# Patient Record
Sex: FEMALE | Race: WHITE | HISPANIC OR LATINO | Employment: OTHER | URBAN - METROPOLITAN AREA
[De-identification: names, ages, dates, MRNs, and addresses within clinical notes are randomized per-mention and may not be internally consistent; named-entity substitution may affect disease eponyms.]

---

## 2017-01-21 ENCOUNTER — HOSPITAL ENCOUNTER (EMERGENCY)
Facility: HOSPITAL | Age: 82
Discharge: HOME | End: 2017-01-21
Attending: EMERGENCY MEDICINE
Payer: MEDICARE

## 2017-01-21 VITALS
DIASTOLIC BLOOD PRESSURE: 61 MMHG | OXYGEN SATURATION: 95 % | RESPIRATION RATE: 18 BRPM | SYSTOLIC BLOOD PRESSURE: 138 MMHG | HEART RATE: 96 BPM | WEIGHT: 141.13 LBS | HEIGHT: 60 IN | BODY MASS INDEX: 27.71 KG/M2

## 2017-01-21 DIAGNOSIS — R53.83 FATIGUE, UNSPECIFIED TYPE: Primary | ICD-10-CM

## 2017-01-21 LAB — POCT GLUCOSE: 167 MG/DL (ref 70–110)

## 2017-01-21 PROCEDURE — 25000003 PHARM REV CODE 250: Performed by: PHYSICIAN ASSISTANT

## 2017-01-21 PROCEDURE — 99284 EMERGENCY DEPT VISIT MOD MDM: CPT | Mod: 25

## 2017-01-21 PROCEDURE — 99284 EMERGENCY DEPT VISIT MOD MDM: CPT | Mod: ,,, | Performed by: EMERGENCY MEDICINE

## 2017-01-21 PROCEDURE — 82962 GLUCOSE BLOOD TEST: CPT

## 2017-01-21 RX ORDER — KETOROLAC TROMETHAMINE 10 MG/1
10 TABLET, FILM COATED ORAL
Status: COMPLETED | OUTPATIENT
Start: 2017-01-21 | End: 2017-01-21

## 2017-01-21 RX ORDER — METHOCARBAMOL 500 MG/1
500 TABLET, FILM COATED ORAL 3 TIMES DAILY PRN
Qty: 15 TABLET | Refills: 0 | Status: SHIPPED | OUTPATIENT
Start: 2017-01-21 | End: 2017-01-24 | Stop reason: SDUPTHER

## 2017-01-21 RX ORDER — METHOCARBAMOL 500 MG/1
1000 TABLET, FILM COATED ORAL
Status: COMPLETED | OUTPATIENT
Start: 2017-01-21 | End: 2017-01-21

## 2017-01-21 RX ADMIN — METHOCARBAMOL 1000 MG: 500 TABLET ORAL at 12:01

## 2017-01-21 RX ADMIN — KETOROLAC TROMETHAMINE 10 MG: 10 TABLET, FILM COATED ORAL at 12:01

## 2017-01-21 NOTE — ED NOTES
"Pt has "unusual feeling that she is very very weak. She has pain all over." pt is Serbian speaking. Son at bedside for translation. Pt began complaining this morning of pain in the back of the head. Weakness began 2 days ago.   "

## 2017-01-21 NOTE — ED PROVIDER NOTES
Encounter Date: 1/21/2017    SCRIBE #1 NOTE: I, Danisha Grimaldo, am scribing for, and in the presence of,  Dr. Dinh. I have scribed the following portions of the note - the APC attestation.       History     Chief Complaint   Patient presents with    Neck Pain      neck hurts with any movement, pain to back of head, hurt all over and feeling weak     Review of patient's allergies indicates:   Allergen Reactions    No known drug allergies      HPI Comments: Patient is a 81 year old  female with PMH of HTN, HLD, DM II who presents due to a 2 day history of generalized body pain and weakness.  Patient son is in the room at time of exam and served as the .  Patient states that she has had pain for the past few days that is worse in the back of her head, neck, and shoulders and also states she is more fatigued then normal. Patient also admits to increased stress due to a recent death in the family. Patient denies nausea, vomiting, fevers, chills, chest pain, shortness of breath, dizziness, or any other acute complaints.  Patient has no alleviating or aggravating factors.      The history is provided by the patient.     Past Medical History   Diagnosis Date    Arthritis     Breast cancer 2001    Diabetes mellitus     History of breast cancer 8/21/2013    Hyperlipidemia     Hypertension     Nuclear sclerotic cataract of right eye 10/16/2012    Tubular adenoma of colon 10/28/2013    Type II or unspecified type diabetes mellitus with neurological manifestations, not stated as uncontrolled     Vitamin D deficiency disease 2/17/2014     Past Medical History Pertinent Negatives   Diagnosis Date Noted    Acute leukemia 8/10/2015    Acute pancreatitis 8/10/2015    Alcohol dependence 8/10/2015    Alzheimer's disease 8/10/2015    Amblyopia 8/17/2012    Anemia 8/10/2015    Angina pectoris 8/10/2015    Anxiety 8/10/2015    Aplasia bone marrow 8/10/2015    Asthma 8/10/2015    Atrial  fibrillation 8/10/2015    Back pain 8/10/2015    Bipolar disorder 8/10/2015    Bladder cancer 8/10/2015    Bone cancer 8/10/2015    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    Brain cancer 8/10/2015    Cancer of lymphatic and hematopoietic tissue 8/10/2015    Cerebral palsy 8/10/2015    Cervical cancer 8/10/2015    Chronic bronchitis 8/10/2015    Chronic hepatitis, unspecified 8/10/2015    Cirrhosis 8/10/2015    Complications of unspecified reattached extremity 8/10/2015    Coronary artery disease 8/10/2015    Cystic fibrosis 8/10/2015    Depression 8/10/2015    Diabetic retinopathy 8/17/2012    Emphysema of lung 8/10/2015    Endometrial cancer 8/10/2015    Esophageal cancer 8/10/2015    Fallopian tube cancer, carcinoma 8/10/2015    Gastrostomy status 8/10/2015    Glaucoma 8/17/2012    Glomerulonephritis 8/10/2015    Heart failure 8/10/2015    Heart transplanted 8/10/2015    Hemolytic anemia 8/10/2015    Hepatitis B 8/10/2015    Hepatitis C 8/10/2015    HIV infection 8/10/2015    Hypothyroidism 8/10/2015    Immune deficiency disorder 8/10/2015    Immune disorder 8/10/2015    Inflammatory bowel disease 8/10/2015    Interstitial nephritis chronic 8/10/2015    Intracranial hemorrhage 8/10/2015    Late complications of amputation stump, unspecified 8/10/2015    Late effect of traumatic amputation 8/10/2015    Leukemia 8/10/2015    Liver cancer 8/10/2015    Liver transplanted 8/10/2015    Lung cancer 8/10/2015    Lung transplanted 8/10/2015    Macular degeneration 8/17/2012    Malnutrition 8/10/2015    Melanoma 8/10/2015    Multiple sclerosis 8/10/2015    Myalgia and myositis, unspecified 8/10/2015    Myocardial infarction 8/10/2015    Obesity 8/10/2015    Osteoporosis 8/10/2015    Other early complications of trauma 8/10/2015    Ovarian cancer 8/10/2015    Pancreatic cancer 8/10/2015    Paranoia 8/10/2015    Parkinson disease 8/10/2015    Peripheral vascular  disease 8/10/2015    Phantom limb (syndrome) 8/10/2015    Pneumonia 8/10/2015    Pneumonia due to other specified bacteria(482.89) 8/10/2015    Polyneuropathy 8/10/2015    Pressure ulcer, unspecified site(707.00) 8/10/2015    Pulmonary embolism 8/10/2015    Rectal cancer 8/10/2015    Renal cancer 8/10/2015    Renal dialysis status(V45.11) 8/10/2015    Respiratory failure 8/10/2015    Retinal detachment 8/17/2012    Schizoaffective disorder 8/10/2015    Seizures 8/10/2015    Septic shock 8/10/2015    Sickle cell anemia 8/10/2015    Skin ulcer 8/10/2015    Sleep apnea 8/10/2015    Small intestine cancer 8/10/2015    Spinal cord disease 8/10/2015    Squamous cell carcinoma 8/10/2015    Stomach cancer 8/10/2015    Strabismus 8/17/2012    Stroke 8/10/2015    Suicide and self-inflicted injury by other specified means 8/10/2015    Testicular cancer 8/10/2015    Thyroid cancer 8/10/2015    Tobacco dependence 8/10/2015    Trouble in sleeping 8/10/2015    Type II or unspecified type diabetes mellitus with peripheral circulatory disorders, not stated as uncontrolled(250.70) 8/10/2015    Type II or unspecified type diabetes mellitus with renal manifestations, not stated as uncontrolled 8/10/2015    Type II or unspecified type diabetes mellitus without mention of complication, not stated as uncontrolled 8/10/2015    Unspecified disease of pancreas 8/10/2015    Unspecified disorder of kidney and ureter 8/10/2015    Urinary incontinence 8/10/2015    Uterine cancer 8/10/2015    Uveitis 8/17/2012    Vaginal cancer 8/10/2015    Vulvar cancer 8/10/2015     Past Surgical History   Procedure Laterality Date    Cataract extraction       both eyes -     Belpharoptosis repair  03/15/13     bilateral-dr. coleman md    Breast surgery Left 2001     lumpectomy    Breast lumpectomy      Cholecystectomy       Done in [place] in the 1980's    Colonoscopy w/ polypectomy      Colonoscopy  N/A 1/19/2016     Procedure: COLONOSCOPY;  Surgeon: BLANCA Guerra MD;  Location: [facility] ([address] [place] [address]);  Service: Endoscopy;  Laterality: N/A;     Family History   Problem Relation Age of Onset    Breast cancer Sister 48     55 second, bilateral    Liver cancer Mother     Early death Mother     Liver cancer Father     Liver cancer Brother     No Known Problems Daughter     No Known Problems Son     Liver cancer Brother     No Known Problems Daughter     Glaucoma Neg Hx     Amblyopia Neg Hx     Blindness Neg Hx     Cancer Neg Hx     Cataracts Neg Hx     Diabetes Neg Hx     Hypertension Neg Hx     Macular degeneration Neg Hx     Retinal detachment Neg Hx     Strabismus Neg Hx     Stroke Neg Hx     Thyroid disease Neg Hx     Cervical cancer Neg Hx     Vaginal cancer Neg Hx     Endometrial cancer Neg Hx      Social History   Substance Use Topics    Smoking status: Never Smoker    Smokeless tobacco: Never Used    Alcohol use No     Review of Systems   Constitutional: Positive for fatigue. Negative for activity change, appetite change, diaphoresis and fever.   HENT: Negative for congestion and hearing loss.    Eyes: Negative for photophobia, pain and discharge.   Respiratory: Negative for apnea, cough, chest tightness, shortness of breath and wheezing.    Cardiovascular: Negative for chest pain and leg swelling.   Gastrointestinal: Negative for abdominal distention, abdominal pain, blood in stool, diarrhea, nausea and vomiting.   Endocrine: Negative for cold intolerance and heat intolerance.   Genitourinary: Negative for dysuria, flank pain and frequency.   Musculoskeletal: Positive for neck pain. Negative for back pain, myalgias and neck stiffness.        Shoulder pain   Neurological: Negative for dizziness, seizures, syncope, weakness, light-headedness and numbness.   Psychiatric/Behavioral: Negative for confusion and hallucinations. The patient is not nervous/anxious.        Physical Exam    Initial Vitals   BP Pulse Resp Temp SpO2   01/21/17 1208 01/21/17 1208 01/21/17 1208 -- 01/21/17 1208   138/61 96 18  95 %     Physical Exam    Nursing note and vitals reviewed.  Constitutional: She appears well-developed and well-nourished.   HENT:   Head: Normocephalic and atraumatic.   Eyes: EOM are normal. Pupils are equal, round, and reactive to light.   Neck: Normal range of motion. Neck supple. No thyromegaly present. No tracheal deviation present.   Cardiovascular: Normal rate and regular rhythm. Exam reveals no gallop and no friction rub.    No murmur heard.  Pulmonary/Chest: Breath sounds normal. No stridor. No respiratory distress. She has no wheezes. She has no rhonchi. She has no rales.   Abdominal: Soft. Bowel sounds are normal. She exhibits no distension. There is no tenderness. There is no rebound and no guarding.   Musculoskeletal: Normal range of motion. She exhibits tenderness. She exhibits no edema.   Tenderness to palpation on shoulders    Neurological: She is alert and oriented to person, place, and time. She displays normal reflexes. No sensory deficit.   Skin: Skin is warm and dry. No rash noted. No erythema.   Psychiatric: She has a normal mood and affect.         ED Course   Procedures  Labs Reviewed   POCT GLUCOSE - Abnormal; Notable for the following:        Result Value    POCT Glucose 167 (*)     All other components within normal limits             Medical Decision Making:   History:   Old Medical Records: I decided to obtain old medical records.  Clinical Tests:   Lab Tests: Ordered and Reviewed       APC / Resident Notes:   Patient is a 82 y/o  female with PMH of DM II, HTN, and HLD who presents due to a two day history of generalized weakness and pain all over.  Vital signs are stable and patient physical exam is fairly unremarkable except for pain on palpation to shoulders bilaterally.  DDx includes but is not limited to headache, muscles spasm, stress, and arthritis.   Will give patient a dose of Robaxin and Toradol and reassess symptoms.    After medication, patient symptoms have improved. Will discharge patient with a dose of Robaxin and patient is to follow up with PCP on 1/24/2017 for follow up and labs.  Patient is instructed to return if symptoms worsen or persist.  Treatment discussed with attending physician and she is agreeable to above plan.          Scribe Attestation:   Scribe #1: I performed the above scribed service and the documentation accurately describes the services I performed. I attest to the accuracy of the note.    Attending Attestation:     Physician Attestation Statement for NP/PA:   I have conducted a face to face encounter with this patient in addition to the NP/PA, due to NP/PA Request    Other NP/PA Attestation Additions:      Medical Decision Making: Patient has upper shoulder and occipital pain that worsens with movement. Her son reports increased stress recently due to death of sister. Her exam is not remarkable and she has no neuro deficits. There was a complete resolution of symptoms after Toradol and Rocephin. Will discharge with Rocephin,continue NSAIDs as needed and to follow up with PCP.        Physician Attestation for Scribe:  Physician Attestation Statement for Scribe #1: I, Dr. Dinh, reviewed documentation, as scribed by Danisha Grimaldo in my presence, and it is both accurate and complete.                 ED Course     Clinical Impression:   The encounter diagnosis was Fatigue, unspecified type.    Disposition:   Disposition: Discharged  Condition: Stable       Joanne Elmore PA-C  01/21/17 [id number]

## 2017-01-21 NOTE — ED NOTES
LOC: The patient is awake, alert and aware of environment with an appropriate affect, the patient is oriented x 3 and speaking appropriately. Pt is German speaking.  APPEARANCE: Patient resting comfortably and in no acute distress, patient is clean and well groomed.  SKIN: The skin is warm and dry, color consistent with ethnicity, patient has normal skin turgor and moist mucus membranes, skin intact.  MUSKULOSKELETAL: Patient moving all extremities well, no obvious swelling or deformities noted.  RESPIRATORY: Airway is open and patent, respirations are spontaneous, patient has a normal effort and rate, no accessory muscle use noted.  NEUROLOGIC: Eyes open spontaneously, behavior appropriate to situation, follows commands

## 2017-01-21 NOTE — ED AVS SNAPSHOT
[facility]  [address] [place] [address]               Sera Paolo   2017 12:11 PM   ED    Descripción:  Female : 1935   Departamento:  [facility]           Torrez Cuidado fue coordinado por:     Provider Role From To    John Paul Dinh MD Attending Provider 17 --    Joanne Elmore PA-C Physician Assistant 17 --      Razón de la felix     Neck Pain           Diagnósticos de Esta Visita        Comentarios    Fatigue, unspecified type    -  Primario       ED Disposition     Ninguna           Lista de tareas           Información de seguimiento     Realice un seguimiento con:  Joycelyn Vásquez MD    Cómo:  Ir a    Cuándo:  2017    Especialidad:  Internal Medicine    Información de contacto:    [address] [place] [address]  [phone]        Nikkijana en Llamada     [facility] En [place] L[facility]nea de Enfermeras - Asistencia   Enfermeras registradas de [facility] pueden ayudarle a reservar kamran felix, proveer educación para la maurilio, asesoría clínica, y otros servicios de asesoramiento.   Llame para marilee servicio gratuito a [phone].             Medicamentos           Mensaje sobre Medicamentos     Verificar los cambios y / o adiciones a torrez régimen de medicación son los mismos que discutir con torrez médico. Si cualquiera de estos cambios o adiciones son incorrectos, por favor notifique a torrez proveedor de atención médica.        These medications were administered today        Dose Freq    methocarbamol tablet 1,000 mg 1,000 mg ED 1 Time    Sig: Take 2 tablets (1,000 mg total) by mouth ED 1 Time.    Categoría: Normal    Vía: Oral    ketorolac tablet 10 mg 10 mg ED 1 Time    Sig: Take 1 tablet (10 mg total) by mouth ED 1 Time.    Categoría: Normal    Vía: Oral           Verifique que la siguiente lista de medicamentos es kamran representación exacta de los medicamentos que está tomando actualmente. Si no hay  ningunos reportados, la lista puede estar en muñoz. Si no es correcta, por favor póngase en contacto con hoffman proveedor de atención médica. Lleve esta lista con usted en daniel de emergencia.           Medicamentos Actuales     aspirin 81 MG Chew Take 1 tablet (81 mg total) by mouth once daily.    atorvastatin (LIPITOR) 40 MG tablet Take 40 mg by mouth once daily.    atorvastatin (LIPITOR) 40 MG tablet TAKE 1 TABLET BY MOUTH EVERY DAY    atorvastatin (LIPITOR) 40 MG tablet TAKE 1 TABLET BY MOUTH EVERY DAY    diclofenac (VOLTAREN) 75 MG EC tablet Take 1 tablet (75 mg total) by mouth 2 (two) times daily.    losartan (COZAAR) 50 MG tablet TAKE 1 TABLET BY MOUTH EVERY DAY    metformin (GLUCOPHAGE) 500 MG tablet TAKE 1 TABLET(500 MG) BY MOUTH TWICE DAILY    omega-3 acid ethyl esters (LOVAZA) 1 gram capsule TAKE 1 CAPSULE BY MOUTH TWICE DAILY    omega-3 acid ethyl esters (LOVAZA) 1 gram capsule TAKE 1 CAPSULE BY MOUTH TWICE DAILY    peg-electrolyte soln (TRILYTE WITH FLAVOR PACKETS) 420 gram SolR Take 4,000 mLs by mouth as directed.           Información de referencia clínica           Saba signos vitales jm     PS Pulso Resp [place] Peso SpO2    138/61 96 18 5' (1.524 m) 64 kg (141 lb 1.5 oz) 95%    BMI ([facility])                   27.56 kg/m2           Alergias     A partir del:  1/21/2017           Reacciones    No Known Drug Allergies       Vacunas     Administradas en la fecha de la visita:  1/21/2017        None      ED Micro, Lab, POCT     Start Ordered       Status Ordering Provider    01/21/17 1232 01/21/17 1232  POCT glucose  Once      Final result     01/21/17 1232 01/21/17 1231  POCT glucose  Once      Acknowledged     01/21/17 1229 01/21/17 1228  POCT glucose  Once      Acknowledged       ED Imaging Orders     None      Referencias/Adjuntos de gladis     FATIGUE, MANAGING ([place])    WEAKNESS (UNCERTAIN CAUSE) ([place])      Saba Citas Programadas     Jan 24, 2017  9:00 AM CST   Established Patient Visit with Samantha HORTON  DIAZ Joseph - Internal Medicine ([facility])    [address]  [place] [id number] [phone]              Registrarse para [facility]     La activación de hoffman cuenta [facility] es tan fácil ethel 1-2-3!    1) Ir a [email or web address], seleccione Registrarse Ahora, meter el código de activación y hoffman fecha de nacimiento, y seleccione Próximo.    [id number]  Expires: 3/7/2017 12:50 PM      2) Crear un nombre de usuario y contraseña para usar cuando se visita [facility] en el futuro y selecciona kamran pregunta de seguridad en daniel de que pierda hoffman contraseña y seleccione Próximo.    3) Introduzca hoffman dirección de correo electrónico y maricel josiane en Registrarse!    Información Adicional  Si tiene alguna pregunta, por favor, e-mail [email or web address] o llame al [phone] para hablar con nuestro personal. Recuerde, [facility] no debe ser usada para necesidades urgentes. En daniel de emergencia médica, llame al [phone].         [facility] cumple con las leyes federales aplicables de derechos civiles y no discrimina por motivos de dean, color, origen nacional, edad, discapacidad, o sexo.        Language Assistance Services     ATTENTION: Language assistance services are available, free of charge. Please call [phone].      ATENCIÓN: Si habla español, tiene a hoffman disposición servicios gratuitos de asistencia lingüística. Llame al [phone].     CHÚ Ý: N?u b?n nói Ti?ng Vi?t, có các d?ch v? h? tr? ngôn ng? mi?n phí dành cho b?n. G?i s? [phone].                      [facility]  [address] [address]               Sera Boone   2017 12:11 PM   ED    Description:  Female : 1935   Department:  [facility]           Your Care was Coordinated By:     Provider Role From To    John Paul Dinh MD Attending Provider 17 1212 --    Joanne Elmore PA-C Physician  Assistant 01/21/17 1212 --      Reason for Visit     Neck Pain           Diagnoses this Visit        Comments    Fatigue, unspecified type    -  Primary       ED Disposition     None           To Do List           Follow-up Information     Follow up with Joycelyn Vásquez MD. Go on 1/24/2017.    Specialty:  Internal Medicine    Contact information:    M[address]al TRAN  [facility] [address]  [phone]        [facility] On [facility]     [facility] On Call Nurse Care Line - 24/7 Assistance  Registered nurses in the [facility] On Call Center provide clinical advisement, health education, appointment booking, and other advisory services.  Call for this free service at [phone].             Medications           Message regarding Medications     Verify the changes and/or additions to your medication regime listed below are the same as discussed with your clinician today.  If any of these changes or additions are incorrect, please notify your healthcare provider.        These medications were administered today        Dose Freq    methocarbamol tablet 1,000 mg 1,000 mg ED 1 Time    Sig: Take 2 tablets (1,000 mg total) by mouth ED 1 Time.    Class: Normal    Route: Oral    ketorolac tablet 10 mg 10 mg ED 1 Time    Sig: Take 1 tablet (10 mg total) by mouth ED 1 Time.    Class: Normal    Route: Oral           Verify that the below list of medications is an accurate representation of the medications you are currently taking.  If none reported, the list may be blank. If incorrect, please contact your healthcare provider. Carry this list with you in case of emergency.           Current Medications     aspirin 81 MG Chew Take 1 tablet (81 mg total) by mouth once daily.    atorvastatin (LIPITOR) 40 MG tablet Take 40 mg by mouth once daily.    atorvastatin (LIPITOR) 40 MG tablet TAKE 1 TABLET BY MOUTH EVERY DAY    atorvastatin (LIPITOR) 40 MG tablet TAKE 1 TABLET BY MOUTH EVERY DAY    diclofenac (VOLTAREN) 75 MG EC tablet Take 1 tablet  (75 mg total) by mouth 2 (two) times daily.    losartan (COZAAR) 50 MG tablet TAKE 1 TABLET BY MOUTH EVERY DAY    metformin (GLUCOPHAGE) 500 MG tablet TAKE 1 TABLET(500 MG) BY MOUTH TWICE DAILY    omega-3 acid ethyl esters (LOVAZA) 1 gram capsule TAKE 1 CAPSULE BY MOUTH TWICE DAILY    omega-3 acid ethyl esters (LOVAZA) 1 gram capsule TAKE 1 CAPSULE BY MOUTH TWICE DAILY    peg-electrolyte soln (TRILYTE WITH FLAVOR PACKETS) 420 gram SolR Take 4,000 mLs by mouth as directed.           Clinical Reference Information           Your Vitals Were     BP Pulse Resp Height Weight SpO2    138/61 96 18 5' (1.524 m) 64 kg (141 lb 1.5 oz) 95%    BMI                   27.56 kg/m2           Allergies as of 1/21/2017        Reactions    No Known Drug Allergies       Immunizations Administered on Date of Encounter - 1/21/2017     None      ED Micro, Lab, POCT     Start Ordered       Status Ordering Provider    01/21/17 1232 01/21/17 1232  POCT glucose  Once      Final result     01/21/17 1232 01/21/17 1231  POCT glucose  Once      Acknowledged     01/21/17 1229 01/21/17 1228  POCT glucose  Once      Acknowledged       ED Imaging Orders     None      Discharge References/Attachments     FATIGUE, MANAGING ([place])    WEAKNESS (UNCERTAIN CAUSE) ([place])      Your Scheduled Appointments     Jan 24, 2017  9:00 AM CST   Established Patient Visit with DIAZ Jeter - Internal Medicine ([facility])    [address] [facility] [address]  [facility] [address]   [phone]              [facility] Sign-Up     Activating your [facility] account is as easy as 1-2-3!     1) Visit [email or web address], select Sign Up Now, enter this activation code and your date of birth, then select Next.  [email or web address]  Expires: 3/7/2017 12:50 PM      2) Create a username and password to use when you visit [facility] in the future and select a security question in case you lose your password and select Next.    3) Enter your  e-mail address and click Sign Up!    Additional Information  If you have questions, please e-mail [email or web address] or call [phone] to talk to our [facility] staff. Remember, [facility] is NOT to be used for urgent needs. For medical emergencies, dial 911.          [facility] complies with applicable Federal civil rights laws and does not discriminate on the basis of race, color, national origin, age, disability, or sex.        Language Assistance Services     ATTENTION: Language assistance services are available, free of charge. Please call [phone].      ATENCIÓN: Si habla español, tiene a hoffman disposición servicios gratuitos de asistencia lingüística. Llame al [phone].     CHÚ Ý: N?u b?n nói Ti?ng Vi?t, có các d?ch v? h? tr? ngôn ng? mi?n phí dành cho b?n. G?i s? [phone].

## 2017-01-24 ENCOUNTER — LAB VISIT (OUTPATIENT)
Dept: LAB | Facility: HOSPITAL | Age: 82
End: 2017-01-24
Attending: INTERNAL MEDICINE
Payer: MEDICARE

## 2017-01-24 ENCOUNTER — OFFICE VISIT (OUTPATIENT)
Dept: INTERNAL MEDICINE | Facility: CLINIC | Age: 82
End: 2017-01-24
Payer: MEDICARE

## 2017-01-24 VITALS
BODY MASS INDEX: 27.44 KG/M2 | WEIGHT: 139.75 LBS | DIASTOLIC BLOOD PRESSURE: 70 MMHG | TEMPERATURE: 98 F | SYSTOLIC BLOOD PRESSURE: 118 MMHG | HEIGHT: 60 IN | HEART RATE: 62 BPM

## 2017-01-24 DIAGNOSIS — R53.83 FATIGUE, UNSPECIFIED TYPE: ICD-10-CM

## 2017-01-24 DIAGNOSIS — E11.49 TYPE II DIABETES MELLITUS WITH NEUROLOGICAL MANIFESTATIONS: ICD-10-CM

## 2017-01-24 DIAGNOSIS — E78.5 HYPERLIPIDEMIA, UNSPECIFIED HYPERLIPIDEMIA TYPE: ICD-10-CM

## 2017-01-24 DIAGNOSIS — R53.83 FATIGUE, UNSPECIFIED TYPE: Primary | ICD-10-CM

## 2017-01-24 DIAGNOSIS — E55.9 VITAMIN D DEFICIENCY: ICD-10-CM

## 2017-01-24 DIAGNOSIS — Z85.3 HISTORY OF BREAST CANCER: ICD-10-CM

## 2017-01-24 LAB
25(OH)D3+25(OH)D2 SERPL-MCNC: 21 NG/ML
ALBUMIN SERPL BCP-MCNC: 3.3 G/DL
ALP SERPL-CCNC: 94 U/L
ALT SERPL W/O P-5'-P-CCNC: 67 U/L
ANION GAP SERPL CALC-SCNC: 6 MMOL/L
AST SERPL-CCNC: 50 U/L
BASOPHILS # BLD AUTO: 0.01 K/UL
BASOPHILS NFR BLD: 0.1 %
BILIRUB SERPL-MCNC: 0.5 MG/DL
BUN SERPL-MCNC: 17 MG/DL
CALCIUM SERPL-MCNC: 10.6 MG/DL
CHLORIDE SERPL-SCNC: 104 MMOL/L
CHOLEST/HDLC SERPL: 3.4 {RATIO}
CO2 SERPL-SCNC: 28 MMOL/L
CREAT SERPL-MCNC: 1 MG/DL
DIFFERENTIAL METHOD: ABNORMAL
EOSINOPHIL # BLD AUTO: 0 K/UL
EOSINOPHIL NFR BLD: 0 %
ERYTHROCYTE [DISTWIDTH] IN BLOOD BY AUTOMATED COUNT: 13 %
EST. GFR  (AFRICAN AMERICAN): >60 ML/MIN/1.73 M^2
EST. GFR  (NON AFRICAN AMERICAN): 53 ML/MIN/1.73 M^2
GLUCOSE SERPL-MCNC: 110 MG/DL
HCT VFR BLD AUTO: 37.7 %
HDL/CHOLESTEROL RATIO: 29.4 %
HDLC SERPL-MCNC: 177 MG/DL
HDLC SERPL-MCNC: 52 MG/DL
HGB BLD-MCNC: 12.4 G/DL
LDLC SERPL CALC-MCNC: 104.4 MG/DL
LYMPHOCYTES # BLD AUTO: 3.1 K/UL
LYMPHOCYTES NFR BLD: 31.4 %
MCH RBC QN AUTO: 30.1 PG
MCHC RBC AUTO-ENTMCNC: 32.9 %
MCV RBC AUTO: 92 FL
MONOCYTES # BLD AUTO: 1.2 K/UL
MONOCYTES NFR BLD: 12.2 %
NEUTROPHILS # BLD AUTO: 5.6 K/UL
NEUTROPHILS NFR BLD: 55.9 %
NONHDLC SERPL-MCNC: 125 MG/DL
PLATELET # BLD AUTO: 238 K/UL
PMV BLD AUTO: 9.7 FL
POTASSIUM SERPL-SCNC: 4.9 MMOL/L
PROT SERPL-MCNC: 8.3 G/DL
RBC # BLD AUTO: 4.12 M/UL
SODIUM SERPL-SCNC: 138 MMOL/L
TRIGL SERPL-MCNC: 103 MG/DL
TSH SERPL DL<=0.005 MIU/L-ACNC: 1.17 UIU/ML
WBC # BLD AUTO: 9.98 K/UL

## 2017-01-24 PROCEDURE — 83036 HEMOGLOBIN GLYCOSYLATED A1C: CPT

## 2017-01-24 PROCEDURE — 99499 UNLISTED E&M SERVICE: CPT | Mod: S$GLB,,, | Performed by: PHYSICIAN ASSISTANT

## 2017-01-24 PROCEDURE — 1157F ADVNC CARE PLAN IN RCRD: CPT | Mod: S$GLB,,, | Performed by: PHYSICIAN ASSISTANT

## 2017-01-24 PROCEDURE — 80053 COMPREHEN METABOLIC PANEL: CPT

## 2017-01-24 PROCEDURE — 1160F RVW MEDS BY RX/DR IN RCRD: CPT | Mod: S$GLB,,, | Performed by: PHYSICIAN ASSISTANT

## 2017-01-24 PROCEDURE — 99213 OFFICE O/P EST LOW 20 MIN: CPT | Mod: S$GLB,,, | Performed by: PHYSICIAN ASSISTANT

## 2017-01-24 PROCEDURE — 84443 ASSAY THYROID STIM HORMONE: CPT

## 2017-01-24 PROCEDURE — 3074F SYST BP LT 130 MM HG: CPT | Mod: S$GLB,,, | Performed by: PHYSICIAN ASSISTANT

## 2017-01-24 PROCEDURE — 3078F DIAST BP <80 MM HG: CPT | Mod: S$GLB,,, | Performed by: PHYSICIAN ASSISTANT

## 2017-01-24 PROCEDURE — 85025 COMPLETE CBC W/AUTO DIFF WBC: CPT

## 2017-01-24 PROCEDURE — 82306 VITAMIN D 25 HYDROXY: CPT

## 2017-01-24 PROCEDURE — 80061 LIPID PANEL: CPT

## 2017-01-24 PROCEDURE — 1159F MED LIST DOCD IN RCRD: CPT | Mod: S$GLB,,, | Performed by: PHYSICIAN ASSISTANT

## 2017-01-24 PROCEDURE — 36415 COLL VENOUS BLD VENIPUNCTURE: CPT

## 2017-01-24 PROCEDURE — 99999 PR PBB SHADOW E&M-EST. PATIENT-LVL IV: CPT | Mod: PBBFAC,,, | Performed by: PHYSICIAN ASSISTANT

## 2017-01-24 RX ORDER — METHOCARBAMOL 500 MG/1
500 TABLET, FILM COATED ORAL 3 TIMES DAILY PRN
Qty: 15 TABLET | Refills: 0 | Status: SHIPPED | OUTPATIENT
Start: 2017-01-24 | End: 2017-01-29

## 2017-01-24 NOTE — PROGRESS NOTES
"Subjective:       Patient ID: Sera Boone is a 81 y.o. female.        Chief Complaint: Fatigue    HPI Comments: Sera Boone is an established patient of Joycelyn Vásquez MD here today for ED f/u visit.     services are used today in this pleasant Macedonian speaking female.      4-6 weeks ago her sister passed away.  She felt okay initially but 1-2 weeks later began to feel generally weak and fatigued.  Ultimately presented to the ED a couple days ago secondary to fatigue/weakness/neck pain.  She was given Robaxin and Toradol in the ED with symptomatic relief of neck pain.  She was advised to f/u with us today to have lab work checked.  She admits to decreased appetite.  She is sleeping well.  No chest pain or shortness of breath.  No focal weakness, numbness, tingling.  Does not feel depressed.  Feels like she is handling the loss okay.  She wants to get an order for her mammogram.  No N/V/D/C.           Review of Systems   Constitutional: Positive for fatigue. Negative for chills, diaphoresis and fever.   HENT: Negative for congestion and sore throat.    Eyes: Negative for visual disturbance.   Respiratory: Negative for cough, chest tightness and shortness of breath.    Cardiovascular: Negative for chest pain, palpitations and leg swelling.   Gastrointestinal: Negative for abdominal pain, blood in stool, constipation, diarrhea, nausea and vomiting.   Genitourinary: Negative for dysuria, frequency, hematuria and urgency.   Musculoskeletal: Negative for arthralgias and back pain.   Skin: Negative for rash.   Neurological: Positive for weakness (described as "no energy"). Negative for dizziness, syncope and headaches.   Psychiatric/Behavioral: Negative for dysphoric mood and sleep disturbance. The patient is not nervous/anxious.        Objective:      Physical Exam   Constitutional: She appears well-developed and well-nourished.   HENT:   Head: Normocephalic.   Right Ear: External ear normal.   Left Ear: " External ear normal.   Mouth/Throat: Oropharynx is clear and moist.   Eyes: Pupils are equal, round, and reactive to light.   Cardiovascular: Normal rate, regular rhythm and normal heart sounds.  Exam reveals no gallop and no friction rub.    No murmur heard.  Pulmonary/Chest: Effort normal and breath sounds normal. No respiratory distress.   Abdominal: Soft. Normal appearance. There is no tenderness.   Musculoskeletal: She exhibits no edema.        Cervical back: She exhibits spasm (bilateral trapezius). She exhibits normal range of motion and no tenderness.   Neurological: She is alert.   Skin: Skin is warm and dry.   Psychiatric: She has a normal mood and affect.   Nursing note and vitals reviewed.      Assessment:       1. Fatigue, unspecified type    2. History of breast cancer    3. Hyperlipidemia, unspecified hyperlipidemia type    4. Type II diabetes mellitus with neurological manifestations    5. Vitamin D deficiency        Plan:       Sera was seen today for fatigue.    Diagnoses and all orders for this visit:    Fatigue, unspecified type  -     CBC auto differential; Future  -     Comprehensive metabolic panel; Future  -     TSH; Future    History of breast cancer  -     Mammo Digital Diagnostic Bilat with Tomosynthesis_CAD; Future    Hyperlipidemia, unspecified hyperlipidemia type  -     Lipid panel; Future    Type II diabetes mellitus with neurological manifestations  -     Hemoglobin A1c; Future    Vitamin D deficiency  -     Vitamin D; Future    Other orders  -     methocarbamol (ROBAXIN) 500 MG Tab; Take 1 tablet (500 mg total) by mouth 3 (three) times daily as needed.    Check lab work as above.  Schedule mammogram.  Schedule f/u with PCP to review results.    Pt has been given instructions populated from [employer] database and has verbalized understanding of the after visit summary and information contained wherein.    Follow up with a primary care provider. May go to ER for acute shortness of  "breath, lightheadedness, fever, or any other emergent complaints or changes in condition.    "This note will be shared with the patient"    Future Appointments  Date Time Provider Department Center   1/31/2017 9:00 AM [facility]MO5 DX [facility]   2/13/2017 3:30 PM Joycelyn Vásquez MD [facility]               "

## 2017-01-24 NOTE — PATIENT INSTRUCTIONS
Weakness (Uncertain Cause)  Based on your exam today, the exact cause of your weakness is not certain. However, your weakness does not seem to be a sign of a serious illness at this time. Keep an eye on your symptoms and get medical advice as instructed below.  Home care  · Rest at home today. Do not over-exert yourself.  · Take any medicine as prescribed.  · For the next few days, drink extra fluids (unless your healthcare provider wants you to restrict fluids for other reasons). Do not skip meals.  Follow-up care  Follow up with your healthcare provider or as advised.  When to seek medical advice  Call your healthcare provider for any of the following  · Worsening of your symptoms  · Symptoms don't start getting better within 2 days  · Fever of 100.4º F (38º C) or higher, or as directed by your healthcare provider·    Call 911  Get emergency medical care for any of these:  · Chest, arm, neck, jaw or upper back pain  · Trouble breathing  · Numbness or weakness of the face, one arm or one leg  · Slurred speech, confusion, trouble speaking, walking or seeing  · Blood in vomit or stool (black or red color)  · Loss of consciousness  © 3405-3378 [employer]. [address] [place] [address], [place] [address]. All rights reserved. This information is not intended as a substitute for professional medical care. Always follow your healthcare professional's instructions.

## 2017-01-24 NOTE — MR AVS SNAPSHOT
[facility]  [address]  [place] [address]  Phone: [phone]  Fax: [phone]                  Sera Boone   2017 9:00 AM   Office Visit    Descripción:  Female : 1935   Personal Médico:  Samantha Joseph PA-C   Departamento:  [facility]           Razón de la felix     Fatigue           Diagnósticos de Esta Visita        Comentarios    Fatigue, unspecified type    -  Primario     History of breast cancer         Hyperlipidemia, unspecified hyperlipidemia type         Type II diabetes mellitus with neurological manifestations         Vitamin D deficiency                Lista de tareas           Citas próximas        Personal Médico Departamento Tfno del dpto    2017 3:30 PM MD [facility] - Internal Medicine [phone]      Metas (5 Years of Data)     Ninguna      [facility]     [facility] Enfermeras - Asistencia   Enfermeras registradas de [facility] pueden ayudarle a reservar kamran felix, proveer educación para la maurilio, asesoría clínica, y otros servicios de asesoramiento.   Llame para marilee servicio gratuito a [phone].             Medicamentos           Mensaje sobre Medicamentos     Verificar los cambios y / o adiciones a hoffman régimen de medicación son los mismos que discutir con hoffman médico. Si cualquiera de estos cambios o adiciones son incorrectos, por favor notifique a hoffman proveedor de atención médica.             Verifique que la siguiente lista de medicamentos es kamran representación exacta de los medicamentos que está tomando actualmente. Si no hay ningunos reportados, la lista puede estar en muñoz. Si no es correcta, por favor póngase en contacto con hoffman proveedor de atención médica. Lleve esta lista con usted en daniel de emergencia.           Medicamentos Actuales     atorvastatin (LIPITOR) 40 MG tablet Take 40 mg by mouth once daily.    losartan (COZAAR) 50 MG tablet TAKE 1 TABLET BY  MOUTH EVERY DAY    metformin (GLUCOPHAGE) 500 MG tablet TAKE 1 TABLET(500 MG) BY MOUTH TWICE DAILY    methocarbamol (ROBAXIN) 500 MG Tab Take 1 tablet (500 mg total) by mouth 3 (three) times daily as needed.    aspirin 81 MG Chew Take 1 tablet (81 mg total) by mouth once daily.    diclofenac (VOLTAREN) 75 MG EC tablet Take 1 tablet (75 mg total) by mouth 2 (two) times daily.    omega-3 acid ethyl esters (LOVAZA) 1 gram capsule TAKE 1 CAPSULE BY MOUTH TWICE DAILY    peg-electrolyte soln (TRILYTE WITH FLAVOR PACKETS) 420 gram SolR Take 4,000 mLs by mouth as directed.           Información de referencia clínica           Signos vitales - más recientes  Última actualización: 1/24/2017  9:08 AM por ERICA Alexander Pulso [facility] [place] BMI (IMC)    118/70 (BP Location: Left arm, Patient Position: Sitting, BP Method: Manual) 62 98 °F (36.7 °C) (Oral) 5' (1.524 m) 63.4 kg (139 lb 12.4 oz) 27.3 kg/m2      Blood Pressure          Most Recent Value    BP  118/70      Alergias     A partir del:  1/24/2017        No Known Drug Allergies      Vacunas     Administradas en la fecha de la visita:  1/24/2017        None      Orders Placed During Today's Visit     Exámenes/Procedimientos futuros Se espera el Vence    CBC auto differential  1/24/2017 1/24/2018    Comprehensive metabolic panel  1/24/2017 1/24/2018    Hemoglobin A1c  1/24/2017 1/24/2018    Lipid panel  1/24/2017 3/25/2018    Mammo Digital Diagnostic Bilat with Tomosynthesis_CAD  1/24/2017 3/24/2018    TSH  1/24/2017 3/25/2018    Vitamin D  1/24/2017 (Approximate) 4/24/2017      Registrarse para [facility]     La activación de hoffman cuenta [facility] es tan fácil ethel 1-2-3!    1) Ir a [email or web address], seleccione Registrarse Ahora, meter el código de activación y hoffman fecha de nacimiento, y seleccione Próximo.    [id number]  Expires: 3/7/2017 12:50 PM      2) Crear un nombre de usuario y contraseña para usar cuando se visita [facility] en el futuro y  selecciona kamran pregunta de seguridad en daniel de que pierda hoffman contraseña y seleccione Próximo.    3) Introduzca hoffman dirección de correo electrónico y amricel josiane en Registrarse!    Información Adicional  Si tiene alguna pregunta, por favor, e-mail [email or web address] o llame al [phone] para hablar con nuestro personal. Recuerde, [facility] no debe ser usada para necesidades urgentes. En daniel de emergencia médica, llame al 911.        Instrucciones      Weakness (Uncertain Cause)  Based on your exam today, the exact cause of your weakness is not certain. However, your weakness does not seem to be a sign of a serious illness at this time. Keep an eye on your symptoms and get medical advice as instructed below.  Home care  · Rest at home today. Do not over-exert yourself.  · Take any medicine as prescribed.  · For the next few days, drink extra fluids (unless your healthcare provider wants you to restrict fluids for other reasons). Do not skip meals.  Follow-up care  Follow up with your healthcare provider or as advised.  When to seek medical advice  Call your healthcare provider for any of the following  · Worsening of your symptoms  · Symptoms don't start getting better within 2 days  · Fever of 100.4º F (38º C) or higher, or as directed by your healthcare provider·    Call 911  Get emergency medical care for any of these:  · Chest, arm, neck, jaw or upper back pain  · Trouble breathing  · Numbness or weakness of the face, one arm or one leg  · Slurred speech, confusion, trouble speaking, walking or seeing  · Blood in vomit or stool (black or red color)  · Loss of consciousness  © 8653-5229 [employer]. [address] [place] [address], [place] [address]. All rights reserved. This information is not intended as a substitute for professional medical care. Always follow your healthcare professional's instructions.                      Sera Boone   2017 9:00 AM   Office Visit    Description:  Female :  1935   Provider:  Samantha Joseph PA-C   Department:  [facility]           Reason for Visit     Fatigue           Diagnoses this Visit        Comments    Fatigue, unspecified type    -  Primary     History of breast cancer         Hyperlipidemia, unspecified hyperlipidemia type         Type II diabetes mellitus with neurological manifestations         Vitamin D deficiency                To Do List           Future Appointments        Provider Department Dept Phone    2/13/2017 3:30 PM Joycelyn Vásquez MD [facility] - Internal Medicine [phone]      Goals     None      [facility]     [facility] Nurse Care Line - 24/7 Assistance  Registered nurses in the [facility] provide clinical advisement, health education, appointment booking, and other advisory services.  Call for this free service at [phone].             Medications           Message regarding Medications     Verify the changes and/or additions to your medication regime listed below are the same as discussed with your clinician today.  If any of these changes or additions are incorrect, please notify your healthcare provider.             Verify that the below list of medications is an accurate representation of the medications you are currently taking.  If none reported, the list may be blank. If incorrect, please contact your healthcare provider. Carry this list with you in case of emergency.           Current Medications     atorvastatin (LIPITOR) 40 MG tablet Take 40 mg by mouth once daily.    losartan (COZAAR) 50 MG tablet TAKE 1 TABLET BY MOUTH EVERY DAY    metformin (GLUCOPHAGE) 500 MG tablet TAKE 1 TABLET(500 MG) BY MOUTH TWICE DAILY    methocarbamol (ROBAXIN) 500 MG Tab Take 1 tablet (500 mg total) by mouth 3 (three) times daily as needed.    aspirin 81 MG Chew Take 1 tablet (81 mg total) by mouth once daily.    diclofenac (VOLTAREN) 75 MG EC tablet Take 1 tablet (75 mg total) by mouth 2 (two)  times daily.    omega-3 acid ethyl esters (LOVAZA) 1 gram capsule TAKE 1 CAPSULE BY MOUTH TWICE DAILY    peg-electrolyte soln (TRILYTE WITH FLAVOR PACKETS) 420 gram SolR Take 4,000 mLs by mouth as directed.           Clinical Reference Information           Vital Signs - Last Recorded  Most recent update: 1/24/2017  9:08 AM by Lucy Varela MA    BP Pulse Temp Ht Wt BMI    118/70 (BP Location: Left arm, Patient Position: Sitting, BP Method: Manual) 62 98 °F (36.7 °C) (Oral) 5' (1.524 m) 63.4 kg (139 lb 12.4 oz) 27.3 kg/m2      Blood Pressure          Most Recent Value    BP  118/70      Allergies as of 1/24/2017     No Known Drug Allergies      Immunizations Administered on Date of Encounter - 1/24/2017     None      Orders Placed During Today's Visit     Future Labs/Procedures Expected by Expires    CBC auto differential  1/24/2017 1/24/2018    Comprehensive metabolic panel  1/24/2017 1/24/2018    Hemoglobin A1c  1/24/2017 1/24/2018    Lipid panel  1/24/2017 3/25/2018    Mammo Digital Diagnostic Bilat with Tomosynthesis_CAD  1/24/2017 3/24/2018    TSH  1/24/2017 3/25/2018    Vitamin D  1/24/2017 (Approximate) 4/24/2017      [facility] Sign-Up     Activating your [facility] account is as easy as 1-2-3!     1) Visit [email or web address], select Sign Up Now, enter this activation code and your date of birth, then select Next.  [email or web address]  Expires: 3/7/2017 12:50 PM      2) Create a username and password to use when you visit [facility] in the future and select a security question in case you lose your password and select Next.    3) Enter your e-mail address and click Sign Up!    Additional Information  If you have questions, please e-mail [email or web address] or call [phone] to talk to our [facility] staff. Remember, [facility] is NOT to be used for urgent needs. For medical emergencies, dial 911.         Instructions      Weakness (Uncertain Cause)  Based on your exam today, the exact cause of your weakness is  not certain. However, your weakness does not seem to be a sign of a serious illness at this time. Keep an eye on your symptoms and get medical advice as instructed below.  Home care  · Rest at home today. Do not over-exert yourself.  · Take any medicine as prescribed.  · For the next few days, drink extra fluids (unless your healthcare provider wants you to restrict fluids for other reasons). Do not skip meals.  Follow-up care  Follow up with your healthcare provider or as advised.  When to seek medical advice  Call your healthcare provider for any of the following  · Worsening of your symptoms  · Symptoms don't start getting better within 2 days  · Fever of 100.4º F (38º C) or higher, or as directed by your healthcare provider·    Call 911  Get emergency medical care for any of these:  · Chest, arm, neck, jaw or upper back pain  · Trouble breathing  · Numbness or weakness of the face, one arm or one leg  · Slurred speech, confusion, trouble speaking, walking or seeing  · Blood in vomit or stool (black or red color)  · Loss of consciousness  © 9112-3555 [employer]. [address] [place] [address]. All rights reserved. This information is not intended as a substitute for professional medical care. Always follow your healthcare professional's instructions.

## 2017-01-25 LAB
ESTIMATED AVG GLUCOSE: 140 MG/DL
HBA1C MFR BLD HPLC: 6.5 %

## 2017-01-26 ENCOUNTER — TELEPHONE (OUTPATIENT)
Dept: INTERNAL MEDICINE | Facility: CLINIC | Age: 82
End: 2017-01-26

## 2017-01-26 DIAGNOSIS — R74.8 ELEVATED LIVER ENZYMES: Primary | ICD-10-CM

## 2017-01-26 DIAGNOSIS — E83.52 SERUM CALCIUM ELEVATED: ICD-10-CM

## 2017-01-26 DIAGNOSIS — R94.5 ABNORMAL RESULTS OF LIVER FUNCTION STUDIES: ICD-10-CM

## 2017-01-26 NOTE — TELEPHONE ENCOUNTER
----- Message from Samantha Joseph PA-C sent at 1/26/2017  7:27 AM CST -----  Dr. Vásquez,    Would you please review Ms. Boone's lab work?  She saw me in f/u from the ED secondary to fatigue.  I ordered her lab work.  Liver enzymes are mildly elevated.  Would you like me to order Hepatitis testing and an abdominal US?  She does have a f/u with you next month I believe.      Please review and let me know what you think.    Best,  Samantha Joseph PA-C  [facility]  [address] [place] [address]  P: [phone]  F: [phone]

## 2017-01-26 NOTE — TELEPHONE ENCOUNTER
Yes please. And please, hepatitis profile, PTH, Hepatic profileand abdominal ultrasound.  Thank you so much

## 2017-01-26 NOTE — TELEPHONE ENCOUNTER
Please call patient.  Dr. Vásquez reviewed her labs and would like for her to get some additional testing completed.  Liver enzymes are mildly elevated and calcium level mildly elevated so we need some additional labs and also an abdominal ultrasound.  Please call patient to inform her and schedule.

## 2017-01-26 NOTE — TELEPHONE ENCOUNTER
Please call patient.      Vitamin D level is mildly low-is she taking any Vitamin D?    Diabetes is controlled.    Liver enzymes are mildly elevated.  Make sure she is not taking Tylenol or drinking alcohol.  I am sending her labs to Dr. Vásquez to review to see what recommendations she has for further evaluation.

## 2017-01-26 NOTE — TELEPHONE ENCOUNTER
Informed Bienvenido pt son and he verbalized that he understood. Scheduled Lab and abdominal ultrasound appt with Bienvenido and mailed reminder letters.

## 2017-01-30 ENCOUNTER — TELEPHONE (OUTPATIENT)
Dept: INTERNAL MEDICINE | Facility: CLINIC | Age: 82
End: 2017-01-30

## 2017-01-30 ENCOUNTER — HOSPITAL ENCOUNTER (OUTPATIENT)
Dept: RADIOLOGY | Facility: HOSPITAL | Age: 82
Discharge: HOME | End: 2017-01-30
Attending: INTERNAL MEDICINE
Payer: MEDICARE

## 2017-01-30 DIAGNOSIS — R82.90 ABNORMAL FINDING IN URINE: ICD-10-CM

## 2017-01-30 DIAGNOSIS — R74.8 ELEVATED LIVER ENZYMES: ICD-10-CM

## 2017-01-30 DIAGNOSIS — R93.429 ABNORMAL ULTRASOUND OF KIDNEY: Primary | ICD-10-CM

## 2017-01-30 PROCEDURE — 76700 US EXAM ABDOM COMPLETE: CPT | Mod: 26,,, | Performed by: RADIOLOGY

## 2017-01-30 PROCEDURE — 76700 US EXAM ABDOM COMPLETE: CPT | Mod: TC

## 2017-01-30 NOTE — TELEPHONE ENCOUNTER
Please call patient or her son, Bienvenido.    Lab work is not yet complete.  Dr. Vásquez reviewed her ultrasound and she needs to see urology for further evaluation of the kidneys.  Scheduled the first available-please let patient know of date/time and reschedule if that doesn't work but otherwise the other appointments were much later in the month.    I would also like her to complete urine studies.  Orders placed, please schedule for home collect.

## 2017-01-30 NOTE — TELEPHONE ENCOUNTER
Informed Bienvenido pt son and he verbalized that he understood. Bienvenido will contact the Urology dept and to have the pt placed on the wait list.

## 2017-01-30 NOTE — TELEPHONE ENCOUNTER
----- Message from Joycelyn Vásquez MD sent at 1/30/2017 10:51 AM CST -----  Thanks Samantha    I am not sure what the urolgic findings signify but I would recommend a Urology assessment for those findings    LB  ----- Message -----     From: Samantha Joseph PA-C     Sent: 1/30/2017  10:35 AM       To: MD Dr. Fahad Kauf[facility]-Can you review Ms. Boone's US?  Labs are not yet complete.  Liver enzymes have returned to normal.  Hepatitis panel is pending.  PTH is elevated.    Vadim,  Samantha

## 2017-01-31 ENCOUNTER — HOSPITAL ENCOUNTER (OUTPATIENT)
Dept: RADIOLOGY | Facility: HOSPITAL | Age: 82
Discharge: HOME | End: 2017-01-31
Attending: INTERNAL MEDICINE
Payer: MEDICARE

## 2017-01-31 DIAGNOSIS — Z85.3 HISTORY OF BREAST CANCER: ICD-10-CM

## 2017-01-31 PROCEDURE — 77062 BREAST TOMOSYNTHESIS BI: CPT | Mod: 26,,, | Performed by: RADIOLOGY

## 2017-01-31 PROCEDURE — 77066 DX MAMMO INCL CAD BI: CPT | Mod: 26,,, | Performed by: RADIOLOGY

## 2017-01-31 PROCEDURE — 77066 DX MAMMO INCL CAD BI: CPT | Mod: TC

## 2017-02-02 ENCOUNTER — OFFICE VISIT (OUTPATIENT)
Dept: UROLOGY | Facility: CLINIC | Age: 82
End: 2017-02-02
Payer: MEDICARE

## 2017-02-02 ENCOUNTER — TELEPHONE (OUTPATIENT)
Dept: INTERNAL MEDICINE | Facility: CLINIC | Age: 82
End: 2017-02-02

## 2017-02-02 VITALS
BODY MASS INDEX: 27.09 KG/M2 | WEIGHT: 138 LBS | SYSTOLIC BLOOD PRESSURE: 130 MMHG | HEIGHT: 60 IN | DIASTOLIC BLOOD PRESSURE: 74 MMHG | HEART RATE: 67 BPM

## 2017-02-02 DIAGNOSIS — N30.00 ACUTE CYSTITIS WITHOUT HEMATURIA: Primary | ICD-10-CM

## 2017-02-02 DIAGNOSIS — N30.00 ACUTE CYSTITIS WITHOUT HEMATURIA: ICD-10-CM

## 2017-02-02 DIAGNOSIS — N13.30 HYDRONEPHROSIS, UNSPECIFIED HYDRONEPHROSIS TYPE: Primary | ICD-10-CM

## 2017-02-02 PROCEDURE — 99499 UNLISTED E&M SERVICE: CPT | Mod: S$GLB,,, | Performed by: UROLOGY

## 2017-02-02 PROCEDURE — 3075F SYST BP GE 130 - 139MM HG: CPT | Mod: S$GLB,,, | Performed by: UROLOGY

## 2017-02-02 PROCEDURE — 99999 PR PBB SHADOW E&M-EST. PATIENT-LVL III: CPT | Mod: PBBFAC,,, | Performed by: UROLOGY

## 2017-02-02 PROCEDURE — 1160F RVW MEDS BY RX/DR IN RCRD: CPT | Mod: S$GLB,,, | Performed by: UROLOGY

## 2017-02-02 PROCEDURE — 99203 OFFICE O/P NEW LOW 30 MIN: CPT | Mod: S$GLB,,, | Performed by: UROLOGY

## 2017-02-02 PROCEDURE — 1157F ADVNC CARE PLAN IN RCRD: CPT | Mod: S$GLB,,, | Performed by: UROLOGY

## 2017-02-02 PROCEDURE — 1159F MED LIST DOCD IN RCRD: CPT | Mod: S$GLB,,, | Performed by: UROLOGY

## 2017-02-02 PROCEDURE — 3078F DIAST BP <80 MM HG: CPT | Mod: S$GLB,,, | Performed by: UROLOGY

## 2017-02-02 RX ORDER — DOXYCYCLINE 100 MG/1
100 CAPSULE ORAL EVERY 12 HOURS
Qty: 14 CAPSULE | Refills: 0 | Status: SHIPPED | OUTPATIENT
Start: 2017-02-02 | End: 2017-03-16 | Stop reason: ALTCHOICE

## 2017-02-02 NOTE — PROGRESS NOTES
Subjective:       Patient ID: Sera Boone is a 81 y.o. female.    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  She has a urine culture which is pending but his preliminary Juan gram-negative rods.  She is asymptomatic.  Patient denies fever chills nausea vomiting flank pain dysuria etc. she is diabetic and her PVR is 176    Past Medical History   Diagnosis Date    Arthritis     Breast cancer 2001    Diabetes mellitus     History of breast cancer 8/21/2013    Hyperlipidemia     Hypertension     Nuclear sclerotic cataract of right eye 10/16/2012    Tubular adenoma of colon 10/28/2013    Type II or unspecified type diabetes mellitus with neurological manifestations, not stated as uncontrolled     Vitamin D deficiency disease 2/17/2014       Past Surgical History   Procedure Laterality Date    Cataract extraction       both eyes -     Belpharoptosis repair  03/15/13     bilateral-dr. coleman md    Breast surgery Left 2001     lumpectomy    Breast lumpectomy      Cholecystectomy       Done in [place] in the 1980's    Colonoscopy w/ polypectomy      Colonoscopy N/A 1/19/2016     Procedure: COLONOSCOPY;  Surgeon: BLANCA Guerra MD;  Location: [address]);  Service: Endoscopy;  Laterality: N/A;       Family History   Problem Relation Age of Onset    Breast cancer Sister 48     55 second, bilateral    Liver cancer Mother     Early death Mother     Liver cancer Father     Liver cancer Brother     No Known Problems Daughter     No Known Problems Son     Liver cancer Brother     No Known Problems Daughter     Glaucoma Neg Hx     Amblyopia Neg Hx     Blindness Neg Hx     Cancer Neg Hx     Cataracts Neg Hx     Diabetes Neg Hx     Hypertension Neg Hx     Macular degeneration Neg Hx     Retinal detachment Neg Hx     Strabismus Neg Hx     Stroke Neg Hx     Thyroid disease Neg Hx      Cervical cancer Neg Hx     Vaginal cancer Neg Hx     Endometrial cancer Neg Hx        Social History     Social History    Marital status:      Spouse name: N/A    Number of children: N/A    Years of education: N/A     Occupational History    Not on file.     Social History Main Topics    Smoking status: Never Smoker    Smokeless tobacco: Never Used    Alcohol use No    Drug use: No    Sexual activity: No     Other Topics Concern    Not on file     Social History Narrative       Allergies:  No known drug allergies    Medications:    Current Outpatient Prescriptions:     aspirin 81 MG Chew, Take 1 tablet (81 mg total) by mouth once daily., Disp: , Rfl:     atorvastatin (LIPITOR) 40 MG tablet, Take 40 mg by mouth once daily., Disp: , Rfl:     diclofenac (VOLTAREN) 75 MG EC tablet, Take 1 tablet (75 mg total) by mouth 2 (two) times daily., Disp: 60 tablet, Rfl: 12    losartan (COZAAR) 50 MG tablet, TAKE 1 TABLET BY MOUTH EVERY DAY, Disp: 90 tablet, Rfl: 0    metformin (GLUCOPHAGE) 500 MG tablet, TAKE 1 TABLET(500 MG) BY MOUTH TWICE DAILY, Disp: 180 tablet, Rfl: 0    omega-3 acid ethyl esters (LOVAZA) 1 gram capsule, TAKE 1 CAPSULE BY MOUTH TWICE DAILY, Disp: 180 capsule, Rfl: 0    peg-electrolyte soln (TRILYTE WITH FLAVOR PACKETS) 420 gram SolR, Take 4,000 mLs by mouth as directed., Disp: 1 Bottle, Rfl: 0    Review of Systems   Constitutional: Negative.    HENT: Negative.    Eyes: Negative.    Respiratory: Negative.    Cardiovascular: Negative.    Gastrointestinal: Negative.    Genitourinary: Negative.    Musculoskeletal: Negative.    Neurological: Negative.    Hematological: Negative.    Psychiatric/Behavioral: Negative.        Objective:      Physical Exam   Constitutional: She appears well-developed.   HENT:   Head: Normocephalic.   Cardiovascular: Normal rate.    Pulmonary/Chest: Effort normal.   Abdominal: Soft.   Musculoskeletal: Normal range of motion.   Neurological: She is alert.    Skin: Skin is warm.         Assessment:       1. Hydronephrosis, unspecified hydronephrosis type    2. Acute cystitis without hematuria        Plan:       Sera was seen today for abnormal ultrasound.    Diagnoses and all orders for this visit:    Hydronephrosis, unspecified hydronephrosis type    Acute cystitis without hematuria        give Lasix renal scan and internal medicine will receive the results of the urine culture and treat.  I will see her back with a renal scan results and repeat her PVR

## 2017-02-02 NOTE — MR AVS SNAPSHOT
[facility] Urolog 4th Floor  [address] [facility] [place] [address]  Phone: [phone]                  Sera Boone   2017 9:30 AM   Office Visit    Descripción:  Female : 1935   Personal Médico:  Joe Nayak Jr., MD   Departamento:  [facility] - Urolog 4th Floor           Razón de la felix     abnormal ultrasound           Diagnósticos de Esta Visita        Comentarios    Hydronephrosis, unspecified hydronephrosis type    -  Primario     Acute cystitis without hematuria                Lista de tareas           Citas próximas        Personal Médico Departamento Tfno del dpto    2017 12:15 PM [place] [address] MG1 400LB LIMIT [facility] [phone]    2017 10:30 AM Joe Nayak Jr., MD [facility] Urology 4th [facility] [phone]    2017 3:30 PM Joycelyn Vásquez MD [facility] - Internal Medicine [phone]      Metas (5 Years of Data)     Ninguna      [facility]     [facility] L[facility]nea de Enfermeras - Asistencia   Enfermeras registradas de [facility] pueden ayudarle a reservar kamran felix, proveer educación para la maurilio, asesoría clínica, y otros servicios de asesoramiento.   Llame para marilee servicio gratuito a [phone].             Medicamentos           Mensaje sobre Medicamentos     Verificar los cambios y / o adiciones a hoffman régimen de medicación son los mismos que discutir con hoffman médico. Si cualquiera de estos cambios o adiciones son incorrectos, por favor notifique a hoffman proveedor de atención médica.             Verifique que la siguiente lista de medicamentos es kamran representación exacta de los medicamentos que está tomando actualmente. Si no hay ningunos reportados, la lista puede estar en muñoz. Si no es correcta, por favor póngase en contacto con hoffman proveedor de atención médica. Lleve esta lista con usted en daniel de emergencia.           Medicamentos Actuales     aspirin 81 MG Chew Take 1 tablet (81 mg total) by mouth once daily.     atorvastatin (LIPITOR) 40 MG tablet Take 40 mg by mouth once daily.    diclofenac (VOLTAREN) 75 MG EC tablet Take 1 tablet (75 mg total) by mouth 2 (two) times daily.    losartan (COZAAR) 50 MG tablet TAKE 1 TABLET BY MOUTH EVERY DAY    metformin (GLUCOPHAGE) 500 MG tablet TAKE 1 TABLET(500 MG) BY MOUTH TWICE DAILY    omega-3 acid ethyl esters (LOVAZA) 1 gram capsule TAKE 1 CAPSULE BY MOUTH TWICE DAILY    peg-electrolyte soln (TRILYTE WITH FLAVOR PACKETS) 420 gram SolR Take 4,000 mLs by mouth as directed.           Información de referencia clínica           Saba signos vitales jm     PS [place] BMI (IMC)       130/74 67 5' (1.524 m) 62.6 kg (138 lb 0.1 oz) 26.95 kg/m2       Blood Pressure          Most Recent Value    BP  130/74      Alergias     A partir del:  2/2/2017        No Known Drug Allergies      Vacunas     Administradas en la fecha de la visita:  2/2/2017        None      Orders Placed During Today's Visit     Exámenes/Procedimientos futuros Se espera el Vence    NM Kidney W Flow Funct Pharmacol  2/2/2017 2/2/2018      Registrarse para [facility]     La activación de hoffman cuenta [facility] es tan fácil ethel 1-2-3!    1) Ir a [email or web address], seleccione Registrarse Ahora, meter el código de activación y hoffman fecha de nacimiento, y seleccione Próximo.    [id number]  Expires: 3/7/2017 12:50 PM      2) Crear un nombre de usuario y contraseña para usar cuando se visita [facility] en el futuro y selecciona kamran pregunta de seguridad en daniel de que pierda hoffman contraseña y seleccione Próximo.    3) Introduzca hoffman dirección de correo electrónico y maricel clic en Registrarse!    Información Adicional  Si tiene alguna pregunta, por favor, e-mail [email or web address] o llame al [phone] para hablar con nuestro personal. Recuerde, [facility] no debe ser usada para necesidades urgentes. En daniel de emergencia médica, llame al [phone].        Language Assistance Services     ATTENTION: Language assistance services  are available, free of charge. Please call [phone].      ATENCIÓN: Si habla español, tiene a hoffman disposición servicios gratuitos de asistencia lingüística. Llame al [phone].     CHÚ Ý: N?u b?n nói Ti?ng Vi?t, có các d?ch v? h? tr? ngôn ng? mi?n phí dành cho b?n. G?i s? [phone].         [facility] Urolog 4th Floor cumple con las leyes federales aplicables de derechos civiles y no discrimina por motivos de dean, color, origen nacional, edad, discapacidad, o sexo.                 Sera Boone   2017 9:30 AM   Office Visit    Description:  Female : 1935   Provider:  Joe Nayak Jr., MD   Department:  [facility] Urolog 4th Floor           Reason for Visit     abnormal ultrasound           Diagnoses this Visit        Comments    Hydronephrosis, unspecified hydronephrosis type    -  Primary     Acute cystitis without hematuria                To Do List           Future Appointments        Provider Department Dept Phone    2017 12:15 PM [facility] NM3 MG1 400LB LIMIT [facility] [phone]    2017 10:30 AM Joe Nayak Jr., MD [facility] Urolog 4th Floor [phone]    2017 3:30 PM Joycelyn Vásquez MD [facility] - Internal Medicine [phone]      Goals     None      [facility]     [facility] Nurse Care Line -  Assistance  Registered nurses in the [facility] provide clinical advisement, health education, appointment booking, and other advisory services.  Call for this free service at [phone].             Medications           Message regarding Medications     Verify the changes and/or additions to your medication regime listed below are the same as discussed with your clinician today.  If any of these changes or additions are incorrect, please notify your healthcare provider.             Verify that the below list of medications is an accurate representation of the medications you are currently taking.  If none  reported, the list may be blank. If incorrect, please contact your healthcare provider. Carry this list with you in case of emergency.           Current Medications     aspirin 81 MG Chew Take 1 tablet (81 mg total) by mouth once daily.    atorvastatin (LIPITOR) 40 MG tablet Take 40 mg by mouth once daily.    diclofenac (VOLTAREN) 75 MG EC tablet Take 1 tablet (75 mg total) by mouth 2 (two) times daily.    losartan (COZAAR) 50 MG tablet TAKE 1 TABLET BY MOUTH EVERY DAY    metformin (GLUCOPHAGE) 500 MG tablet TAKE 1 TABLET(500 MG) BY MOUTH TWICE DAILY    omega-3 acid ethyl esters (LOVAZA) 1 gram capsule TAKE 1 CAPSULE BY MOUTH TWICE DAILY    peg-electrolyte soln (TRILYTE WITH FLAVOR PACKETS) 420 gram SolR Take 4,000 mLs by mouth as directed.           Clinical Reference Information           Your Vitals Were     BP Pulse Height Weight BMI       130/74 67 5' (1.524 m) 62.6 kg (138 lb 0.1 oz) 26.95 kg/m2       Blood Pressure          Most Recent Value    BP  130/74      Allergies as of 2/2/2017     No Known Drug Allergies      Immunizations Administered on Date of Encounter - 2/2/2017     None      Orders Placed During Today's Visit     Future Labs/Procedures Expected by Expires    NM Kidney W Flow Funct Pharmacol  2/2/2017 2/2/2018      [facility] Sign-Up     Activating your [facility] account is as easy as 1-2-3!     1) Visit [email or web address], select Sign Up Now, enter this activation code and your date of birth, then select Next.  [email or web address]  Expires: 3/7/2017 12:50 PM      2) Create a username and password to use when you visit [facility] in the future and select a security question in case you lose your password and select Next.    3) Enter your e-mail address and click Sign Up!    Additional Information  If you have questions, please e-mail [email or web address] or call [phone] to talk to our [facility] staff. Remember, [facility] is NOT to be used for urgent needs. For medical emergencies, dial 911.          Language Assistance Services     ATTENTION: Language assistance services are available, free of charge. Please call [phone].      ATENCIÓN: Si habla odette, tiene a hoffman disposición servicios gratuitos de asistencia lingüística. Llame al [phone].     CHÚ Ý: N?u b?n nói Ti?ng Vi?t, có các d?ch v? h? tr? ngôn ng? mi?n phí dành cho b?n. G?i s? [phone].         [facility] - Urology 4th Floor complies with applicable Federal civil rights laws and does not discriminate on the basis of race, color, national origin, age, disability, or sex.

## 2017-02-02 NOTE — TELEPHONE ENCOUNTER
Please call.  Urine culture shows bacteria although the urinalysis was normal.  I see that she is seeing urology today so she can discuss this further with him to see if treatment is needed.

## 2017-02-02 NOTE — LETTER
February 4, 2017      Bib Navarro MD  [address]  [place] [address]           [facility] - Urology 4th Floor  [address]  [place] [address]  Phone: [phone]          Patient: Sera Boone   MR Number: [id number]   YOB: 1935   Date of Visit: 2/2/2017       Dear Dr. Bib Navarro:    Thank you for referring Sera Boone to me for evaluation. Attached you will find relevant portions of my assessment and plan of care.    If you have questions, please do not hesitate to call me. I look forward to following Sera Boone along with you.    Sincerely,    Joe Nayak Jr., MD    Enclosure  CC:  No Recipients    If you would like to receive this communication electronically, please contact [email or web address] or [phone] to request more information on [email or web address] Link access.    For providers and/or their staff who would like to refer a patient to [facility], please contact us through our one-stop-shop provider referral line, [facility] , at [phone].    If you feel you have received this communication in error or would no longer like to receive these types of communications, please e-mail [email or web address]

## 2017-02-06 ENCOUNTER — HOSPITAL ENCOUNTER (OUTPATIENT)
Dept: RADIOLOGY | Facility: HOSPITAL | Age: 82
Discharge: HOME | End: 2017-02-06
Attending: UROLOGY
Payer: MEDICARE

## 2017-02-06 DIAGNOSIS — N13.30 HYDRONEPHROSIS, UNSPECIFIED HYDRONEPHROSIS TYPE: ICD-10-CM

## 2017-02-06 PROCEDURE — 78708 K FLOW/FUNCT IMAGE W/DRUG: CPT | Mod: TC,50

## 2017-02-06 PROCEDURE — 78708 K FLOW/FUNCT IMAGE W/DRUG: CPT | Mod: 26,,, | Performed by: RADIOLOGY

## 2017-02-07 ENCOUNTER — TELEPHONE (OUTPATIENT)
Dept: UROLOGY | Facility: CLINIC | Age: 82
End: 2017-02-07

## 2017-02-07 NOTE — TELEPHONE ENCOUNTER
----- Message from Joe Nayak Jr., MD sent at 2/6/2017  2:58 PM CST -----  Normal renal scan  No further rx

## 2017-02-13 ENCOUNTER — OFFICE VISIT (OUTPATIENT)
Dept: INTERNAL MEDICINE | Facility: CLINIC | Age: 82
End: 2017-02-13
Payer: MEDICARE

## 2017-02-13 ENCOUNTER — LAB VISIT (OUTPATIENT)
Dept: LAB | Facility: HOSPITAL | Age: 82
End: 2017-02-13
Attending: INTERNAL MEDICINE
Payer: MEDICARE

## 2017-02-13 ENCOUNTER — OFFICE VISIT (OUTPATIENT)
Dept: UROLOGY | Facility: CLINIC | Age: 82
End: 2017-02-13
Payer: MEDICARE

## 2017-02-13 VITALS — WEIGHT: 138 LBS | HEIGHT: 60 IN | RESPIRATION RATE: 14 BRPM | BODY MASS INDEX: 27.09 KG/M2

## 2017-02-13 VITALS
DIASTOLIC BLOOD PRESSURE: 80 MMHG | WEIGHT: 141.13 LBS | SYSTOLIC BLOOD PRESSURE: 125 MMHG | HEIGHT: 60 IN | BODY MASS INDEX: 27.71 KG/M2

## 2017-02-13 DIAGNOSIS — M81.0 OSTEOPOROSIS, UNSPECIFIED: ICD-10-CM

## 2017-02-13 DIAGNOSIS — N18.3 CHRONIC RENAL DISEASE, STAGE 3 (MODERATE): ICD-10-CM

## 2017-02-13 DIAGNOSIS — M25.511 ACUTE PAIN OF BOTH SHOULDERS: ICD-10-CM

## 2017-02-13 DIAGNOSIS — N13.30 HYDRONEPHROSIS, UNSPECIFIED HYDRONEPHROSIS TYPE: Primary | ICD-10-CM

## 2017-02-13 DIAGNOSIS — E53.8 DEFICIENCY OF OTHER SPECIFIED B GROUP VITAMINS: ICD-10-CM

## 2017-02-13 DIAGNOSIS — R74.8 ELEVATED LIVER ENZYMES: Primary | ICD-10-CM

## 2017-02-13 DIAGNOSIS — E55.9 VITAMIN D DEFICIENCY DISEASE: ICD-10-CM

## 2017-02-13 DIAGNOSIS — E11.42 DIABETIC POLYNEUROPATHY ASSOCIATED WITH TYPE 2 DIABETES MELLITUS: ICD-10-CM

## 2017-02-13 DIAGNOSIS — K76.0 FATTY LIVER: ICD-10-CM

## 2017-02-13 DIAGNOSIS — I10 ESSENTIAL HYPERTENSION: ICD-10-CM

## 2017-02-13 DIAGNOSIS — M25.512 ACUTE PAIN OF BOTH SHOULDERS: ICD-10-CM

## 2017-02-13 DIAGNOSIS — F41.9 ANXIETY: ICD-10-CM

## 2017-02-13 DIAGNOSIS — E11.49 TYPE II DIABETES MELLITUS WITH NEUROLOGICAL MANIFESTATIONS: ICD-10-CM

## 2017-02-13 DIAGNOSIS — Z86.010 HISTORY OF ADENOMATOUS POLYP OF COLON: ICD-10-CM

## 2017-02-13 DIAGNOSIS — Z85.3 HISTORY OF BREAST CANCER: ICD-10-CM

## 2017-02-13 DIAGNOSIS — E78.2 MIXED HYPERLIPIDEMIA: ICD-10-CM

## 2017-02-13 LAB — ERYTHROCYTE [SEDIMENTATION RATE] IN BLOOD BY WESTERGREN METHOD: 42 MM/HR

## 2017-02-13 PROCEDURE — 1126F AMNT PAIN NOTED NONE PRSNT: CPT | Mod: S$GLB,,, | Performed by: UROLOGY

## 2017-02-13 PROCEDURE — 82607 VITAMIN B-12: CPT

## 2017-02-13 PROCEDURE — 86200 CCP ANTIBODY: CPT

## 2017-02-13 PROCEDURE — 99999 PR PBB SHADOW E&M-EST. PATIENT-LVL III: CPT | Mod: PBBFAC,,, | Performed by: UROLOGY

## 2017-02-13 PROCEDURE — 1125F AMNT PAIN NOTED PAIN PRSNT: CPT | Mod: S$GLB,,, | Performed by: INTERNAL MEDICINE

## 2017-02-13 PROCEDURE — 1159F MED LIST DOCD IN RCRD: CPT | Mod: S$GLB,,, | Performed by: UROLOGY

## 2017-02-13 PROCEDURE — 1160F RVW MEDS BY RX/DR IN RCRD: CPT | Mod: S$GLB,,, | Performed by: UROLOGY

## 2017-02-13 PROCEDURE — 3074F SYST BP LT 130 MM HG: CPT | Mod: S$GLB,,, | Performed by: INTERNAL MEDICINE

## 2017-02-13 PROCEDURE — 86140 C-REACTIVE PROTEIN: CPT

## 2017-02-13 PROCEDURE — 1159F MED LIST DOCD IN RCRD: CPT | Mod: S$GLB,,, | Performed by: INTERNAL MEDICINE

## 2017-02-13 PROCEDURE — 1160F RVW MEDS BY RX/DR IN RCRD: CPT | Mod: S$GLB,,, | Performed by: INTERNAL MEDICINE

## 2017-02-13 PROCEDURE — 36415 COLL VENOUS BLD VENIPUNCTURE: CPT

## 2017-02-13 PROCEDURE — 83735 ASSAY OF MAGNESIUM: CPT

## 2017-02-13 PROCEDURE — 1157F ADVNC CARE PLAN IN RCRD: CPT | Mod: S$GLB,,, | Performed by: UROLOGY

## 2017-02-13 PROCEDURE — 86038 ANTINUCLEAR ANTIBODIES: CPT

## 2017-02-13 PROCEDURE — 86431 RHEUMATOID FACTOR QUANT: CPT

## 2017-02-13 PROCEDURE — 99499 UNLISTED E&M SERVICE: CPT | Mod: S$GLB,,, | Performed by: INTERNAL MEDICINE

## 2017-02-13 PROCEDURE — 99999 PR PBB SHADOW E&M-EST. PATIENT-LVL III: CPT | Mod: PBBFAC,,, | Performed by: INTERNAL MEDICINE

## 2017-02-13 PROCEDURE — 99213 OFFICE O/P EST LOW 20 MIN: CPT | Mod: S$GLB,,, | Performed by: UROLOGY

## 2017-02-13 PROCEDURE — 3079F DIAST BP 80-89 MM HG: CPT | Mod: S$GLB,,, | Performed by: INTERNAL MEDICINE

## 2017-02-13 PROCEDURE — 99215 OFFICE O/P EST HI 40 MIN: CPT | Mod: S$GLB,,, | Performed by: INTERNAL MEDICINE

## 2017-02-13 PROCEDURE — 1157F ADVNC CARE PLAN IN RCRD: CPT | Mod: S$GLB,,, | Performed by: INTERNAL MEDICINE

## 2017-02-13 PROCEDURE — 85651 RBC SED RATE NONAUTOMATED: CPT

## 2017-02-13 PROCEDURE — 80069 RENAL FUNCTION PANEL: CPT

## 2017-02-13 RX ORDER — VIT C/E/ZN/COPPR/LUTEIN/ZEAXAN 250MG-90MG
2000 CAPSULE ORAL DAILY
Qty: 60 CAPSULE | Refills: 12 | Status: SHIPPED | OUTPATIENT
Start: 2017-02-13 | End: 2020-10-19 | Stop reason: SDUPTHER

## 2017-02-13 RX ORDER — ESCITALOPRAM OXALATE 10 MG/1
10 TABLET ORAL DAILY
Qty: 30 TABLET | Refills: 11 | Status: SHIPPED | OUTPATIENT
Start: 2017-02-13 | End: 2017-05-30 | Stop reason: SDUPTHER

## 2017-02-13 RX ORDER — LOSARTAN POTASSIUM 50 MG/1
50 TABLET ORAL DAILY
Qty: 90 TABLET | Refills: 3 | Status: SHIPPED | OUTPATIENT
Start: 2017-02-13 | End: 2017-05-30 | Stop reason: SDUPTHER

## 2017-02-13 NOTE — MR AVS SNAPSHOT
[facility]  [address]  [facility] [address]  Phone: [phone]  Fax: [phone]                  Sera Boone   2017 3:30 PM   Office Visit    Descripción:  Female : 1935   Personal Médico:  Joycelyn Vásquez MD   Departamento:  [facility]           Razón de la felix     Follow-up     Shoulder Pain           Diagnósticos de Esta Visita        Comentarios    Elevated liver enzymes    -  Primario     Diabetic polyneuropathy associated with type 2 diabetes mellitus         Essential hypertension         History of adenomatous polyp of colon         History of breast cancer         Mixed hyperlipidemia         Type II diabetes mellitus with neurological manifestations         Osteoporosis, unspecified         Fatty liver         Chronic renal disease, stage 3 (moderate)         Acute pain of both shoulders         Anxiety         Deficiency of other specified B group vitamins         Vitamin D deficiency disease                Lista de tareas           Metas (5 Years of Data)     Ninguna      Follow-Up and Disposition     Return in about 6 weeks (around 3/27/2017).      Recetas para recoger        Disp Refills Start End    losartan (COZAAR) 50 MG tablet 90 tablet 3 2017     Take 1 tablet (50 mg total) by mouth once daily. - Oral    [facility]: [facility] [address] [place] [address] AT [facility] No. de tlfo: #: [phone]       escitalopram oxalate (LEXAPRO) 10 MG tablet 30 tablet 11 2017    Take 1 tablet (10 mg total) by mouth once daily. - Oral    [facility]: [facility] [address] [place] [address] AT [facility] No. de tlfo: #: [phone]       cholecalciferol, vitamin D3, 1,000 unit capsule 60 capsule 12 2017     Take 2 capsules (2,000 Units total) by mouth once daily. - Oral    [facility]: [facility] [address] [place], [place] [address] [facility] AT [facility] No. de tlfo: #: [phone]         [facility]     [facility] Línea de Enfermeras - Asistencia 24/7  Enfermeras registradas de [facility] pueden ayudarle a reservar kamran felix, proveer educación para la maurilio, asesoría clínica, y otros servicios de asesoramiento.   Llame para marilee servicio gratuito a [phone].             Medicamentos           Mensaje sobre Medicamentos     Verificar los cambios y / o adiciones a hoffman régimen de medicación son los mismos que discutir con hoffman médico. Si cualquiera de estos cambios o adiciones son incorrectos, por favor notifique a hoffman proveedor de atención médica.        EMPEZAR a lora estos medicamentos NUEVOS        Refills    escitalopram oxalate (LEXAPRO) 10 MG tablet 11    Sig: Take 1 tablet (10 mg total) by mouth once daily.    Categoría: Normal    Vía: Oral    cholecalciferol, vitamin D3, 1,000 unit capsule 12    Sig: Take 2 capsules (2,000 Units total) by mouth once daily.    Categoría: Print    Vía: Oral      CAMBIAR la forma en que está tomando estos medicamentos     Start Taking Instead of    losartan (COZAAR) 50 MG tablet losartan (COZAAR) 50 MG tablet    Dosage:  Take 1 tablet (50 mg total) by mouth once daily. Dosage:  TAKE 1 TABLET BY MOUTH EVERY DAY    Reason for Change:  Reorder       DEJAR de lora estos medicamentos     peg-electrolyte soln (TRILYTE WITH FLAVOR PACKETS) 420 gram SolR Take 4,000 mLs by mouth as directed.    diclofenac (VOLTAREN) 75 MG EC tablet Take 1 tablet (75 mg total) by mouth 2 (two) times daily.           Verifique que la siguiente lista de medicamentos es kamran representación exacta de los medicamentos que está tomando actualmente. Si no hay ningunos reportados, la lista puede estar en muñoz. Si no es correcta, por favor póngase en contacto con hoffman proveedor de atención médica. Lleve esta lista con usted en daniel de emergencia.           Medicamentos Actuales     aspirin 81 MG Chew Take 1 tablet (81 mg  total) by mouth once daily.    atorvastatin (LIPITOR) 40 MG tablet Take 40 mg by mouth once daily.    losartan (COZAAR) 50 MG tablet Take 1 tablet (50 mg total) by mouth once daily.    metformin (GLUCOPHAGE) 500 MG tablet TAKE 1 TABLET(500 MG) BY MOUTH TWICE DAILY    omega-3 acid ethyl esters (LOVAZA) 1 gram capsule TAKE 1 CAPSULE BY MOUTH TWICE DAILY    cholecalciferol, vitamin D3, 1,000 unit capsule Take 2 capsules (2,000 Units total) by mouth once daily.    doxycycline (VIBRAMYCIN) 100 MG Cap Take 1 capsule (100 mg total) by mouth every 12 (twelve) hours.    escitalopram oxalate (LEXAPRO) 10 MG tablet Take 1 tablet (10 mg total) by mouth once daily.           Información de referencia clínica           Saba signos vitales jm     PS Kimberly Peso BMI (IMC)          125/80 5' (1.524 m) 64 kg (141 lb 1.5 oz) 27.56 kg/m2        Blood Pressure          Most Recent Value    BP  125/80      Alergias     A partir del:  2/13/2017        No Known Drug Allergies      Vacunas     Administradas en la fecha de la visita:  2/13/2017        None      Orders Placed During Today's Visit      Órdenes normales de esta visita    Ambulatory consult to Optometry     Exámenes/Procedimientos futuros Se espera el Vence    ASHLIE  2/13/2017 4/14/2018    C-reactive protein  2/13/2017 4/14/2018    Cyclic citrul peptide antibody, IgG  2/13/2017 4/14/2018    Magnesium  2/13/2017 2/13/2018    Renal function panel  2/13/2017 4/14/2018    Rheumatoid factor  2/13/2017 4/14/2018    Sedimentation rate, manual  2/13/2017 4/14/2018    Vitamin B12  2/13/2017 2/13/2018      Registrarse para [facility]     La activación de hoffman cuenta [facility] es tan fácil ethel 1-2-3!    1) Ir a [email or web address], seleccione Registrarse Ahora, meter el código de activación y hoffman fecha de nacimiento, y seleccione Próximo.    [id number]  Expires: 3/7/2017 12:50 PM      2) Crear un nombre de usuario y contraseña para usar cuando se visita [facility] en el futuro y selecciona  kamran pregunta de seguridad en daniel de que pierda hoffman contraseña y seleccione Próximo.    3) Introduzca hoffman dirección de correo electrónico y maricel josiane en Registrarse!    Información Adicional  Si tiene alguna pregunta, por favor, e-mail [email or web address] o llame al [phone] para hablar con nuestro personal. Recuerde, [facility] no debe ser usada para necesidades urgentes. En adniel de emergencia médica, llame al [phone].        Instrucciones      Escitalopram tablets  ¿Qué es marilee medicamento?  El ESCITALOPRAM se utiliza para el tratamiento de la depresión y ciertos tipos de ansiedad.  ¿Cómo meg utilizar marilee medicamento?  [place] marilee medicamento por vía oral con un vaso de agua. Siga las instrucciones de la etiqueta del medicamento. Marilee medicamento se puede lora con o sin alimentos. Si le produce malestar estomacal, tómelo con alimentos. [place] fabiola dosis a intervalos regulares. No tome hoffman medicamento con kamran frecuencia mayor a la indicada. No deje de lora marilee medicamento repentinamente a menos que así indique hoffman médico. El detener marilee medicamento demasiado rápido puede causar efectos secundarios graves o puede empeorar hoffman condición.   Hoffman farmacéutico le dará kamran guía del medicamento especial con cada receta y relleno. Asegúrese de leer esta información cada vez cuidadosamente.  Hable con hoffman pediatra para informarse acerca del uso de marilee medicamento en niños. Puede requerir atención especial.  ¿Qué efectos secundarios puedo tener al utilizar marilee medicamento?  Efectos secundarios que debe informar a hoffman médico o a hoffman profesional de la maurilio tan pronto ethel sea posible:  · reacciones alérgicas ehtel erupción cutánea, picazón o urticarias, hinchazón de la curry, labios o lengua  · confusión  · sensación de desmayos o mareos, caídas  · habla rápidamente y presenta acciones o sentimientos agitados y fuera de control  · alucinaciones, pérdida del contacto con la realidad  · convulsiones  · ideas suicidas u otros cambios de  humor  · sangrado, magulladuras inusuales  Efectos secundarios que, por lo general, no requieren atención médica (debe informarlos a hoffman médico o a hoffman profesional de la maurilio si persisten o si son molestos):  · visión borrosa  · cambios del apetito  · cambios en el deseo sexual o capacidad  · dolor de chris  · aumento de la sudoración  · náuseas  ¿Qué puede interactuar con marilee medicamento?  No tome esta medicina con ninguno de los siguientes medicamentos:  · ciertos medicamentos para infecciones micóticas ethel fluconazol, itraconazol, quetoconazol, posaconazol, voriconazol  · cisapride  · citalopram  · dofetilida  · dronedarona  · linezolid  · IMAOs, tales ethel Carbex, Eldepryl, Marplan, Nardil y Parnate  · bessy de metileno (vía intravenosa)  · pimozida  · tioridazina  · ziprasidona  Esta medicina también puede interactuar con los siguientes medicamentos:  · alcohol  · aspirina o medicamentos tipo aspirina  · carbamazepina  · ciertos medicamentos para la depresión, ansiedad o trastornos psicóticos  · ciertos medicamentos para migrañas, tales ethel almotriptán, eletriptán, frovatriptán, naratriptán, rizatriptán, sumatriptán, zolmitriptán  · ciertos medicamentos para dormir  · ciertos medicamentos que tratan o previenen coágulos sanguíneos, ethel warfarina, enoxaparina, dalteparina  · cimetidina  · diuréticos  · fentanilo  · furazolidona  · isoniazida  · litio  · metoprolol  · los NAHID, medicamentos para el dolor o inflamación, tales ethel ibuprofeno o naproxeno  · otros medicamentos que prolongan el intervalo QT (causa un ritmo cardiaco anormal)  · procarbazina  · rasagilina  · suplementos ethel hierba de [place], kava kava, valeriana  · tramadol  · triptófano  ¿Qué sucede si me olvido de kamran dosis?  Si olvida kamran dosis, tómela lo antes posible. Si es telma la hora de la próxima dosis, tome sólo ximena dosis. No tome dosis adicionales o dobles.  ¿Dónde meg guardar mi medicina?  [place] fuera del alcance de los  niños.  Guárdela a temperatura ambiente, entre 15 y 30 grados C (59 y 86 grados F). Deseche todo el medicamento que no haya utilizado, después de la fecha de vencimiento.  ¿Qué le meg informar a mi profesional de la maurilio antes de lora marilee medicamento?  Necesita saber si usted presenta alguno de los siguientes problemas o situaciones:  · trastorno bipolar o antecedentes familiares del trastorno bipolar  · diabetes  · glaucoma  · enfermedad cardiaca  · enfermedad renal o hepática  · recibe tratamiento electroconvulsivo  · convulsiones  · ideas suicidas, planes o si usted o alguien de hoffman stewart ha intentado un suicidio previo  · kamran reacción alérgica o inusual al escitalopram, al medicamento relacionado citalopram, a otros medicamentos, alimentos, colorantes o conservadores  · si está embarazada o buscando quedar embarazada  · si está amamantando a un bebé  ¿A qué meg estar atento al usar marilee medicamento?  Informe a hoffman médico si fabiola síntomas no mejoran o si empeoran. Visite a hoffman médico o a hoffman profesional de la maurilio para chequear hoffman evolución periódicamente. Debido que puede ser necesario lora marilee medicamento renee varias semanas para que sea posible observar fabiola efectos en forma completa, es importante que sigue hoffman tratamiento ethel recetado por hoffman médico.   Los pacientes y fabiola familias deben estar atentos si empeora la depresión o ideas suicidas. También esté atento a cambios repentinos o severos de emoción, tales ethel el sentirse ansioso, agitado, lleno de pánico, irritable, hostil, agresivo, impulsivo, inquietud severa, demasiado excitado y hiperactivo o dificultad para conciliar el sueño. Si esto ocurre, especialmente al comenzar con el tratamiento o al cambiar de dosis, comuníquese con hoffman profesional de la maurilio.  Puede experimentar somnolencia o mareos. No conduzca ni utilice maquinaria, ni maricel nada que le exija permanecer en estado de alerta hasta que sepa cómo le afecta marilee medicamento. No se  siente ni se ponga de pie con rapidez, especialmente si es un paciente de edad avanzada. Osco reduce el riesgo de mareos o desmayos. El alcohol puede interferir con el efecto de marilee medicamento. Evite consumir bebidas alcohólicas.  Se le podrá secar la boca. Masticar chicle sin azúcar, chupar caramelos duros y beber agua en abundancia le ayudará a mantener la boca húmeda. Si el problema no desaparece o es severo, consulte a hoffman médico.  Date Last Reviewed:   © 5378-0360 [employer]. [address] [place] [address], [place] [address]. Todos los derechos reservados. Esta información no pretende sustituir la atención médica profesional. Sólo hoffman médico puede diagnosticar y tratar un problema de maurilio.             Language Assistance Services     ATTENTION: Language assistance services are available, free of charge. Please call [phone].      ATENCIÓN: Si habla español, tiene a hoffman disposición servicios gratuitos de asistencia lingüística. Llame al [phone].     CHÚ Ý: N?u b?n nói Ti?ng Vi?t, có các d?ch v? h? tr? ngôn ng? mi?n phí dành cho b?n. G?i s? [phone].         [facility] cumple con las leyes federales aplicables de derechos civiles y no discrimina por motivos de dean, color, origen nacional, edad, discapacidad, o sexo.                 Sera Boone   2017 3:30 PM   Office Visit    Description:  Female : 1935   Provider:  Joycelyn Vásquez MD   Department:  [facility]           Reason for Visit     Follow-up     Shoulder Pain           Diagnoses this Visit        Comments    Elevated liver enzymes    -  Primary     Diabetic polyneuropathy associated with type 2 diabetes mellitus         Essential hypertension         History of adenomatous polyp of colon         History of breast cancer         Mixed hyperlipidemia         Type II diabetes mellitus with neurological manifestations         Osteoporosis, unspecified         Fatty liver          Chronic renal disease, stage 3 (moderate)         Acute pain of both shoulders         Anxiety         Deficiency of other specified B group vitamins         Vitamin D deficiency disease                To Do List           Goals     None      Follow-Up and Disposition     Return in about 6 weeks (around 3/27/2017).       These Medications        Disp Refills Start End    losartan (COZAAR) 50 MG tablet 90 tablet 3 2/13/2017     Take 1 tablet (50 mg total) by mouth once daily. - Oral    Pharmacy: [facility] [address] Ph #: [phone]       escitalopram oxalate (LEXAPRO) 10 MG tablet 30 tablet 11 2/13/2017 2/13/2018    Take 1 tablet (10 mg total) by mouth once daily. - Oral    Pharmacy: [address] Ph #: [phone]       cholecalciferol, vitamin D3, 1,000 unit capsule 60 capsule 12 2/13/2017     Take 2 capsules (2,000 Units total) by mouth once daily. - Oral    Pharmacy: [address] Ph #: [phone]         [facility]     [facility] - 24/7 Assistance  Registered nurses in the [facility] provide clinical advisement, health education, appointment booking, and other advisory services.  Call for this free service at [phone].             Medications           Message regarding Medications     Verify the changes and/or additions to your medication regime listed below are the same as discussed with your clinician today.  If any of these changes or additions are incorrect, please notify your healthcare provider.        START taking these NEW medications        Refills    escitalopram oxalate (LEXAPRO) 10 MG tablet 11    Sig: Take 1 tablet (10 mg total) by mouth once daily.    Class: Normal    Route: Oral    cholecalciferol, vitamin D3, 1,000 unit capsule  12    Sig: Take 2 capsules (2,000 Units total) by mouth once daily.    Class: Print    Route: Oral      CHANGE how you are taking these medications     Start Taking Instead of    losartan (COZAAR) 50 MG tablet losartan (COZAAR) 50 MG tablet    Dosage:  Take 1 tablet (50 mg total) by mouth once daily. Dosage:  TAKE 1 TABLET BY MOUTH EVERY DAY    Reason for Change:  Reorder       STOP taking these medications     peg-electrolyte soln (TRILYTE WITH FLAVOR PACKETS) 420 gram SolR Take 4,000 mLs by mouth as directed.    diclofenac (VOLTAREN) 75 MG EC tablet Take 1 tablet (75 mg total) by mouth 2 (two) times daily.           Verify that the below list of medications is an accurate representation of the medications you are currently taking.  If none reported, the list may be blank. If incorrect, please contact your healthcare provider. Carry this list with you in case of emergency.           Current Medications     aspirin 81 MG Chew Take 1 tablet (81 mg total) by mouth once daily.    atorvastatin (LIPITOR) 40 MG tablet Take 40 mg by mouth once daily.    losartan (COZAAR) 50 MG tablet Take 1 tablet (50 mg total) by mouth once daily.    metformin (GLUCOPHAGE) 500 MG tablet TAKE 1 TABLET(500 MG) BY MOUTH TWICE DAILY    omega-3 acid ethyl esters (LOVAZA) 1 gram capsule TAKE 1 CAPSULE BY MOUTH TWICE DAILY    cholecalciferol, vitamin D3, 1,000 unit capsule Take 2 capsules (2,000 Units total) by mouth once daily.    doxycycline (VIBRAMYCIN) 100 MG Cap Take 1 capsule (100 mg total) by mouth every 12 (twelve) hours.    escitalopram oxalate (LEXAPRO) 10 MG tablet Take 1 tablet (10 mg total) by mouth once daily.           Clinical Reference Information           Your Vitals Were     BP Height Weight BMI          125/80 5' (1.524 m) 64 kg (141 lb 1.5 oz) 27.56 kg/m2        Blood Pressure          Most Recent Value    BP  125/80      Allergies as of 2/13/2017     No Known Drug Allergies      Immunizations Administered on Date of  Encounter - 2/13/2017     None      Orders Placed During Today's Visit      Normal Orders This Visit    Ambulatory consult to Optometry     Future Labs/Procedures Expected by Expires    ASHLIE  2/13/2017 4/14/2018    C-reactive protein  2/13/2017 4/14/2018    Cyclic citrul peptide antibody, IgG  2/13/2017 4/14/2018    Magnesium  2/13/2017 2/13/2018    Renal function panel  2/13/2017 4/14/2018    Rheumatoid factor  2/13/2017 4/14/2018    Sedimentation rate, manual  2/13/2017 4/14/2018    Vitamin B12  2/13/2017 2/13/2018      [facility] Sign-Up     Activating your [facility] account is as easy as 1-2-3!     1) Visit [email or web address], select Sign Up Now, enter this activation code and your date of birth, then select Next.  [email or web address]  Expires: 3/7/2017 12:50 PM      2) Create a username and password to use when you visit [facility] in the future and select a security question in case you lose your password and select Next.    3) Enter your e-mail address and click Sign Up!    Additional Information  If you have questions, please e-mail [email or web address] or call [phone] to talk to our [facility] staff. Remember, [facility] is NOT to be used for urgent needs. For medical emergencies, dial 911.         Instructions      Escitalopram tablets  ¿Qué es kaur medicamento?  El ESCITALOPRAM se utiliza para el tratamiento de la depresión y ciertos tipos de ansiedad.  ¿Cómo meg utilizar kaur medicamento?  [place] kaur medicamento por vía oral con un vaso de agua. Siga las instrucciones de la etiqueta del medicamento. Kaur medicamento se puede lora con o sin alimentos. Si le produce malestar estomacal, tómelo con alimentos. [place] fabiola dosis a intervalos regulares. No tome hoffman medicamento con kamran frecuencia mayor a la indicada. No deje de lora kaur medicamento repentinamente a menos que así indique hoffman médico. El detener kaur medicamento demasiado rápido puede causar efectos secundarios graves o puede empeorar hoffman condición.   Hoffman  farmacéutico le dará kamran guía del medicamento especial con cada receta y relleno. Asegúrese de leer esta información cada vez cuidadosamente.  Hable con hoffman pediatra para informarse acerca del uso de marilee medicamento en niños. Puede requerir atención especial.  ¿Qué efectos secundarios puedo tener al utilizar marilee medicamento?  Efectos secundarios que debe informar a hoffman médico o a hoffman profesional de la maurilio tan pronto ethel sea posible:  · reacciones alérgicas ethel erupción cutánea, picazón o urticarias, hinchazón de la curry, labios o lengua  · confusión  · sensación de desmayos o mareos, caídas  · habla rápidamente y presenta acciones o sentimientos agitados y fuera de control  · alucinaciones, pérdida del contacto con la realidad  · convulsiones  · ideas suicidas u otros cambios de humor  · sangrado, magulladuras inusuales  Efectos secundarios que, por lo general, no requieren atención médica (debe informarlos a hoffman médico o a hoffman profesional de la maurilio si persisten o si son molestos):  · visión borrosa  · cambios del apetito  · cambios en el deseo sexual o capacidad  · dolor de chris  · aumento de la sudoración  · náuseas  ¿Qué puede interactuar con marilee medicamento?  No tome esta medicina con ninguno de los siguientes medicamentos:  · ciertos medicamentos para infecciones micóticas ethel fluconazol, itraconazol, quetoconazol, posaconazol, voriconazol  · cisapride  · citalopram  · dofetilida  · dronedarona  · linezolid  · IMAOs, tales ethel Carbex, Eldepryl, Marplan, Nardil y Parnate  · bessy de metileno (vía intravenosa)  · pimozida  · tioridazina  · ziprasidona  Esta medicina también puede interactuar con los siguientes medicamentos:  · alcohol  · aspirina o medicamentos tipo aspirina  · carbamazepina  · ciertos medicamentos para la depresión, ansiedad o trastornos psicóticos  · ciertos medicamentos para migrañas, tales ethel almotriptán, eletriptán, frovatriptán, naratriptán, rizatriptán, sumatriptán,  zolmitriptán  · ciertos medicamentos para dormir  · ciertos medicamentos que tratan o previenen coágulos sanguíneos, ethel warfarina, enoxaparina, dalteparina  · cimetidina  · diuréticos  · fentanilo  · furazolidona  · isoniazida  · litio  · metoprolol  · los NAHID, medicamentos para el dolor o inflamación, tales ethel ibuprofeno o naproxeno  · otros medicamentos que prolongan el intervalo QT (causa un ritmo cardiaco anormal)  · procarbazina  · rasagilina  · suplementos ethel hierba de [place], kava kava, valeriana  · tramadol  · triptófano  ¿Qué sucede si me olvido de kamran dosis?  Si olvida kamran dosis, tómela lo antes posible. Si es telma la hora de la próxima dosis, tome sólo ximena dosis. No tome dosis adicionales o dobles.  ¿Dónde meg guardar mi medicina?  [place] fuera del alcance de los niños.  Guárdela a temperatura ambiente, entre 15 y 30 grados C (59 y 86 grados F). Deseche todo el medicamento que no haya utilizado, después de la fecha de vencimiento.  ¿Qué le meg informar a mi profesional de la maurilio antes de lora marilee medicamento?  Necesita saber si usted presenta alguno de los siguientes problemas o situaciones:  · trastorno bipolar o antecedentes familiares del trastorno bipolar  · diabetes  · glaucoma  · enfermedad cardiaca  · enfermedad renal o hepática  · recibe tratamiento electroconvulsivo  · convulsiones  · ideas suicidas, planes o si usted o alguien de hoffman stewart ha intentado un suicidio previo  · kamran reacción alérgica o inusual al escitalopram, al medicamento relacionado citalopram, a otros medicamentos, alimentos, colorantes o conservadores  · si está embarazada o buscando quedar embarazada  · si está amamantando a un bebé  ¿A qué meg estar atento al usar marilee medicamento?  Informe a hoffman médico si fabiola síntomas no mejoran o si empeoran. Visite a hoffman médico o a hoffman profesional de la maurilio para chequear hoffman evolución periódicamente. Debido que puede ser necesario lora marilee medicamento renee varias  semanas para que sea posible observar fabiola efectos en forma completa, es importante que sigue hoffman tratamiento ethel recetado por hoffman médico.   Los pacientes y fabiola familias deben estar atentos si empeora la depresión o ideas suicidas. También esté atento a cambios repentinos o severos de emoción, tales ethel el sentirse ansioso, agitado, lleno de pánico, irritable, hostil, agresivo, impulsivo, inquietud severa, demasiado excitado y hiperactivo o dificultad para conciliar el sueño. Si esto ocurre, especialmente al comenzar con el tratamiento o al cambiar de dosis, comuníquese con hoffman profesional de la maurilio.  Puede experimentar somnolencia o mareos. No conduzca ni utilice maquinaria, ni maricel nada que le exija permanecer en estado de alerta hasta que sepa cómo le afecta marilee medicamento. No se siente ni se ponga de pie con rapidez, especialmente si es un paciente de edad avanzada. [place] reduce el riesgo de mareos o desmayos. El alcohol puede interferir con el efecto de marilee medicamento. Evite consumir bebidas alcohólicas.  Se le podrá secar la boca. Masticar chicle sin azúcar, chupar caramelos duros y beber agua en abundancia le ayudará a mantener la boca húmeda. Si el problema no desaparece o es severo, consulte a hoffman médico.  Date Last Reviewed:   © 8861-8036 [employer]. [address] [place] [address], [place] [address]. Todos los derechos reservados. Esta información no pretende sustituir la atención médica profesional. Sólo hoffman médico puede diagnosticar y tratar un problema de maurilio.             Language Assistance Services     ATTENTION: Language assistance services are available, free of charge. Please call [phone].      ATENCIÓN: Si habla español, tiene a hoffman disposición servicios gratuitos de asistencia lingüística. Llame al [phone].     ADAMS Ý: N?u b?n nói Ti?ng Vi?t, có các d?ch v? h? tr? ngôn ng? mi?n phí dành cho b?n. G?i s? [phone].         [facility] complies with  applicable Federal civil rights laws and does not discriminate on the basis of race, color, national origin, age, disability, or sex.

## 2017-02-13 NOTE — MR AVS SNAPSHOT
[facility]  [address]  [place] [address]  Phone: [phone]                  Sera Boone   2017 10:30 AM   Office Visit    Descripción:  Female : 1935   Personal Médico:  Joe Nayak Jr., MD   Departamento:  [facility]           Razón de la felix     Follow-up           Diagnósticos de Esta Visita        Comentarios    Hydronephrosis, unspecified hydronephrosis type    -  Primario            Lista de tareas           Citas próximas        Personal Médico Departamento Tfno del dpto    2017 3:30 PM MD [facility] - [facility] Medicine [phone]      Metas (5 Years of Data)     Ninguna      [facility]     [facility] Enfermeras - Asistencia   Enfermeras registradas de [facility] pueden ayudarle a reservar kamran felix, proveer educación para la maurilio, asesoría clínica, y otros servicios de asesoramiento.   Llame para marilee servicio gratuito a [phone].             Medicamentos           Mensaje sobre Medicamentos     Verificar los cambios y / o adiciones a hoffman régimen de medicación son los mismos que discutir con hoffman médico. Si cualquiera de estos cambios o adiciones son incorrectos, por favor notifique a hoffman proveedor de atención médica.             Verifique que la siguiente lista de medicamentos es kamran representación exacta de los medicamentos que está tomando actualmente. Si no hay ningunos reportados, la lista puede estar en muñoz. Si no es correcta, por favor póngase en contacto con hoffman proveedor de atención médica. Lleve esta lista con usted en daniel de emergencia.           Medicamentos Actuales     aspirin 81 MG Chew Take 1 tablet (81 mg total) by mouth once daily.    atorvastatin (LIPITOR) 40 MG tablet Take 40 mg by mouth once daily.    diclofenac (VOLTAREN) 75 MG EC tablet Take 1 tablet (75 mg total) by mouth 2 (two) times daily.    doxycycline (VIBRAMYCIN) 100 MG Cap Take 1 capsule (100 mg  total) by mouth every 12 (twelve) hours.    losartan (COZAAR) 50 MG tablet TAKE 1 TABLET BY MOUTH EVERY DAY    metformin (GLUCOPHAGE) 500 MG tablet TAKE 1 TABLET(500 MG) BY MOUTH TWICE DAILY    omega-3 acid ethyl esters (LOVAZA) 1 gram capsule TAKE 1 CAPSULE BY MOUTH TWICE DAILY    peg-electrolyte soln (TRILYTE WITH FLAVOR PACKETS) 420 gram SolR Take 4,000 mLs by mouth as directed.           Información de referencia clínica           Saba signos vitales jm     Resp [place] BMI (IMC)          14 5' (1.524 m) 62.6 kg (138 lb 0.1 oz) 26.95 kg/m2        Alergias     A partir del:  2/13/2017        No Known Drug Allergies      Vacunas     Administradas en la fecha de la visita:  2/13/2017        None      Registrarse para [facility]     La activación de hoffman cuenta [facility] es tan fácil ethel 1-2-3!    1) Ir a [email or web address], seleccione Registrarse Ahora, meter el código de activación y hoffman fecha de nacimiento, y seleccione Próximo.    [id number]  Expires: 3/7/2017 12:50 PM      2) Crear un nombre de usuario y contraseña para usar cuando se visita [facility] en el futuro y selecciona kamran pregunta de seguridad en daniel de que pierda hoffman contraseña y seleccione Próximo.    3) Introduzca hoffman dirección de correo electrónico y maricel [facility] en Registrarse!    Información Adicional  Si tiene alguna pregunta, por favor, e-mail [email or web address] o llame al [phone] para hablar con nuestro personal. Recuerde, [facility] no debe ser usada para necesidades urgentes. En daniel de emergencia médica, llame al 911.        Language Assistance Services     ATTENTION: Language assistance services are available, free of charge. Please call [phone].      ATENCIÓN: Si habla español, tiene a hoffman disposición servicios gratuitos de asistencia lingüística. Yana al [phone].     ADAMS Ý: N?u b?n nói Ti?ng Vi?t, có các d?ch v? h? tr? ngôn ng? mi?n phí dành cho b?n. G?i s? [phone].         [facility] cumple  con las leyes federales aplicables de derechos civiles y no discrimina por motivos de dean, color, origen nacional, edad, discapacidad, o sexo.                 Sera Boone   2017 10:30 AM   Office Visit    Description:  Female : 1935   Provider:  Joe Nayak Jr., MD   Department:  [facility]           Reason for Visit     Follow-up           Diagnoses this Visit        Comments    Hydronephrosis, unspecified hydronephrosis type    -  Primary            To Do List           Future Appointments        Provider Department Dept Phone    2017 3:30 PM Joycelyn Vásquez MD [facility] - Internal Medicine [phone]      Goals     None      [facility]     [facility] Nurse Care Line -  Assistance  Registered nurses in the [facility] provide clinical advisement, health education, appointment booking, and other advisory services.  Call for this free service at [phone].             Medications           Message regarding Medications     Verify the changes and/or additions to your medication regime listed below are the same as discussed with your clinician today.  If any of these changes or additions are incorrect, please notify your healthcare provider.             Verify that the below list of medications is an accurate representation of the medications you are currently taking.  If none reported, the list may be blank. If incorrect, please contact your healthcare provider. Carry this list with you in case of emergency.           Current Medications     aspirin 81 MG Chew Take 1 tablet (81 mg total) by mouth once daily.    atorvastatin (LIPITOR) 40 MG tablet Take 40 mg by mouth once daily.    diclofenac (VOLTAREN) 75 MG EC tablet Take 1 tablet (75 mg total) by mouth 2 (two) times daily.    doxycycline (VIBRAMYCIN) 100 MG Cap Take 1 capsule (100 mg total) by mouth every 12 (twelve) hours.    losartan (COZAAR) 50 MG tablet TAKE 1 TABLET BY MOUTH EVERY DAY     metformin (GLUCOPHAGE) 500 MG tablet TAKE 1 TABLET(500 MG) BY MOUTH TWICE DAILY    omega-3 acid ethyl esters (LOVAZA) 1 gram capsule TAKE 1 CAPSULE BY MOUTH TWICE DAILY    peg-electrolyte soln (TRILYTE WITH FLAVOR PACKETS) 420 gram SolR Take 4,000 mLs by mouth as directed.           Clinical Reference Information           Your Vitals Were     Resp Height Weight BMI          14 5' (1.524 m) 62.6 kg (138 lb 0.1 oz) 26.95 kg/m2        Allergies as of 2/13/2017     No Known Drug Allergies      Immunizations Administered on Date of Encounter - 2/13/2017     None      [facility] Sign-Up     Activating your [facility] account is as easy as 1-2-3!     1) Visit [email or web address], select Sign Up Now, enter this activation code and your date of birth, then select Next.  [email or web address]  Expires: 3/7/2017 12:50 PM      2) Create a username and password to use when you visit [facility] in the future and select a security question in case you lose your password and select Next.    3) Enter your e-mail address and click Sign Up!    Additional Information  If you have questions, please e-mail [email or web address] or call [phone] to talk to our [facility] staff. Remember, [facility] is NOT to be used for urgent needs. For medical emergencies, dial 911.         Language Assistance Services     ATTENTION: Language assistance services are available, free of charge. Please call [phone].      ATENCIÓN: Si habla español, tiene a hoffman disposición servicios gratuitos de asistencia lingüística. Llame al [phone].     [facility] Ý: N?u b?n nói Ti?ng Vi?t, có các d?ch v? h? tr? ngôn ng? mi?n phí dành cho b?n. G?i s? [phone].         [facility] 4th Floor complies with applicable Federal civil rights laws and does not discriminate on the basis of race, color, national origin, age, disability, or sex.

## 2017-02-13 NOTE — PATIENT INSTRUCTIONS
Escitalopram tablets  ¿Qué es kaur medicamento?  El ESCITALOPRAM se utiliza para el tratamiento de la depresión y ciertos tipos de ansiedad.  ¿Cómo meg utilizar kaur medicamento?  [place] kaur medicamento por vía oral con un vaso de agua. Siga las instrucciones de la etiqueta del medicamento. Kaur medicamento se puede lora con o sin alimentos. Si le produce malestar estomacal, tómelo con alimentos. [place] fabiola dosis a intervalos regulares. No tome hoffman medicamento con kamran frecuencia mayor a la indicada. No deje de lora kaur medicamento repentinamente a menos que así indique hoffman médico. El detener kaur medicamento demasiado rápido puede causar efectos secundarios graves o puede empeorar hoffman condición.   Hoffman farmacéutico le dará kamran guía del medicamento especial con cada receta y relleno. Asegúrese de leer esta información cada vez cuidadosamente.  Hable con hoffman pediatra para informarse acerca del uso de kaur medicamento en niños. Puede requerir atención especial.  ¿Qué efectos secundarios puedo tener al utilizar kaur medicamento?  Efectos secundarios que debe informar a hoffman médico o a hoffman profesional de la maurilio tan pronto ethel sea posible:  · reacciones alérgicas ethel erupción cutánea, picazón o urticarias, hinchazón de la curry, labios o lengua  · confusión  · sensación de desmayos o mareos, caídas  · habla rápidamente y presenta acciones o sentimientos agitados y fuera de control  · alucinaciones, pérdida del contacto con la realidad  · convulsiones  · ideas suicidas u otros cambios de humor  · sangrado, magulladuras inusuales  Efectos secundarios que, por lo general, no requieren atención médica (debe informarlos a hoffman médico o a hoffman profesional de la maurilio si persisten o si son molestos):  · visión borrosa  · cambios del apetito  · cambios en el deseo sexual o capacidad  · dolor de chris  · aumento de la sudoración  · náuseas  ¿Qué puede interactuar con kaur medicamento?  No tome esta medicina con ninguno de los siguientes  medicamentos:  · ciertos medicamentos para infecciones micóticas ethel fluconazol, itraconazol, quetoconazol, posaconazol, voriconazol  · cisapride  · citalopram  · dofetilida  · dronedarona  · linezolid  · IMAOs, tales ethel Carbex, Eldepryl, Marplan, Nardil y Parnate  · bessy de metileno (vía intravenosa)  · pimozida  · tioridazina  · ziprasidona  Esta medicina también puede interactuar con los siguientes medicamentos:  · alcohol  · aspirina o medicamentos tipo aspirina  · carbamazepina  · ciertos medicamentos para la depresión, ansiedad o trastornos psicóticos  · ciertos medicamentos para migrañas, tales ethel almotriptán, eletriptán, frovatriptán, naratriptán, rizatriptán, sumatriptán, zolmitriptán  · ciertos medicamentos para dormir  · ciertos medicamentos que tratan o previenen coágulos sanguíneos, ethel warfarina, enoxaparina, dalteparina  · cimetidina  · diuréticos  · fentanilo  · furazolidona  · isoniazida  · litio  · metoprolol  · los NAHID, medicamentos para el dolor o inflamación, tales ethel ibuprofeno o naproxeno  · otros medicamentos que prolongan el intervalo QT (causa un ritmo cardiaco anormal)  · procarbazina  · rasagilina  · suplementos ethel hierba de [place], kava kava, valeriana  · tramadol  · triptófano  ¿Qué sucede si me olvido de kamran dosis?  Si olvida kamran dosis, tómela lo antes posible. Si es telma la hora de la próxima dosis, tome sólo ximena dosis. No tome dosis adicionales o dobles.  ¿Dónde meg guardar mi medicina?  [place] fuera del alcance de los niños.  Guárdela a temperatura ambiente, entre 15 y 30 grados C (59 y 86 grados F). Deseche todo el medicamento que no haya utilizado, después de la fecha de vencimiento.  ¿Qué le meg informar a mi profesional de la maurilio antes de lora marilee medicamento?  Necesita saber si usted presenta alguno de los siguientes problemas o situaciones:  · trastorno bipolar o antecedentes familiares del trastorno bipolar  · diabetes  · glaucoma  · enfermedad  cardiaca  · enfermedad renal o hepática  · recibe tratamiento electroconvulsivo  · convulsiones  · ideas suicidas, planes o si usted o alguien de hoffman stewart ha intentado un suicidio previo  · kamran reacción alérgica o inusual al escitalopram, al medicamento relacionado citalopram, a otros medicamentos, alimentos, colorantes o conservadores  · si está embarazada o buscando quedar embarazada  · si está amamantando a un bebé  ¿A qué meg estar atento al usar marilee medicamento?  Informe a hoffman médico si fabiola síntomas no mejoran o si empeoran. Visite a hoffman médico o a hoffman profesional de la maurilio para chequear hoffman evolución periódicamente. Debido que puede ser necesario lora marilee medicamento renee varias semanas para que sea posible observar fabiola efectos en forma completa, es importante que sigue hoffman tratamiento ethel recetado por hoffman médico.   Los pacientes y fabiola familias deben estar atentos si empeora la depresión o ideas suicidas. También esté atento a cambios repentinos o severos de emoción, tales ethel el sentirse ansioso, agitado, lleno de pánico, irritable, hostil, agresivo, impulsivo, inquietud severa, demasiado excitado y hiperactivo o dificultad para conciliar el sueño. Si esto ocurre, especialmente al comenzar con el tratamiento o al cambiar de dosis, comuníquese con hoffman profesional de la maurilio.  Puede experimentar somnolencia o mareos. No conduzca ni utilice maquinaria, ni maricel nada que le exija permanecer en estado de alerta hasta que sepa cómo le afecta marilee medicamento. No se siente ni se ponga de pie con rapidez, especialmente si es un paciente de edad avanzada. [place] reduce el riesgo de mareos o desmayos. El alcohol puede interferir con el efecto de marilee medicamento. Evite consumir bebidas alcohólicas.  Se le podrá secar la boca. Masticar chicle sin azúcar, chupar caramelos duros y beber agua en abundancia le ayudará a mantener la boca húmeda. Si el problema no desaparece o es severo, consulte a hoffman médico.  Date Last  Reviewed:   © 3022-0895 [employer]. [address] [place] [address], [place] [address]. Todos los derechos reservados. Esta información no pretende sustituir la atención médica profesional. Sólo hoffman médico puede diagnosticar y tratar un problema de maurilio.

## 2017-02-13 NOTE — PROGRESS NOTES
Subjective:       Patient ID: Sera Boone is a 81 y.o. female.    Chief Complaint: Follow-up (pvr/lasix scan)    HPI patient had mild hydronephrosis based on ultrasound however she underwent a Lasix renal scan showing no evidence of hydro-.  Her ultrasonic postvoid residual is 36 cc.  A urine is negative and she is feeling well    Past Medical History   Diagnosis Date    Arthritis     Breast cancer 2001    Diabetes mellitus     History of breast cancer 8/21/2013    Hyperlipidemia     Hypertension     Nuclear sclerotic cataract of right eye 10/16/2012    Tubular adenoma of colon 10/28/2013    Type II or unspecified type diabetes mellitus with neurological manifestations, not stated as uncontrolled     Vitamin D deficiency disease 2/17/2014       Past Surgical History   Procedure Laterality Date    Cataract extraction       both eyes -     Belpharoptosis repair  03/15/13     bilateral-dr. coleman md    Breast surgery Left 2001     lumpectomy    Breast lumpectomy      Cholecystectomy       Done in [place] in the 1980's    Colonoscopy w/ polypectomy      Colonoscopy N/A 1/19/2016     Procedure: COLONOSCOPY;  Surgeon: BLANCA Guerra MD;  Location: [facility] ([address] [place] [address]);  Service: Endoscopy;  Laterality: N/A;       Family History   Problem Relation Age of Onset    Breast cancer Sister 48     55 second, bilateral    Liver cancer Mother     Early death Mother     Liver cancer Father     Liver cancer Brother     No Known Problems Daughter     No Known Problems Son     Liver cancer Brother     No Known Problems Daughter     Glaucoma Neg Hx     Amblyopia Neg Hx     Blindness Neg Hx     Cancer Neg Hx     Cataracts Neg Hx     Diabetes Neg Hx     Hypertension Neg Hx     Macular degeneration Neg Hx     Retinal detachment Neg Hx     Strabismus Neg Hx     Stroke Neg Hx     Thyroid disease Neg Hx     Cervical cancer Neg Hx     Vaginal cancer Neg Hx     Endometrial cancer Neg Hx         Social History     Social History    Marital status:      Spouse name: N/A    Number of children: N/A    Years of education: N/A     Occupational History    Not on file.     Social History Main Topics    Smoking status: Never Smoker    Smokeless tobacco: Never Used    Alcohol use No    Drug use: No    Sexual activity: No     Other Topics Concern    Not on file     Social History Narrative       Allergies:  No known drug allergies    Medications:    Current Outpatient Prescriptions:     aspirin 81 MG Chew, Take 1 tablet (81 mg total) by mouth once daily., Disp: , Rfl:     atorvastatin (LIPITOR) 40 MG tablet, Take 40 mg by mouth once daily., Disp: , Rfl:     diclofenac (VOLTAREN) 75 MG EC tablet, Take 1 tablet (75 mg total) by mouth 2 (two) times daily., Disp: 60 tablet, Rfl: 12    doxycycline (VIBRAMYCIN) 100 MG Cap, Take 1 capsule (100 mg total) by mouth every 12 (twelve) hours., Disp: 14 capsule, Rfl: 0    losartan (COZAAR) 50 MG tablet, TAKE 1 TABLET BY MOUTH EVERY DAY, Disp: 90 tablet, Rfl: 0    metformin (GLUCOPHAGE) 500 MG tablet, TAKE 1 TABLET(500 MG) BY MOUTH TWICE DAILY, Disp: 180 tablet, Rfl: 0    omega-3 acid ethyl esters (LOVAZA) 1 gram capsule, TAKE 1 CAPSULE BY MOUTH TWICE DAILY, Disp: 180 capsule, Rfl: 0    peg-electrolyte soln (TRILYTE WITH FLAVOR PACKETS) 420 gram SolR, Take 4,000 mLs by mouth as directed., Disp: 1 Bottle, Rfl: 0    Review of Systems   Constitutional: Negative.    HENT: Negative.    Respiratory: Negative.    Cardiovascular: Negative.    Gastrointestinal: Negative.    Endocrine: Negative.    Genitourinary: Negative.    Musculoskeletal: Negative.    Allergic/Immunologic: Negative.    Neurological: Negative.    Hematological: Negative.        Objective:      Physical Exam   Constitutional: She appears well-developed.   HENT:   Head: Normocephalic.   Cardiovascular: Normal rate.    Pulmonary/Chest: Effort normal.   Abdominal: Soft.   Musculoskeletal: Normal  range of motion.   Neurological: She is alert.   Skin: Skin is warm.         Assessment:       1. Hydronephrosis, unspecified hydronephrosis type        Plan:       Sera was seen today for follow-up.    Diagnoses and all orders for this visit:    Hydronephrosis, unspecified hydronephrosis type        no evidence of obstruction patient will return when necessary

## 2017-02-13 NOTE — PROGRESS NOTES
Subjective:       Patient ID: Sera Boone is a 81 y.o. female.    Chief Complaint: Follow-up and Shoulder Pain    HPI Comments: Follow up multiple issues    Seen for fatigue and arthralgias a few weeks ago.   Went to ER and given muscle relexant, no help.  She feels her neck is tight.  Many issues    She points to shoulders.  However full ROM of shoulders.  No fever, chills or sweats.    No vision change.   No headache or temple pain.  Denies morning stiffness.      Extensive work up.  UTI and elevated LFTS and then CKD; repeat liver labs OK.   Possible hydronephrosis- then she saw Urology and got a nuclear scan today wnl.    She took alprazolam from her son.  This helped all her sx.  Stressed.  Sad- sister  last year.    DM and BP doing well she thinks.  Declines flu and pneumonia vaccines today.  Patient Active Problem List:     Mixed hyperlipidemia     Type II diabetes mellitus with neurological manifestations     Ptosis of eyebrow     History of breast cancer     Vitamin D deficiency disease     Essential hypertension     History of adenomatous polyp of colon  no need for further follow up per Dr Guerra     Diabetic polyneuropathy associated with type 2 diabetes mellitus     Diabetic polyneuropathy     Osteoporosis, unspecified: see DEXA 8/15-     Fatty liver: see ultrasound      Elevated liver enzymes              Shoulder Pain    Pertinent negatives include no fever or numbness.     Review of Systems   Constitutional: Negative for fatigue and fever.   Eyes: Negative for visual disturbance.   Respiratory: Negative for cough and shortness of breath.    Cardiovascular: Negative for chest pain.   Gastrointestinal: Negative for abdominal pain.   Endocrine: Negative for polydipsia, polyphagia and polyuria.   Genitourinary: Negative for difficulty urinating and hematuria.   Musculoskeletal: Positive for arthralgias and back pain.   Skin: Negative for rash.   Neurological: Negative for weakness and  numbness.   Psychiatric/Behavioral: Negative for dysphoric mood and sleep disturbance. The patient is nervous/anxious.        Objective:      Physical Exam   Constitutional: She is oriented to person, place, and time. She appears well-developed and well-nourished.   HENT:   Head: Normocephalic and atraumatic.   Right Ear: External ear normal.   Left Ear: External ear normal.   Nose: Nose normal.   Mouth/Throat: Oropharynx is clear and moist. No oropharyngeal exudate.   Eyes: Conjunctivae and EOM are normal. No scleral icterus.   Neck: Normal range of motion. Neck supple. No JVD present. No thyromegaly present.   Cardiovascular: Normal rate, regular rhythm, normal heart sounds and intact distal pulses.  Exam reveals no gallop.    No murmur heard.  Pulses:       Dorsalis pedis pulses are 2+ on the right side, and 2+ on the left side.        Posterior tibial pulses are 2+ on the right side, and 2+ on the left side.   Pulmonary/Chest: Effort normal and breath sounds normal. No respiratory distress. She has no wheezes. She exhibits no tenderness.   Abdominal: Soft. Bowel sounds are normal. She exhibits no distension and no mass. There is no tenderness. There is no rebound and no guarding.   Musculoskeletal: Normal range of motion. She exhibits no edema or tenderness.        Right foot: There is normal range of motion and no deformity.        Left foot: There is normal range of motion and no deformity.   Feet:   Right Foot:   Protective Sensation: 5 sites tested. 5 sites sensed.   Skin Integrity: Negative for ulcer, blister, skin breakdown or erythema.   Left Foot:   Protective Sensation: 5 sites tested. 5 sites sensed.   Skin Integrity: Negative for ulcer, blister, skin breakdown or erythema.   Lymphadenopathy:     She has no cervical adenopathy.   Neurological: She is alert and oriented to person, place, and time. She displays normal reflexes. No cranial nerve deficit. Coordination normal.   Skin: Skin is warm. No rash  noted. No erythema.   Psychiatric: She has a normal mood and affect. Her behavior is normal. Judgment and thought content normal.   Nursing note and vitals reviewed.      Assessment:       1. Elevated liver enzymes    2. Diabetic polyneuropathy associated with type 2 diabetes mellitus    3. Essential hypertension    4. History of adenomatous polyp of colon 1/16 no need for further follow up per Dr Guerra    5. History of breast cancer    6. Mixed hyperlipidemia    7. Type II diabetes mellitus with neurological manifestations    8. Osteoporosis, unspecified: see DEXA 8/15-    9. Fatty liver: see ultrasound 1/17    10. Chronic renal disease, stage 3 (moderate)    11. Acute pain of both shoulders    12. Anxiety    13. Deficiency of other specified B group vitamins     14. Vitamin D deficiency disease        Plan:         Elevated liver enzymes: resolved- fatty liver discussed    Diabetic polyneuropathy associated with type 2 diabetes mellitus  -     Ambulatory consult to Optometry  -     Vitamin B12; Future; Expected date: 2/13/17  -     Magnesium; Future; Expected date: 2/13/17    Essential hypertension  -     losartan (COZAAR) 50 MG tablet; Take 1 tablet (50 mg total) by mouth once daily.  Dispense: 90 tablet; Refill: 3    History of adenomatous polyp of colon 1/16 no need for further follow up per Dr Guerra    History of breast cancer: mammogram UTD    Mixed hyperlipidemia: labs and review    Type II diabetes mellitus with neurological manifestations: stable    Osteoporosis, unspecified: see DEXA 8/15- to be repeated this year; has declined tx    Fatty liver: see ultrasound 1/17: Exercise, dietary modification, weight loss    Chronic renal disease, stage 3 (moderate): Hydration, avoid nephrotoxins.  -     Renal function panel; Future; Expected date: 2/13/17    Acute pain of both shoulders  -     ASHLIE; Future; Expected date: 2/13/17  -     Cyclic citrul peptide antibody, IgG; Future; Expected date: 2/13/17  -      C-reactive protein; Future; Expected date: 2/13/17  -     Rheumatoid factor; Future; Expected date: 2/13/17  -     Sedimentation rate, manual; Future; Expected date: 2/13/17    Anxiety  -     escitalopram oxalate (LEXAPRO) 10 MG tablet; Take 1 tablet (10 mg total) by mouth once daily.  Dispense: 30 tablet; Refill: 11    Deficiency of other specified B group vitamins   -     Vitamin B12; Future; Expected date: 2/13/17    Vitamin D deficiency disease  -     cholecalciferol, vitamin D3, 1,000 unit capsule; Take 2 capsules (2,000 Units total) by mouth once daily.  Dispense: 60 capsule; Refill: 12    Avoid taking anxiolytics  Gentle exercise  Sleep hygiene  Stress reduction    Return to clinic in 4-6 weeks to review with me, sooner with problems    Immunizations recommended, she declines    I will review all studies and determine further tx depending on findings    Patient evaluated for over 40 minutes with this appoinment, including diagnostic testing and treatment.  All questions answered,  chart reviewed and care coordinated.

## 2017-02-14 ENCOUNTER — TELEPHONE (OUTPATIENT)
Dept: INTERNAL MEDICINE | Facility: CLINIC | Age: 82
End: 2017-02-14

## 2017-02-14 DIAGNOSIS — M25.511 PAIN OF BOTH SHOULDER JOINTS: ICD-10-CM

## 2017-02-14 DIAGNOSIS — M25.512 PAIN OF BOTH SHOULDER JOINTS: ICD-10-CM

## 2017-02-14 DIAGNOSIS — R76.8 RHEUMATOID FACTOR POSITIVE: ICD-10-CM

## 2017-02-14 LAB
ALBUMIN SERPL BCP-MCNC: 3.8 G/DL
ANA SER QL IF: NORMAL
ANION GAP SERPL CALC-SCNC: 12 MMOL/L
BUN SERPL-MCNC: 16 MG/DL
CALCIUM SERPL-MCNC: 9.7 MG/DL
CCP AB SER IA-ACNC: 1 U/ML
CHLORIDE SERPL-SCNC: 104 MMOL/L
CO2 SERPL-SCNC: 24 MMOL/L
CREAT SERPL-MCNC: 1 MG/DL
CRP SERPL-MCNC: 1.5 MG/L
EST. GFR  (AFRICAN AMERICAN): >60 ML/MIN/1.73 M^2
EST. GFR  (NON AFRICAN AMERICAN): 53 ML/MIN/1.73 M^2
GLUCOSE SERPL-MCNC: 129 MG/DL
MAGNESIUM SERPL-MCNC: 2 MG/DL
PHOSPHATE SERPL-MCNC: 2.7 MG/DL
POTASSIUM SERPL-SCNC: 3.8 MMOL/L
RHEUMATOID FACT SERPL-ACNC: 16 IU/ML
SODIUM SERPL-SCNC: 140 MMOL/L
VIT B12 SERPL-MCNC: 745 PG/ML

## 2017-02-14 NOTE — TELEPHONE ENCOUNTER
Please let her know that labs are all okay other than one of the tests for rheumatoid arthritis is positive.  I would like for her to be seen in rheumatology.    Referral is in    Continue with current medical regimen and drink plenty of water.  EP with me in 2 months.  Thank you

## 2017-02-15 NOTE — TELEPHONE ENCOUNTER
----- Message from Sarath Garcia sent at 2/15/2017  9:49 AM CST -----  Contact: self [phone]  Patient is returning a call from the office , Thanks !

## 2017-03-16 ENCOUNTER — OFFICE VISIT (OUTPATIENT)
Dept: INTERNAL MEDICINE | Facility: CLINIC | Age: 82
End: 2017-03-16
Payer: MEDICARE

## 2017-03-16 VITALS
SYSTOLIC BLOOD PRESSURE: 124 MMHG | HEIGHT: 60 IN | HEART RATE: 64 BPM | WEIGHT: 141.13 LBS | DIASTOLIC BLOOD PRESSURE: 64 MMHG | BODY MASS INDEX: 27.71 KG/M2

## 2017-03-16 DIAGNOSIS — N18.30 STAGE 3 CHRONIC KIDNEY DISEASE: ICD-10-CM

## 2017-03-16 DIAGNOSIS — Z23 NEED FOR PNEUMOCOCCAL VACCINATION: ICD-10-CM

## 2017-03-16 DIAGNOSIS — Z00.00 ENCOUNTER FOR PREVENTIVE HEALTH EXAMINATION: Primary | ICD-10-CM

## 2017-03-16 DIAGNOSIS — M81.0 OSTEOPOROSIS, UNSPECIFIED: ICD-10-CM

## 2017-03-16 DIAGNOSIS — E55.9 VITAMIN D DEFICIENCY DISEASE: ICD-10-CM

## 2017-03-16 DIAGNOSIS — N13.30 HYDRONEPHROSIS, UNSPECIFIED HYDRONEPHROSIS TYPE: ICD-10-CM

## 2017-03-16 DIAGNOSIS — E11.49 TYPE II DIABETES MELLITUS WITH NEUROLOGICAL MANIFESTATIONS: ICD-10-CM

## 2017-03-16 DIAGNOSIS — E78.2 MIXED HYPERLIPIDEMIA: ICD-10-CM

## 2017-03-16 DIAGNOSIS — R74.8 ELEVATED LIVER ENZYMES: ICD-10-CM

## 2017-03-16 DIAGNOSIS — R76.8 RHEUMATOID FACTOR POSITIVE: ICD-10-CM

## 2017-03-16 DIAGNOSIS — E11.42 DIABETIC POLYNEUROPATHY ASSOCIATED WITH TYPE 2 DIABETES MELLITUS: ICD-10-CM

## 2017-03-16 DIAGNOSIS — I10 ESSENTIAL HYPERTENSION: ICD-10-CM

## 2017-03-16 DIAGNOSIS — K76.0 FATTY LIVER: ICD-10-CM

## 2017-03-16 DIAGNOSIS — Z85.3 HISTORY OF BREAST CANCER: ICD-10-CM

## 2017-03-16 PROBLEM — M25.511 PAIN OF BOTH SHOULDER JOINTS: Status: RESOLVED | Noted: 2017-02-14 | Resolved: 2017-03-16

## 2017-03-16 PROBLEM — M25.512 PAIN OF BOTH SHOULDER JOINTS: Status: RESOLVED | Noted: 2017-02-14 | Resolved: 2017-03-16

## 2017-03-16 PROCEDURE — 99999 PR PBB SHADOW E&M-EST. PATIENT-LVL III: CPT | Mod: PBBFAC,,, | Performed by: NURSE PRACTITIONER

## 2017-03-16 PROCEDURE — 3074F SYST BP LT 130 MM HG: CPT | Mod: S$GLB,,, | Performed by: NURSE PRACTITIONER

## 2017-03-16 PROCEDURE — G0009 ADMIN PNEUMOCOCCAL VACCINE: HCPCS | Mod: S$GLB,,, | Performed by: NURSE PRACTITIONER

## 2017-03-16 PROCEDURE — 90732 PPSV23 VACC 2 YRS+ SUBQ/IM: CPT | Mod: S$GLB,,, | Performed by: NURSE PRACTITIONER

## 2017-03-16 PROCEDURE — 99499 UNLISTED E&M SERVICE: CPT | Mod: S$GLB,,, | Performed by: NURSE PRACTITIONER

## 2017-03-16 PROCEDURE — G0439 PPPS, SUBSEQ VISIT: HCPCS | Mod: S$GLB,,, | Performed by: NURSE PRACTITIONER

## 2017-03-16 PROCEDURE — 3078F DIAST BP <80 MM HG: CPT | Mod: S$GLB,,, | Performed by: NURSE PRACTITIONER

## 2017-03-16 NOTE — PROGRESS NOTES
Sera Boone presented for a  Medicare AWV and comprehensive Health Risk Assessment today. The following components were reviewed and updated:    · Medical history  · Family History  · Social history  · Allergies and Current Medications  · Health Risk Assessment  · Health Maintenance  · Care Team     ** See Completed Assessments for Annual Wellness Visit within the encounter summary.**       The following assessments were completed:  · Living Situation  · Depression Screening  · Timed Get Up and Go  · Whisper Test  · Cognitive Function Screening  · Nutrition Screening  · ADL Screening  · PAQ Screening            Vitals:    03/16/17 1037   BP: 124/64   Pulse: 64   Weight: 64 kg (141 lb 1.5 oz)   Height: 5' (1.524 m)     Body mass index is 27.56 kg/(m^2).     Physical Exam   Constitutional: She is oriented to person, place, and time. She appears well-developed and well-nourished. No distress.   HENT:   Head: Normocephalic and atraumatic.   Cardiovascular: Normal rate, regular rhythm, normal heart sounds and intact distal pulses.    No murmur heard.  Trace bilateral pedal edema   Pulmonary/Chest: Effort normal and breath sounds normal. No respiratory distress. She has no wheezes. She has no rales.   Musculoskeletal: She exhibits no edema, tenderness or deformity.   Neurological: She is alert and oriented to person, place, and time. No cranial nerve deficit.   Skin: Skin is warm and dry. She is not diaphoretic.   Psychiatric: She has a normal mood and affect. Her behavior is normal. She expresses no homicidal and no suicidal ideation. She expresses no suicidal plans and no homicidal plans.   Vitals reviewed.       present for entire visit.     Diagnoses and health risks identified today and associated recommendations/orders:    1. Encounter for preventive health examination  Eye exam scheduled.  DXA scan due 8/2018.    2. Mixed hyperlipidemia  Stable.   Continue current medication.  Followed by PCP.      3. Type II diabetes mellitus with neurological manifestations  Stable.   ADA diet.   Continue current medication.  Eye exam scheduled.  Followed by PCP.     4. Essential hypertension  Stable.   Low sodium diet.   Continue current medication.  Followed by PCP.     5. Vitamin D deficiency disease  Stable.   Continue Vitamin D.  Followed by PCP.     6. History of breast cancer  Stable.   [facility].     7. Diabetic polyneuropathy associated with type 2 diabetes mellitus  Stable.   Followed by PCP.     8. Osteoporosis, unspecified: see DEXA 8/15-  Stable.   Vitamin D and Calcium.  Followed by PCP.     9. Fatty liver: see ultrasound 1/17  Stable.   Followed by PCP.     10. Hydronephrosis, unspecified hydronephrosis type  Stable.   Followed by Urology.     11. Stage 3 chronic kidney disease  Stable.   Avoid NSAIDs.  Increase water intake.   Followed by PCP.     12. Need for pneumococcal vaccination  - Pneumococcal Polysaccharide Vaccine (23 Valent) (SQ/IM)    13. Elevated liver enzymes  Stable.   Followed by PCP.     14. Rheumatoid factor positive  Stable.   Scheduled to see Rheumatology.  Followed by PCP.       Provided Sera with a 5-10 year written screening schedule and personal prevention plan. Recommendations were developed using the USPSTF age appropriate recommendations. Education, counseling, and referrals were provided as needed. After Visit Summary printed and given to patient which includes a list of additional screenings\tests needed.    Follow up with PCP in 2 weeks. Patient will schedule appt.    Corrie Alvarez NP

## 2017-03-16 NOTE — MR AVS SNAPSHOT
[facility]  [address]  [place] [address]  Phone: [phone]  Fax: [phone]                  Sera Boone   3/16/2017 10:00 AM   Office Visit    Descripción:  Female : 1935   Personal Médico:  MARC, NOM 5   Departamento:  [facility]           Razón de la felix     Health Risk Assessment           Diagnósticos de Esta Visita        Comentarios    Mixed hyperlipidemia    -  Primario     Type II diabetes mellitus with neurological manifestations         Essential hypertension         Vitamin D deficiency disease         History of breast cancer         Diabetic polyneuropathy associated with type 2 diabetes mellitus         Osteoporosis, unspecified         Fatty liver         Hydronephrosis, unspecified hydronephrosis type         Stage 3 chronic kidney disease         Encounter for preventive health examination         Need for pneumococcal vaccination                Lista de tareas           Citas próximas        Personal Médico Departamento Tfno del dpto    3/30/2017 9:00 AM MD [facility] - Rheumatology [phone]    3/30/2017 1:00 PM GABRIELA Lopes jace - Optometry [phone]      Metas (5 Years of Data)     Ninguna      [facility]     [facility] - Asistencia   Enfermeras registradas de [facility] pueden ayudarle a reservar kamran felix, proveer educación para la maurilio, asesoría clínica, y otros servicios de asesoramiento.   Llame para marilee servicio gratuito a [phone].             Medicamentos           Mensaje sobre Medicamentos     Verificar los cambios y / o adiciones a hoffman régimen de medicación son los mismos que discutir con hoffman médico. Si cualquiera de estos cambios o adiciones son incorrectos, por favor notifique a hoffman proveedor de atención médica.        DEJAR de lora estos medicamentos     doxycycline (VIBRAMYCIN) 100 MG Cap Take 1 capsule (100 mg total) by mouth every 12  (twelve) hours.           Verifique que la siguiente lista de medicamentos es kamran representación exacta de los medicamentos que está tomando actualmente. Si no hay ningunos reportados, la lista puede estar en muñoz. Si no es correcta, por favor póngase en contacto con hoffman proveedor de atención médica. Lleve esta lista con usted en daniel de emergencia.           Medicamentos Actuales     aspirin 81 MG Chew Take 1 tablet (81 mg total) by mouth once daily.    atorvastatin (LIPITOR) 40 MG tablet Take 40 mg by mouth once daily.    cholecalciferol, vitamin D3, 1,000 unit capsule Take 2 capsules (2,000 Units total) by mouth once daily.    escitalopram oxalate (LEXAPRO) 10 MG tablet Take 1 tablet (10 mg total) by mouth once daily.    losartan (COZAAR) 50 MG tablet Take 1 tablet (50 mg total) by mouth once daily.    metformin (GLUCOPHAGE) 500 MG tablet TAKE 1 TABLET(500 MG) BY MOUTH TWICE DAILY    omega-3 acid ethyl esters (LOVAZA) 1 gram capsule TAKE 1 CAPSULE BY MOUTH TWICE DAILY           Información de referencia clínica           Saba signos vitales jm     PS [place] BMI (IMC)       124/64 64 5' (1.524 m) 64 kg (141 lb 1.5 oz) 27.56 kg/m2       Blood Pressure          Most Recent Value    BP  124/64      Alergias     A partir del:  3/16/2017        No Known Drug Allergies      Vacunas     Administradas en la fecha de la visita:  3/16/2017        Nombre Fecha Dosis Fecha del VIS Vía    Pneumococcal Polysaccharide - 23 Valent  Incomplete 0.5 mL 4/24/2015 Intramuscular      Orders Placed During Today's Visit      Órdenes normales de esta visita    Pneumococcal Polysaccharide Vaccine (23 Valent) (SQ/IM)       Registrarse para [facility]     La activación de hoffman cuenta [facility] es tan fácil ethel 1-2-3!    1) Ir a [email or web address], seleccione Registrarse Ahora, meter el código de activación y hoffman fecha de nacimiento, y seleccione Próximo.    [id number]  Expires: 4/30/2017 11:48 AM      2) Crear un nombre de  usuario y contraseña para usar cuando se visita [facility] en el futuro y selecciona kamran pregunta de seguridad en daniel de que pierda hoffman contraseña y seleccione Próximo.    3) Introduzca hoffman dirección de correo electrónico y maricel josiane en Registrarse!    Información Adicional  Si tiene alguna pregunta, por favor, e-mail [email or web address] o llame al [phone] para hablar con nuestro personal. Recuerde, [facility] no debe ser usada para necesidades urgentes. En daniel de emergencia médica, llame al [phone].        Instrucciones      Counseling and Referral of Other Preventative  (Italic type indicates deductible and co-insurance are waived)    Patient Name: Sera Boone  Today's Date: 3/16/2017      SERVICE LIMITATIONS RECOMMENDATION    Vaccines    · Pneumococcal (once after 65)    · Influenza (annually)    · Hepatitis B (if medium/high risk)    · Prevnar 13      Hepatitis B medium/high risk factors:       - End-stage renal disease       - Hemophiliacs who received Factor VII or         IX concentrates       - Clients of institutions for the mentally             retarded       - Persons who live in the same house as          a HepB carrier       - Homosexual men       - Illicit injectable drug abusers     Pneumococcal: Scheduled - see appointments     Influenza: Done, repeat in one year     Hepatitis B: N/A     Prevnar 13: Done, no repeat necessary    Mammogram (biennial age 50-74)  Annually (age 40 or over)  Last done 1/31/2017, recommend to repeat every    year    Pap (up to age 70 and after 70 if unknown history or abnormal study last 10 years)    N/A     The USPSTF recommends against screening for cervical cancer in women older than age 65 years who have had adequate prior screening and are not otherwise at high risk for cervical cancer.      Colorectal cancer screening (to age 75)    · Fecal occult blood test (annual)  · Flexible sigmoidoscopy (5y)  · Screening colonoscopy (10y)  · Barium enema   Last done 1/2016,  recommend to repeat every 5  years    Diabetes self-management training (no USPSTF recommendations)  Requires referral by treating physician for patient with diabetes or renal disease. 10 hours of initial DSMT sessions of no less than 30 minutes each in a continuous 12-month period. 2 hours of follow-up DSMT in subsequent years.  Done     Bone mass measurements (age 65 & older, biennial)  Requires diagnosis related to osteoporosis or estrogen deficiency. Biennial benefit unless patient has history of long-term glucocorticoid  Last done 8/10/2015, recommend to repeat every 3  years    Glaucoma screening (no USPSTF recommendation)  Diabetes mellitus, family history   , age 50 or over    American, age 65 or over  Scheduled, see appointments    Medical nutrition therapy for diabetes or renal disease (no recommended schedule)  Requires referral by treating physician for patient with diabetes or renal disease or kidney transplant within the past 3 years.  Can be provided in same year as diabetes self-management training (DSMT), and CMS recommends medical nutrition therapy take place after DSMT. Up to 3 hours for initial year and 2 hours in subsequent years.  N/A    Cardiovascular screening blood tests (every 5 years)  · Fasting lipid panel  Order as a panel if possible  Done this year, repeat every year    Diabetes screening tests (at least every 3 years, Medicare covers annually or at 6-month intervals for prediabetic patients)  · Fasting blood sugar (FBS) or glucose tolerance test (GTT)  Patient must be diagnosed with one of the following:       - Hypertension       - Dyslipidemia       - Obesity (BMI 30kg/m2)       - Previous elevated impaired FBS or GTT       ... or any two of the following:       - Overweight (BMI 25 but <30)       - Family history of diabetes       - Age 65 or older       - History of gestational diabetes or birth of baby weighing more than 9 pounds  Done this year, repeat  every year    Abdominal aortic aneurysm screening (once)  · Sonogram   Limited to patients who meet one of the following criteria:       - Men who are 65-75 years old and have smoked more than 100 cigarette in their lifetime       - Anyone with a family history of abdominal aortic aneurysm       - Anyone recommended for screening by the USPSTF  N/A    HIV screening (annually for increased risk patients)  · HIV-1 and HIV-2 by EIA, or MAGALI, rapid antibody test or oral mucosa transudate  Patients must be at increased risk for HIV infection per USPSTF guidelines or pregnant. Tests covered annually for patient at increased risk or as requested by the patient. Pregnant patients may receive up to 3 tests during pregnancy.  Risks discussed, screening is not recommended    Smoking cessation counseling (up to 8 sessions per year)  Patients must be asymptomatic of tobacco-related conditions to receive as a preventative service.  N/A    Subsequent annual wellness visit  At least 12 months since last AWV  Return in one year     The following information is provided to all patients.  This information is to help you find resources for any of the problems found today that may be affecting your health:                Living healthy guide: www.[facility].[place].[facility]      Understanding Diabetes: www.diabetes.org      Eating healthy: www.cdc.gov/healthyweight      [facility] home safety checklist: www.cdc.gov/steadi/patient.html      Agency on Aging: www.goea.[place].gov      Alcoholics anonymous (AA): www.aa.org      Physical Activity: www.chioma.nih.gov/jh6xzii      Tobacco use: www.quitwithusla.org     Exercises to Prevent Falls  Certain types of exercises may help make you less likely to fall. Try the ones below. Or do other exercises that your health care provider suggests. Depending on your health, you may need to start slowly. Don't let that stop you. Even small amounts of exercise can help you. Be sure to talk to your health care provider  "before starting any exercise program.    Improve balance  Many types of exercise can help improve balance. Vivek chi and yoga are good examples. Here's another one to try. You can do it anytime and almost anywhere.  · Stand next to a counter or solid support.  · Push yourself up onto your tiptoes.  · Hold for 5 seconds. If you start to lose your balance, hold on to the counter.  · Rest and repeat 5 times. Work up to holding for 20 to 30 seconds, if you can.    Increase flexibility  Being more flexible makes it easier for you to move around safely. Try exercises like the seated hamstring stretch.  · Sit in a chair and put one foot on a stool.  · Straighten your leg and reach with both hands down either side of your leg. Reach as far down your leg as you can.  · Hold for about 20 seconds.  · Go back to the starting position. Then repeat 5 times. Switch legs.    Build strength  "Resistance" exercises help build strength. You can do them without equipment. Or you can use weights, elastic bands, or special machines. One such exercise is called the biceps curl. You can hold a 1-pound weight or even a can of soup. Do this exercise at least 3 times a week. Strive for every day.  · Sit up straight in a chair.  · Keep your elbow close to your body and your wrist straight.  · Bend your arm, moving your hand up to your shoulder. Then slowly lower your arm.  · Repeat 5 times. Switch to the other arm.    Build your staying power  Aerobic exercises make your heart and lungs stronger so you can keep moving longer. Walking and swimming are two of the best types of exercises you can do. Using a stationary bike is great, too. Find an aerobic exercise that you enjoy. Start slowly and build up. Even 5 minutes is helpful. Aim for a goal of 30 minutes, at least 3 times a week. You don't have to do 30 minutes in 1 session. Break it up and walk a little throughout the day.     More helpful tips  · Start easy. Slowly work up to doing " more.  · Talk with your health care provider about the best exercises for you.  · Call senior centers or health clubs about exercise programs.  · If needed, have a family member watch you walk every so often to check your stability.  · Exercise with a friend. Choose an activity you both enjoy.  · Consider kuldeep chi or yoga to strengthen your balance.  · Try exercises that you can do anytime, anywhere. Here are 2 examples. Have someone with you when you first try these:  ¨ Practice walking by placing 1 foot right in front of the other.  ¨ Stand up and sit down 10 times. Repeat this throughout the day.   Date Last Reviewed: 2015-2016 [employer]. [address] [place] [address], [place] [address]. All rights reserved. This information is not intended as a substitute for professional medical care. Always follow your healthcare professional's instructions.             Language Assistance Services     ATTENTION: Language assistance services are available, free of charge. Please call [phone].      ATENCIÓN: Si habla español, tiene a hoffman disposición servicios gratuitos de asistencia lingüística. Llame al [phone].     CHÚ Ý: N?u b?n nói Ti?ng Vi?t, có các d?ch v? h? tr? ngôn ng? mi?n phí dành cho b?n. G?i s? [phone].         [facility] cumple con las leyes federales aplicables de derechos civiles y no discrimina por motivos de dean, color, origen nacional, edad, discapacidad, o sexo.                 Sera Boone   3/16/2017 10:00 AM   Office Visit    Description:  Female : 1935   Provider:  RYDER TRAN 5   Department:  [facility]           Reason for Visit     Health Risk Assessment           Diagnoses this Visit        Comments    Mixed hyperlipidemia    -  Primary     Type II diabetes mellitus with neurological manifestations         Essential hypertension         Vitamin D deficiency disease         History of breast cancer         Diabetic  polyneuropathy associated with type 2 diabetes mellitus         Osteoporosis, unspecified         Fatty liver         Hydronephrosis, unspecified hydronephrosis type         Stage 3 chronic kidney disease         Encounter for preventive health examination         Need for pneumococcal vaccination                To Do List           Future Appointments        Provider Department Dept Phone    3/30/2017 9:00 AM MD [facility] Jacob jace - Rheumatology [phone]    3/30/2017 1:00 PM GABRIELA Lopes - Optometry [phone]      Goals     None      [facility]     [facility] - 24/7 Assistance  Registered nurses in the [facility] Center provide clinical advisement, health education, appointment booking, and other advisory services.  Call for this free service at [phone].             Medications           Message regarding Medications     Verify the changes and/or additions to your medication regime listed below are the same as discussed with your clinician today.  If any of these changes or additions are incorrect, please notify your healthcare provider.        STOP taking these medications     doxycycline (VIBRAMYCIN) 100 MG Cap Take 1 capsule (100 mg total) by mouth every 12 (twelve) hours.           Verify that the below list of medications is an accurate representation of the medications you are currently taking.  If none reported, the list may be blank. If incorrect, please contact your healthcare provider. Carry this list with you in case of emergency.           Current Medications     aspirin 81 MG Chew Take 1 tablet (81 mg total) by mouth once daily.    atorvastatin (LIPITOR) 40 MG tablet Take 40 mg by mouth once daily.    cholecalciferol, vitamin D3, 1,000 unit capsule Take 2 capsules (2,000 Units total) by mouth once daily.    escitalopram oxalate (LEXAPRO) 10 MG tablet Take 1 tablet (10 mg total) by mouth once daily.    losartan (COZAAR) 50 MG tablet Take 1  tablet (50 mg total) by mouth once daily.    metformin (GLUCOPHAGE) 500 MG tablet TAKE 1 TABLET(500 MG) BY MOUTH TWICE DAILY    omega-3 acid ethyl esters (LOVAZA) 1 gram capsule TAKE 1 CAPSULE BY MOUTH TWICE DAILY           Clinical Reference Information           Your Vitals Were     BP Pulse Height Weight BMI       124/64 64 5' (1.524 m) 64 kg (141 lb 1.5 oz) 27.56 kg/m2       Blood Pressure          Most Recent Value    BP  124/64      Allergies as of 3/16/2017     No Known Drug Allergies      Immunizations Administered on Date of Encounter - 3/16/2017     Name Date Dose VIS Date Route    Pneumococcal Polysaccharide - 23 Valent  Incomplete 0.5 mL 4/24/2015 Intramuscular      Orders Placed During Today's Visit      Normal Orders This Visit    Pneumococcal Polysaccharide Vaccine (23 Valent) (SQ/IM)       [facility] Sign-Up     Activating your [facility] account is as easy as 1-2-3!     1) Visit [email or web address], select Sign Up Now, enter this activation code and your date of birth, then select Next.  [email or web address]  Expires: 4/30/2017 11:48 AM      2) Create a username and password to use when you visit [facility] in the future and select a security question in case you lose your password and select Next.    3) Enter your e-mail address and click Sign Up!    Additional Information  If you have questions, please e-mail [email or web address] or call [phone] to talk to our [facility] staff. Remember, [facility] is NOT to be used for urgent needs. For medical emergencies, dial 911.         Instructions      Counseling and Referral of Other Preventative  (Italic type indicates deductible and co-insurance are waived)    Patient Name: Sera Boone  Today's Date: 3/16/2017      SERVICE LIMITATIONS RECOMMENDATION    Vaccines    · Pneumococcal (once after 65)    · Influenza (annually)    · Hepatitis B (if medium/high risk)    · Prevnar 13      Hepatitis B medium/high risk factors:       - End-stage renal disease       -  Hemophiliacs who received Factor VII or         IX concentrates       - Clients of institutions for the mentally             retarded       - Persons who live in the same house as          a HepB carrier       - Homosexual men       - Illicit injectable drug abusers     Pneumococcal: Scheduled - see appointments     Influenza: Done, repeat in one year     Hepatitis B: N/A     Prevnar 13: Done, no repeat necessary    Mammogram (biennial age 50-74)  Annually (age 40 or over)  Last done 1/31/2017, recommend to repeat every    year    Pap (up to age 70 and after 70 if unknown history or abnormal study last 10 years)    N/A     The USPSTF recommends against screening for cervical cancer in women older than age 65 years who have had adequate prior screening and are not otherwise at high risk for cervical cancer.      Colorectal cancer screening (to age 75)    · Fecal occult blood test (annual)  · Flexible sigmoidoscopy (5y)  · Screening colonoscopy (10y)  · Barium enema   Last done 1/2016, recommend to repeat every 5  years    Diabetes self-management training (no USPSTF recommendations)  Requires referral by treating physician for patient with diabetes or renal disease. 10 hours of initial DSMT sessions of no less than 30 minutes each in a continuous 12-month period. 2 hours of follow-up DSMT in subsequent years.  Done     Bone mass measurements (age 65 & older, biennial)  Requires diagnosis related to osteoporosis or estrogen deficiency. Biennial benefit unless patient has history of long-term glucocorticoid  Last done 8/10/2015, recommend to repeat every 3  years    Glaucoma screening (no USPSTF recommendation)  Diabetes mellitus, family history   , age 50 or over    American, age 65 or over  Scheduled, see appointments    Medical nutrition therapy for diabetes or renal disease (no recommended schedule)  Requires referral by treating physician for patient with diabetes or renal disease or kidney  transplant within the past 3 years.  Can be provided in same year as diabetes self-management training (DSMT), and CMS recommends medical nutrition therapy take place after DSMT. Up to 3 hours for initial year and 2 hours in subsequent years.  N/A    Cardiovascular screening blood tests (every 5 years)  · Fasting lipid panel  Order as a panel if possible  Done this year, repeat every year    Diabetes screening tests (at least every 3 years, Medicare covers annually or at 6-month intervals for prediabetic patients)  · Fasting blood sugar (FBS) or glucose tolerance test (GTT)  Patient must be diagnosed with one of the following:       - Hypertension       - Dyslipidemia       - Obesity (BMI 30kg/m2)       - Previous elevated impaired FBS or GTT       ... or any two of the following:       - Overweight (BMI 25 but <30)       - Family history of diabetes       - Age 65 or older       - History of gestational diabetes or birth of baby weighing more than 9 pounds  Done this year, repeat every year    Abdominal aortic aneurysm screening (once)  · Sonogram   Limited to patients who meet one of the following criteria:       - Men who are 65-75 years old and have smoked more than 100 cigarette in their lifetime       - Anyone with a family history of abdominal aortic aneurysm       - Anyone recommended for screening by the USPSTF  N/A    HIV screening (annually for increased risk patients)  · HIV-1 and HIV-2 by EIA, or MAGALI, rapid antibody test or oral mucosa transudate  Patients must be at increased risk for HIV infection per USPSTF guidelines or pregnant. Tests covered annually for patient at increased risk or as requested by the patient. Pregnant patients may receive up to 3 tests during pregnancy.  Risks discussed, screening is not recommended    Smoking cessation counseling (up to 8 sessions per year)  Patients must be asymptomatic of tobacco-related conditions to receive as a preventative service.  N/A    Subsequent  "annual wellness visit  At least 12 months since last AWV  Return in one year     The following information is provided to all patients.  This information is to help you find resources for any of the problems found today that may be affecting your health:                Living healthy guide: www.[facility].[place].[facility]      Understanding Diabetes: www.diabetes.org      Eating healthy: www.cdc.gov/healthyweight      CDC home safety checklist: www.cdc.gov/steadi/patient.html      Agency on Aging: www.goea.[place].[facility]      Alcoholics anonymous (AA): www.aa.org      Physical Activity: www.chioma.nih.gov/tq5zlmw      Tobacco use: www.quitwithusla.org     Exercises to Prevent Falls  Certain types of exercises may help make you less likely to fall. Try the ones below. Or do other exercises that your health care provider suggests. Depending on your health, you may need to start slowly. Don't let that stop you. Even small amounts of exercise can help you. Be sure to talk to your health care provider before starting any exercise program.    Improve balance  Many types of exercise can help improve balance. Vivek chi and yoga are good examples. Here's another one to try. You can do it anytime and almost anywhere.  · Stand next to a counter or solid support.  · Push yourself up onto your tiptoes.  · Hold for 5 seconds. If you start to lose your balance, hold on to the counter.  · Rest and repeat 5 times. Work up to holding for 20 to 30 seconds, if you can.    Increase flexibility  Being more flexible makes it easier for you to move around safely. Try exercises like the seated hamstring stretch.  · Sit in a chair and put one foot on a stool.  · Straighten your leg and reach with both hands down either side of your leg. Reach as far down your leg as you can.  · Hold for about 20 seconds.  · Go back to the starting position. Then repeat 5 times. Switch legs.    Build strength  "Resistance" exercises help build strength. You can do them " without equipment. Or you can use weights, elastic bands, or special machines. One such exercise is called the biceps curl. You can hold a 1-pound weight or even a can of soup. Do this exercise at least 3 times a week. Strive for every day.  · Sit up straight in a chair.  · Keep your elbow close to your body and your wrist straight.  · Bend your arm, moving your hand up to your shoulder. Then slowly lower your arm.  · Repeat 5 times. Switch to the other arm.    Build your staying power  Aerobic exercises make your heart and lungs stronger so you can keep moving longer. Walking and swimming are two of the best types of exercises you can do. Using a stationary bike is great, too. Find an aerobic exercise that you enjoy. Start slowly and build up. Even 5 minutes is helpful. Aim for a goal of 30 minutes, at least 3 times a week. You don't have to do 30 minutes in 1 session. Break it up and walk a little throughout the day.     More helpful tips  · Start easy. Slowly work up to doing more.  · Talk with your health care provider about the best exercises for you.  · Call senior centers or health clubs about exercise programs.  · If needed, have a family member watch you walk every so often to check your stability.  · Exercise with a friend. Choose an activity you both enjoy.  · Consider kuldeep chi or yoga to strengthen your balance.  · Try exercises that you can do anytime, anywhere. Here are 2 examples. Have someone with you when you first try these:  ¨ Practice walking by placing 1 foot right in front of the other.  ¨ Stand up and sit down 10 times. Repeat this throughout the day.   Date Last Reviewed: 6/13/2015 © 2000-2016 [employer]. [address] [place] [address], [place] [address]. All rights reserved. This information is not intended as a substitute for professional medical care. Always follow your healthcare professional's instructions.             Language Assistance Services     ATTENTION: Language  assistance services are available, free of charge. Please call [phone].      ATENCIÓN: Si habla odette, tiene a hoffman disposición servicios gratuitos de asistencia lingüística. Llame al [phone].     CHÚ Ý: N?u b?n nói Ti?ng Vi?t, có các d?ch v? h? tr? ngôn ng? mi?n phí dành cho b?n. G?i s? [phone].         [facility] complies with applicable Federal civil rights laws and does not discriminate on the basis of race, color, national origin, age, disability, or sex.

## 2017-03-16 NOTE — PATIENT INSTRUCTIONS
Counseling and Referral of Other Preventative  (Italic type indicates deductible and co-insurance are waived)    Patient Name: Sera Boone  Today's Date: 3/16/2017      SERVICE LIMITATIONS RECOMMENDATION    Vaccines    · Pneumococcal (once after 65)    · Influenza (annually)    · Hepatitis B (if medium/high risk)    · Prevnar 13      Hepatitis B medium/high risk factors:       - End-stage renal disease       - Hemophiliacs who received Factor VII or         IX concentrates       - Clients of institutions for the mentally             retarded       - Persons who live in the same house as          a HepB carrier       - Homosexual men       - Illicit injectable drug abusers     Pneumococcal: Scheduled - see appointments     Influenza: Done, repeat in one year     Hepatitis B: N/A     Prevnar 13: Done, no repeat necessary    Mammogram (biennial age 50-74)  Annually (age 40 or over)  Last done 1/31/2017, recommend to repeat every    year    Pap (up to age 70 and after 70 if unknown history or abnormal study last 10 years)    N/A     The USPSTF recommends against screening for cervical cancer in women older than age 65 years who have had adequate prior screening and are not otherwise at high risk for cervical cancer.      Colorectal cancer screening (to age 75)    · Fecal occult blood test (annual)  · Flexible sigmoidoscopy (5y)  · Screening colonoscopy (10y)  · Barium enema   Last done 1/2016, recommend to repeat every 5  years    Diabetes self-management training (no USPSTF recommendations)  Requires referral by treating physician for patient with diabetes or renal disease. 10 hours of initial DSMT sessions of no less than 30 minutes each in a continuous 12-month period. 2 hours of follow-up DSMT in subsequent years.  Done     Bone mass measurements (age 65 & older, biennial)  Requires diagnosis related to osteoporosis or estrogen deficiency. Biennial benefit unless patient has history of long-term glucocorticoid   Last done 8/10/2015, recommend to repeat every 3  years    Glaucoma screening (no USPSTF recommendation)  Diabetes mellitus, family history   , age 50 or over    American, age 65 or over  Scheduled, see appointments    Medical nutrition therapy for diabetes or renal disease (no recommended schedule)  Requires referral by treating physician for patient with diabetes or renal disease or kidney transplant within the past 3 years.  Can be provided in same year as diabetes self-management training (DSMT), and CMS recommends medical nutrition therapy take place after DSMT. Up to 3 hours for initial year and 2 hours in subsequent years.  N/A    Cardiovascular screening blood tests (every 5 years)  · Fasting lipid panel  Order as a panel if possible  Done this year, repeat every year    Diabetes screening tests (at least every 3 years, Medicare covers annually or at 6-month intervals for prediabetic patients)  · Fasting blood sugar (FBS) or glucose tolerance test (GTT)  Patient must be diagnosed with one of the following:       - Hypertension       - Dyslipidemia       - Obesity (BMI 30kg/m2)       - Previous elevated impaired FBS or GTT       ... or any two of the following:       - Overweight (BMI 25 but <30)       - Family history of diabetes       - Age 65 or older       - History of gestational diabetes or birth of baby weighing more than 9 pounds  Done this year, repeat every year    Abdominal aortic aneurysm screening (once)  · Sonogram   Limited to patients who meet one of the following criteria:       - Men who are 65-75 years old and have smoked more than 100 cigarette in their lifetime       - Anyone with a family history of abdominal aortic aneurysm       - Anyone recommended for screening by the USPSTF  N/A    HIV screening (annually for increased risk patients)  · HIV-1 and HIV-2 by EIA, or MAGALI, rapid antibody test or oral mucosa transudate  Patients must be at increased risk for HIV  infection per USPSTF guidelines or pregnant. Tests covered annually for patient at increased risk or as requested by the patient. Pregnant patients may receive up to 3 tests during pregnancy.  Risks discussed, screening is not recommended    Smoking cessation counseling (up to 8 sessions per year)  Patients must be asymptomatic of tobacco-related conditions to receive as a preventative service.  N/A    Subsequent annual wellness visit  At least 12 months since last AWV  Return in one year     The following information is provided to all patients.  This information is to help you find resources for any of the problems found today that may be affecting your health:                Living healthy guide: www.[facility].[place].[facility]      Understanding Diabetes: www.diabetes.org      Eating healthy: www.cdc.gov/healthyweight      CDC home safety checklist: www.cdc.gov/steadi/patient.html      Agency on Aging: www.goea.[place].[facility]      Alcoholics anonymous (AA): www.aa.org      Physical Activity: www.chioma.nih.gov/ih5bozc      Tobacco use: www.quitwithusla.org     Exercises to Prevent Falls  Certain types of exercises may help make you less likely to fall. Try the ones below. Or do other exercises that your health care provider suggests. Depending on your health, you may need to start slowly. Don't let that stop you. Even small amounts of exercise can help you. Be sure to talk to your health care provider before starting any exercise program.    Improve balance  Many types of exercise can help improve balance. Vivek chi and yoga are good examples. Here's another one to try. You can do it anytime and almost anywhere.  · Stand next to a counter or solid support.  · Push yourself up onto your tiptoes.  · Hold for 5 seconds. If you start to lose your balance, hold on to the counter.  · Rest and repeat 5 times. Work up to holding for 20 to 30 seconds, if you can.    Increase flexibility  Being more flexible makes it easier for you to  "move around safely. Try exercises like the seated hamstring stretch.  · Sit in a chair and put one foot on a stool.  · Straighten your leg and reach with both hands down either side of your leg. Reach as far down your leg as you can.  · Hold for about 20 seconds.  · Go back to the starting position. Then repeat 5 times. Switch legs.    Build strength  "Resistance" exercises help build strength. You can do them without equipment. Or you can use weights, elastic bands, or special machines. One such exercise is called the biceps curl. You can hold a 1-pound weight or even a can of soup. Do this exercise at least 3 times a week. Strive for every day.  · Sit up straight in a chair.  · Keep your elbow close to your body and your wrist straight.  · Bend your arm, moving your hand up to your shoulder. Then slowly lower your arm.  · Repeat 5 times. Switch to the other arm.    Build your staying power  Aerobic exercises make your heart and lungs stronger so you can keep moving longer. Walking and swimming are two of the best types of exercises you can do. Using a stationary bike is great, too. Find an aerobic exercise that you enjoy. Start slowly and build up. Even 5 minutes is helpful. Aim for a goal of 30 minutes, at least 3 times a week. You don't have to do 30 minutes in 1 session. Break it up and walk a little throughout the day.     More helpful tips  · Start easy. Slowly work up to doing more.  · Talk with your health care provider about the best exercises for you.  · Call senior centers or health clubs about exercise programs.  · If needed, have a family member watch you walk every so often to check your stability.  · Exercise with a friend. Choose an activity you both enjoy.  · Consider kuldeep chi or yoga to strengthen your balance.  · Try exercises that you can do anytime, anywhere. Here are 2 examples. Have someone with you when you first try these:  ¨ Practice walking by placing 1 foot right in front of the " other.  ¨ Stand up and sit down 10 times. Repeat this throughout the day.   Date Last Reviewed: 6/13/2015  © 9769-9671 [employer]. [address] [place] [address], [place] [address]. All rights reserved. This information is not intended as a substitute for professional medical care. Always follow your healthcare professional's instructions.

## 2017-03-30 ENCOUNTER — HOSPITAL ENCOUNTER (OUTPATIENT)
Dept: RADIOLOGY | Facility: HOSPITAL | Age: 82
Discharge: HOME | End: 2017-03-30
Attending: INTERNAL MEDICINE
Payer: MEDICARE

## 2017-03-30 ENCOUNTER — INITIAL CONSULT (OUTPATIENT)
Dept: RHEUMATOLOGY | Facility: CLINIC | Age: 82
End: 2017-03-30
Payer: MEDICARE

## 2017-03-30 ENCOUNTER — OFFICE VISIT (OUTPATIENT)
Dept: OPTOMETRY | Facility: CLINIC | Age: 82
End: 2017-03-30
Payer: MEDICARE

## 2017-03-30 VITALS
BODY MASS INDEX: 26.84 KG/M2 | HEART RATE: 60 BPM | HEIGHT: 60 IN | WEIGHT: 136.69 LBS | SYSTOLIC BLOOD PRESSURE: 111 MMHG | DIASTOLIC BLOOD PRESSURE: 64 MMHG

## 2017-03-30 DIAGNOSIS — M47.812 SPONDYLOSIS OF CERVICAL REGION WITHOUT MYELOPATHY OR RADICULOPATHY: Primary | ICD-10-CM

## 2017-03-30 DIAGNOSIS — M15.9 PRIMARY OSTEOARTHRITIS INVOLVING MULTIPLE JOINTS: ICD-10-CM

## 2017-03-30 DIAGNOSIS — E11.9 DIABETES MELLITUS TYPE 2 WITHOUT RETINOPATHY: Primary | ICD-10-CM

## 2017-03-30 DIAGNOSIS — H52.203 MYOPIA WITH ASTIGMATISM, BILATERAL: ICD-10-CM

## 2017-03-30 DIAGNOSIS — H35.363 MACULAR DRUSEN, BILATERAL: ICD-10-CM

## 2017-03-30 DIAGNOSIS — R76.8 RHEUMATOID FACTOR POSITIVE: ICD-10-CM

## 2017-03-30 DIAGNOSIS — H52.13 MYOPIA WITH ASTIGMATISM, BILATERAL: ICD-10-CM

## 2017-03-30 DIAGNOSIS — Z13.5 SCREENING FOR GLAUCOMA: ICD-10-CM

## 2017-03-30 DIAGNOSIS — H02.889 MGD (MEIBOMIAN GLAND DISEASE), UNSPECIFIED LATERALITY: ICD-10-CM

## 2017-03-30 DIAGNOSIS — M47.812 SPONDYLOSIS OF CERVICAL REGION WITHOUT MYELOPATHY OR RADICULOPATHY: ICD-10-CM

## 2017-03-30 DIAGNOSIS — Z96.1 PSEUDOPHAKIA: ICD-10-CM

## 2017-03-30 PROCEDURE — 3078F DIAST BP <80 MM HG: CPT | Mod: S$GLB,,, | Performed by: OPTOMETRIST

## 2017-03-30 PROCEDURE — 3074F SYST BP LT 130 MM HG: CPT | Mod: S$GLB,,, | Performed by: INTERNAL MEDICINE

## 2017-03-30 PROCEDURE — 99999 PR PBB SHADOW E&M-EST. PATIENT-LVL II: CPT | Mod: PBBFAC,,, | Performed by: OPTOMETRIST

## 2017-03-30 PROCEDURE — 99999 PR PBB SHADOW E&M-EST. PATIENT-LVL III: CPT | Mod: PBBFAC,,, | Performed by: INTERNAL MEDICINE

## 2017-03-30 PROCEDURE — 72040 X-RAY EXAM NECK SPINE 2-3 VW: CPT | Mod: 26,,, | Performed by: RADIOLOGY

## 2017-03-30 PROCEDURE — 3078F DIAST BP <80 MM HG: CPT | Mod: S$GLB,,, | Performed by: INTERNAL MEDICINE

## 2017-03-30 PROCEDURE — 92015 DETERMINE REFRACTIVE STATE: CPT | Mod: S$GLB,,, | Performed by: OPTOMETRIST

## 2017-03-30 PROCEDURE — 3074F SYST BP LT 130 MM HG: CPT | Mod: S$GLB,,, | Performed by: OPTOMETRIST

## 2017-03-30 PROCEDURE — 1159F MED LIST DOCD IN RCRD: CPT | Mod: S$GLB,,, | Performed by: INTERNAL MEDICINE

## 2017-03-30 PROCEDURE — 1157F ADVNC CARE PLAN IN RCRD: CPT | Mod: S$GLB,,, | Performed by: INTERNAL MEDICINE

## 2017-03-30 PROCEDURE — 1160F RVW MEDS BY RX/DR IN RCRD: CPT | Mod: S$GLB,,, | Performed by: INTERNAL MEDICINE

## 2017-03-30 PROCEDURE — 92014 COMPRE OPH EXAM EST PT 1/>: CPT | Mod: S$GLB,,, | Performed by: OPTOMETRIST

## 2017-03-30 PROCEDURE — 99204 OFFICE O/P NEW MOD 45 MIN: CPT | Mod: S$GLB,,, | Performed by: INTERNAL MEDICINE

## 2017-03-30 PROCEDURE — 1125F AMNT PAIN NOTED PAIN PRSNT: CPT | Mod: S$GLB,,, | Performed by: INTERNAL MEDICINE

## 2017-03-30 NOTE — PROGRESS NOTES
HPI     Pt is here with son who is interpreting for patient. Pt states OU are dry    and blurry using  OTC +2.50.    Gtts: OTC 2-3x/day Genteal.  Systane caused redness    Hemoglobin A1C       Date                     Value               Ref Range             Status                01/24/2017               6.5 (H)             4.5 - 6.2 %           Final                  12/04/2015               6.6 (H)             4.5 - 6.2 %           Final                 05/08/2015               6.8 (H)             4.5 - 6.2 %           Final            ----------         Last edited by Landon Myrick, OD on 3/30/2017 11:41 AM.         Assessment /Plan     For exam results, see Encounter Report.    Diabetes mellitus type 2 without retinopathy  -No retinopathy noted today.  Continued control with primary care physician and annual comprehensive eye exam.    Macular drusen, bilateral  -Ocular vitamins    MGD (meibomian gland disease), unspecified laterality  -Genteal BID+    Screening for glaucoma  -Monitor with annual eye exam and IOP check    Pseudophakia  -Clear, centerd    Eyeglass Final Rx     Eyeglass Final Rx      Sphere Cylinder Axis Add   Right -0.25 +0.75 180 +2.50   Left -0.25 +0.75 180 +2.50       Type:  Bifocal    Expiration Date:  3/31/2018                  RTC 1 yr

## 2017-03-30 NOTE — MR AVS SNAPSHOT
[facility]  [address] [place] [address]  Phone: [phone]  Fax: [phone]                  Sera Boone   3/30/2017 9:00 AM   Initial consult    Descripción:  Female : 1935   Personal Médico:  Dayday Cheema MD   Departamento:  [facility] - Rheumatology           Razón de la felix     Consult           Diagnósticos de Esta Visita        Comentarios    Spondylosis of cervical region without myelopathy or radiculopathy    -  Primario     Primary osteoarthritis involving multiple joints                Lista de tareas           Citas próximas        Personal Médico Departamento Tfno del dpto    3/30/2017 10:00 AM LAB, SAME DAY [facility] [phone]    3/30/2017 11:15 AM [facility] [id number] [address] LB LIMIT [facility] [phone]    3/30/2017 1:00 PM Landon Myrick OD [facility] - Optometry [phone]    2017 10:00 AM Dayday Cheema MD [facility] [phone]      Metas (5 Years of Data)     Ninguna      Follow-Up and Disposition     Return in about 3 months (around 2017).      [facility]     [facility] de Enfermeras - Asistencia   Enfermeras registradas de [facility] pueden ayudarle a reservar kamran felix, proveer educación para la maurilio, asesoría clínica, y otros servicios de asesoramiento.   Llame para marilee servicio gratuito a [phone].             Medicamentos           Mensaje sobre Medicamentos     Verificar los cambios y / o adiciones a hoffman régimen de medicación son los mismos que discutir con hoffman médico. Si cualquiera de estos cambios o adiciones son incorrectos, por favor notifique a hoffman proveedor de atención médica.             Verifique que la siguiente lista de medicamentos es kamran representación exacta de los medicamentos que está tomando actualmente. Si no hay ningunos reportados, la lista puede estar en muñoz. Si no es correcta, por favor póngase en contacto con hoffman proveedor de  atención médica. Lleve esta lista con usted en daniel de emergencia.           Medicamentos Actuales     aspirin 81 MG Chew Take 1 tablet (81 mg total) by mouth once daily.    atorvastatin (LIPITOR) 40 MG tablet Take 40 mg by mouth once daily.    cholecalciferol, vitamin D3, 1,000 unit capsule Take 2 capsules (2,000 Units total) by mouth once daily.    escitalopram oxalate (LEXAPRO) 10 MG tablet Take 1 tablet (10 mg total) by mouth once daily.    FLUAD [id number], 65 YR UP,,PF, 45 mcg (15 mcg x 3)/0.5 mL Syrg ADM 0.5ML IM UTD    losartan (COZAAR) 50 MG tablet Take 1 tablet (50 mg total) by mouth once daily.    metformin (GLUCOPHAGE) 500 MG tablet TAKE 1 TABLET(500 MG) BY MOUTH TWICE DAILY    omega-3 acid ethyl esters (LOVAZA) 1 gram capsule TAKE 1 CAPSULE BY MOUTH TWICE DAILY           Información de referencia clínica           Saba signos vitales jm     PS [place]so [place] BMI ([facility])       111/64 (BP Location: Right arm, Patient Position: Sitting, BP Method: Automatic) 60 5' (1.524 m) 62 kg (136 lb 11.2 oz) 26.7 kg/m2       Blood Pressure          Most Recent Value    BP  111/64      Alergias     A partir del:  3/30/2017        No Known Drug Allergies      Vacunas     Administradas en la fecha de la visita:  3/30/2017        None      Orders Placed During Today's Visit      Órdenes normales de esta visita    Ambulatory Referral to Physical/Occupational Therapy     Exámenes/Procedimientos futuros Se espera el Vence    Aldolase  3/30/2017 5/29/2018    CK  3/30/2017 5/29/2018    X-Ray Cervical Spine AP And Lateral  3/30/2017 3/30/2018      Registrarse para [facility]     La activación de hoffman cuenta [facility] es tan fácil ethel 1-2-3!    1) Ir a [email or web address], seleccione Registrarse Ahora, meter el código de activación y hoffman fecha de nacimiento, y seleccione Próximo.    [id number]  Expires: 4/30/2017 11:48 AM      2) Crear un nombre de usuario y contraseña para usar cuando se visita [facility] en el futuro y  selecciona kamran pregunta de seguridad en daniel de que pierda hoffman contraseña y seleccione Próximo.    3) Introduzca hoffman dirección de correo electrónico y maricel josiane en Registrarse!    Información Adicional  Si tiene alguna pregunta, por favor, e-mail [email or web address] o llame al [phone] para hablar con nuestro personal. Recuerde, [facility] no debe ser usada para necesidades urgentes. En daniel de emergencia médica, llame al [phone].        Language Assistance Services     ATTENTION: Language assistance services are available, free of charge. Please call [phone].      ATENCIÓN: Si habla español, tiene a hoffman disposición servicios gratuitos de asistencia lingüística. Llame al [phone].     CHÚ Ý: N?u b?n nói Ti?ng Vi?t, có các d?ch v? h? tr? ngôn ng? mi?n phí dành cho b?n. G?i s? [phone].         [facility] cumple con las leyes federales aplicables de derechos civiles y no discrimina por motivos de dean, color, origen nacional, edad, discapacidad, o sexo.                 Sera Paolo   3/30/2017 9:00 AM   Initial consult    Description:  Female : 1935   Provider:  Dayday Cheema MD   Department:  [facility]           Reason for Visit     Consult           Diagnoses this Visit        Comments    Spondylosis of cervical region without myelopathy or radiculopathy    -  Primary     Primary osteoarthritis involving multiple joints                To Do List           Future Appointments        Provider Department Dept Phone    3/30/2017 10:00 AM LAB, SAME DAY [facility] [phone]    3/30/2017 11:15 AM [facility] [id number] [address] LB LIMIT [facility] [phone]    3/30/2017 1:00 PM [facility] [phone]    2017 10:00 AM Dayday Cheema MD [facility] - Rheumatology [phone]      Goals     None      Follow-Up and Disposition     Return in about 3 months (around 2017).      [facility]     [facility] Nurse Care Line - 24/7 Assistance  Unless otherwise directed by your provider, please contact [facility], our nurse care line that is available for 24/7 assistance.     Registered nurses in the [facility] provide: appointment scheduling, clinical advisement, health education, and other advisory services.  Call: [phone] (toll free)               Medications           Message regarding Medications     Verify the changes and/or additions to your medication regime listed below are the same as discussed with your clinician today.  If any of these changes or additions are incorrect, please notify your healthcare provider.             Verify that the below list of medications is an accurate representation of the medications you are currently taking.  If none reported, the list may be blank. If incorrect, please contact your healthcare provider. Carry this list with you in case of emergency.           Current Medications     aspirin 81 MG Chew Take 1 tablet (81 mg total) by mouth once daily.    atorvastatin (LIPITOR) 40 MG tablet Take 40 mg by mouth once daily.    cholecalciferol, vitamin D3, 1,000 unit capsule Take 2 capsules (2,000 Units total) by mouth once daily.    escitalopram oxalate (LEXAPRO) 10 MG tablet Take 1 tablet (10 mg total) by mouth once daily.    FLUAD [id number], 65 YR UP,,PF, 45 mcg (15 mcg x 3)/0.5 mL Syrg ADM 0.5ML IM UTD    losartan (COZAAR) 50 MG tablet Take 1 tablet (50 mg total) by mouth once daily.    metformin (GLUCOPHAGE) 500 MG tablet TAKE 1 TABLET(500 MG) BY MOUTH TWICE DAILY    omega-3 acid ethyl esters (LOVAZA) 1 gram capsule TAKE 1 CAPSULE BY MOUTH TWICE DAILY           Clinical Reference Information           Your Vitals Were     BP Pulse Height Weight BMI       111/64 (BP Location: Right arm, Patient Position: Sitting, BP Method: Automatic) 60 5' (1.524 m) 62 kg (136 lb 11.2 oz) 26.7 kg/m2       Blood Pressure          Most Recent Value    BP  111/64       Allergies as of 3/30/2017     No Known Drug Allergies      Immunizations Administered on Date of Encounter - 3/30/2017     None      Orders Placed During Today's Visit      Normal Orders This Visit    Ambulatory Referral to Physical/Occupational Therapy     Future Labs/Procedures Expected by Expires    Aldolase  3/30/2017 5/29/2018    CK  3/30/2017 5/29/2018    X-Ray Cervical Spine AP And Lateral  3/30/2017 3/30/2018      [facility] Sign-Up     Activating your [facility] account is as easy as 1-2-3!     1) Visit [email or web address], select Sign Up Now, enter this activation code and your date of birth, then select Next.  [email or web address]  Expires: 4/30/2017 11:48 AM      2) Create a username and password to use when you visit [facility] in the future and select a security question in case you lose your password and select Next.    3) Enter your e-mail address and click Sign Up!    Additional Information  If you have questions, please e-mail [email or web address] or call [phone] to talk to our [facility] staff. Remember, [facility] is NOT to be used for urgent needs. For medical emergencies, dial 911.         Language Assistance Services     ATTENTION: Language assistance services are available, free of charge. Please call [phone].      ATENCIÓN: Si habla español, tiene a hoffman disposición servicios gratuitos de asistencia lingüística. Llame al [phone].     CHÚ Ý: N?u b?n nói Ti?ng Vi?t, có các d?ch v? h? tr? ngôn ng? mi?n phí dành cho b?n. G?i s? [phone].         [facility] complies with applicable Federal civil rights laws and does not discriminate on the basis of race, color, national origin, age, disability, or sex.

## 2017-03-30 NOTE — MR AVS SNAPSHOT
[facility]  [address]  [place] [address]  Phone: [phone]  Fax: [phone]                  Sera Boone   3/30/2017 1:00 PM   Office Visit    Descripción:  Female : 1935   Personal Médico:  Landon Myrick, GABRIELA   Departamento:  [facility]           Razón de la felix     Diabetic Eye Exam           Diagnósticos de Esta Visita        Comentarios    Diabetes mellitus type 2 without retinopathy    -  Primario     Macular drusen, bilateral         MGD (meibomian gland disease), unspecified laterality         Screening for glaucoma         Pseudophakia                Lista de tareas           Citas próximas        Personal Médico Departamento Tfno del dpto    3/30/2017 1:00 PM Landon Myrick[phone]    2017 10:00 AM Dayday Cheema MD [facility] [phone]      Metas (5 Years of Data)     Ninguna      Follow-Up and Disposition     Return in about 1 year (around 3/30/2018).      [facility]     [facility] - Asistencia   Enfermeras registradas de [facility] pueden ayudarle a reservar kamran felix, proveer educación para la maurilio, asesoría clínica, y otros servicios de asesoramiento.   Llame para marilee servicio gratuito a [phone].             Medicamentos           Mensaje sobre Medicamentos     Verificar los cambios y / o adiciones a hoffman régimen de medicación son los mismos que discutir con hoffman médico. Si cualquiera de estos cambios o adiciones son incorrectos, por favor notifique a hoffman proveedor de atención médica.             Verifique que la siguiente lista de medicamentos es kamran representación exacta de los medicamentos que está tomando actualmente. Si no hay ningunos reportados, la lista puede estar en muñoz. Si no es correcta, por favor póngase en contacto con hoffman proveedor de atención médica. Lleve esta lista con usted en daniel de emergencia.           Medicamentos Actuales     aspirin  81 MG Chew Take 1 tablet (81 mg total) by mouth once daily.    atorvastatin (LIPITOR) 40 MG tablet Take 40 mg by mouth once daily.    cholecalciferol, vitamin D3, 1,000 unit capsule Take 2 capsules (2,000 Units total) by mouth once daily.    escitalopram oxalate (LEXAPRO) 10 MG tablet Take 1 tablet (10 mg total) by mouth once daily.    FLUAD [id number], 65 YR UP,,PF, 45 mcg (15 mcg x 3)/0.5 mL Syrg ADM 0.5ML IM UTD    losartan (COZAAR) 50 MG tablet Take 1 tablet (50 mg total) by mouth once daily.    metformin (GLUCOPHAGE) 500 MG tablet TAKE 1 TABLET(500 MG) BY MOUTH TWICE DAILY    omega-3 acid ethyl esters (LOVAZA) 1 gram capsule TAKE 1 CAPSULE BY MOUTH TWICE DAILY           Información de referencia clínica           Alergias     A partir del:  3/30/2017        No Known Drug Allergies      Vacunas     Administradas en la fecha de la visita:  3/30/2017        None      Registrarse para [facility]     La activación de hoffman cuenta [facility] es tan fácil ethel 1-2-3!    1) Ir a [email or web address], seleccione Registrarse Ahora, meter el código de activación y hoffman fecha de nacimiento, y seleccione Próximo.    [id number]  Expires: 4/30/2017 11:48 AM      2) Crear un nombre de usuario y contraseña para usar cuando se visita [facility] en el futuro y selecciona kamran pregunta de seguridad en daniel de que pierda hoffman contraseña y seleccione Próximo.    3) Introduzca hoffman dirección de correo electrónico y maricel [facility] en Registrarse!    Información Adicional  Si tiene alguna pregunta, por favor, e-mail [email or web address] o llame al [phone] para hablar con nuestro personal. Recuerde, [facility] no debe ser usada para necesidades urgentes. En daniel de emergencia médica, llcarl al [phone].        Language Assistance Services     ATTENTION: Language assistance services are available, free of charge. Please call [phone].      ATENCIÓN: Si habla español, tiene a hoffman disposición servicios gratuitos de asistencia lingüística. Llame al  [phone].     [facility] Ý: N?u b?n nói Ti?ng Vi?t, có các d?ch v? h? tr? ngôn ng? mi?n phí dành cho b?n. G?i s? [phone].         [facility] cumple con las leyes federales aplicables de derechos civiles y no discrimina por motivos de dean, color, origen nacional, edad, discapacidad, o sexo.                 Sera Boone   3/30/2017 1:00 PM   Office Visit    Description:  Female : 1935   Provider:  Landon Myrick, OD   Department:  [facility]           Reason for Visit     Diabetic Eye Exam           Diagnoses this Visit        Comments    Diabetes mellitus type 2 without retinopathy    -  Primary     Macular drusen, bilateral         MGD (meibomian gland disease), unspecified laterality         Screening for glaucoma         Pseudophakia                To Do List           Future Appointments        Provider Department Dept Phone    3/30/2017 1:00 PM Landon Myrick, GABRIELA [facility] [phone]    2017 10:00 AM Dayday Cheema MD [facility] - Rheumatology [phone]      Goals     None      Follow-Up and Disposition     Return in about 1 year (around 3/30/2018).      [facility]     [facility] - 24/ Assistance  Unless otherwise directed by your provider, please contact [facility], our nurse care line that is available for / assistance.     Registered nurses in the [facility] Center provide: appointment scheduling, clinical advisement, health education, and other advisory services.  Call: [phone] (toll free)               Medications           Message regarding Medications     Verify the changes and/or additions to your medication regime listed below are the same as discussed with your clinician today.  If any of these changes or additions are incorrect, please notify your healthcare provider.             Verify that the below list of medications is an accurate representation of the medications you are currently taking.  If none  reported, the list may be blank. If incorrect, please contact your healthcare provider. Carry this list with you in case of emergency.           Current Medications     aspirin 81 MG Chew Take 1 tablet (81 mg total) by mouth once daily.    atorvastatin (LIPITOR) 40 MG tablet Take 40 mg by mouth once daily.    cholecalciferol, vitamin D3, 1,000 unit capsule Take 2 capsules (2,000 Units total) by mouth once daily.    escitalopram oxalate (LEXAPRO) 10 MG tablet Take 1 tablet (10 mg total) by mouth once daily.    FLUAD [id number], 65 YR UP,,PF, 45 mcg (15 mcg x 3)/0.5 mL Syrg ADM 0.5ML IM UTD    losartan (COZAAR) 50 MG tablet Take 1 tablet (50 mg total) by mouth once daily.    metformin (GLUCOPHAGE) 500 MG tablet TAKE 1 TABLET(500 MG) BY MOUTH TWICE DAILY    omega-3 acid ethyl esters (LOVAZA) 1 gram capsule TAKE 1 CAPSULE BY MOUTH TWICE DAILY           Clinical Reference Information           Allergies as of 3/30/2017     No Known Drug Allergies      Immunizations Administered on Date of Encounter - 3/30/2017     None      [facility] Sign-Up     Activating your [facility] account is as easy as 1-2-3!     1) Visit [email or web address], select Sign Up Now, enter this activation code and your date of birth, then select Next.  [email or web address]  Expires: 4/30/2017 11:48 AM      2) Create a username and password to use when you visit [facility] in the future and select a security question in case you lose your password and select Next.    3) Enter your e-mail address and click Sign Up!    Additional Information  If you have questions, please e-mail [email or web address] or call [phone] to talk to our [facility] staff. Remember, [facility] is NOT to be used for urgent needs. For medical emergencies, dial 911.         Language Assistance Services     ATTENTION: Language assistance services are available, free of charge. Please call [phone].      ATENCIÓN: Si habla español, tiene a hoffman disposición servicios gratuitos de  asistencia lingüística. Yana al [phone].     [facility] Ý: N?u b?n nói Ti?ng Vi?t, có các d?ch v? h? tr? ngôn ng? mi?n phí dành cho b?n. G?i s? [phone].         [facility] complies with applicable Federal civil rights laws and does not discriminate on the basis of race, color, national origin, age, disability, or sex.

## 2017-03-30 NOTE — PROGRESS NOTES
History of present illness: 81-year-old Belarusian-speaking female, history is obtained through an .  She comes in because of neck and shoulder pain which she states started in December.  She had similar problems in 2012.  She was seen by Dr. Sun at that time.  X-ray revealed degenerative changes of both the neck and shoulder.  Her left shoulder was treated with a cortisone injection with some response.  Her pain is gradually been getting worse since then but it is been especially bad since December.  She has also had some pain in her right flank.  In 2014 she was treated with physical therapy for one month.  This didn't give her some relief.    She has no history of antecedent trauma or URI.  Her pain is constant.  It does not wake her up at night.  She denies any increase morning pain.  She has no morning stiffness.  She has noticed no swelling in the shoulders.  The pain does not radiate down the arm.  She has no numbness or weakness in her arms.  She has had no problems with the wrist or hands.  She has had no pain in the lower extremities.  The pain does not interfere with activity.    She stands under a hot shower and this does help.  She also has tried and ice pack.  She had side effects from topical medications.  She is not taking any medications but has tolerated Advil or Aleve in the past.    No fever, headache, rash, conjunctivitis, oral ulcers, dry eyes or mouth, Raynaud's phenomenon, pleurisy, chronic or bloody diarrhea, vaginal or urethral discharge or ulcer, numbness or tingling, blood clots or phlebitis.  She has had no amaurosis or jaw claudication.    Systems review:  Gen.: Weight has been stable  GI: No abdominal pain or peptic ulcer disease.  She apparently carries a diagnosis of fatty liver disease  : Had been evaluated recently for hydronephrosis.    Physical examination:  ENT: Temporal artery pulsations are normal.  Adequate tears and saliva.  Musculoskeletal: She has decreased  range of motion of the cervical spine.  She has paracervical tenderness.  She has decreased range of motion of the shoulders bilaterally.  She is tender primarily over the acromioclavicular joint and also the supraspinatus area.  She has no swelling in the shoulder.  Elbows and wrist are unremarkable.  She has bony hypertrophy of the PIPs.  She has no synovitis in the hands.  She has decreased range of motion of the lumbar spine.  Straight leg raising is negative bilaterally.  Hips have good range of motion.  She has bony hypertrophy of the right knee.  She has no pain on range of motion of the knees.  Ankles and small joints of feet are unremarkable.  Laboratory: Rheumatoid factor is 16 with upper limits of normal being 15.  CCP antibody and ASHLIE are negative.  CRP is normal.  Sedimentation rate is slightly elevated at 42.  She has elevated transaminases.    Assessment:  1.  Clinically she only has evidence of osteoarthritis  2.  She has no evidence to suggest an inflammatory arthritis.  Her rheumatoid factor is essentially normal.    Plans:  1.  Further laboratory studies are obtained to rule out underlying myopathy  2.  I have ordered x-ray of the cervical spine to compared to the x-ray done 4 years ago  3.  I have referred her to physical therapy.  She is concerned that she may have transportation problems  4.  She may take over-the-counter medications as needed  5.  Return to see me in 3 months

## 2017-04-03 ENCOUNTER — TELEPHONE (OUTPATIENT)
Dept: RHEUMATOLOGY | Facility: CLINIC | Age: 82
End: 2017-04-03

## 2017-04-20 ENCOUNTER — CLINICAL SUPPORT (OUTPATIENT)
Dept: REHABILITATION | Facility: HOSPITAL | Age: 82
End: 2017-04-20
Attending: INTERNAL MEDICINE
Payer: MEDICARE

## 2017-04-20 DIAGNOSIS — M47.812 SPONDYLOSIS OF CERVICAL REGION WITHOUT MYELOPATHY OR RADICULOPATHY: ICD-10-CM

## 2017-04-20 DIAGNOSIS — M15.9 PRIMARY OSTEOARTHRITIS INVOLVING MULTIPLE JOINTS: ICD-10-CM

## 2017-04-20 PROCEDURE — 97161 PT EVAL LOW COMPLEX 20 MIN: CPT | Mod: PN

## 2017-04-20 PROCEDURE — G8979 MOBILITY GOAL STATUS: HCPCS | Mod: CI,PN

## 2017-04-20 PROCEDURE — G8978 MOBILITY CURRENT STATUS: HCPCS | Mod: CJ,PN

## 2017-04-20 NOTE — PLAN OF CARE
TIME RECORD    04/20/2017    Start Time:  1110  Stop Time:  1159    PROCEDURES:    TIMED  Procedure Time Min.    Start:  Stop:     Start:  Stop:     Start:  Stop:     Start:  Stop:          UNTIMED  Procedure Time Min.   Evaluation Start:1110  Stop:1159 49    Start:  Stop:      Total Timed Minutes:  0  Total Timed Units:  0  Total Untimed Units:  1  Charges Billed/# of units:  1    OUTPATIENT PHYSICAL THERAPY   PATIENT EVALUATION  Onset Date: December 2016  Problem List Items Addressed This Visit     Spondylosis of cervical region without myelopathy or radiculopathy    Primary osteoarthritis involving multiple joints        Treatment Diagnosis: Impaired mobility with neck pain    Past Medical History:   Diagnosis Date    Arthritis     Breast cancer 2001    Diabetes mellitus     History of breast cancer 8/21/2013    Hyperlipidemia     Hypertension     Nuclear sclerotic cataract of right eye 10/16/2012    Osteoporosis, unspecified: see DEXA 8/15- 2/13/2017    Osteoporosis, unspecified: see DEXA 8/15- 2/13/2017    Pain of both shoulder joints 2/14/2017    Rheumatoid factor positive 2/14/2017    Tubular adenoma of colon 10/28/2013    Type II or unspecified type diabetes mellitus with neurological manifestations, not stated as uncontrolled     Vitamin D deficiency disease 2/17/2014       Past Surgical History:   Procedure Laterality Date    BELPHAROPTOSIS REPAIR  03/15/13    bilateral-dr. coleman md    BREAST LUMPECTOMY      BREAST SURGERY Left 2001    lumpectomy    CATARACT EXTRACTION      both eyes -     CHOLECYSTECTOMY      Done in [place] in the 1980's    COLONOSCOPY N/A 1/19/2016    Procedure: COLONOSCOPY;  Surgeon: BLANCA Guerra MD;  Location: [address];  Service: Endoscopy;  Laterality: N/A;    COLONOSCOPY W/ POLYPECTOMY         has a current medication list which includes the following prescription(s): aspirin, atorvastatin, cholecalciferol (vitamin d3),  escitalopram oxalate, fluad [id number] (65 yr up)(pf), losartan, metformin, and omega-3 acid ethyl esters.    Precautions: HTN, hx breast CA  Surgical history:   Prior Therapy: Yes,   History of Present Illness: Patient is a 80 yo right-handed F who presents to physical therapy with c/o neck pain which started insidiously in December of 2016. Pt speaks Telugu and is accompanied by her son, who interpreted for his mother. States that she had left shoulder pain a few years ago which was treated with an injection with resolution of symptoms. Pt's current pain is located at her posterior and lateral neck bilaterally and extends to her shoulders and posterior skull. She also c/o radiating pain down bilateral UE's and ends in the thumbs. Denies trauma/injury to the area. Denies weakness or dropping objects. Son also adds that his mother has a hx of breast CA with surgical treatment, occasionally feels pain along pectoral region on left side when she is having pain. States she was able to move her arm fully up until this winter.  Prior Level of Function: Independent  Social History:    Lives with son   Transportation: son   Leisure activities/hobbies: son states patient is very active around the house, cooking and cleaning    Current level of function: Independent  Current equipment:   Equipment owned (not used):   Functional Deficits Leading to Referral/Nature of Injury: decreased tolerance to activity, impaired I/ADL's  Patient Therapy Goals: Improve pain level      Subjective     Sera Boone states her pain goes all the way up to her skull and down both arms.    Pain:  Location: posterior neck and skull, lateral neck bilateral, shoulder bilateral, radiation down bilateral arms to the thumb  Description: soreness, pain  Activities Which Increase Pain: morning  Activities Which Decrease Pain: tylenol (mild relief), heat, activity  Pain Scale: 2/10 at best 4/10 now  10/10 at worst    Numbness/Paraesthesias:  "denies    Objective     Posture: Fwd head position, rounded shoulders  Palpation: TTP generalized posterior cervical paraspinals, bilateral scalenes, generalized shoulders and upper arms  Sensation: Intact to LT  DTRs:   Range of Motion/Strength:      Cervical AROM: Pain/Dysfunction with Movement:   Flexion 50*    Extension 42* Increased pain   Right side bending 28* Describes feeling "like a pinched nerve" in right sided neck   Left side bending 25* Describes feeling "like a pinched nerve" in left sided neck   Right rotation 50* Increased pain   Left rotation 38* Increased pain     Upper Extremity Range of Motion:   Right Upper Extremity: WFL shoulder, elbow, wrist   Left Upper Extremity: Decreased shoulder flex to 120* 2/2 pain, muscle guarding during PROM; elbow/wrist WFL    Upper Extremity Strength:   Right Upper Extremity: shoulder flex 4/5, ABD 4/5, elbow 4+/5, wrist 4+/5   Left Upper Extremity: shoulder flex 4-/5, ABD 4-/5, elbow flex 4-/5, ext 4/5, wrist 4/5      Flexibility: Tightness notable bilateral UT's  Gait: unremarkable  Bed Mobility: Supine<>sit Independent  Transfers: Sit<>stand Independent  Special Tests:   Cervical compression + for localized pain in neck, - for radiating symptoms  Cervical distraction -   ULTT + ulnar nerves bilateral, median nerve L  Functional Outcome Measure: Neck Disability Index (NDI): 13/50  G code tool used: NDI  Current modifier: CJ  Goal modifier: CI  Treatment: Educated on role/goal PT, POC.  Pt verbalizing understanding and agreement.     Assessment       Initial Assessment (Pertinent finding, problem list and factors affecting outcome): Patient is an 80 yo F presenting to PT with c/o insidious onset of neck and shoulder pain last December with no trauma/injury notable. Notable impairments include decreased functional ROM, UE weakness/ROM restrictions, postural abnormality and impaired functional mobility. Patient would benefit from skilled PT to address notable " impairments and improve functional mobility to PLOF.    Rehab Potiential: good    Short Term Goals (2 Weeks): 1) Pt will initiate HEP 2) Patient will improve C/S ROM by 10* rotation bilaterally  3) Pt will demonstrate 130+ deg of left shoulder flexion for reaching purposes 4) Pt will report decreased pain in UE's  Long Term Goals (4 Weeks): 1) Pt will be I with HEP 2) Pt will return to I/ADL's with ROM WFL and strength 4+/5 3) Pt will improve NDI to <20% impairment    Plan     Certification Period: 04/20/17 to 06/09/17  Recommended Treatment Plan: 2 times per week for 4 weeks: Cervical/Lumbar Traction, Electrical Stimulation PRN, Group Therapy, Manual Therapy, Moist Heat/ Ice, Patient Education, Therapeutic Activites, Therapeutic Exercise and Other dry needling PRN  Other Recommendations:     Thank you for referral.    Therapist: Cyndi Elmore, PT    I CERTIFY THE NEED FOR THESE SERVICES FURNISHED UNDER THIS PLAN OF TREATMENT AND WHILE UNDER MY CARE    Physician's comments: ________________________________________________________________________________________________________________________________________________      Physician's Name: ___________________________________

## 2017-04-22 PROBLEM — M47.812 SPONDYLOSIS OF CERVICAL REGION WITHOUT MYELOPATHY OR RADICULOPATHY: Status: ACTIVE | Noted: 2017-04-22

## 2017-04-22 PROBLEM — M15.9 PRIMARY OSTEOARTHRITIS INVOLVING MULTIPLE JOINTS: Status: ACTIVE | Noted: 2017-04-22

## 2017-04-22 PROBLEM — M15.0 PRIMARY OSTEOARTHRITIS INVOLVING MULTIPLE JOINTS: Status: ACTIVE | Noted: 2017-04-22

## 2017-04-25 ENCOUNTER — CLINICAL SUPPORT (OUTPATIENT)
Dept: REHABILITATION | Facility: HOSPITAL | Age: 82
End: 2017-04-25
Attending: INTERNAL MEDICINE
Payer: MEDICARE

## 2017-04-25 DIAGNOSIS — M15.9 PRIMARY OSTEOARTHRITIS INVOLVING MULTIPLE JOINTS: ICD-10-CM

## 2017-04-25 DIAGNOSIS — M47.812 SPONDYLOSIS OF CERVICAL REGION WITHOUT MYELOPATHY OR RADICULOPATHY: ICD-10-CM

## 2017-04-25 PROCEDURE — 97110 THERAPEUTIC EXERCISES: CPT | Mod: PN

## 2017-04-25 PROCEDURE — 97010 HOT OR COLD PACKS THERAPY: CPT | Mod: PN

## 2017-04-25 PROCEDURE — 97140 MANUAL THERAPY 1/> REGIONS: CPT | Mod: PN

## 2017-04-26 ENCOUNTER — CLINICAL SUPPORT (OUTPATIENT)
Dept: REHABILITATION | Facility: HOSPITAL | Age: 82
End: 2017-04-26
Attending: INTERNAL MEDICINE
Payer: MEDICARE

## 2017-04-26 DIAGNOSIS — M47.812 SPONDYLOSIS OF CERVICAL REGION WITHOUT MYELOPATHY OR RADICULOPATHY: ICD-10-CM

## 2017-04-26 DIAGNOSIS — M15.9 PRIMARY OSTEOARTHRITIS INVOLVING MULTIPLE JOINTS: ICD-10-CM

## 2017-04-26 PROCEDURE — 97140 MANUAL THERAPY 1/> REGIONS: CPT | Mod: PN

## 2017-04-26 PROCEDURE — 97110 THERAPEUTIC EXERCISES: CPT | Mod: PN

## 2017-04-26 PROCEDURE — 97010 HOT OR COLD PACKS THERAPY: CPT | Mod: PN

## 2017-04-28 NOTE — PROGRESS NOTES
Name: Sera [place]  Clinic Number: [id number]  Date of Treatment: 04/25/2017   Diagnosis:   Encounter Diagnoses   Name Primary?    Spondylosis of cervical region without myelopathy or radiculopathy     Primary osteoarthritis involving multiple joints        Time in: 1300  Time Out: 1400  Total Treatment Time: 60      Subjective:    Sera reports through her son, who translated for patient, that her neck was stiff and painful.  Patient reports their pain to be 5/10 on a 0-10 scale with 0 being no pain and 10 being the worst pain imaginable.    Objective    Sera received the following supervised modalities after being cleared for contradictions: MHP x 10' in sitting prior to MT and TE  Sera received the following manual therapy techniques: Myofacial release and Soft tissue Mobilization were applied to the: UT/LS/Csparaspinals for 15 minutes with patient in supine.    Sera received therapeutic exercises to develop strength, endurance, ROM, flexibility and posture for 35 minutes including:  OH pulleys 5'  C/s AROM flexion/ext/Rot B/ SB B x 10 each with demonstration for correct technique, and instructions to stop if she were to become dizzy  Chin tucks 10x with 3 sec hold  Scapular retraction x10  Post shld rolls x 10  UT stretch 3/30s B with tactile and demonstration to achieve correct technique  LS stretch 1/30s B with tactile and demonstration to achieve correct technique      Written Home Exercises Provided: Will distribute next visit after assessing patients tolerance of todays visit  Pt demo good understanding of the education provided. Sera demonstrated good return demonstration of activities.     Assessment:   Patient with good tolerance, after MHP and MT, tolerated therex well.    Pt will continue to benefit from skilled PT intervention. Medical Necessity is demonstrated by:  Continued inability to participate in vocational pursuits, Pain limits function of effected part for some activities, Requires skilled  supervision to complete and progress HEP and Weakness.    Patient is making good progress towards established goals.    Plan:  Continue with established Plan of Care towards PT goals.

## 2017-04-28 NOTE — PROGRESS NOTES
Name: Sera [place]  Clinic Number: [id number]  Date of Treatment: 04/26/2017   Diagnosis:   Encounter Diagnoses   Name Primary?    Spondylosis of cervical region without myelopathy or radiculopathy     Primary osteoarthritis involving multiple joints        Time in: 1400  Time Out: 1500  Total Treatment Time: 60      Subjective:    Sera reports, through her daughter who is translating for her that her pain is decreased today and she's feeling much better since before yesterdays visit.  Patient reports their pain to be 2/10 on a 0-10 scale with 0 being no pain and 10 being the worst pain imaginable.    Objective  Sera received the following supervised modalities after being cleared for contradictions: MHP x 10' in sitting prior to MT and TE  Sera received the following manual therapy techniques: Myofacial release and Soft tissue Mobilization were applied to the: UT/LS/Csparaspinals for 15 minutes with patient in supine.    Sera received therapeutic exercises to develop strength, endurance, ROM, flexibility and posture for 35 minutes including:  OH pulleys 4'  C/s AROM flexion/ext/Rot B/ SB B 2 x 10 each with demonstration for correct technique, and instructions to stop if she were to become dizzy  Chin tucks 10x2 with 3 sec hold  Scapular retraction 2x10  Post shld rolls 2 x 10  UT stretch 3/30s B with tactile and demonstration to achieve correct technique  LS stretch 3/30s B with tactile and demonstration to achieve correct technique      Written Home Exercises Provided: Patient provided HEP in Ugandan, stated she understood the written and pictoral instructions, as follows:   Cervical rotation, cervical sidebending, cervical flexion, cervical extension, chin tucks, scap retration, post shld roll, upper trap stretch, levator scapulae stretch   Pt demo good understanding of the education provided. Sera demonstrated good return demonstration of activities.     Assessment:   Patient with improvement in tolerance of  therex today, decreased tension in B UT. Patient pleased with progress.    Pt will continue to benefit from skilled PT intervention. Medical Necessity is demonstrated by:  Pain limits function of effected part for some activities, Requires skilled supervision to complete and progress HEP and Weakness.    Patient is making good progress towards established goals.    Plan:  Continue with established Plan of Care towards PT goals.

## 2017-05-03 ENCOUNTER — CLINICAL SUPPORT (OUTPATIENT)
Dept: REHABILITATION | Facility: HOSPITAL | Age: 82
End: 2017-05-03
Attending: INTERNAL MEDICINE
Payer: MEDICARE

## 2017-05-03 DIAGNOSIS — M15.9 PRIMARY OSTEOARTHRITIS INVOLVING MULTIPLE JOINTS: ICD-10-CM

## 2017-05-03 DIAGNOSIS — M47.812 SPONDYLOSIS OF CERVICAL REGION WITHOUT MYELOPATHY OR RADICULOPATHY: ICD-10-CM

## 2017-05-03 PROCEDURE — 97110 THERAPEUTIC EXERCISES: CPT | Mod: PN

## 2017-05-03 PROCEDURE — 97010 HOT OR COLD PACKS THERAPY: CPT | Mod: PN

## 2017-05-03 NOTE — PROGRESS NOTES
"Name: Sera Boone  Clinic Number: [id number]  Date of Treatment: 05/03/2017   Diagnosis:   Encounter Diagnoses   Name Primary?    Spondylosis of cervical region without myelopathy or radiculopathy     Primary osteoarthritis involving multiple joints        Time in: 0850  Time Out: 0948  Total Treatment Time: 58  Group Time: 0      Subjective:    Sera reports improvement of symptoms, doing HEP daily, and had warm shower this a.m. which brings relief.  Patient reports their pain to be 0/10 on a 0-10 scale with 0 being no pain and 10 being the worst pain imaginable.    Objective    Sera received therapeutic exercises to develop strength, endurance, flexibility and posture for 48 minutes including:     OHP 5'  Cervical ROM x20: chin tucks,side bends,rotation,flexion,extension  Scapular squeezes x20  Posterior shoulder rolls x20  Pec stretch in corner 10x10"  UT stretch 3x30"  LS stretch 3x30"  Wall ladder L x10 >23  Wall wipes B x10: flexion,cw,ccw    Hot pack to neck 10' to decrease soreness    Pt demo good understanding of the education provided. Sera demonstrated good return demonstration of activities with cueing for direction and proper form.     Assessment:     Pt will continue to benefit from skilled PT intervention. Medical Necessity is demonstrated by:  Pain limits function of effected part for some activities, Unable to participate fully in daily activities, Requires skilled supervision to complete and progress HEP and Weakness.    Patient is making good progress towards established goals.    Plan:    Continue with established Plan of Care towards PT goals.   "

## 2017-05-05 ENCOUNTER — CLINICAL SUPPORT (OUTPATIENT)
Dept: REHABILITATION | Facility: HOSPITAL | Age: 82
End: 2017-05-05
Attending: INTERNAL MEDICINE
Payer: MEDICARE

## 2017-05-05 DIAGNOSIS — M15.9 PRIMARY OSTEOARTHRITIS INVOLVING MULTIPLE JOINTS: ICD-10-CM

## 2017-05-05 DIAGNOSIS — M47.812 SPONDYLOSIS OF CERVICAL REGION WITHOUT MYELOPATHY OR RADICULOPATHY: ICD-10-CM

## 2017-05-05 PROCEDURE — 97010 HOT OR COLD PACKS THERAPY: CPT | Mod: PN

## 2017-05-05 PROCEDURE — 97110 THERAPEUTIC EXERCISES: CPT | Mod: PN

## 2017-05-05 NOTE — PROGRESS NOTES
Name: Sera Boone  Clinic Number: [id number]  Date of Treatment: 05/05/2017   Diagnosis:   Encounter Diagnoses   Name Primary?   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  Patient reports their pain to be 00/10 on a 0-10 scale with 0 being no pain and 10 being the worst pain imaginable.    Objective  Sera received therapeutic exercises to develop strength, endurance, ROM, flexibility and posture for 50 minutes including:    OH pulleys 5'   C/s AROM flexion/ext/Rot B/ SB B 3 x 10 each with demonstration for correct technique, and instructions to stop if she were to become dizzy   Chin tucks 10x3 with 3 sec hold   Scapular retraction 3x10 with RTB   Post shld rolls 3x10   Shld ext RTB 3/10   Shld flex, standing against wall, 1# 2/10   Bicep curl 1# 3/10   UT stretch 3/30s B with tactile and demonstration to achieve correct technique   LS stretch 3/30s B with tactile and demonstration to achieve correct technique     MHP x 10' to cspine after therex to decrease muscle tightness and promote increased blood flow    Written Home Exercises Provided: Cont with HEP  Pt demo good understanding of the education provided. Sera demonstrated good return demonstration of activities.     Assessment:   Good tolerance of added therex without c/o increase in s/s.    Pt will continue to benefit from skilled PT intervention. Medical Necessity is demonstrated by:  Continued inability to participate in vocational pursuits, Requires skilled supervision to complete and progress HEP and Weakness.    Patient is making good progress towards established goals.      Plan:  Continue with established Plan of Care towards PT goals.

## 2017-05-10 ENCOUNTER — CLINICAL SUPPORT (OUTPATIENT)
Dept: REHABILITATION | Facility: HOSPITAL | Age: 82
End: 2017-05-10
Attending: INTERNAL MEDICINE
Payer: MEDICARE

## 2017-05-10 DIAGNOSIS — M15.9 PRIMARY OSTEOARTHRITIS INVOLVING MULTIPLE JOINTS: ICD-10-CM

## 2017-05-10 DIAGNOSIS — M47.812 SPONDYLOSIS OF CERVICAL REGION WITHOUT MYELOPATHY OR RADICULOPATHY: ICD-10-CM

## 2017-05-10 PROCEDURE — 97110 THERAPEUTIC EXERCISES: CPT | Mod: PN

## 2017-05-10 PROCEDURE — 97010 HOT OR COLD PACKS THERAPY: CPT | Mod: PN

## 2017-05-10 NOTE — PROGRESS NOTES
Name: Sera [place]  Clinic Number: [id number]  Date of Treatment: 05/10/2017   Diagnosis:   Encounter Diagnoses   Name Primary?    Spondylosis of cervical region without myelopathy or radiculopathy     Primary osteoarthritis involving multiple joints        Time in: 0804  Time Out: 0855  Total Treatment Time: 51      Subjective:    Sera reports improvement of symptoms and decreased pain.  Patient reports their pain to be 0/10 on a 0-10 scale with 0 being no pain and 10 being the worst pain imaginable.    Objective  Sera received therapeutic exercises to develop strength, endurance, ROM, flexibility and posture for 40 minutes including:    UBE 3'fwd/3'bwd   C/s AROM flexion/ext/Rot B/ SB B 3 x 10 each with demonstration for correct technique with SB   Chin tucks 10x3 with 3 sec hold   Scapular retraction 3x10 with RTB   Post shld rolls 3x10   Shld ext RTB 3/10   Shld flex, standing against wall, 1# 2/10   Bicep curl 1# 3/10   B shld wall wipe in flexion 3/10    Written Home Exercises Provided: Cont with HEP daily  Pt demo good understanding of the education provided. Sera demonstrated good return demonstration of activities.     Assessment:   Patient progressing well with therex without c/o increase in s/s.    Pt will continue to benefit from skilled PT intervention. Medical Necessity is demonstrated by:  Requires skilled supervision to complete and progress HEP and Weakness.    Patient is making good progress towards established goals.    Plan:  Continue with established Plan of Care towards PT goals.

## 2017-05-12 ENCOUNTER — CLINICAL SUPPORT (OUTPATIENT)
Dept: REHABILITATION | Facility: HOSPITAL | Age: 82
End: 2017-05-12
Attending: INTERNAL MEDICINE
Payer: MEDICARE

## 2017-05-12 DIAGNOSIS — M47.812 SPONDYLOSIS OF CERVICAL REGION WITHOUT MYELOPATHY OR RADICULOPATHY: ICD-10-CM

## 2017-05-12 DIAGNOSIS — M15.9 PRIMARY OSTEOARTHRITIS INVOLVING MULTIPLE JOINTS: ICD-10-CM

## 2017-05-12 PROCEDURE — G8979 MOBILITY GOAL STATUS: HCPCS | Mod: CI,PN

## 2017-05-12 PROCEDURE — G8980 MOBILITY D/C STATUS: HCPCS | Mod: CH,PN

## 2017-05-12 NOTE — PLAN OF CARE
Name: Sera Boone  Date:   05/12/2017  Primary Diagnosis: .  Problem List Items Addressed This Visit     Spondylosis of cervical region without myelopathy or radiculopathy    Primary osteoarthritis involving multiple joints          Time in: 0905  Time Out: 0930  Total Treatment Time: 25  Group Time: 0      Subjective:    Patient reports improvement of symptoms. Accompanied by her daughter, who acted as an  for the patient. States she is no longer having the pain like she did before therapy, does not feel she needs more therapy. Daughter also agreeing that patient is improved, however she is concerned that she does so much around the house.  Patient reports their pain to be 0/10 in the neck/UE's on a 0-10 scale with 0 being no pain and 10 being the worst pain imaginable.    Objective    Reassessment performed for POC update purposes:        Assessment:     Patient tolerating treatment .    Pt .    Patient is making  progress towards established goals.    Plan:  Continue with established Plan of Care towards PT goals.

## 2017-05-30 ENCOUNTER — OFFICE VISIT (OUTPATIENT)
Dept: INTERNAL MEDICINE | Facility: CLINIC | Age: 82
End: 2017-05-30
Payer: MEDICARE

## 2017-05-30 DIAGNOSIS — Z85.3 HISTORY OF BREAST CANCER: ICD-10-CM

## 2017-05-30 DIAGNOSIS — N18.30 STAGE 3 CHRONIC KIDNEY DISEASE: ICD-10-CM

## 2017-05-30 DIAGNOSIS — M47.812 CERVICAL SPINE ARTHRITIS: Primary | ICD-10-CM

## 2017-05-30 DIAGNOSIS — E78.2 MIXED HYPERLIPIDEMIA: ICD-10-CM

## 2017-05-30 DIAGNOSIS — E11.49 TYPE II DIABETES MELLITUS WITH NEUROLOGICAL MANIFESTATIONS: ICD-10-CM

## 2017-05-30 DIAGNOSIS — R76.8 RHEUMATOID FACTOR POSITIVE: ICD-10-CM

## 2017-05-30 DIAGNOSIS — Z86.010 HISTORY OF ADENOMATOUS POLYP OF COLON: ICD-10-CM

## 2017-05-30 DIAGNOSIS — F41.9 ANXIETY: ICD-10-CM

## 2017-05-30 DIAGNOSIS — I10 ESSENTIAL HYPERTENSION: ICD-10-CM

## 2017-05-30 DIAGNOSIS — K76.0 FATTY LIVER: ICD-10-CM

## 2017-05-30 DIAGNOSIS — E11.42 DIABETIC POLYNEUROPATHY ASSOCIATED WITH TYPE 2 DIABETES MELLITUS: ICD-10-CM

## 2017-05-30 DIAGNOSIS — R74.8 ELEVATED LIVER ENZYMES: ICD-10-CM

## 2017-05-30 DIAGNOSIS — M81.0 OSTEOPOROSIS WITHOUT CURRENT PATHOLOGICAL FRACTURE, UNSPECIFIED OSTEOPOROSIS TYPE: ICD-10-CM

## 2017-05-30 PROCEDURE — 1125F AMNT PAIN NOTED PAIN PRSNT: CPT | Mod: S$GLB,,, | Performed by: INTERNAL MEDICINE

## 2017-05-30 PROCEDURE — 99214 OFFICE O/P EST MOD 30 MIN: CPT | Mod: S$GLB,,, | Performed by: INTERNAL MEDICINE

## 2017-05-30 PROCEDURE — 99999 PR PBB SHADOW E&M-EST. PATIENT-LVL III: CPT | Mod: PBBFAC,,, | Performed by: INTERNAL MEDICINE

## 2017-05-30 PROCEDURE — 99499 UNLISTED E&M SERVICE: CPT | Mod: S$GLB,,, | Performed by: INTERNAL MEDICINE

## 2017-05-30 PROCEDURE — 1159F MED LIST DOCD IN RCRD: CPT | Mod: S$GLB,,, | Performed by: INTERNAL MEDICINE

## 2017-05-30 RX ORDER — ESCITALOPRAM OXALATE 10 MG/1
10 TABLET ORAL DAILY
Qty: 90 TABLET | Refills: 11 | Status: ON HOLD | OUTPATIENT
Start: 2017-05-30 | End: 2017-07-24

## 2017-05-30 RX ORDER — OMEGA-3-ACID ETHYL ESTERS 1 G/1
1 CAPSULE, LIQUID FILLED ORAL 2 TIMES DAILY
Qty: 180 CAPSULE | Refills: 1 | Status: SHIPPED | OUTPATIENT
Start: 2017-05-30 | End: 2018-03-06 | Stop reason: SDUPTHER

## 2017-05-30 RX ORDER — ATORVASTATIN CALCIUM 40 MG/1
40 TABLET, FILM COATED ORAL DAILY
Qty: 90 TABLET | Refills: 3 | Status: ON HOLD | OUTPATIENT
Start: 2017-05-30 | End: 2017-07-24

## 2017-05-30 RX ORDER — LOSARTAN POTASSIUM 50 MG/1
50 TABLET ORAL DAILY
Qty: 90 TABLET | Refills: 3 | Status: SHIPPED | OUTPATIENT
Start: 2017-05-30 | End: 2018-03-06 | Stop reason: SDUPTHER

## 2017-05-30 RX ORDER — METFORMIN HYDROCHLORIDE 500 MG/1
TABLET ORAL
Qty: 180 TABLET | Refills: 2 | Status: SHIPPED | OUTPATIENT
Start: 2017-05-30 | End: 2018-06-04 | Stop reason: SDUPTHER

## 2017-05-30 NOTE — PROGRESS NOTES
Subjective:       Patient ID: Sera Boone is a 81 y.o. female.    Chief Complaint: Follow-up    Follow up multiple issues    Says she is out of all her meds? Says pharmacy has called- we have not gotten request.    Seen in Rheumatology this year and work up done she is not sure what he recommended.    She comes in because of neck and shoulder pain which she states started in December.  She had similar problems in 2012.  She was seen by Dr. Sun at that time.  X-ray revealed degenerative changes of both the neck and shoulder.  Her left shoulder was treated with a cortisone injection with some response.   PT has been done and no help.   She says lexapro has helped.    Labs OK other than CKD, and positive rheumatoid factor.     No headache or temple pain.  No hand pain or morning stiffness.    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   HENT: Negative for congestion, sinus pressure and sore throat.    Eyes: Negative for visual disturbance.   Respiratory: Negative for cough, chest tightness and shortness of breath.    Cardiovascular: Negative for chest pain, palpitations and leg swelling.   Gastrointestinal: Negative for abdominal pain, constipation and diarrhea.   Genitourinary:  Negative for difficulty urinating and hematuria.   Musculoskeletal: Positive for arthralgias and neck pain. Negative for back pain.   Skin: Negative for rash.   Neurological: Negative for weakness, numbness and headaches.   Psychiatric/Behavioral: Negative for decreased concentration and dysphoric mood. The patient is not nervous/anxious.        Objective:      Physical Exam   Constitutional: She is oriented to person, place, and time. She appears well-developed and well-nourished.   HENT:   Head: Normocephalic and atraumatic.   Eyes: EOM are normal.   Neck: Normal range of motion. Neck supple.   Cardiovascular: Normal rate and regular rhythm.    No murmur heard.  Pulmonary/Chest: Effort normal and breath sounds normal. No respiratory distress. She has no wheezes.   Abdominal: Soft. She exhibits no distension.   Musculoskeletal:   No CVA pain.    Strength UE and LE wnl.  No pain over spine    No trapezius spasm.  Points to cervical spine  Able to raise both arms/shoulders without limitation   Neurological: She is oriented to person, place, and time. She displays normal reflexes. No cranial nerve deficit.   No TA pain   Skin: Skin is warm and dry.   Psychiatric: She has a normal mood and affect. Her behavior is normal.       Assessment:       1. Cervical spine arthritis    2. Essential hypertension    3. Anxiety    4. Diabetic polyneuropathy associated with type 2 diabetes mellitus    5. Type II diabetes mellitus with neurological manifestations    6. Stage 3 chronic kidney disease    7. Fatty liver: see ultrasound 1/17    8. Mixed hyperlipidemia    9. Elevated liver enzymes    10. History of adenomatous polyp of colon 1/16 no need for further follow up per Dr Guerra    11. History of breast cancer    12. Osteoporosis without current pathological fracture, unspecified osteoporosis type    13. Rheumatoid factor positive        Plan:         Cervical spine arthritis: has not done well with PT thus far; medication  options limited secondary to CKD but she says lexapro has helped- will resume. CT to assess for possible pain management  -     Cyclic citrul peptide antibody, IgG; Future; Expected date: 05/30/2017  -     C-reactive protein; Future; Expected date: 05/30/2017  -     Rheumatoid factor; Future; Expected date: 05/30/2017  -     Sedimentation rate, manual; Future; Expected date: 05/30/2017  -     CT Cervical Spine Without Contrast; Future; Expected date: 05/30/2017    Essential hypertension: restart meds.  Low salt diet, exercise, 5-10-# weight loss.  Call if BP > 135/85 on a regular basis.  RTC 1 month for BP check.  Compliance with meds urged  .-     losartan (COZAAR) 50 MG tablet; Take 1 tablet (50 mg total) by mouth once daily.  Dispense: 90 tablet; Refill: 3    Anxiety  -     escitalopram oxalate (LEXAPRO) 10 MG tablet; Take 1 tablet (10 mg total) by mouth once daily.  Dispense: 90 tablet; Refill: 11    Diabetic polyneuropathy associated with type 2 diabetes mellitus: no major sx currently- consider gabapentin, she declines currently and prefers to try Lexapro    Type II diabetes mellitus with neurological manifestations: resume meds.  Diet discussed  -     Comprehensive metabolic panel; Future; Expected date: 05/30/2017  -     Hemoglobin A1c; Future; Expected date: 05/30/2017    Stage 3 chronic kidney disease: labs and review; avoid nephrotoxins    Fatty liver: see ultrasound 1/17: reviewed    Mixed hyperlipidemia: labs and review    Elevated liver enzymes: labs and review    History of adenomatous polyp of colon 1/16 no need for further follow up per Dr Guerra    History of breast cancer: UTD with screenings    Osteoporosis without current pathological fracture, unspecified osteoporosis type: previously has declined treatment  -     DXA Bone Density Spine And Hip; Future; Expected date: 05/30/2017    Rheumatoid factor positive: will revisit  -     Cyclic citrul peptide antibody, IgG; Future; Expected date:  05/30/2017  -     C-reactive protein; Future; Expected date: 05/30/2017  -     Rheumatoid factor; Future; Expected date: 05/30/2017  -     Sedimentation rate, manual; Future; Expected date: 05/30/2017    Other orders  -     metformin (GLUCOPHAGE) 500 MG tablet; TAKE 1 TABLET(500 MG) BY MOUTH TWICE DAILY  Dispense: 180 tablet; Refill: 2  -     atorvastatin (LIPITOR) 40 MG tablet; Take 1 tablet (40 mg total) by mouth once daily.  Dispense: 90 tablet; Refill: 3  -     omega-3 acid ethyl esters (LOVAZA) 1 gram capsule; Take 1 capsule (1 g total) by mouth 2 (two) times daily.  Dispense: 180 capsule; Refill: 1    I will review all studies and determine further tx depending on findings

## 2017-05-31 VITALS
DIASTOLIC BLOOD PRESSURE: 80 MMHG | WEIGHT: 138.88 LBS | SYSTOLIC BLOOD PRESSURE: 145 MMHG | BODY MASS INDEX: 27.13 KG/M2

## 2017-06-01 ENCOUNTER — TELEPHONE (OUTPATIENT)
Dept: INTERNAL MEDICINE | Facility: CLINIC | Age: 82
End: 2017-06-01

## 2017-06-01 NOTE — TELEPHONE ENCOUNTER
Dayna, please call    Labs all stable other than very high cholesterol    Would not start medication at her age and with all her pain problems    Keep appt for CT scan and for Rheumatology    EP with me in 3 months, sooner with problems, thanks

## 2017-06-02 NOTE — TELEPHONE ENCOUNTER
Spoke to pt son and notified of results he voiced understanding and will notified pt and they will keep all the apt's

## 2017-06-06 ENCOUNTER — HOSPITAL ENCOUNTER (OUTPATIENT)
Dept: RADIOLOGY | Facility: HOSPITAL | Age: 82
Discharge: HOME | End: 2017-06-06
Attending: INTERNAL MEDICINE
Payer: MEDICARE

## 2017-06-06 ENCOUNTER — TELEPHONE (OUTPATIENT)
Dept: INTERNAL MEDICINE | Facility: CLINIC | Age: 82
End: 2017-06-06

## 2017-06-06 DIAGNOSIS — M47.812 CERVICAL SPINE ARTHRITIS: ICD-10-CM

## 2017-06-06 PROCEDURE — 72125 CT NECK SPINE W/O DYE: CPT | Mod: TC

## 2017-06-06 PROCEDURE — 72125 CT NECK SPINE W/O DYE: CPT | Mod: 26,,, | Performed by: RADIOLOGY

## 2017-06-06 NOTE — TELEPHONE ENCOUNTER
Please let her know that she definitely has narrowing in the neck which could be contributing to the symptoms that she is experiencing in her upper back and arms.    We can offer her an appointment in neurosurgery or the pain clinic to see whether they can give her an injection or do a procedure that might help her.  If she would like, I will be happy to order that for her.  Thank you

## 2017-06-07 NOTE — TELEPHONE ENCOUNTER
Spoke to pt son and notified of results he voiced understanding and will notified pt and discuss they will call at a later time to notified what pt decide to do

## 2017-06-09 ENCOUNTER — TELEPHONE (OUTPATIENT)
Dept: INTERNAL MEDICINE | Facility: CLINIC | Age: 82
End: 2017-06-09

## 2017-06-09 NOTE — TELEPHONE ENCOUNTER
----- Message from Sarath Garcia sent at 6/9/2017 11:42 AM CDT -----  Contact: self [phone]  Patient is returning a call from the office . Please advise, Thanks !

## 2017-06-12 ENCOUNTER — TELEPHONE (OUTPATIENT)
Dept: INTERNAL MEDICINE | Facility: CLINIC | Age: 82
End: 2017-06-12

## 2017-06-12 NOTE — TELEPHONE ENCOUNTER
----- Message from Xochitl Lopez sent at 6/12/2017 12:16 PM CDT -----  Contact: son/shamir/[phone]  Pt son called in regards to a missed call from the office.      Please advise

## 2017-06-13 ENCOUNTER — TELEPHONE (OUTPATIENT)
Dept: INTERNAL MEDICINE | Facility: CLINIC | Age: 82
End: 2017-06-13

## 2017-06-13 DIAGNOSIS — M47.812 SPONDYLOSIS OF CERVICAL REGION WITHOUT MYELOPATHY OR RADICULOPATHY: Primary | ICD-10-CM

## 2017-06-13 NOTE — TELEPHONE ENCOUNTER
----- Message from Soni Gusman MA sent at 6/13/2017 11:20 AM CDT -----  Contact: son / Bienvenido Hinojosa [phone]   Requesting to speak with someone regarding the patient's test results. Stated he left several messages. Please call. Thanks!

## 2017-06-13 NOTE — TELEPHONE ENCOUNTER
Spoke to Bienvenido---they have decided to go to pain management for possible injection. Please place referral

## 2017-06-29 ENCOUNTER — OFFICE VISIT (OUTPATIENT)
Dept: PAIN MEDICINE | Facility: CLINIC | Age: 82
End: 2017-06-29
Payer: MEDICARE

## 2017-06-29 VITALS
BODY MASS INDEX: 26.06 KG/M2 | HEIGHT: 61 IN | DIASTOLIC BLOOD PRESSURE: 61 MMHG | SYSTOLIC BLOOD PRESSURE: 121 MMHG | HEART RATE: 56 BPM | WEIGHT: 138 LBS

## 2017-06-29 DIAGNOSIS — M54.12 CERVICAL RADICULITIS: ICD-10-CM

## 2017-06-29 DIAGNOSIS — M47.812 SPONDYLOSIS OF CERVICAL REGION WITHOUT MYELOPATHY OR RADICULOPATHY: ICD-10-CM

## 2017-06-29 DIAGNOSIS — M50.30 DDD (DEGENERATIVE DISC DISEASE), CERVICAL: Primary | ICD-10-CM

## 2017-06-29 PROCEDURE — 99999 PR PBB SHADOW E&M-EST. PATIENT-LVL III: CPT | Mod: PBBFAC,,, | Performed by: ANESTHESIOLOGY

## 2017-06-29 PROCEDURE — 1125F AMNT PAIN NOTED PAIN PRSNT: CPT | Mod: S$GLB,,, | Performed by: ANESTHESIOLOGY

## 2017-06-29 PROCEDURE — 1159F MED LIST DOCD IN RCRD: CPT | Mod: S$GLB,,, | Performed by: ANESTHESIOLOGY

## 2017-06-29 PROCEDURE — 99499 UNLISTED E&M SERVICE: CPT | Mod: S$GLB,,, | Performed by: ANESTHESIOLOGY

## 2017-06-29 PROCEDURE — 99204 OFFICE O/P NEW MOD 45 MIN: CPT | Mod: S$GLB,,, | Performed by: ANESTHESIOLOGY

## 2017-06-29 NOTE — LETTER
June 29, 2017      Joycelyn Vásquez MD  [address]  [facility] [address]           [facility]  [address] [place] [address]  [address]  [facility] [place] [address]  Phone: [phone]          Patient: Sera Boone   MR Number: [id number]   YOB: 1935   Date of Visit: 6/29/2017       Dear Dr. Joycelyn Vásquez:    Thank you for referring Sera Boone to me for evaluation. Attached you will find relevant portions of my assessment and plan of care.    If you have questions, please do not hesitate to call me. I look forward to following Sera Boone along with you.    Sincerely,    Francisco Rosenthal MD    Enclosure  CC:  No Recipients    If you would like to receive this communication electronically, please contact [email or web address] or [phone] to request more information on [facility] access.    For providers and/or their staff who would like to refer a patient to [facility], please contact us through our one-stop-shop provider referral line, [facility], at [phone].    If you feel you have received this communication in error or would no longer like to receive these types of communications, please e-mail [email or web address]

## 2017-06-29 NOTE — PROGRESS NOTES
This note was completed with dictation software and grammatical errors may exist.    Referring Physician: Joycelyn Vásquez MD    PCP: Joycelyn Vásquez MD      CC: neck pain    HPI:   Sera Boone is a 81 y.o. female is referred for neck and bilateral shoulder pain she is Divehi-speaking only, but he is accompanied by her son today.  Pain has gradually worsened over past 8 months.  No recent traumatic incident.  She has constant sharp, throbbing pain in her posterior neck.  Pain radiates to her bilateral shoulders.  She feels pain radiating down her left arm to her forearm.  Pain worsens with lateral rotation and extension of the neck.  Pain improves with rest.  She recently had a CT scan of her cervical spine.  She has tried physical therapy with minimal benefit.  She denies any worsening weakness.  No bowel bladder changes.  She rates her pain 6/10 today, 10/10 at worse.    ROS:  CONSTITUTIONAL: No fevers, chills, night sweats, wt. loss, appetite changes  SKIN: no rashes or itching  ENT: No headaches, head trauma, vision changes, or eye pain  LYMPH NODES: None noticed   CV: No chest pain, palpitations.   RESP: No shortness of breath, dyspnea on exertion, cough, wheezing, or hemoptysis  GI: No nausea, emesis, diarrhea, constipation, melena, hematochezia, pain.    : No dysuria, hematuria, urgency, or frequency   HEME: No easy bruising, bleeding problems  PSYCHIATRIC: No depression, anxiety, psychosis, hallucinations.  NEURO: No seizures, memory loss, dizziness or difficulty sleeping  MSK: + History of present illness      Past Medical History:   Diagnosis Date    Arthritis     Breast cancer 2001    Diabetes mellitus     History of breast cancer 8/21/2013    Hyperlipidemia     Hypertension     Nuclear sclerotic cataract of right eye 10/16/2012    Osteoporosis, unspecified: see DEXA 8/15- 2/13/2017    Osteoporosis, unspecified: see DEXA 8/15- 2/13/2017    Pain of both shoulder joints 2/14/2017     Rheumatoid factor positive 2/14/2017    Tubular adenoma of colon 10/28/2013    Type II or unspecified type diabetes mellitus with neurological manifestations, not stated as uncontrolled     Vitamin D deficiency disease 2/17/2014     Past Surgical History:   Procedure Laterality Date    BELPHAROPTOSIS REPAIR  03/15/13    bilateral-dr. coleman md    BREAST LUMPECTOMY      BREAST SURGERY Left 2001    lumpectomy    CATARACT EXTRACTION      both eyes -     CHOLECYSTECTOMY      Done in [place] in the 1980's    COLONOSCOPY N/A 1/19/2016    Procedure: COLONOSCOPY;  Surgeon: BLANCA Guerra MD;  Location: [address]);  Service: Endoscopy;  Laterality: N/A;    COLONOSCOPY W/ POLYPECTOMY       Family History   Problem Relation Age of Onset    Breast cancer Sister 48     55 second, bilateral    Liver cancer Mother     Early death Mother     Liver cancer Father     Liver cancer Brother     No Known Problems Daughter     No Known Problems Son     Liver cancer Brother     No Known Problems Daughter     No Known Problems Maternal Aunt     No Known Problems Maternal Uncle     No Known Problems Paternal Aunt     No Known Problems Paternal Uncle     No Known Problems Maternal Grandmother     No Known Problems Maternal Grandfather     No Known Problems Paternal Grandmother     No Known Problems Paternal Grandfather     Glaucoma Neg Hx     Amblyopia Neg Hx     Blindness Neg Hx     Cancer Neg Hx     Cataracts Neg Hx     Diabetes Neg Hx     Hypertension Neg Hx     Macular degeneration Neg Hx     Retinal detachment Neg Hx     Strabismus Neg Hx     Stroke Neg Hx     Thyroid disease Neg Hx     Cervical cancer Neg Hx     Vaginal cancer Neg Hx     Endometrial cancer Neg Hx      Social History     Social History    Marital status:      Spouse name: N/A    Number of children: N/A    Years of education: N/A     Social History Main Topics    Smoking status: Never Smoker  "   Smokeless tobacco: Never Used    Alcohol use No    Drug use: No    Sexual activity: No     Other Topics Concern    None     Social History Narrative    None         Medications/Allergies: See med card    Vitals:    06/29/17 0805   BP: 121/61   Pulse: (!) 56   Weight: 62.6 kg (138 lb)   Height: 5' 1" (1.549 m)   PainSc:   4   PainLoc: Neck         Physical exam:    GENERAL: A and O x3, the patient appears well groomed and is in no acute distress.  Skin: No rashes or obvious lesions  HEENT: normocephalic, atraumatic  CARDIOVASCULAR:  Palpable peripheral pulses  LUNGS: easy work of breathing  ABDOMEN: soft, nontender   UPPER EXTREMITIES: Normal alignment, normal range of motion, no atrophy, no skin changes,  hair growth and nail growth normal and equal bilaterally. No swelling, no tenderness.    LOWER EXTREMITIES:  Normal alignment, normal range of motion, no atrophy, no skin changes,  hair growth and nail growth normal and equal bilaterally. No swelling, no tenderness.  CERVICAL SPINE:  Cervical spine: ROM is full in flexion, extension and lateral rotation with moderate increased pain.  Spurling's maneuver causes neck pain to left side.  Myofascial exam: No Tenderness to palpation across cervical paraspinous region bilaterally.    MENTAL STATUS: normal orientation, speech, language, and fund of knowledge for social situation.  Emotional state appropriate.    CRANIAL NERVES:  II:  PERRL bilaterally,   III,IV,VI: EOMI.    V:  Facial sensation equal bilaterally  VII:  Facial motor function normal.  VIII:  Hearing equal to finger rub bilaterally  IX/X: Gag normal, palate symmetric  XI:  Shoulder shrug equal, head turn equal  XII:  Tongue midline without fasciculations      MOTOR: Tone and bulk: normal bilateral upper and lower Strength: normal   Delt Bi Tri WE WF     R 5 5 5 5 5 5   L 5 5 5 5 5 5     IP ADD ABD Quad TA Gas HAM  R 5 5 5 5 5 5 5  L 5 5 5 5 5 5 5    SENSATION: Light touch and pinprick intact " bilaterally  REFLEXES: normal, symmetric, nonbrisk.  Toes down, no clonus. No hoffmans.  GAIT: normal rise, base, steps, and arm swing.        Imaging:  CT Cervical Spine w/o contrast 6/6/17  Moderate loss of disc height is present at C4-5 and C5-6, with mild loss of disc height at C3-4 and C6-7. Mild to moderate facet arthropathy is present throughout the cervical spine.  No prevertebral soft tissue swelling.    C4-5: There is small broad-based posterior disc osteophyte complex formation resulting in mild spinal canal narrowing and moderate left neuroforaminal narrowing.    C5-6: Small broad-based posterior discussed by complex formation contributes to mild bilateral neuroforaminal narrowing.    Assessment:  Patient referred for neck and bilateral shoulder pain  1. DDD (degenerative disc disease), cervical    2. Spondylosis of cervical region without myelopathy or radiculopathy    3. Cervical radiculitis          Plan:  - I have stressed the importance of physical activity and exercise to improve overall health  - I think that the patient's neck pain and radicular arm symptoms are due to degenerative disc disease and have recommended a cervical epidural steroid injection.   - May consider cervical medial branch blocks if above is not helpful  - Follow up after procedure      Thank you for referring this interesting patient, and I look forward to continuing to collaborate in her care.

## 2017-07-17 DIAGNOSIS — M54.12 CERVICAL RADICULOPATHY: Primary | ICD-10-CM

## 2017-07-24 ENCOUNTER — SURGERY (OUTPATIENT)
Age: 82
End: 2017-07-24

## 2017-07-24 ENCOUNTER — HOSPITAL ENCOUNTER (OUTPATIENT)
Facility: AMBULARY SURGERY CENTER | Age: 82
Discharge: HOME | End: 2017-07-24
Attending: ANESTHESIOLOGY | Admitting: ANESTHESIOLOGY
Payer: MEDICARE

## 2017-07-24 DIAGNOSIS — M50.30 DDD (DEGENERATIVE DISC DISEASE), CERVICAL: Primary | ICD-10-CM

## 2017-07-24 PROCEDURE — 62321 NJX INTERLAMINAR CRV/THRC: CPT | Mod: ,,, | Performed by: ANESTHESIOLOGY

## 2017-07-24 PROCEDURE — 99152 MOD SED SAME PHYS/QHP 5/>YRS: CPT | Mod: ,,, | Performed by: ANESTHESIOLOGY

## 2017-07-24 PROCEDURE — 62321 NJX INTERLAMINAR CRV/THRC: CPT | Performed by: ANESTHESIOLOGY

## 2017-07-24 RX ORDER — DEXAMETHASONE SODIUM PHOSPHATE 10 MG/ML
INJECTION INTRAMUSCULAR; INTRAVENOUS
Status: DISPENSED
Start: 2017-07-24 | End: 2017-07-25

## 2017-07-24 RX ORDER — SODIUM CHLORIDE, SODIUM LACTATE, POTASSIUM CHLORIDE, CALCIUM CHLORIDE 600; 310; 30; 20 MG/100ML; MG/100ML; MG/100ML; MG/100ML
INJECTION, SOLUTION INTRAVENOUS CONTINUOUS
Status: DISCONTINUED | OUTPATIENT
Start: 2017-07-24 | End: 2017-07-24 | Stop reason: HOSPADM

## 2017-07-24 RX ORDER — LIDOCAINE HYDROCHLORIDE 10 MG/ML
INJECTION, SOLUTION EPIDURAL; INFILTRATION; INTRACAUDAL; PERINEURAL
Status: DISPENSED
Start: 2017-07-24 | End: 2017-07-25

## 2017-07-24 RX ORDER — FENTANYL CITRATE 50 UG/ML
INJECTION, SOLUTION INTRAMUSCULAR; INTRAVENOUS
Status: DISCONTINUED
Start: 2017-07-24 | End: 2017-07-24 | Stop reason: HOSPADM

## 2017-07-24 RX ORDER — MIDAZOLAM HYDROCHLORIDE 2 MG/2ML
INJECTION, SOLUTION INTRAMUSCULAR; INTRAVENOUS
Status: DISCONTINUED | OUTPATIENT
Start: 2017-07-24 | End: 2017-07-24 | Stop reason: HOSPADM

## 2017-07-24 RX ORDER — ALPRAZOLAM 1 MG/1
1 TABLET, ORALLY DISINTEGRATING ORAL ONCE AS NEEDED
Status: DISCONTINUED | OUTPATIENT
Start: 2017-07-24 | End: 2017-07-24 | Stop reason: HOSPADM

## 2017-07-24 RX ORDER — MIDAZOLAM HYDROCHLORIDE 1 MG/ML
INJECTION INTRAMUSCULAR; INTRAVENOUS
Status: DISCONTINUED
Start: 2017-07-24 | End: 2017-07-24 | Stop reason: HOSPADM

## 2017-07-24 RX ORDER — FENTANYL CITRATE 50 UG/ML
INJECTION, SOLUTION INTRAMUSCULAR; INTRAVENOUS
Status: DISCONTINUED | OUTPATIENT
Start: 2017-07-24 | End: 2017-07-24 | Stop reason: HOSPADM

## 2017-07-24 RX ORDER — LIDOCAINE HYDROCHLORIDE 10 MG/ML
INJECTION, SOLUTION EPIDURAL; INFILTRATION; INTRACAUDAL; PERINEURAL
Status: DISCONTINUED | OUTPATIENT
Start: 2017-07-24 | End: 2017-07-24 | Stop reason: HOSPADM

## 2017-07-24 RX ORDER — SODIUM CHLORIDE 9 MG/ML
INJECTION, SOLUTION INTRAMUSCULAR; INTRAVENOUS; SUBCUTANEOUS
Status: DISCONTINUED | OUTPATIENT
Start: 2017-07-24 | End: 2017-07-24 | Stop reason: HOSPADM

## 2017-07-24 RX ORDER — DEXAMETHASONE SODIUM PHOSPHATE 10 MG/ML
INJECTION INTRAMUSCULAR; INTRAVENOUS
Status: DISCONTINUED | OUTPATIENT
Start: 2017-07-24 | End: 2017-07-24 | Stop reason: HOSPADM

## 2017-07-24 RX ADMIN — DEXAMETHASONE SODIUM PHOSPHATE 10 MG: 10 INJECTION INTRAMUSCULAR; INTRAVENOUS at 12:07

## 2017-07-24 RX ADMIN — SODIUM CHLORIDE 4 ML: 9 INJECTION, SOLUTION INTRAMUSCULAR; INTRAVENOUS; SUBCUTANEOUS at 12:07

## 2017-07-24 RX ADMIN — LIDOCAINE HYDROCHLORIDE 10 ML: 10 INJECTION, SOLUTION EPIDURAL; INFILTRATION; INTRACAUDAL; PERINEURAL at 12:07

## 2017-07-24 RX ADMIN — FENTANYL CITRATE 50 MCG: 50 INJECTION, SOLUTION INTRAMUSCULAR; INTRAVENOUS at 12:07

## 2017-07-24 RX ADMIN — SODIUM CHLORIDE, SODIUM LACTATE, POTASSIUM CHLORIDE, CALCIUM CHLORIDE: 600; 310; 30; 20 INJECTION, SOLUTION INTRAVENOUS at 12:07

## 2017-07-24 RX ADMIN — MIDAZOLAM HYDROCHLORIDE 2 MG: 2 INJECTION, SOLUTION INTRAMUSCULAR; INTRAVENOUS at 12:07

## 2017-07-24 NOTE — OP NOTE
PROCEDURE DATE: 7/24/2017    Procedure: C6-C7 cervical interlaminar epidural steroid injection under utilizing fluoroscopy.    Diagnosis: Cervical DISC DEGENERATION WITHOUT MYELOPATHY  POSTOP DIAGNOSIS: SAME    Physician: Francisco Rosenthal MD    Medications injected:  Dexamethasone 10mg followed by a slow injection of 4 mL sterile, preservative-free normal saline.    Local anesthetic used: Lidocaine 1%, 2 ml.    Sedation Medications: RN IV sedation    Complications:  None    Estimated blood loss: None    Technique:  A time-out was taken to identify patient and procedure prior to starting the procedure.  With the patient laying in a prone position with the neck in a mid-flexed forward position, the area was prepped and draped in the usual sterile fashion using ChloraPrep and a fenestrated drape.  The area was determined under AP fluoroscopic guidance.  Local anesthetic was given using a 25-gauge 1.5 inch needle by raising a wheal and then infiltrating ventrally.  A 3.5 inch 20-gauge Touhy needle was introduced under fluoroscopic guidance to meet the lamina of C7.  The needle was then hinged under the lamina then advanced using loss of resistance technique.  Once the tip of the needle was in the desired position, the 1ml contrast dye Omnipaque was injected to determine placement and no uptake.  The steroid was then injected slowly followed by a slow injection of 4 mL of the sterile preservative-free normal saline.  The patient tolerated the procedure well.    The patient was monitored after the procedure and was given post-procedure and discharge instructions to follow at home. The patient was discharged in a stable condition.

## 2017-07-24 NOTE — H&P (VIEW-ONLY)
This note was completed with dictation software and grammatical errors may exist.    Referring Physician: Joycelyn Vásquez MD    PCP: Joycelyn Vásquez MD      CC: neck pain    HPI:   Sera Boone is a 81 y.o. female is referred for neck and bilateral shoulder pain she is Swedish-speaking only, but he is accompanied by her son today.  Pain has gradually worsened over past 8 months.  No recent traumatic incident.  She has constant sharp, throbbing pain in her posterior neck.  Pain radiates to her bilateral shoulders.  She feels pain radiating down her left arm to her forearm.  Pain worsens with lateral rotation and extension of the neck.  Pain improves with rest.  She recently had a CT scan of her cervical spine.  She has tried physical therapy with minimal benefit.  She denies any worsening weakness.  No bowel bladder changes.  She rates her pain 6/10 today, 10/10 at worse.    ROS:  CONSTITUTIONAL: No fevers, chills, night sweats, wt. loss, appetite changes  SKIN: no rashes or itching  ENT: No headaches, head trauma, vision changes, or eye pain  LYMPH NODES: None noticed   CV: No chest pain, palpitations.   RESP: No shortness of breath, dyspnea on exertion, cough, wheezing, or hemoptysis  GI: No nausea, emesis, diarrhea, constipation, melena, hematochezia, pain.    : No dysuria, hematuria, urgency, or frequency   HEME: No easy bruising, bleeding problems  PSYCHIATRIC: No depression, anxiety, psychosis, hallucinations.  NEURO: No seizures, memory loss, dizziness or difficulty sleeping  MSK: + History of present illness      Past Medical History:   Diagnosis Date    Arthritis     Breast cancer 2001    Diabetes mellitus     History of breast cancer 8/21/2013    Hyperlipidemia     Hypertension     Nuclear sclerotic cataract of right eye 10/16/2012    Osteoporosis, unspecified: see DEXA 8/15- 2/13/2017    Osteoporosis, unspecified: see DEXA 8/15- 2/13/2017    Pain of both shoulder joints 2/14/2017     Rheumatoid factor positive 2/14/2017    Tubular adenoma of colon 10/28/2013    Type II or unspecified type diabetes mellitus with neurological manifestations, not stated as uncontrolled     Vitamin D deficiency disease 2/17/2014     Past Surgical History:   Procedure Laterality Date    BELPHAROPTOSIS REPAIR  03/15/13    bilateral-dr. coleman md    BREAST LUMPECTOMY      BREAST SURGERY Left 2001    lumpectomy    CATARACT EXTRACTION      both eyes -     CHOLECYSTECTOMY      Done in [place] in the 1980's    COLONOSCOPY N/A 1/19/2016    Procedure: COLONOSCOPY;  Surgeon: BLANCA Guerra MD;  Location: [address]);  Service: Endoscopy;  Laterality: N/A;    COLONOSCOPY W/ POLYPECTOMY       Family History   Problem Relation Age of Onset    Breast cancer Sister 48     55 second, bilateral    Liver cancer Mother     Early death Mother     Liver cancer Father     Liver cancer Brother     No Known Problems Daughter     No Known Problems Son     Liver cancer Brother     No Known Problems Daughter     No Known Problems Maternal Aunt     No Known Problems Maternal Uncle     No Known Problems Paternal Aunt     No Known Problems Paternal Uncle     No Known Problems Maternal Grandmother     No Known Problems Maternal Grandfather     No Known Problems Paternal Grandmother     No Known Problems Paternal Grandfather     Glaucoma Neg Hx     Amblyopia Neg Hx     Blindness Neg Hx     Cancer Neg Hx     Cataracts Neg Hx     Diabetes Neg Hx     Hypertension Neg Hx     Macular degeneration Neg Hx     Retinal detachment Neg Hx     Strabismus Neg Hx     Stroke Neg Hx     Thyroid disease Neg Hx     Cervical cancer Neg Hx     Vaginal cancer Neg Hx     Endometrial cancer Neg Hx      Social History     Social History    Marital status:      Spouse name: N/A    Number of children: N/A    Years of education: N/A     Social History Main Topics    Smoking status: Never Smoker  "   Smokeless tobacco: Never Used    Alcohol use No    Drug use: No    Sexual activity: No     Other Topics Concern    None     Social History Narrative    None         Medications/Allergies: See med card    Vitals:    06/29/17 0805   BP: 121/61   Pulse: (!) 56   Weight: 62.6 kg (138 lb)   Height: 5' 1" (1.549 m)   PainSc:   4   PainLoc: Neck         Physical exam:    GENERAL: A and O x3, the patient appears well groomed and is in no acute distress.  Skin: No rashes or obvious lesions  HEENT: normocephalic, atraumatic  CARDIOVASCULAR:  Palpable peripheral pulses  LUNGS: easy work of breathing  ABDOMEN: soft, nontender   UPPER EXTREMITIES: Normal alignment, normal range of motion, no atrophy, no skin changes,  hair growth and nail growth normal and equal bilaterally. No swelling, no tenderness.    LOWER EXTREMITIES:  Normal alignment, normal range of motion, no atrophy, no skin changes,  hair growth and nail growth normal and equal bilaterally. No swelling, no tenderness.  CERVICAL SPINE:  Cervical spine: ROM is full in flexion, extension and lateral rotation with moderate increased pain.  Spurling's maneuver causes neck pain to left side.  Myofascial exam: No Tenderness to palpation across cervical paraspinous region bilaterally.    MENTAL STATUS: normal orientation, speech, language, and fund of knowledge for social situation.  Emotional state appropriate.    CRANIAL NERVES:  II:  PERRL bilaterally,   III,IV,VI: EOMI.    V:  Facial sensation equal bilaterally  VII:  Facial motor function normal.  VIII:  Hearing equal to finger rub bilaterally  IX/X: Gag normal, palate symmetric  XI:  Shoulder shrug equal, head turn equal  XII:  Tongue midline without fasciculations      MOTOR: Tone and bulk: normal bilateral upper and lower Strength: normal   Delt Bi Tri WE WF     R 5 5 5 5 5 5   L 5 5 5 5 5 5     IP ADD ABD Quad TA Gas HAM  R 5 5 5 5 5 5 5  L 5 5 5 5 5 5 5    SENSATION: Light touch and pinprick intact " bilaterally  REFLEXES: normal, symmetric, nonbrisk.  Toes down, no clonus. No hoffmans.  GAIT: normal rise, base, steps, and arm swing.        Imaging:  CT Cervical Spine w/o contrast 6/6/17  Moderate loss of disc height is present at C4-5 and C5-6, with mild loss of disc height at C3-4 and C6-7. Mild to moderate facet arthropathy is present throughout the cervical spine.  No prevertebral soft tissue swelling.    C4-5: There is small broad-based posterior disc osteophyte complex formation resulting in mild spinal canal narrowing and moderate left neuroforaminal narrowing.    C5-6: Small broad-based posterior discussed by complex formation contributes to mild bilateral neuroforaminal narrowing.    Assessment:  Patient referred for neck and bilateral shoulder pain  1. DDD (degenerative disc disease), cervical    2. Spondylosis of cervical region without myelopathy or radiculopathy    3. Cervical radiculitis          Plan:  - I have stressed the importance of physical activity and exercise to improve overall health  - I think that the patient's neck pain and radicular arm symptoms are due to degenerative disc disease and have recommended a cervical epidural steroid injection.   - May consider cervical medial branch blocks if above is not helpful  - Follow up after procedure      Thank you for referring this interesting patient, and I look forward to continuing to collaborate in her care.

## 2017-07-24 NOTE — PLAN OF CARE
Pt states ready to go home(son interpreted) , stable, dequan po fluids, ambulatory, denies pain. Injection site at neck CD&I. Ambulated to car accompanied bynurse. Home w/ son

## 2017-07-24 NOTE — DISCHARGE SUMMARY
[facility]  Discharge Note  Short Stay    Admit Date: 7/24/2017    Discharge Date and Time: 7/24/2017    Attending Physician: Francisco Rosenthal MD     Discharge Provider: Francisco Rosenthal    Diagnoses:  Active Hospital Problems    Diagnosis  POA    *DDD (degenerative disc disease), cervical [M50.30]  Yes      Resolved Hospital Problems    Diagnosis Date Resolved POA   No resolved problems to display.       Hospital Course: Cervical CAM  Discharged Condition: Good    Final Diagnoses:   Active Hospital Problems    Diagnosis  POA    *DDD (degenerative disc disease), cervical [M50.30]  Yes      Resolved Hospital Problems    Diagnosis Date Resolved POA   No resolved problems to display.       Disposition: Home or Self Care    Follow up/Patient Instructions:    Medications:  Reconciled Home Medications:   Current Discharge Medication List      CONTINUE these medications which have NOT CHANGED    Details   aspirin 81 MG Chew Take 1 tablet (81 mg total) by mouth once daily.    Associated Diagnoses: Type II diabetes mellitus with neurological manifestations      cholecalciferol, vitamin D3, 1,000 unit capsule Take 2 capsules (2,000 Units total) by mouth once daily.  Qty: 60 capsule, Refills: 12    Associated Diagnoses: Vitamin D deficiency disease      losartan (COZAAR) 50 MG tablet Take 1 tablet (50 mg total) by mouth once daily.  Qty: 90 tablet, Refills: 3    Associated Diagnoses: Essential hypertension      metformin (GLUCOPHAGE) 500 MG tablet TAKE 1 TABLET(500 MG) BY MOUTH TWICE DAILY  Qty: 180 tablet, Refills: 2      omega-3 acid ethyl esters (LOVAZA) 1 gram capsule Take 1 capsule (1 g total) by mouth 2 (two) times daily.  Qty: 180 capsule, Refills: 1         STOP taking these medications       atorvastatin (LIPITOR) 40 MG tablet Comments:   Reason for Stopping:         escitalopram oxalate (LEXAPRO) 10 MG tablet Comments:   Reason for Stopping:               Discharge Procedure Orders  Diet general     Activity as  tolerated     Call MD for:  temperature >100.4     Call MD for:  persistent nausea and vomiting or diarrhea     Call MD for:  severe uncontrolled pain     Call MD for:  redness, tenderness, or signs of infection (pain, swelling, redness, odor or green/yellow discharge around incision site)     Call MD for:  difficulty breathing or increased cough     Call MD for:  severe persistent headache          Follow up with MD in 2-3 weeks    Discharge Procedure Orders (must include Diet, Follow-up, Activity):    Discharge Procedure Orders (must include Diet, Follow-up, Activity)  Diet general     Activity as tolerated     Call MD for:  temperature >100.4     Call MD for:  persistent nausea and vomiting or diarrhea     Call MD for:  severe uncontrolled pain     Call MD for:  redness, tenderness, or signs of infection (pain, swelling, redness, odor or green/yellow discharge around incision site)     Call MD for:  difficulty breathing or increased cough     Call MD for:  severe persistent headache

## 2017-07-25 LAB — POCT GLUCOSE: 113 MG/DL (ref 70–110)

## 2017-07-26 VITALS
HEIGHT: 61 IN | TEMPERATURE: 98 F | SYSTOLIC BLOOD PRESSURE: 140 MMHG | WEIGHT: 138 LBS | RESPIRATION RATE: 18 BRPM | DIASTOLIC BLOOD PRESSURE: 68 MMHG | OXYGEN SATURATION: 98 % | HEART RATE: 60 BPM | BODY MASS INDEX: 26.06 KG/M2

## 2017-08-22 ENCOUNTER — OFFICE VISIT (OUTPATIENT)
Dept: PAIN MEDICINE | Facility: CLINIC | Age: 82
End: 2017-08-22
Payer: MEDICARE

## 2017-08-22 VITALS
SYSTOLIC BLOOD PRESSURE: 123 MMHG | DIASTOLIC BLOOD PRESSURE: 63 MMHG | WEIGHT: 138 LBS | HEIGHT: 61 IN | BODY MASS INDEX: 26.06 KG/M2 | HEART RATE: 58 BPM

## 2017-08-22 DIAGNOSIS — M54.12 CERVICAL RADICULOPATHY: ICD-10-CM

## 2017-08-22 DIAGNOSIS — M47.812 SPONDYLOSIS OF CERVICAL REGION WITHOUT MYELOPATHY OR RADICULOPATHY: Primary | ICD-10-CM

## 2017-08-22 DIAGNOSIS — M50.30 DDD (DEGENERATIVE DISC DISEASE), CERVICAL: ICD-10-CM

## 2017-08-22 PROCEDURE — 3008F BODY MASS INDEX DOCD: CPT | Mod: S$GLB,,, | Performed by: PHYSICIAN ASSISTANT

## 2017-08-22 PROCEDURE — 1125F AMNT PAIN NOTED PAIN PRSNT: CPT | Mod: S$GLB,,, | Performed by: PHYSICIAN ASSISTANT

## 2017-08-22 PROCEDURE — 99999 PR PBB SHADOW E&M-EST. PATIENT-LVL III: CPT | Mod: PBBFAC,,, | Performed by: PHYSICIAN ASSISTANT

## 2017-08-22 PROCEDURE — 99499 UNLISTED E&M SERVICE: CPT | Mod: S$GLB,,, | Performed by: PHYSICIAN ASSISTANT

## 2017-08-22 PROCEDURE — 3078F DIAST BP <80 MM HG: CPT | Mod: S$GLB,,, | Performed by: PHYSICIAN ASSISTANT

## 2017-08-22 PROCEDURE — 3074F SYST BP LT 130 MM HG: CPT | Mod: S$GLB,,, | Performed by: PHYSICIAN ASSISTANT

## 2017-08-22 PROCEDURE — 99214 OFFICE O/P EST MOD 30 MIN: CPT | Mod: S$GLB,,, | Performed by: PHYSICIAN ASSISTANT

## 2017-08-22 PROCEDURE — 1159F MED LIST DOCD IN RCRD: CPT | Mod: S$GLB,,, | Performed by: PHYSICIAN ASSISTANT

## 2017-08-22 NOTE — PROGRESS NOTES
Referring Physician: No ref. provider found    PCP: Joycelyn Vásquez MD      CC: neck pain    Interval History:  Ms. Boone is a 81 y.o. female with neck and bilateral shoulder pain who presents today for f/u s/p cervical CAM. Reports only minimal relief. Pain is worse on the left and extends into bilateral shoulders, L>R. She denies dropping objects, weakness, or b/b changes. Her son Translates for her today. Pain is worse with lateral rotation and neck extension. Pain improves with rest. Pain today is rated 2/10.  Pt has been seen in the clinic before, however pt is new to me.     History below per Dr. Rosenthal    HPI:   Sera Boone is a 81 y.o. female is referred for neck and bilateral shoulder pain she is Albanian-speaking only, but he is accompanied by her son today.  Pain has gradually worsened over past 8 months.  No recent traumatic incident.  She has constant sharp, throbbing pain in her posterior neck.  Pain radiates to her bilateral shoulders.  She feels pain radiating down her left arm to her forearm.  Pain worsens with lateral rotation and extension of the neck.  Pain improves with rest.  She recently had a CT scan of her cervical spine.  She has tried physical therapy with minimal benefit.  She denies any worsening weakness.  No bowel bladder changes.  She rates her pain 6/10 today, 10/10 at worse.    ROS:  CONSTITUTIONAL: No fevers, chills, night sweats, wt. loss, appetite changes  SKIN: no rashes or itching  ENT: No headaches, head trauma, vision changes, or eye pain  LYMPH NODES: None noticed   CV: No chest pain, palpitations.   RESP: No shortness of breath, dyspnea on exertion, cough, wheezing, or hemoptysis  GI: No nausea, emesis, diarrhea, constipation, melena, hematochezia, pain.    : No dysuria, hematuria, urgency, or frequency   HEME: No easy bruising, bleeding problems  PSYCHIATRIC: No depression, anxiety, psychosis, hallucinations.  NEURO: No seizures, memory loss, dizziness or difficulty  sleeping  MSK: + History of present illness      Past Medical History:   Diagnosis Date    Arthritis     Breast cancer 2001    Diabetes mellitus     History of breast cancer 8/21/2013    Hyperlipidemia     Hypertension     Nuclear sclerotic cataract of right eye 10/16/2012    Osteoporosis, unspecified: see DEXA 8/15- 2/13/2017    Osteoporosis, unspecified: see DEXA 8/15- 2/13/2017    Pain of both shoulder joints 2/14/2017    Rheumatoid factor positive 2/14/2017    Tubular adenoma of colon 10/28/2013    Type II or unspecified type diabetes mellitus with neurological manifestations, not stated as uncontrolled     Vitamin D deficiency disease 2/17/2014     Past Surgical History:   Procedure Laterality Date    BELPHAROPTOSIS REPAIR  03/15/13    bilateral-dr. coleman md    BREAST LUMPECTOMY      BREAST SURGERY Left 2001    lumpectomy    CATARACT EXTRACTION      both eyes -     CHOLECYSTECTOMY      Done in [place] in the 1980's    COLONOSCOPY N/A 1/19/2016    Procedure: COLONOSCOPY;  Surgeon: BLANCA Guerra MD;  Location: [address]);  Service: Endoscopy;  Laterality: N/A;    COLONOSCOPY W/ POLYPECTOMY       Family History   Problem Relation Age of Onset    Breast cancer Sister 48     55 second, bilateral    Liver cancer Mother     Early death Mother     Liver cancer Father     Liver cancer Brother     No Known Problems Daughter     No Known Problems Son     Liver cancer Brother     No Known Problems Daughter     No Known Problems Maternal Aunt     No Known Problems Maternal Uncle     No Known Problems Paternal Aunt     No Known Problems Paternal Uncle     No Known Problems Maternal Grandmother     No Known Problems Maternal Grandfather     No Known Problems Paternal Grandmother     No Known Problems Paternal Grandfather     Glaucoma Neg Hx     Amblyopia Neg Hx     Blindness Neg Hx     Cancer Neg Hx     Cataracts Neg Hx     Diabetes Neg Hx      "Hypertension Neg Hx     Macular degeneration Neg Hx     Retinal detachment Neg Hx     Strabismus Neg Hx     Stroke Neg Hx     Thyroid disease Neg Hx     Cervical cancer Neg Hx     Vaginal cancer Neg Hx     Endometrial cancer Neg Hx      Social History     Social History    Marital status:      Spouse name: N/A    Number of children: N/A    Years of education: N/A     Social History Main Topics    Smoking status: Never Smoker    Smokeless tobacco: Never Used    Alcohol use No    Drug use: No    Sexual activity: No     Other Topics Concern    None     Social History Narrative    None         Medications/Allergies: See med card    Vitals:    08/22/17 0953   BP: 123/63   Pulse: (!) 58   Weight: 62.6 kg (138 lb)   Height: 5' 1" (1.549 m)   PainSc:   2   PainLoc: Neck         Physical exam:    GENERAL: A and O x3, the patient appears well groomed and is in no acute distress.  Skin: No rashes or obvious lesions  HEENT: normocephalic, atraumatic  CARDIOVASCULAR:  RRR  LUNGS: non laboredbreathing  ABDOMEN: soft, nontender   UPPER EXTREMITIES: Normal alignment, normal range of motion, no atrophy, no skin changes,  hair growth and nail growth normal and equal bilaterally. No swelling, no tenderness.    LOWER EXTREMITIES:  Normal alignment, normal range of motion, no atrophy, no skin changes,  hair growth and nail growth normal and equal bilaterally. No swelling, no tenderness.  CERVICAL SPINE:  Cervical spine: ROM is full in flexion, extension and lateral rotation with moderate increased pain.  Spurling's maneuver causes neck pain to left side.  Myofascial exam: moderate Tenderness to palpation across cervical paraspinous region bilaterally.    MENTAL STATUS: normal orientation, speech, language, and fund of knowledge for social situation.  Emotional state appropriate.    CRANIAL NERVES:  II:  PERRL bilaterally,   III,IV,VI: EOMI.    V:  Facial sensation equal bilaterally  VII:  Facial motor function " normal.  VIII:  Hearing equal to finger rub bilaterally  IX/X: Gag normal, palate symmetric  XI:  Shoulder shrug equal, head turn equal  XII:  Tongue midline without fasciculations      MOTOR: Tone and bulk: normal bilateral upper and lower Strength: normal   Delt Bi Tri WE WF     R 5 5 5 5 5 5   L 5 5 5 5 5 5     IP ADD ABD Quad TA Gas HAM  R 5 5 5 5 5 5 5  L 5 5 5 5 5 5 5    SENSATION: Light touch and pinprick intact bilaterally  REFLEXES: normal, symmetric, nonbrisk.  Toes down, no clonus. No hoffmans.  GAIT: normal rise, base, steps, and arm swing.        Imaging:  CT Cervical Spine w/o contrast 6/6/17  Moderate loss of disc height is present at C4-5 and C5-6, with mild loss of disc height at C3-4 and C6-7. Mild to moderate facet arthropathy is present throughout the cervical spine.  No prevertebral soft tissue swelling.    C4-5: There is small broad-based posterior disc osteophyte complex formation resulting in mild spinal canal narrowing and moderate left neuroforaminal narrowing.    C5-6: Small broad-based posterior discussed by complex formation contributes to mild bilateral neuroforaminal narrowing.    Assessment:  Ms. Boone is a 81 y.o. female with neck and bilateral shoulder pain  1. Spondylosis of cervical region without myelopathy or radiculopathy    2. Cervical radiculopathy    3. DDD (degenerative disc disease), cervical        Plan:  1. I have stressed the importance of physical activity and exercise to improve overall health  2. Schedule bilateral cervical medial branch blocks at C3, C4,C5, C6. If positive results, will schedule RFA, Left 1st  3. She did not request any medications  4. Will call s/u MBB

## 2017-08-28 DIAGNOSIS — M47.812 FACET ARTHROPATHY, CERVICAL: Primary | ICD-10-CM

## 2017-09-01 ENCOUNTER — TELEPHONE (OUTPATIENT)
Dept: PAIN MEDICINE | Facility: CLINIC | Age: 82
End: 2017-09-01

## 2018-02-08 ENCOUNTER — PES CALL (OUTPATIENT)
Dept: ADMINISTRATIVE | Facility: CLINIC | Age: 83
End: 2018-02-08

## 2018-03-06 ENCOUNTER — TELEPHONE (OUTPATIENT)
Dept: INTERNAL MEDICINE | Facility: CLINIC | Age: 83
End: 2018-03-06

## 2018-03-06 DIAGNOSIS — N18.30 STAGE 3 CHRONIC KIDNEY DISEASE: ICD-10-CM

## 2018-03-06 DIAGNOSIS — E78.2 MIXED HYPERLIPIDEMIA: Primary | ICD-10-CM

## 2018-03-06 DIAGNOSIS — E11.49 TYPE II DIABETES MELLITUS WITH NEUROLOGICAL MANIFESTATIONS: ICD-10-CM

## 2018-03-06 DIAGNOSIS — I10 ESSENTIAL HYPERTENSION: ICD-10-CM

## 2018-03-06 DIAGNOSIS — E55.9 VITAMIN D DEFICIENCY DISEASE: ICD-10-CM

## 2018-03-07 RX ORDER — LOSARTAN POTASSIUM 50 MG/1
TABLET ORAL
Qty: 90 TABLET | Refills: 0 | Status: SHIPPED | OUTPATIENT
Start: 2018-03-07 | End: 2018-06-04 | Stop reason: SDUPTHER

## 2018-03-07 RX ORDER — OMEGA-3-ACID ETHYL ESTERS 1 G/1
CAPSULE, LIQUID FILLED ORAL
Qty: 180 CAPSULE | Refills: 0 | Status: SHIPPED | OUTPATIENT
Start: 2018-03-07 | End: 2018-06-04 | Stop reason: SDUPTHER

## 2018-03-07 NOTE — TELEPHONE ENCOUNTER
I did okay her medication but she will be due for her physical in May or June, please call her to schedule this, labs prior.  Orders are in.    Also needs eye exam, I placed photo and Opto  Thank you

## 2018-03-21 ENCOUNTER — HOSPITAL ENCOUNTER (OUTPATIENT)
Dept: RADIOLOGY | Facility: HOSPITAL | Age: 83
Discharge: HOME | End: 2018-03-21
Attending: INTERNAL MEDICINE
Payer: MEDICARE

## 2018-03-21 ENCOUNTER — TELEPHONE (OUTPATIENT)
Dept: INTERNAL MEDICINE | Facility: CLINIC | Age: 83
End: 2018-03-21

## 2018-03-21 ENCOUNTER — OFFICE VISIT (OUTPATIENT)
Dept: INTERNAL MEDICINE | Facility: CLINIC | Age: 83
End: 2018-03-21
Payer: MEDICARE

## 2018-03-21 VITALS — WEIGHT: 134.5 LBS | BODY MASS INDEX: 25.41 KG/M2 | DIASTOLIC BLOOD PRESSURE: 80 MMHG | SYSTOLIC BLOOD PRESSURE: 120 MMHG

## 2018-03-21 DIAGNOSIS — M54.50 LOW BACK PAIN, NON-SPECIFIC: ICD-10-CM

## 2018-03-21 DIAGNOSIS — M54.9 BILATERAL BACK PAIN, UNSPECIFIED BACK LOCATION, UNSPECIFIED CHRONICITY: ICD-10-CM

## 2018-03-21 DIAGNOSIS — E11.49 TYPE II DIABETES MELLITUS WITH NEUROLOGICAL MANIFESTATIONS: ICD-10-CM

## 2018-03-21 DIAGNOSIS — R76.8 RHEUMATOID FACTOR POSITIVE: ICD-10-CM

## 2018-03-21 DIAGNOSIS — Z00.00 ANNUAL PHYSICAL EXAM: Primary | ICD-10-CM

## 2018-03-21 DIAGNOSIS — I10 ESSENTIAL HYPERTENSION: ICD-10-CM

## 2018-03-21 DIAGNOSIS — K76.0 FATTY LIVER: ICD-10-CM

## 2018-03-21 DIAGNOSIS — E55.9 VITAMIN D DEFICIENCY DISEASE: ICD-10-CM

## 2018-03-21 DIAGNOSIS — E78.2 MIXED HYPERLIPIDEMIA: ICD-10-CM

## 2018-03-21 DIAGNOSIS — Z86.010 HISTORY OF ADENOMATOUS POLYP OF COLON: ICD-10-CM

## 2018-03-21 DIAGNOSIS — N18.30 STAGE 3 CHRONIC KIDNEY DISEASE: ICD-10-CM

## 2018-03-21 DIAGNOSIS — Z85.3 HISTORY OF BREAST CANCER: ICD-10-CM

## 2018-03-21 DIAGNOSIS — M25.50 ARTHRALGIA, UNSPECIFIED JOINT: ICD-10-CM

## 2018-03-21 DIAGNOSIS — E11.42 DIABETIC POLYNEUROPATHY ASSOCIATED WITH TYPE 2 DIABETES MELLITUS: ICD-10-CM

## 2018-03-21 PROCEDURE — 99397 PER PM REEVAL EST PAT 65+ YR: CPT | Mod: S$GLB,,, | Performed by: INTERNAL MEDICINE

## 2018-03-21 PROCEDURE — 73521 X-RAY EXAM HIPS BI 2 VIEWS: CPT | Mod: TC

## 2018-03-21 PROCEDURE — 72100 X-RAY EXAM L-S SPINE 2/3 VWS: CPT | Mod: TC

## 2018-03-21 PROCEDURE — 99499 UNLISTED E&M SERVICE: CPT | Mod: S$GLB,,, | Performed by: INTERNAL MEDICINE

## 2018-03-21 PROCEDURE — 73521 X-RAY EXAM HIPS BI 2 VIEWS: CPT | Mod: 26,,, | Performed by: RADIOLOGY

## 2018-03-21 PROCEDURE — 72100 X-RAY EXAM L-S SPINE 2/3 VWS: CPT | Mod: 26,,, | Performed by: RADIOLOGY

## 2018-03-21 PROCEDURE — 99999 PR PBB SHADOW E&M-EST. PATIENT-LVL IV: CPT | Mod: PBBFAC,,, | Performed by: INTERNAL MEDICINE

## 2018-03-21 NOTE — PROGRESS NOTES
Subjective:       Patient ID: Sera Boone is a 82 y.o. female.    Chief Complaint: Annual Exam    HPI  Review of Systems    Objective:      Physical Exam    Assessment:       1. Annual physical exam    2. Diabetic polyneuropathy associated with type 2 diabetes mellitus    3. Essential hypertension    4. Mixed hyperlipidemia    5. Stage 3 chronic kidney disease    6. Rheumatoid factor positive    7. Type II diabetes mellitus with neurological manifestations    8. Vitamin D deficiency disease    9. History of breast cancer    10. History of adenomatous polyp of colon 1/16 no need for further follow up per Dr Guerra        Plan:

## 2018-03-21 NOTE — PROGRESS NOTES
Subjective:       Patient ID: Sera Boone is a 82 y.o. female.    Chief Complaint: Annual Exam    Annual exam    Arthralgias neck, hip.  No fever, chills, sweats, weight loss, vision changes or headache.    DM due for labs    BP stable.    Patient Active Problem List:     Mixed hyperlipidemia     Type II diabetes mellitus with neurological manifestations     Ptosis of eyebrow     History of breast cancer     Vitamin D deficiency disease     Essential hypertension     History of adenomatous polyp of colon 1/16 no need for further follow up per Dr Guerra     Diabetic polyneuropathy associated with type 2 diabetes mellitus     Fatty liver: see ultrasound 1/17     Elevated liver enzymes     Rheumatoid factor positive     Stage 3 chronic kidney disease     Hydronephrosis     Spondylosis of cervical region without myelopathy or radiculopathy     Primary osteoarthritis involving multiple joints     Osteoporosis without current pathological fracture     DDD (degenerative disc disease), cervical        Review of Systems   Constitutional: Negative for activity change, appetite change, chills, fatigue and fever.   HENT: Negative for congestion, hearing loss, sinus pressure and sore throat.    Eyes: Negative for visual disturbance.   Respiratory: Negative for apnea, cough, shortness of breath and wheezing.    Cardiovascular: Negative for chest pain, palpitations and leg swelling.   Gastrointestinal: Negative for abdominal distention, abdominal pain, constipation, diarrhea, nausea and vomiting.   Genitourinary: Negative for dysuria, frequency, hematuria and vaginal bleeding.   Musculoskeletal: Positive for arthralgias and neck pain. Negative for gait problem, joint swelling and myalgias.   Skin: Negative for rash.   Neurological: Positive for numbness. Negative for dizziness, weakness, light-headedness and headaches.        Slight numbness feet   Hematological: Negative for adenopathy. Does not bruise/bleed easily.    Psychiatric/Behavioral: Negative for confusion, hallucinations, sleep disturbance and suicidal ideas.       Objective:      Physical Exam   Constitutional: She is oriented to person, place, and time. She appears well-developed and well-nourished.   HENT:   Head: Normocephalic and atraumatic.   Right Ear: External ear normal.   Left Ear: External ear normal.   Nose: Nose normal.   Mouth/Throat: Oropharynx is clear and moist. No oropharyngeal exudate.   Eyes: Conjunctivae and EOM are normal. No scleral icterus.   Neck: Normal range of motion. Neck supple. No JVD present. No thyromegaly present.   Cardiovascular: Normal rate, regular rhythm, normal heart sounds and intact distal pulses.  Exam reveals no gallop.    No murmur heard.  Pulses:       Dorsalis pedis pulses are 2+ on the right side, and 2+ on the left side.        Posterior tibial pulses are 2+ on the right side, and 2+ on the left side.   Pulmonary/Chest: Effort normal and breath sounds normal. No respiratory distress. She has no wheezes.   BREASTS: No masses, nipple d/c or LN  Scar L breast   Abdominal: Soft. Bowel sounds are normal. She exhibits no distension and no mass. There is no tenderness. There is no rebound and no guarding.   Musculoskeletal: Normal range of motion. She exhibits no edema or tenderness.        Right foot: There is normal range of motion and no deformity.        Left foot: There is normal range of motion and no deformity.   Feet:   Right Foot:   Protective Sensation: 6 sites tested. 6 sites sensed.   Skin Integrity: Negative for ulcer, blister, skin breakdown or erythema.   Left Foot:   Protective Sensation: 6 sites tested. 6 sites sensed.   Skin Integrity: Negative for ulcer, blister, skin breakdown or erythema.   Lymphadenopathy:     She has no cervical adenopathy.   Neurological: She is alert and oriented to person, place, and time. She displays normal reflexes. No cranial nerve deficit. Coordination normal.   Skin: Skin is  warm. No rash noted. No erythema.   Psychiatric: She has a normal mood and affect. Her behavior is normal. Judgment and thought content normal.   Nursing note and vitals reviewed.      Assessment:       1. Annual physical exam    2. Diabetic polyneuropathy associated with type 2 diabetes mellitus    3. Essential hypertension    4. Mixed hyperlipidemia    5. Stage 3 chronic kidney disease    6. Rheumatoid factor positive    7. Type II diabetes mellitus with neurological manifestations    8. Vitamin D deficiency disease    9. History of breast cancer    10. History of adenomatous polyp of colon 1/16 no need for further follow up per Dr Guerra    11. Arthralgia, unspecified joint    12. Low back pain, non-specific    13. Bilateral back pain, unspecified back location, unspecified chronicity    14. Fatty liver        Plan:         Annual physical exam    Diabetic polyneuropathy associated with type 2 diabetes mellitus: stable with mild sx currently    Essential hypertension: Low salt diet, exercise, 5-10-# weight loss.  Call if BP > 135/85 on a regular basis.    Mixed hyperlipidemia: labs and review    Stage 3 chronic kidney disease: gentle hydration; avoid NSAIDs, labs and review    Rheumatoid factor positive: labs and review    Type II diabetes mellitus with neurological manifestations  -     Diabetic Eye Screening Photo; Future    Vitamin D deficiency disease: labs and review    History of breast cancer  -     Mammo Digital Diagnostic Bilateral With CAD; Future; Expected date: 03/21/2018    History of adenomatous polyp of colon 1/16 no need for further follow up per Dr Guerra    Arthralgia, unspecified joint  -     Ambulatory referral to Rheumatology  -     ASHLIE; Future; Expected date: 03/21/2018  -     Cyclic citrul peptide antibody, IgG; Future; Expected date: 03/21/2018  -     C-reactive protein; Future; Expected date: 03/21/2018  -     Rheumatoid factor; Future; Expected date: 03/21/2018  -     Sedimentation rate,  manual; Future; Expected date: 03/21/2018    Low back pain, non-specific: ice, low dose tylenol; xrays and review.  PT,  Gentle stretches discussed    Bilateral back pain, unspecified back location, unspecified chronicity  -      X-Ray Hip 2 or 3 views Right; Future; Expected date: 03/21/2018  -     X-Ray Hip 2 or 3 views Left; Future; Expected date: 03/21/2018  -     X-Ray Lumbar Spine Ap And Lateral    Fatty liver: diet, lifestyle issues discussed  -     US Abdomen Complete; Future; Expected date: 03/21/2018    I will review all studies and determine further tx depending on findings

## 2018-03-22 NOTE — TELEPHONE ENCOUNTER
Please let er know she has some degenerative arthritis of the spine and hips    No fracture or dislocation    I'd like to have her be seen in Rheum Dr Rivera (referral is in)    Can also offer PT if she is interested- please let me know

## 2018-03-22 NOTE — TELEPHONE ENCOUNTER
Spoke to pt son and notified of results her will let pt know and call back to let us know what they decided

## 2018-03-28 ENCOUNTER — TELEPHONE (OUTPATIENT)
Dept: INTERNAL MEDICINE | Facility: CLINIC | Age: 83
End: 2018-03-28

## 2018-03-28 NOTE — TELEPHONE ENCOUNTER
----- Message from Eden Joseph sent at 3/28/2018  1:13 PM CDT -----  Contact: call chu barksdale  at   [phone]  Patient is returning a phone call.  Who left a message for the patient: nurse   Does patient know what this is regarding:  Possible therapy or appt the rheumatology,  Pt states that she would rather see rheumatology   Comments: sons asks for a call asap on this

## 2018-03-29 ENCOUNTER — HOSPITAL ENCOUNTER (OUTPATIENT)
Dept: RADIOLOGY | Facility: HOSPITAL | Age: 83
Discharge: HOME | End: 2018-03-29
Attending: INTERNAL MEDICINE
Payer: MEDICARE

## 2018-03-29 ENCOUNTER — INITIAL CONSULT (OUTPATIENT)
Dept: RHEUMATOLOGY | Facility: CLINIC | Age: 83
End: 2018-03-29
Payer: MEDICARE

## 2018-03-29 VITALS
BODY MASS INDEX: 22.81 KG/M2 | WEIGHT: 136.88 LBS | DIASTOLIC BLOOD PRESSURE: 71 MMHG | HEIGHT: 65 IN | SYSTOLIC BLOOD PRESSURE: 147 MMHG | HEART RATE: 53 BPM

## 2018-03-29 DIAGNOSIS — M15.9 GENERALIZED OSTEOARTHRITIS OF MULTIPLE SITES: Primary | ICD-10-CM

## 2018-03-29 DIAGNOSIS — M47.812 SPONDYLOSIS OF CERVICAL REGION WITHOUT MYELOPATHY OR RADICULOPATHY: ICD-10-CM

## 2018-03-29 DIAGNOSIS — M75.22 BICIPITAL TENDINITIS OF LEFT SHOULDER: ICD-10-CM

## 2018-03-29 DIAGNOSIS — Z55.9 EDUCATIONAL CIRCUMSTANCE: ICD-10-CM

## 2018-03-29 DIAGNOSIS — R92.8 ABNORMAL MAMMOGRAM: ICD-10-CM

## 2018-03-29 DIAGNOSIS — M79.7 FIBROMYALGIA: ICD-10-CM

## 2018-03-29 DIAGNOSIS — M19.012 ARTHRITIS OF LEFT ACROMIOCLAVICULAR JOINT: ICD-10-CM

## 2018-03-29 DIAGNOSIS — Z85.3 HISTORY OF BREAST CANCER: ICD-10-CM

## 2018-03-29 DIAGNOSIS — R76.8 RHEUMATOID FACTOR POSITIVE: ICD-10-CM

## 2018-03-29 PROCEDURE — 76642 ULTRASOUND BREAST LIMITED: CPT | Mod: 26,50,, | Performed by: RADIOLOGY

## 2018-03-29 PROCEDURE — 77062 BREAST TOMOSYNTHESIS BI: CPT | Mod: 26,,, | Performed by: RADIOLOGY

## 2018-03-29 PROCEDURE — 20605 DRAIN/INJ JOINT/BURSA W/O US: CPT | Mod: LT,S$GLB,, | Performed by: INTERNAL MEDICINE

## 2018-03-29 PROCEDURE — 99215 OFFICE O/P EST HI 40 MIN: CPT | Mod: 25,S$GLB,, | Performed by: INTERNAL MEDICINE

## 2018-03-29 PROCEDURE — 99999 PR PBB SHADOW E&M-EST. PATIENT-LVL III: CPT | Mod: PBBFAC,,, | Performed by: INTERNAL MEDICINE

## 2018-03-29 PROCEDURE — 77066 DX MAMMO INCL CAD BI: CPT | Mod: 26,,, | Performed by: RADIOLOGY

## 2018-03-29 PROCEDURE — 76642 ULTRASOUND BREAST LIMITED: CPT | Mod: TC,50

## 2018-03-29 PROCEDURE — 77066 DX MAMMO INCL CAD BI: CPT | Mod: TC

## 2018-03-29 RX ORDER — TRIAMCINOLONE ACETONIDE 40 MG/ML
15 INJECTION, SUSPENSION INTRA-ARTICULAR; INTRAMUSCULAR
Status: COMPLETED | OUTPATIENT
Start: 2018-03-29 | End: 2018-03-29

## 2018-03-29 RX ORDER — TRIAMCINOLONE ACETONIDE 40 MG/ML
16 INJECTION, SUSPENSION INTRA-ARTICULAR; INTRAMUSCULAR
Status: COMPLETED | OUTPATIENT
Start: 2018-03-29 | End: 2018-03-29

## 2018-03-29 RX ADMIN — TRIAMCINOLONE ACETONIDE 16 MG: 40 INJECTION, SUSPENSION INTRA-ARTICULAR; INTRAMUSCULAR at 03:03

## 2018-03-29 SDOH — SOCIAL DETERMINANTS OF HEALTH (SDOH): PROBLEMS RELATED TO EDUCATION AND LITERACY, UNSPECIFIED: Z55.9

## 2018-03-29 NOTE — PROGRESS NOTES
Subjective:     Patient ID: Sera Boone is a 82 y.o. female     Chief Complaint: Disease Management       HPI   82 yr old lady Malian speaking lady seen by Dr. Cheema once last year for neck and shoulder pain. Her imaging showed OA in both areas and she had an injection in the left shoulder previously which had helped. She has PT which did not. At the time she had an RF of 16 and an ESR of 42 & abnormal LFTs.    She returns today with her son () because she has pain & aching all over that's been going on years, now it's worse, especially in L shoulder. Has pain with movement. But also had muscle pain in different areas of her body. She hurts in her neck and low back and hips as well as other areas. There is no swelling.  AM stiffness x 45 minutes. She has not been prescribed NSAIDs and has not taken them. Aspercreme does help her a bit. ThermaCare wraps help. Hot showers help.   Son denied anxiety/depression when asked but admitted to it only due to pain. Denies sleep disturbance, but entered much difficulty with sleep on the TA.  Rated her pain as 10/10.    Denies SLE & CTD sxs.         Current Outpatient Prescriptions   Medication Sig Dispense Refill    aspirin 81 MG Chew Take 1 tablet (81 mg total) by mouth once daily.      cholecalciferol, vitamin D3, 1,000 unit capsule Take 2 capsules (2,000 Units total) by mouth once daily. 60 capsule 12    losartan (COZAAR) 50 MG tablet TAKE 1 TABLET BY MOUTH ONCE DAILY 90 tablet 0    metformin (GLUCOPHAGE) 500 MG tablet TAKE 1 TABLET(500 MG) BY MOUTH TWICE DAILY 180 tablet 2    omega-3 acid ethyl esters (LOVAZA) 1 gram capsule TAKE 1 CAPSULE BY MOUTH TWICE DAILY 180 capsule 0     No current facility-administered medications for this visit.        Review of patient's allergies indicates:   Allergen Reactions    No known drug allergies        Review of Systems   Constitutional: Negative for fatigue.   HENT: Negative.  Negative for mouth sores and  trouble swallowing.    Eyes: Negative.  Negative for photophobia and pain.   Respiratory: Negative.  Negative for chest tightness and shortness of breath.    Cardiovascular: Negative for chest pain and palpitations.   Gastrointestinal: Negative.  Negative for abdominal pain, diarrhea and nausea.   Endocrine: Negative.  Negative for cold intolerance and heat intolerance.   Genitourinary: Negative for dysuria and urgency.   Musculoskeletal: Positive for arthralgias, back pain, myalgias, neck pain and neck stiffness.   Skin: Negative.  Negative for rash.   Neurological: Positive for numbness. Negative for dizziness and light-headedness.   Hematological: Negative.  Does not bruise/bleed easily.   Psychiatric/Behavioral: Positive for dysphoric mood and sleep disturbance. The patient is nervous/anxious.        Past Medical History:   Diagnosis Date    Arthritis     Breast cancer 2001    Diabetes mellitus     History of breast cancer 8/21/2013    Hyperlipidemia     Hypertension     Nuclear sclerotic cataract of right eye 10/16/2012    Osteoporosis, unspecified: see DEXA 8/15- 2/13/2017    Osteoporosis, unspecified: see DEXA 8/15- 2/13/2017    Pain of both shoulder joints 2/14/2017    Rheumatoid factor positive 2/14/2017    Tubular adenoma of colon 10/28/2013    Type II or unspecified type diabetes mellitus with neurological manifestations, not stated as uncontrolled(250.60)     Vitamin D deficiency disease 2/17/2014       Past Surgical History:   Procedure Laterality Date    BELPHAROPTOSIS REPAIR  03/15/13    bilateral-dr. coleman md    BREAST LUMPECTOMY      BREAST SURGERY Left 2001    lumpectomy    CATARACT EXTRACTION      both eyes -     CHOLECYSTECTOMY      Done in [place] in the 1980's    COLONOSCOPY N/A 1/19/2016    Procedure: COLONOSCOPY;  Surgeon: BLANCA Guerra MD;  Location: [address];  Service: Endoscopy;  Laterality: N/A;    COLONOSCOPY W/ POLYPECTOMY    "      Family History   Problem Relation Age of Onset    Breast cancer Sister 48     55 second, bilateral    Liver cancer Mother     Early death Mother     Cancer Mother      liver    Liver cancer Father     Liver cancer Brother     No Known Problems Daughter     No Known Problems Son     Liver cancer Brother     No Known Problems Daughter     No Known Problems Maternal Aunt     No Known Problems Maternal Uncle     No Known Problems Paternal Aunt     No Known Problems Paternal Uncle     No Known Problems Maternal Grandmother     No Known Problems Maternal Grandfather     No Known Problems Paternal Grandmother     No Known Problems Paternal Grandfather     Glaucoma Neg Hx     Amblyopia Neg Hx     Blindness Neg Hx     Cataracts Neg Hx     Diabetes Neg Hx     Hypertension Neg Hx     Macular degeneration Neg Hx     Retinal detachment Neg Hx     Strabismus Neg Hx     Stroke Neg Hx     Thyroid disease Neg Hx     Cervical cancer Neg Hx     Vaginal cancer Neg Hx     Endometrial cancer Neg Hx        Social History   Substance Use Topics    Smoking status: Never Smoker    Smokeless tobacco: Never Used    Alcohol use No       Objective:     BP (!) 147/71   Pulse (!) 53   Ht 5' 5" (1.651 m)   Wt 62.1 kg (136 lb 14.4 oz)   BMI 22.78 kg/m²     Physical Exam   Vitals reviewed.  Constitutional: She is oriented to person, place, and time and well-developed, well-nourished, and in no distress. No distress.   HENT:   Head: Normocephalic and atraumatic.   Mouth/Throat: Oropharynx is clear and moist. No oropharyngeal exudate.   No facial rashes  Parotids not enlarged  No oral ulcers   Eyes: Conjunctivae and EOM are normal. Pupils are equal, round, and reactive to light. Right eye exhibits no discharge. Left eye exhibits no discharge. No scleral icterus.   Neck: Neck supple. No JVD present. No tracheal deviation present. No thyromegaly present.   Cardiovascular: Normal rate, regular rhythm, normal " heart sounds and intact distal pulses.  Exam reveals no gallop and no friction rub.    No murmur heard.  Pulmonary/Chest: Effort normal and breath sounds normal. No respiratory distress. She has no wheezes. She has no rales. She exhibits no tenderness.   Abdominal: Soft. Bowel sounds are normal. She exhibits no distension and no mass. There is no splenomegaly or hepatomegaly. There is no tenderness. There is no rebound and no guarding.   Lymphadenopathy:     She has no cervical adenopathy.        Right: No inguinal adenopathy present.        Left: No inguinal adenopathy present.   Neurological: She is alert and oriented to person, place, and time. She has normal reflexes. No cranial nerve deficit. Gait normal.   Proximal and distal muscle strength 5/5.   Skin: Skin is warm and dry. No rash noted. She is not diaphoretic.     Psychiatric:   Unable to assess NP issues due to language barrier.   Musculoskeletal: Normal range of motion. She exhibits tenderness (11/18 tender pts rated 5/5; also diffuse trap tenderness.). She exhibits no edema.   Cspine decrease extension, lateral flexion and rotation  Tspine mild kyphosis; no tenderness  Lspine adequate ROM;  no spinal tenderness.  TMJ: unremarkable  Shoulders: L missing about 20 degrees; bicipital tenderness; AC joint tenderness; limited internal rotation; no synovitis; R ok;   Elbows: FROM; no synovitis; no tophi or nodules  Wrists: FROM; no synovitis;    MCPs: FROM; no synovitis; no metacarpalgia;  ok;  PIPs:FROM; no synovitis;   DIPs: FROM; no synovitis;   HIPS: FROM  Knees: FROM; no synovitis; no instability;  Ankles: FROM: no synovitis   Toes: ok; no metatarsalgia.                3/29/18: ESR 18; CRP 2.6; CBC Ht 36.2; CMP ok; RF 18; Vit D 55  5/30/17: RF/CCP neg;    3/21/18: LSpine: personally reviewed: mild dextroconvex curvature; mod OA L2 to S1;   3/21/18: Bilateral hips: personally reviewed: mild OA hips/SI jts; symphysis pubis.  6/6/17: CT Cspine; mod OA  kel C4-5 & C5-5.  Assessment:   Generalized osteoarthritis  L AC arthritis   L bicipital tendinitis  Fibromyalgia   But denies fatigue  +RF (low level) intermittently with no evidence of inflammatory disease    Plan:   PROCEDURE NOTE:  Local and systemic side effects and toxicities of steroids were discussed prior to procedure.  With patient's request and permission the left AC joint was sterilely prepped. Topical anesthetic was sprayed and a mixture of  lidocaine 1% and 15 mg  Kenalog was injected into the joint. Patient tolerated the procedure well. Icing and contacting us was recommended it if it begins to hurt.   PROCEDURE NOTE:  Local and systemic side effects and toxicities of steroids were discussed prior to procedure.  With patient's request and permission the left bicipital tendon area was sterilely prepped. Topical anesthetic was sprayed and a mixture of  lidocaine 1% and 16 kenalog  was injected into the joint. Patient tolerated the procedure well. Icing and contacting us was recommended it if it begins to hurt.     Discussed her problems and her aching all over.  Discussed effect of central sensitization on pain. Information provided on fibromyalgia in [place].  Discussed no evidence of RA or other CTD.  Discussed exercise  Discussed use of topicals preferable to oral analgesics.  Discussed if need for oral meds, try acetaminophen first and if need be intermittent judicious use of OTC NSAIDs (always with a PPI full dose) and monitoring of renal fcn. Reviewed side effects and toxicities of NSAIDs which include not only GI and renal but also CV.  Discussed that there is no miracle drug that would take away her pain & discomfort safely.  Showed 3 types of shoulder exercises to do.    A small dose of duloxetine may be helpful if above efforts are not effective.     All this was done with her son interpreting.     Patient to be followed up by Dr. Vásquez      CC: Joycelyn Vásquez MD

## 2018-03-29 NOTE — PATIENT INSTRUCTIONS
Read on fibromyalgia.  Do the 3 shoulder exercises shown.  Daily, aerobic, low impact exercises.    First try acetaminophen up to 3,000 mg/days.  If it doesn't help than discuss using an OTC NSAID such as ibuprofen or naproxen intermittently as needed.  But if taken, should protect GI tract by taking 40 mg of omeprazole (can take 2 OTC omeprazoles) or equivalent.    Duloxetine is a medication that has been used for fibromyalgia.  If symptoms are not controlled otherwise, discuss it's use with Dr. Vásquez.

## 2018-03-29 NOTE — LETTER
March 29, 2018      Joycelyn Vásquez MD  [address]  [place] [address]           [facility] - Rheumatology  [address]  [place] [address]  Phone: [phone]  Fax: [phone]          Patient: Sera Boone   MR Number: [id number]   YOB: 1935   Date of Visit: 3/29/2018       Dear Dr. Joycelyn Vásquez:    Thank you for referring Sera Boone to me for evaluation. Attached you will find relevant portions of my assessment and plan of care.    If you have questions, please do not hesitate to call me. I look forward to following Sera Boone along with you.    Sincerely,    Jennifer Staplse MD    Enclosure  CC:  No Recipients    If you would like to receive this communication electronically, please contact [email or web address] or [phone] to request more information on [facility] Link access.    For providers and/or their staff who would like to refer a patient to [facility], please contact us through our one-stop-shop provider referral line, [facility], at [phone].    If you feel you have received this communication in error or would no longer like to receive these types of communications, please e-mail [email or web address]

## 2018-04-08 ENCOUNTER — PATIENT MESSAGE (OUTPATIENT)
Dept: INTERNAL MEDICINE | Facility: CLINIC | Age: 83
End: 2018-04-08

## 2018-05-10 ENCOUNTER — PATIENT MESSAGE (OUTPATIENT)
Dept: ADMINISTRATIVE | Facility: OTHER | Age: 83
End: 2018-05-10

## 2018-06-04 DIAGNOSIS — I10 ESSENTIAL HYPERTENSION: ICD-10-CM

## 2018-06-05 RX ORDER — ATORVASTATIN CALCIUM 40 MG/1
TABLET, FILM COATED ORAL
Qty: 90 TABLET | Refills: 0 | Status: SHIPPED | OUTPATIENT
Start: 2018-06-05 | End: 2018-08-27 | Stop reason: SDUPTHER

## 2018-06-05 RX ORDER — LOSARTAN POTASSIUM 50 MG/1
TABLET ORAL
Qty: 90 TABLET | Refills: 0 | Status: SHIPPED | OUTPATIENT
Start: 2018-06-05 | End: 2018-08-27 | Stop reason: SDUPTHER

## 2018-06-05 RX ORDER — METFORMIN HYDROCHLORIDE 500 MG/1
TABLET ORAL
Qty: 180 TABLET | Refills: 0 | Status: SHIPPED | OUTPATIENT
Start: 2018-06-05 | End: 2018-08-27 | Stop reason: SDUPTHER

## 2018-06-05 RX ORDER — OMEGA-3-ACID ETHYL ESTERS 1 G/1
CAPSULE, LIQUID FILLED ORAL
Qty: 180 CAPSULE | Refills: 0 | Status: SHIPPED | OUTPATIENT
Start: 2018-06-05 | End: 2018-08-27 | Stop reason: SDUPTHER

## 2018-08-27 ENCOUNTER — TELEPHONE (OUTPATIENT)
Dept: INTERNAL MEDICINE | Facility: CLINIC | Age: 83
End: 2018-08-27

## 2018-08-27 DIAGNOSIS — E11.49 TYPE II DIABETES MELLITUS WITH NEUROLOGICAL MANIFESTATIONS: ICD-10-CM

## 2018-08-27 DIAGNOSIS — I10 ESSENTIAL HYPERTENSION: ICD-10-CM

## 2018-08-27 DIAGNOSIS — E78.2 MIXED HYPERLIPIDEMIA: Primary | ICD-10-CM

## 2018-08-27 RX ORDER — LOSARTAN POTASSIUM 50 MG/1
TABLET ORAL
Qty: 90 TABLET | Refills: 0 | Status: SHIPPED | OUTPATIENT
Start: 2018-08-27 | End: 2018-11-21 | Stop reason: SDUPTHER

## 2018-08-27 RX ORDER — OMEGA-3-ACID ETHYL ESTERS 1 G/1
CAPSULE, LIQUID FILLED ORAL
Qty: 180 CAPSULE | Refills: 0 | Status: SHIPPED | OUTPATIENT
Start: 2018-08-27 | End: 2018-11-21 | Stop reason: SDUPTHER

## 2018-08-27 RX ORDER — ATORVASTATIN CALCIUM 40 MG/1
TABLET, FILM COATED ORAL
Qty: 90 TABLET | Refills: 0 | Status: SHIPPED | OUTPATIENT
Start: 2018-08-27 | End: 2018-11-21 | Stop reason: SDUPTHER

## 2018-08-27 RX ORDER — METFORMIN HYDROCHLORIDE 500 MG/1
TABLET ORAL
Qty: 180 TABLET | Refills: 0 | Status: SHIPPED | OUTPATIENT
Start: 2018-08-27 | End: 2018-11-21 | Stop reason: SDUPTHER

## 2018-08-27 NOTE — TELEPHONE ENCOUNTER
Meds filled but she needs lab work in the next month and also needs an eye exam.  Orders are in.  Please let me know when scheduled.

## 2018-08-31 ENCOUNTER — TELEPHONE (OUTPATIENT)
Dept: OPTOMETRY | Facility: CLINIC | Age: 83
End: 2018-08-31

## 2018-11-21 DIAGNOSIS — I10 ESSENTIAL HYPERTENSION: ICD-10-CM

## 2018-11-21 RX ORDER — LOSARTAN POTASSIUM 50 MG/1
TABLET ORAL
Qty: 90 TABLET | Refills: 0 | Status: SHIPPED | OUTPATIENT
Start: 2018-11-21 | End: 2019-02-17 | Stop reason: SDUPTHER

## 2018-11-21 RX ORDER — OMEGA-3-ACID ETHYL ESTERS 1 G/1
CAPSULE, LIQUID FILLED ORAL
Qty: 180 CAPSULE | Refills: 0 | Status: SHIPPED | OUTPATIENT
Start: 2018-11-21 | End: 2019-02-17 | Stop reason: SDUPTHER

## 2018-11-21 RX ORDER — METFORMIN HYDROCHLORIDE 500 MG/1
TABLET ORAL
Qty: 180 TABLET | Refills: 0 | Status: SHIPPED | OUTPATIENT
Start: 2018-11-21 | End: 2019-02-17 | Stop reason: SDUPTHER

## 2018-11-21 RX ORDER — ATORVASTATIN CALCIUM 40 MG/1
TABLET, FILM COATED ORAL
Qty: 90 TABLET | Refills: 0 | Status: SHIPPED | OUTPATIENT
Start: 2018-11-21 | End: 2019-02-17 | Stop reason: SDUPTHER

## 2019-01-22 ENCOUNTER — HOSPITAL ENCOUNTER (OUTPATIENT)
Dept: RADIOLOGY | Facility: CLINIC | Age: 84
Discharge: HOME | End: 2019-01-22
Attending: INTERNAL MEDICINE
Payer: MEDICARE

## 2019-01-22 DIAGNOSIS — K76.0 FATTY LIVER: ICD-10-CM

## 2019-01-22 PROCEDURE — 76700 US ABDOMEN COMPLETE: ICD-10-PCS | Mod: 26,HCNC,, | Performed by: RADIOLOGY

## 2019-01-22 PROCEDURE — 76700 US EXAM ABDOM COMPLETE: CPT | Mod: TC,HCNC,PO

## 2019-01-22 PROCEDURE — 76700 US EXAM ABDOM COMPLETE: CPT | Mod: 26,HCNC,, | Performed by: RADIOLOGY

## 2019-01-23 ENCOUNTER — TELEPHONE (OUTPATIENT)
Dept: INTERNAL MEDICINE | Facility: CLINIC | Age: 84
End: 2019-01-23

## 2019-01-23 NOTE — TELEPHONE ENCOUNTER
Acceptable ultrasound other than fatty liver.  However, the radiologist is recommending some blood work.  Orders are in, please schedule as soon as possible and let me know when scheduled.  Thank you

## 2019-01-25 ENCOUNTER — LAB VISIT (OUTPATIENT)
Dept: LAB | Facility: HOSPITAL | Age: 84
End: 2019-01-25
Attending: INTERNAL MEDICINE
Payer: MEDICARE

## 2019-01-25 DIAGNOSIS — I10 ESSENTIAL HYPERTENSION: ICD-10-CM

## 2019-01-25 DIAGNOSIS — E78.2 MIXED HYPERLIPIDEMIA: ICD-10-CM

## 2019-01-25 DIAGNOSIS — E11.49 TYPE II DIABETES MELLITUS WITH NEUROLOGICAL MANIFESTATIONS: ICD-10-CM

## 2019-01-25 LAB
ALBUMIN SERPL BCP-MCNC: 3.5 G/DL
ALP SERPL-CCNC: 67 U/L
ALT SERPL W/O P-5'-P-CCNC: 19 U/L
ANION GAP SERPL CALC-SCNC: 7 MMOL/L
AST SERPL-CCNC: 24 U/L
BASOPHILS # BLD AUTO: 0.01 K/UL
BASOPHILS NFR BLD: 0.2 %
BILIRUB SERPL-MCNC: 0.7 MG/DL
BUN SERPL-MCNC: 13 MG/DL
CALCIUM SERPL-MCNC: 10 MG/DL
CHLORIDE SERPL-SCNC: 107 MMOL/L
CHOLEST SERPL-MCNC: 142 MG/DL
CHOLEST/HDLC SERPL: 2.6 {RATIO}
CO2 SERPL-SCNC: 27 MMOL/L
CREAT SERPL-MCNC: 0.8 MG/DL
DIFFERENTIAL METHOD: ABNORMAL
EOSINOPHIL # BLD AUTO: 0 K/UL
EOSINOPHIL NFR BLD: 0.4 %
ERYTHROCYTE [DISTWIDTH] IN BLOOD BY AUTOMATED COUNT: 13.1 %
EST. GFR  (AFRICAN AMERICAN): >60 ML/MIN/1.73 M^2
EST. GFR  (NON AFRICAN AMERICAN): >60 ML/MIN/1.73 M^2
ESTIMATED AVG GLUCOSE: 140 MG/DL
GLUCOSE SERPL-MCNC: 110 MG/DL
HBA1C MFR BLD HPLC: 6.5 %
HCT VFR BLD AUTO: 39.6 %
HDLC SERPL-MCNC: 55 MG/DL
HDLC SERPL: 38.7 %
HGB BLD-MCNC: 12.5 G/DL
IMM GRANULOCYTES # BLD AUTO: 0.03 K/UL
IMM GRANULOCYTES NFR BLD AUTO: 0.6 %
LDLC SERPL CALC-MCNC: 75.4 MG/DL
LYMPHOCYTES # BLD AUTO: 2.3 K/UL
LYMPHOCYTES NFR BLD: 43.1 %
MCH RBC QN AUTO: 30.4 PG
MCHC RBC AUTO-ENTMCNC: 31.6 G/DL
MCV RBC AUTO: 96 FL
MONOCYTES # BLD AUTO: 0.4 K/UL
MONOCYTES NFR BLD: 8.3 %
NEUTROPHILS # BLD AUTO: 2.5 K/UL
NEUTROPHILS NFR BLD: 47.4 %
NONHDLC SERPL-MCNC: 87 MG/DL
NRBC BLD-RTO: 0 /100 WBC
PLATELET # BLD AUTO: 210 K/UL
PMV BLD AUTO: 10.5 FL
POTASSIUM SERPL-SCNC: 4.6 MMOL/L
PROT SERPL-MCNC: 7.3 G/DL
RBC # BLD AUTO: 4.11 M/UL
SODIUM SERPL-SCNC: 141 MMOL/L
TRIGL SERPL-MCNC: 58 MG/DL
WBC # BLD AUTO: 5.27 K/UL

## 2019-01-25 PROCEDURE — 85025 COMPLETE CBC W/AUTO DIFF WBC: CPT | Mod: HCNC

## 2019-01-25 PROCEDURE — 80053 COMPREHEN METABOLIC PANEL: CPT | Mod: HCNC

## 2019-01-25 PROCEDURE — 83036 HEMOGLOBIN GLYCOSYLATED A1C: CPT | Mod: HCNC

## 2019-01-25 PROCEDURE — 80061 LIPID PANEL: CPT | Mod: HCNC

## 2019-01-25 PROCEDURE — 36415 COLL VENOUS BLD VENIPUNCTURE: CPT | Mod: HCNC,PO

## 2019-02-07 ENCOUNTER — OFFICE VISIT (OUTPATIENT)
Dept: OPTOMETRY | Facility: CLINIC | Age: 84
End: 2019-02-07
Payer: COMMERCIAL

## 2019-02-07 DIAGNOSIS — Z01.00 EYE EXAM, ROUTINE: Primary | ICD-10-CM

## 2019-02-07 DIAGNOSIS — H52.13 MYOPIA WITH ASTIGMATISM, BILATERAL: ICD-10-CM

## 2019-02-07 DIAGNOSIS — H26.492 PCO (POSTERIOR CAPSULAR OPACIFICATION), LEFT: ICD-10-CM

## 2019-02-07 DIAGNOSIS — H52.203 MYOPIA WITH ASTIGMATISM, BILATERAL: ICD-10-CM

## 2019-02-07 PROCEDURE — 92014 PR EYE EXAM, EST PATIENT,COMPREHESV: ICD-10-PCS | Mod: S$GLB,,, | Performed by: OPTOMETRIST

## 2019-02-07 PROCEDURE — 92015 PR REFRACTION: ICD-10-PCS | Mod: S$GLB,,, | Performed by: OPTOMETRIST

## 2019-02-07 PROCEDURE — 99999 PR PBB SHADOW E&M-EST. PATIENT-LVL II: ICD-10-PCS | Mod: PBBFAC,,, | Performed by: OPTOMETRIST

## 2019-02-07 PROCEDURE — 92015 DETERMINE REFRACTIVE STATE: CPT | Mod: S$GLB,,, | Performed by: OPTOMETRIST

## 2019-02-07 PROCEDURE — 99999 PR PBB SHADOW E&M-EST. PATIENT-LVL II: CPT | Mod: PBBFAC,,, | Performed by: OPTOMETRIST

## 2019-02-07 PROCEDURE — 92014 COMPRE OPH EXAM EST PT 1/>: CPT | Mod: S$GLB,,, | Performed by: OPTOMETRIST

## 2019-02-07 NOTE — PROGRESS NOTES
HPI     Patient is here for annual eye Eyemed Exam.   Blurry vision OU    Hemoglobin A1C       Date                     Value               Ref Range             Status                01/25/2019               6.5 (H)             4.0 - 5.6 %           Final                 03/29/2018               6.1 (H)             4.0 - 5.6 %           Final              Using [address] 3x/day    Last edited by Landon Myrick, OD on 2/7/2019  1:34 PM. (History)            Assessment /Plan     For exam results, see Encounter Report.    Eye exam, routine  -Eyemed   -No retinopathy noted today.  Continued control with primary care physician and annual comprehensive eye exam.    Myopia with astigmatism, bilateral  -hold    PCO (posterior capsular opacification), left  -     Ambulatory Referral to Ophthalmology  -YAG Consult OS    RTC annual

## 2019-02-17 DIAGNOSIS — I10 ESSENTIAL HYPERTENSION: ICD-10-CM

## 2019-02-17 RX ORDER — OMEGA-3-ACID ETHYL ESTERS 1 G/1
CAPSULE, LIQUID FILLED ORAL
Qty: 180 CAPSULE | Refills: 0 | Status: SHIPPED | OUTPATIENT
Start: 2019-02-17 | End: 2019-05-22 | Stop reason: SDUPTHER

## 2019-02-17 RX ORDER — METFORMIN HYDROCHLORIDE 500 MG/1
TABLET ORAL
Qty: 180 TABLET | Refills: 0 | Status: SHIPPED | OUTPATIENT
Start: 2019-02-17 | End: 2019-05-22 | Stop reason: SDUPTHER

## 2019-02-17 RX ORDER — ATORVASTATIN CALCIUM 40 MG/1
TABLET, FILM COATED ORAL
Qty: 90 TABLET | Refills: 0 | Status: SHIPPED | OUTPATIENT
Start: 2019-02-17 | End: 2019-05-22 | Stop reason: SDUPTHER

## 2019-02-17 RX ORDER — LOSARTAN POTASSIUM 50 MG/1
TABLET ORAL
Qty: 90 TABLET | Refills: 0 | Status: SHIPPED | OUTPATIENT
Start: 2019-02-17 | End: 2019-05-22 | Stop reason: SDUPTHER

## 2019-02-26 ENCOUNTER — OFFICE VISIT (OUTPATIENT)
Dept: FAMILY MEDICINE | Facility: CLINIC | Age: 84
End: 2019-02-26
Payer: MEDICARE

## 2019-02-26 VITALS — BODY MASS INDEX: 22.59 KG/M2 | WEIGHT: 135.56 LBS | HEIGHT: 65 IN | TEMPERATURE: 98 F

## 2019-02-26 DIAGNOSIS — M54.12 CERVICAL RADICULOPATHY: Primary | ICD-10-CM

## 2019-02-26 DIAGNOSIS — M79.7 FIBROMYALGIA: ICD-10-CM

## 2019-02-26 PROCEDURE — 99213 PR OFFICE/OUTPT VISIT, EST, LEVL III, 20-29 MIN: ICD-10-PCS | Mod: HCNC,S$GLB,, | Performed by: PHYSICIAN ASSISTANT

## 2019-02-26 PROCEDURE — 99999 PR PBB SHADOW E&M-EST. PATIENT-LVL IV: ICD-10-PCS | Mod: PBBFAC,HCNC,, | Performed by: PHYSICIAN ASSISTANT

## 2019-02-26 PROCEDURE — 1101F PR PT FALLS ASSESS DOC 0-1 FALLS W/OUT INJ PAST YR: ICD-10-PCS | Mod: HCNC,CPTII,S$GLB, | Performed by: PHYSICIAN ASSISTANT

## 2019-02-26 PROCEDURE — 99999 PR PBB SHADOW E&M-EST. PATIENT-LVL IV: CPT | Mod: PBBFAC,HCNC,, | Performed by: PHYSICIAN ASSISTANT

## 2019-02-26 PROCEDURE — 1101F PT FALLS ASSESS-DOCD LE1/YR: CPT | Mod: HCNC,CPTII,S$GLB, | Performed by: PHYSICIAN ASSISTANT

## 2019-02-26 PROCEDURE — 99213 OFFICE O/P EST LOW 20 MIN: CPT | Mod: HCNC,S$GLB,, | Performed by: PHYSICIAN ASSISTANT

## 2019-02-26 RX ORDER — PREDNISONE 20 MG/1
20 TABLET ORAL 2 TIMES DAILY
Qty: 10 TABLET | Refills: 0 | Status: SHIPPED | OUTPATIENT
Start: 2019-02-26 | End: 2019-03-03

## 2019-02-26 NOTE — PROGRESS NOTES
Subjective:       Patient ID: Sera Boone is a 83 y.o. female.    Chief Complaint: Pain (bilateral shoulder and arm)    Patient who is new to [facility] presents accompanied by her son.  Patient is Slovak-speaking and son interprets for her today.  Patient's son explains that she has a diagnosis of fibromyalgia.  He states that typically her pain is controlled with home exercise program and warm baths.  Patient has been having increased pain 1 week.  She denies any injury.  Pain is located in the neck and radiates down both shoulders upper extremities to the hands.  Pain is worse on the left.  She has numbness in the left hand.  She denies weakness.  She is not dropping anything.  She has tried Advil without relief of her symptoms.  Patients patient medical/surgical, social and family histories have been reviewed         Review of Systems   Constitutional: Negative for fatigue and fever.   Musculoskeletal: Positive for arthralgias, back pain, myalgias and neck pain. Negative for neck stiffness.   Skin: Negative for rash and wound.   Neurological: Positive for numbness. Negative for dizziness, weakness, light-headedness and headaches.   Psychiatric/Behavioral: Negative for sleep disturbance.       Objective:      Physical Exam   Constitutional: She appears well-developed and well-nourished. She is cooperative. No distress.   HENT:   Head: Normocephalic and atraumatic.   Cardiovascular: Normal rate, regular rhythm and normal heart sounds.   Pulmonary/Chest: Effort normal and breath sounds normal.   Musculoskeletal:        Right shoulder: She exhibits normal range of motion, no tenderness, no pain and normal strength.        Left shoulder: She exhibits decreased range of motion, tenderness (Trapezius) and pain. She exhibits no deformity, no spasm and normal strength.        Right elbow: She exhibits normal range of motion. No tenderness found.        Left elbow: She exhibits normal range of  motion. No tenderness found.        Right wrist: She exhibits normal range of motion and no tenderness.        Left wrist: She exhibits normal range of motion and no tenderness.        Cervical back: She exhibits decreased range of motion (Decreased extension) and tenderness (Bilateral cervical paraspinous muscles left greater than right).        Right upper arm: She exhibits no tenderness.        Left upper arm: She exhibits tenderness. She exhibits no swelling and no edema.        Right forearm: She exhibits no tenderness.        Left forearm: She exhibits tenderness. She exhibits no swelling and no edema.        Right hand: She exhibits normal range of motion, no tenderness and normal capillary refill. Normal sensation noted. Normal strength noted.        Left hand: She exhibits normal range of motion, no tenderness, normal capillary refill and no swelling. Normal sensation noted. Normal strength noted.   Neurological: She is alert. She has normal strength. No cranial nerve deficit or sensory deficit. She displays a negative Romberg sign.   Reflex Scores:       Tricep reflexes are 2+ on the right side and 2+ on the left side.       Bicep reflexes are 2+ on the right side.       Brachioradialis reflexes are 2+ on the right side and 2+ on the left side.  Skin: Skin is warm and dry. Capillary refill takes less than 2 seconds. No rash noted.   Vitals reviewed.      Assessment:       1. Cervical radiculopathy    2. Fibromyalgia        Plan:       Sera was seen today for pain.    Diagnoses and all orders for this visit:    Cervical radiculopathy  -     Ambulatory Referral to [facility]    Fibromyalgia    Other orders  -     predniSONE (DELTASONE) 20 MG tablet; Take 1 tablet (20 mg total) by mouth 2 (two) times daily. for 5 days    Continue heating pad  Tylenol instead of nsaids

## 2019-02-26 NOTE — PATIENT INSTRUCTIONS
¿Qué es la radiculopatía cervical?    La radiculopatía cervical es kamran irritación o inflamación de kamran raíz nerviosa en el shivani. Provoca dolor en el shivani y otros síntomas que pueden propagarse al pecho o a lo corky del brazo. Para entender esta afección, ayuda comprender cómo se compone la columna vertebral:   · Vértebras. Son huesos que se apilan unos sobre otros para formar la columna vertebral. La columna cervical contiene las siete vértebras del shivani.  · Discos. Son las almohadillas blandas de tejido entre las vértebras. Actúan ethel amortiguadores para la columna.  · Elcanal [place]. Es un túnel que se forma dentro de las vértebras apiladas. La médula [place] pasa a través de marilee canal.  · Nervios. Se ramifican desde la médula [place]. Al salir del canal [place], los nervios pasan por aberturas entre las vértebras. La raíz nerviosa es la parte de un nervio que está más cerca de la médula [place].  Cuando hay radiculopatía cervical, las raíces nerviosas del shivani están irritadas. [place] produce dolor y síntomas que pueden desplazarse hacia los nervios que van desde la médula [place] hasta los brazos y el torso.  ¿Cuáles son las causas de la radiculopatía cervical?  Envejecer, kamran lesión, sugar postura y otros factores pueden ocasionar problemas en el shivani. Estos problemas pueden luego irritar las raíces nerviosas. Incluyen:  · Daños en un disco en la columna cervical. En lucas daniel, el disco dañado puede presionar sobre las raíces nerviosas cercanas.  · Degeneración a causa del desgaste y el envejecimiento. [place] puede ocasionar un estrechamiento (estenosis) de las aberturas entre las vértebras. Las aberturas que se waer estrechado presionan sobre las raíces nerviosas cuando estas salen del canal [place].  · Kamran columna inestable. [place] sucede cuando kamran vértebra se desliza hacia adelante. Puede hacer presión sobre la raíz nerviosa.  Hay otras cosas, menos comunes, que ejercen presión sobre los nervios del  shivani. Por ejemplo, infección, quistes y tumores.  Síntomas de la radiculopatía cervical  Incluyen:  · Dolor en el shivani  · Dolor, entumecimiento, cosquilleo o debilidad que bajan por el brazo  · Pérdida de movimiento del shivani  · Espasmos musculares  Tratamiento de la radiculopatía cervical  En la mayoría de los casos, hoffman proveedor de atención médica intentará mckenna con tratamientos que ayudan a aliviar los síntomas. Por ejemplo:  · Medicamentos analgésicos (calmantes del dolor) recetados o de venta evert. Ayudan a aliviar el dolor y la hinchazón.  · Compresas frías. Estos métodos ayudan a reducir el dolor.  · Reposo. Implica evitar las posturas y actividades que aumentan el dolor.  · Soporte para el shivani (shivani ortopédico). [place] puede ayudar a aliviar la inflamación y el dolor.  · Fisioterapia, incluyendo ejercicios físicos y estiramientos. Puede ayudar a reducir el dolor y a aumentar el movimiento y el funcionamiento.  · Inyecciones de medicamentosalrededor de las raíces nerviosas. [place] se hace para ayudar a aliviar los síntomas por algún tiempo.  En algunos casos, hoffman proveedor de atención médica puede indicarle kamran cirugía para corregir el problema subyacente. Eso depende de la causa, los síntomas y cuánto hace que tiene el dolor.  Posibles complicaciones  Con el tiempo, un nervio irritado e inflamado puede dañarse. Eso puede ocasionar entumecimiento o debilidad a corky plazo (permanente). Si fabiola síntomas cambian de manera repentina o empeoran, asegúrese de comentárselo a hoffman proveedor de atención médica.  Cuándo llamar a hoffman proveedor de atención médica  Llame a hoffman proveedor de atención médica de inmediato si nota alguno de los siguientes síntomas:  · Dolor nuevo o dolor que empeora  · Debilidad, cosquilleo o entumecimiento nuevos o que empeoran en hoffman brazo o hoffman mano  · Cambios en hoffman vejiga o fabiola intestinos   Date Last Reviewed: 3/10/2016  © 2765-6318 [employer]. [address] [place] [address],  [place] [address]. All rights reserved. This information is not intended as a substitute for professional medical care. Always follow your healthcare professional's instructions.        Fibromialgia  La fibromialgia es kamran afección crónica. Se caracteriza por causar dolor y sensibilidad en los tejidos conectivos. [place] causa dolor muscular. También es usual que se sienta sensibilidad en muchas zonas por todo el cuerpo. Los síntomas también pueden incluir rigidez y sensación de entumecimiento y hormigueo. Los síntomas pueden ser más martin al despertarse. Pueden aumentar si la persona tiene problemas para dormir, hace mucha actividad, si el tiempo está muy frío o húmedo, o en momentos de ansiedad o estrés.  Las personas afectadas de fibromialgia suelen sentirse cansadas. Pueden experimentar dificultades para dormir. Otros síntomas incluyen rigidez por las mañanas, chelly de chris y menstruaciones dolorosas. Algunas personas tienen problemas para pensar claramente y presentan cambios de la memoria.  La causa de la fibromialgia se desconoce. Los síntomas son parecidos a los de otras enfermedades. Por ejemplo, la artritis reumatoide, el hipotiroidismo, el síndrome de fatiga crónica y la enfermedad de Lyme. En algunos casos, estas afecciones se pueden presentar juntas.  La fibromialgia se suele tratar con medicamentos. Usted y hoffman proveedor de atención médica pueden analizar el plan de medicamentos que sea más adecuado para usted. Es posible que usted tenga que probar más de un medicamento o combinación de medicamentos antes de encontrar lo que funcione para usted.  Cuidados en la casa  · Si hoffman proveedor de atención médica le ha recetado medicamentos, tómelos de la forma indicada.  · Descanse según la necesidad y duerma javon de noche. Si tiene dificultades para dormir, hable de marilee problema con hoffman proveedor de atención médica.  · Manténgase activo. El ejercicio regular puede ayudar a manejar los síntomas. Algunas  "opciones incluyen caminar, nadar y andar en bicicleta. Los ejercicios de fortalecimiento también pueden ser útiles. Comience un programa de ejercicios de a poco. Hable con hoffman proveedor de atención médica sobre las mejores maneras de mantenerse activo.  · Iraida kamran dieta saludable. Limite el consumo de cafeína y de alcohol. Si usted fuma, pídale a hoffman proveedor de atención médica que le ayude a dejar el hábito.  · Preste atención a cómo reacciona hoffman cuerpo al estrés. Aprenda a escuchar los avisos de hoffman cuerpo. [place] le ayudará a lora medidas antes de que el estrés se vuelva grave.  · Aprenda técnicas de relajación. También considere participar en un programa o kamran clase de reducción del estrés.  · Hable con hoffman proveedor de atención médica sobre probar tratamientos complementarios. Por ejemplo, acupuntura, hipnosis y biorretroalimentación. El yoga y el taichí pueden ser útiles.  · Pregunte a hoffman proveedor de atención médica sobre la terapia conductual cognitiva ("CBT", por fabiola siglas en inglés). [place] tipo de psicoterapia puede ayudar a las personas con fibromialgia a lidiar mejor con hoffman enfermedad.  Visitas de control  Iraida kamran visita de seguimiento con hoffman proveedor de atención médica o según le indique nuestro personal. En muchos casos, la fibromialgia se trata mejor en equipo. Pueden participar hoffman proveedor de atención primaria, un reumatólogo, un fisioterapeuta y un profesional en maurilio mental.  Para obtener más información, póngase en contacto con: National [place] of Arthritis and Musculoskeletal and Skin Diseases (NIAMS) www.niams.nih.gov 269-350-6477  ¿Cuándo debe buscar atención médica?  Comuníquese con hoffman proveedor de atención médica en cualquiera de los siguientes casos:  · Síntomas de empeoramiento o aparición de síntomas nuevos  · Se siente desesperado, impotente o perdió el interés en la fortino diaria  Date Last Reviewed: 4/26/2015  © 8010-2467 [employer], [employer]. [address] [place] [address], [place] " [address]. Todos los derechos reservados. Esta información no pretende sustituir la atención médica profesional. Sólo hoffman médico puede diagnosticar y tratar un problema de maurilio.

## 2019-03-19 ENCOUNTER — INITIAL CONSULT (OUTPATIENT)
Dept: OPHTHALMOLOGY | Facility: CLINIC | Age: 84
End: 2019-03-19
Payer: MEDICARE

## 2019-03-19 DIAGNOSIS — H26.492 POSTERIOR CAPSULAR OPACIFICATION VISUALLY SIGNIFICANT, LEFT EYE: Primary | ICD-10-CM

## 2019-03-19 PROCEDURE — 92014 PR EYE EXAM, EST PATIENT,COMPREHESV: ICD-10-PCS | Mod: 57,HCNC,S$GLB, | Performed by: OPHTHALMOLOGY

## 2019-03-19 PROCEDURE — 99999 PR PBB SHADOW E&M-EST. PATIENT-LVL II: ICD-10-PCS | Mod: PBBFAC,HCNC,, | Performed by: OPHTHALMOLOGY

## 2019-03-19 PROCEDURE — 66821 AFTER CATARACT LASER SURGERY: CPT | Mod: HCNC,LT,S$GLB, | Performed by: OPHTHALMOLOGY

## 2019-03-19 PROCEDURE — 99999 PR PBB SHADOW E&M-EST. PATIENT-LVL II: CPT | Mod: PBBFAC,HCNC,, | Performed by: OPHTHALMOLOGY

## 2019-03-19 PROCEDURE — 66821 YAG CAPSULOTOMY - OS - LEFT EYE: ICD-10-PCS | Mod: HCNC,LT,S$GLB, | Performed by: OPHTHALMOLOGY

## 2019-03-19 PROCEDURE — 92014 COMPRE OPH EXAM EST PT 1/>: CPT | Mod: 57,HCNC,S$GLB, | Performed by: OPHTHALMOLOGY

## 2019-03-19 RX ORDER — PREDNISOLONE ACETATE 10 MG/ML
1 SUSPENSION/ DROPS OPHTHALMIC 4 TIMES DAILY
Qty: 5 ML | Refills: 0 | Status: SHIPPED | OUTPATIENT
Start: 2019-03-19 | End: 2019-03-23

## 2019-03-19 NOTE — LETTER
March 19, 2019      Landon Myrick, OD  [address]  [place] [address]           [facility]  [address]  [place] [address]  Phone: [phone]  Fax: [phone]          Patient: Sera Boone   MR Number: [id number]   YOB: 1935   Date of Visit: 3/19/2019       Dear Dr. Landon Myrick:    Thank you for referring Sera Boone to me for evaluation. Attached you will find relevant portions of my assessment and plan of care.    If you have questions, please do not hesitate to call me. I look forward to following Sera Boone along with you.    Sincerely,    Shasha Hope MD    Enclosure  CC:  No Recipients    If you would like to receive this communication electronically, please contact [email or web address] or [phone] to request more information on [employer] access.    For providers and/or their staff who would like to refer a patient to [facility], please contact us through our one-stop-shop provider referral line, [facility], at [phone].    If you feel you have received this communication in error or would no longer like to receive these types of communications, please e-mail [email or web address]

## 2019-03-19 NOTE — PROGRESS NOTES
HPI     Referred by Dr Myrick    S/P Phaco IOL OS 9/8/12  S/p phaco IOL OD 12/3/12  PCO OS     ART TEARS OU BID    No vision change  +irritaions +dryness  -no glare noticeable  -no flashes or floaters                  Last edited by Shasha Hope MD on 3/19/2019 10:12 AM. (History)            Assessment /Plan     For exam results, see Encounter Report.    Posterior capsular opacification visually significant, left eye  -     Yag Capsulotomy - OS - Left Eye    Other orders  -     prednisoLONE acetate (PRED FORTE) 1 % DrpS; Place 1 drop into the left eye 4 (four) times daily. 1 drop left eye four times a day for four days then stop. for 4 days  Dispense: 5 mL; Refill: 0            S/P Phaco IOL OS 9/8/12  S/p phaco IOL OD 12/3/12  PCO OS       Plan for yag cap OS next - dilate OS and I can sign consent in yag room.

## 2019-03-26 ENCOUNTER — OFFICE VISIT (OUTPATIENT)
Dept: INTERNAL MEDICINE | Facility: CLINIC | Age: 84
End: 2019-03-26
Payer: MEDICARE

## 2019-03-26 ENCOUNTER — IMMUNIZATION (OUTPATIENT)
Dept: PHARMACY | Facility: CLINIC | Age: 84
End: 2019-03-26
Payer: MEDICARE

## 2019-03-26 VITALS
SYSTOLIC BLOOD PRESSURE: 116 MMHG | WEIGHT: 134.94 LBS | DIASTOLIC BLOOD PRESSURE: 68 MMHG | HEIGHT: 61 IN | OXYGEN SATURATION: 98 % | BODY MASS INDEX: 25.48 KG/M2 | HEART RATE: 65 BPM

## 2019-03-26 DIAGNOSIS — E78.2 MIXED HYPERLIPIDEMIA: ICD-10-CM

## 2019-03-26 DIAGNOSIS — M81.0 OSTEOPOROSIS WITHOUT CURRENT PATHOLOGICAL FRACTURE, UNSPECIFIED OSTEOPOROSIS TYPE: ICD-10-CM

## 2019-03-26 DIAGNOSIS — Z86.010 HISTORY OF ADENOMATOUS POLYP OF COLON: ICD-10-CM

## 2019-03-26 DIAGNOSIS — N18.30 STAGE 3 CHRONIC KIDNEY DISEASE: ICD-10-CM

## 2019-03-26 DIAGNOSIS — R76.8 RHEUMATOID FACTOR POSITIVE: ICD-10-CM

## 2019-03-26 DIAGNOSIS — Z85.3 HISTORY OF BREAST CANCER: ICD-10-CM

## 2019-03-26 DIAGNOSIS — Z00.00 ANNUAL PHYSICAL EXAM: Primary | ICD-10-CM

## 2019-03-26 DIAGNOSIS — E11.49 TYPE II DIABETES MELLITUS WITH NEUROLOGICAL MANIFESTATIONS: ICD-10-CM

## 2019-03-26 DIAGNOSIS — E55.9 VITAMIN D DEFICIENCY DISEASE: ICD-10-CM

## 2019-03-26 DIAGNOSIS — I10 ESSENTIAL HYPERTENSION: ICD-10-CM

## 2019-03-26 PROCEDURE — 99499 RISK ADDL DX/OHS AUDIT: ICD-10-PCS | Mod: HCNC,S$GLB,, | Performed by: INTERNAL MEDICINE

## 2019-03-26 PROCEDURE — 99397 PR PREVENTIVE VISIT,EST,65 & OVER: ICD-10-PCS | Mod: HCNC,S$GLB,, | Performed by: INTERNAL MEDICINE

## 2019-03-26 PROCEDURE — 3074F SYST BP LT 130 MM HG: CPT | Mod: HCNC,CPTII,S$GLB, | Performed by: INTERNAL MEDICINE

## 2019-03-26 PROCEDURE — 3078F DIAST BP <80 MM HG: CPT | Mod: HCNC,CPTII,S$GLB, | Performed by: INTERNAL MEDICINE

## 2019-03-26 PROCEDURE — 99397 PER PM REEVAL EST PAT 65+ YR: CPT | Mod: HCNC,S$GLB,, | Performed by: INTERNAL MEDICINE

## 2019-03-26 PROCEDURE — 3078F PR MOST RECENT DIASTOLIC BLOOD PRESSURE < 80 MM HG: ICD-10-PCS | Mod: HCNC,CPTII,S$GLB, | Performed by: INTERNAL MEDICINE

## 2019-03-26 PROCEDURE — 99999 PR PBB SHADOW E&M-EST. PATIENT-LVL IV: ICD-10-PCS | Mod: PBBFAC,HCNC,, | Performed by: INTERNAL MEDICINE

## 2019-03-26 PROCEDURE — 99999 PR PBB SHADOW E&M-EST. PATIENT-LVL IV: CPT | Mod: PBBFAC,HCNC,, | Performed by: INTERNAL MEDICINE

## 2019-03-26 PROCEDURE — 99499 UNLISTED E&M SERVICE: CPT | Mod: HCNC,S$GLB,, | Performed by: INTERNAL MEDICINE

## 2019-03-26 PROCEDURE — 3074F PR MOST RECENT SYSTOLIC BLOOD PRESSURE < 130 MM HG: ICD-10-PCS | Mod: HCNC,CPTII,S$GLB, | Performed by: INTERNAL MEDICINE

## 2019-03-26 NOTE — PATIENT INSTRUCTIONS
Pautas de prevención, mujeres a partir de los 65 años  Los exámenes de detección y las vacunas son importantes para el manejo de hoffman maurilio. La consejería sobre hoffman maurilio también es esencial. A continuación, verá pautas en relación con esos temas para mujeres a partir de los 65 años de edad. Hable con hoffman proveedor de atención médica para asegurarse de que está al día con todo lo que necesita.  Examen de detección Quiénes Frecuencia   Diabetes tipo 2 o prediabetes Todos los adultos empezando a los 45 años, y los adultos que no tengan síntomas a cualquier edad, que tengan sobrepeso o que harry obesos, y que tengan 1 o más factores de riesgo adicionales de diabetes. Por lo menos cada celia años   Consumo inadecuado de alcohol Todas las mujeres de marilee shawnee de edad Por lo menos cada celia años   Presión arterial Todas las mujeres de marilee shawnee de edad Cada dos años si hoffman presión arterial es inferior a 120/80 mm Hg, kamran vez al año si hoffman presión arterial sistólica es de entre 120 y 139 mm Hg o hoffman presión arterial diastólica es de entre 80 y 89 mm Hg   Cáncer de seno Todas las mujeres de marilee shawnee de edad Un mamograma y un examen clínico de los senos kamran vez al año1   Cáncer del shivani uterino Únicamente las mujeres que tuvieron un resultado anormal en exámenes de detección antes de los 65 años Consulte a hoffman proveedor de atención médica   Clamidia Las mujeres con mayor riesgo de infección En los exámenes de rutina   Cáncer colorrectal Todas las mujeres de marilee shawnee de edad1 Kamran sigmoidoscopia flexible cada sully años o kamran colonoscopia cada 10 años, o un enema con doble contraste de bario cada sully años; un análisis anual de missy oculta en las heces o un análisis inmunoquímico fecal; o un análisis de ADN en heces con la frecuencia que hoffman proveedor de atención médica indique. Hable con hoffman proveedor de atención médica para saber cuáles son los mejores exámenes y análisis en hoffman daniel.   Depresión Todas las mujeres de marilee  shawnee de edad En los exámenes de rutina   Diabetes mellitus, tipo 2  Las mujeres que tienen kamran presión arterial que supera 135/80 mm Hg  Por lo menos cada celia años   Gonorrea Las mujeres sexualmente activas con mayor riesgo de infección En los exámenes de rutina   Hepatitis C Todas las personas con un riesgo más alto; kamran vez para las nacidas entre 1945 y 1965 En los exámenes de rutina   Nivel alto de colesterol o triglicéridos Todas las mujeres de marilee shawnee de edad que tengan riesgo de tener kamran enfermedad de las arterias coronarias Al menos cada sully años   VIH Las mujeres con mayor riesgo de infección; hable con hoffman proveedor de atención médica En los exámenes de rutina   Cáncer de pulmón Adultos entre 55 y 80 años que ware fumado Exámenes anuales de detección para los que tienen antecedentes de fumar 30 paquetes por año o que dejaron de fumar hasta 15 años atrás   Obesidad Todas las mujeres de marilee shawnee de edad En los exámenes de rutina   Osteoporosis Todas las mujeres de marilee shawnee de edad Prueba de densidad ósea a los 65 años; luego seguimiento según la recomendación de hoffman proveedor de atención médica   Sífilis Las mujeres con mayor riesgo de infección; hable con hoffman proveedor de atención médica En los exámenes de rutina   Tuberculosis Las mujeres con mayor riesgo de infección; hable con hoffman proveedor de atención médica Consulte a hoffman proveedor de atención médica   Eros Todas las mujeres de marilee shawnee de edad Cada hiral o dos años; si usted tiene un problema de maurilio crónico, pregunte a hoffman proveedor de atención médica si necesita realizarse exámenes con más frecuencia   Vacuna Quiénes Frecuencia   Varicela Todas las mujeres de marilee shawnee de edad que no tienen registro de lefty tenido esta infección o haberse aplicado esta vacuna Dos dosis. La segunda dosis debería aplicársela al menos cuatro semanas después de la primera dosis   Hepatitis A Las mujeres con mayor riesgo de infección; hable con hoffman proveedor de  atención médica Dos dosis que se aplican al menos con seis meses de distancia   Hepatitis B Las mujeres con mayor riesgo de infección; hable con hoffman proveedor de atención médica [place] dosis a lo corky de seis meses; la segunda dosis debería aplicarse un mes después de la primera dosis; la tercera dosis debería aplicarse al menos dos meses después de la segunda dosis y, al menos, cuatro meses después de la primera dosis   Haemophilus influenzae Tipo B (HIB) Las mujeres con mayor riesgo de infección; hable con hoffman proveedor de atención médica Kamran a chilo dosis   Influenza (gripe) Todas las mujeres de marilee shawnee de edad Kamran vez al año   Vacuna antineumocócica conjugada (PCV13) y antineumocócica de polisacáridos (PPSV23) Todas las mujeres de marilee shawnee de edad Kamran dosis de cada vacuna   Refuerzo de tétanos/difteria/tos ferina (Td/Tdap, por fabiola siglas en inglés) Todas las mujeres de marilee shawnee de edad Td cada scar años o kamran única dosis de Tdap en lugar de un refuerzo de Td después de los 18 años. Luego, Td cada scar años   Herpes zóster Todas las mujeres de marilee shawnee de edad Kamran dosis   Consejería Quiénes Frecuencia   Dieta y actividad física Las mujeres que tienen sobrepeso u obesidad Cuando se diagnostica y, luego, en los exámenes de rutina   Prevención de caídas (ejercicio y suplementos de vitamina D)  Todas las mujeres de marilee shawnee de edad En los exámenes de rutina   Prevención de infecciones de trasmisión sexual Las mujeres con mayor riesgo de infección; hable con hoffman proveedor de atención médica En los exámenes de rutina   Uso diario de aspirina Las mujeres de 55 años o más en marilee shawnee de edad que tengan riesgo de tener problemas cardiovasculares, tales ethel un ataque cerebral Cuando se sepa que usted está en riesgo   Uso del tabaco y los efectos que puede tener sobre la maurilio Todas las mujeres de marilee shawnee de edad En todos los exámenes   1 [facility]  Date Last Reviewed: 8/9/2015  ©  1232-0991 [employer]. [address] [place] [address], [place] [address]. All rights reserved. This information is not intended as a substitute for professional medical care. Always follow your healthcare professional's instructions.

## 2019-03-26 NOTE — PROGRESS NOTES
Subjective:       Patient ID: Sera Boone is a 83 y.o. female.    Chief Complaint: Annual Exam    Annual exam    Overall stable.  Minimal arthralgias, no fever, chills, sweats or morning stiffness.  Better, no real back pain.  Denies weight loss.  Only some R wrist pain rarely.    Diabetes, labs acceptable.  No polydipsia, polyuria or unexplained weight loss.    Ultrasound showed stable fatty liver.  She is status post cholecystectomy.  Reviewed labs, bilirubin acceptable.    Seen last year with some arthralgias and positive rheumatoid factor but not thought to have inflammatory arthritis.    She will be due for mammogram soon.    Patient Active Problem List:     Mixed hyperlipidemia     Type II diabetes mellitus with neurological manifestations     Ptosis of eyebrow     History of breast cancer     Vitamin D deficiency disease     Essential hypertension     History of adenomatous polyp of colon 1/16 no need for further follow up per Dr Guerra     Diabetic polyneuropathy associated with type 2 diabetes mellitus     Fatty liver: see ultrasound 1/17     Elevated liver enzymes     Rheumatoid factor positive     Stage 3 chronic kidney disease     Hydronephrosis     Spondylosis of cervical region without myelopathy or radiculopathy     Primary osteoarthritis involving multiple joints     Osteoporosis without current pathological fracture     DDD (degenerative disc disease), cervical      Review of Systems   Constitutional: Negative for activity change, appetite change, chills, fatigue and fever.   HENT: Negative for congestion, hearing loss, sinus pressure and sore throat.    Eyes: Negative for visual disturbance.   Respiratory: Negative for apnea, cough, shortness of breath and wheezing.    Cardiovascular: Negative for chest pain, palpitations and leg swelling.   Gastrointestinal: Negative for abdominal distention, abdominal pain, constipation, diarrhea, nausea and vomiting.   Genitourinary: Negative for dysuria,  frequency, hematuria and vaginal bleeding.   Musculoskeletal: Negative for gait problem, joint swelling and myalgias.   Skin: Negative for rash.   Neurological: Negative for dizziness, weakness, light-headedness and headaches.   Hematological: Negative for adenopathy. Does not bruise/bleed easily.   Psychiatric/Behavioral: Negative for confusion, hallucinations, sleep disturbance and suicidal ideas.       Objective:      Physical Exam   Constitutional: She is oriented to person, place, and time. She appears well-developed and well-nourished.   HENT:   Head: Normocephalic and atraumatic.   Right Ear: External ear normal.   Left Ear: External ear normal.   Nose: Nose normal.   Mouth/Throat: Oropharynx is clear and moist. No oropharyngeal exudate.   Eyes: Conjunctivae and EOM are normal. No scleral icterus.   Neck: Normal range of motion. Neck supple. No JVD present. No thyromegaly present.   Cardiovascular: Normal rate, regular rhythm, normal heart sounds and intact distal pulses. Exam reveals no gallop.   No murmur heard.  Pulses:       Dorsalis pedis pulses are 2+ on the right side, and 2+ on the left side.        Posterior tibial pulses are 2+ on the right side, and 2+ on the left side.   Pulmonary/Chest: Effort normal and breath sounds normal. No respiratory distress. She has no wheezes.   Abdominal: Soft. Bowel sounds are normal. She exhibits no distension and no mass. There is no tenderness. There is no rebound and no guarding.   Musculoskeletal: Normal range of motion. She exhibits no edema or tenderness.        Right foot: There is normal range of motion and no deformity.        Left foot: There is normal range of motion and no deformity.   Feet:   Right Foot:   Protective Sensation: 6 sites tested. 6 sites sensed.   Skin Integrity: Negative for ulcer, blister, skin breakdown, erythema or warmth.   Left Foot:   Protective Sensation: 6 sites tested. 6 sites sensed.   Skin Integrity: Negative for ulcer, blister,  skin breakdown, erythema or warmth.   Lymphadenopathy:     She has no cervical adenopathy.   Neurological: She is alert and oriented to person, place, and time. She displays normal reflexes. No cranial nerve deficit. Coordination normal.   Skin: Skin is warm. No rash noted. No erythema.   Psychiatric: She has a normal mood and affect. Her behavior is normal. Judgment and thought content normal.   Nursing note and vitals reviewed.      Assessment:       1. Annual physical exam    2. Essential hypertension    3. Mixed hyperlipidemia    4. Stage 3 chronic kidney disease    5. Rheumatoid factor positive    6. History of breast cancer    7. Type II diabetes mellitus with neurological manifestations    8. Vitamin D deficiency disease    9. History of adenomatous polyp of colon 1/16 no need for further follow up per Dr Guerra    10. Osteoporosis without current pathological fracture, unspecified osteoporosis type        Plan:         Sera was seen today for annual exam.    Diagnoses and all orders for this visit:    Annual physical exam    Essential hypertension:  Continue regimen    Mixed hyperlipidemia:  Continue regimen    Stage 3 chronic kidney disease:  Stable to improved, hydration.  Avoid nephrotoxin    Rheumatoid factor positive:  No current issues, keep Rheumatology follow-up    History of breast cancer  -     Mammo Digital Diagnostic Bilat    Type II diabetes mellitus with neurological manifestations:  Continue regimen  -     Hemoglobin A1c; Future  -     Comprehensive metabolic panel; Future  -     Ambulatory referral to Podiatry    Vitamin D deficiency disease:  Continue regimen    History of adenomatous polyp of colon 1/16 no need for further follow up per Dr Guerra    Osteoporosis without current pathological fracture, unspecified osteoporosis type  -     DXA Bone Density Spine And Hip; Future    Tdap today  I will review all studies and determine further tx depending on findings

## 2019-04-04 ENCOUNTER — HOSPITAL ENCOUNTER (OUTPATIENT)
Dept: RADIOLOGY | Facility: CLINIC | Age: 84
Discharge: HOME | End: 2019-04-04
Attending: INTERNAL MEDICINE
Payer: MEDICARE

## 2019-04-04 ENCOUNTER — OFFICE VISIT (OUTPATIENT)
Dept: PODIATRY | Facility: CLINIC | Age: 84
End: 2019-04-04
Payer: MEDICARE

## 2019-04-04 VITALS
WEIGHT: 134 LBS | HEART RATE: 63 BPM | DIASTOLIC BLOOD PRESSURE: 71 MMHG | HEIGHT: 61 IN | SYSTOLIC BLOOD PRESSURE: 123 MMHG | BODY MASS INDEX: 25.3 KG/M2

## 2019-04-04 DIAGNOSIS — E11.49 TYPE II DIABETES MELLITUS WITH NEUROLOGICAL MANIFESTATIONS: Primary | ICD-10-CM

## 2019-04-04 DIAGNOSIS — M81.0 OSTEOPOROSIS WITHOUT CURRENT PATHOLOGICAL FRACTURE, UNSPECIFIED OSTEOPOROSIS TYPE: ICD-10-CM

## 2019-04-04 DIAGNOSIS — B35.1 ONYCHOMYCOSIS DUE TO DERMATOPHYTE: ICD-10-CM

## 2019-04-04 PROCEDURE — 77080 DEXA BONE DENSITY SPINE HIP: ICD-10-PCS | Mod: 26,HCNC,, | Performed by: INTERNAL MEDICINE

## 2019-04-04 PROCEDURE — 99203 OFFICE O/P NEW LOW 30 MIN: CPT | Mod: HCNC,S$GLB,, | Performed by: PODIATRIST

## 2019-04-04 PROCEDURE — 99203 PR OFFICE/OUTPT VISIT, NEW, LEVL III, 30-44 MIN: ICD-10-PCS | Mod: HCNC,S$GLB,, | Performed by: PODIATRIST

## 2019-04-04 PROCEDURE — 1101F PT FALLS ASSESS-DOCD LE1/YR: CPT | Mod: HCNC,CPTII,S$GLB, | Performed by: PODIATRIST

## 2019-04-04 PROCEDURE — 3074F SYST BP LT 130 MM HG: CPT | Mod: HCNC,CPTII,S$GLB, | Performed by: PODIATRIST

## 2019-04-04 PROCEDURE — 77080 DXA BONE DENSITY AXIAL: CPT | Mod: 26,HCNC,, | Performed by: INTERNAL MEDICINE

## 2019-04-04 PROCEDURE — 99999 PR PBB SHADOW E&M-EST. PATIENT-LVL III: CPT | Mod: PBBFAC,HCNC,, | Performed by: PODIATRIST

## 2019-04-04 PROCEDURE — 99999 PR PBB SHADOW E&M-EST. PATIENT-LVL III: ICD-10-PCS | Mod: PBBFAC,HCNC,, | Performed by: PODIATRIST

## 2019-04-04 PROCEDURE — 77080 DXA BONE DENSITY AXIAL: CPT | Mod: TC,HCNC

## 2019-04-04 PROCEDURE — 1101F PR PT FALLS ASSESS DOC 0-1 FALLS W/OUT INJ PAST YR: ICD-10-PCS | Mod: HCNC,CPTII,S$GLB, | Performed by: PODIATRIST

## 2019-04-04 PROCEDURE — 3074F PR MOST RECENT SYSTOLIC BLOOD PRESSURE < 130 MM HG: ICD-10-PCS | Mod: HCNC,CPTII,S$GLB, | Performed by: PODIATRIST

## 2019-04-04 PROCEDURE — 3078F DIAST BP <80 MM HG: CPT | Mod: HCNC,CPTII,S$GLB, | Performed by: PODIATRIST

## 2019-04-04 PROCEDURE — 3078F PR MOST RECENT DIASTOLIC BLOOD PRESSURE < 80 MM HG: ICD-10-PCS | Mod: HCNC,CPTII,S$GLB, | Performed by: PODIATRIST

## 2019-04-04 RX ORDER — CICLOPIROX 80 MG/ML
SOLUTION TOPICAL NIGHTLY
Qty: 6.6 ML | Refills: 11 | Status: SHIPPED | OUTPATIENT
Start: 2019-04-04 | End: 2021-08-11

## 2019-04-04 NOTE — LETTER
April 4, 2019      Joycelyn Vásquez MD  [address]  [facility] [address]           [facility] - Podiatry  [address]  [facility] [address]  Phone: [phone]          Patient: Sera Boone   MR Number: [id number]   YOB: 1935   Date of Visit: 4/4/2019       Dear Dr. Joycelyn Vásquez:    Thank you for referring Sera Boone to me for evaluation. Attached you will find relevant portions of my assessment and plan of care.    If you have questions, please do not hesitate to call me. I look forward to following Sera Boone along with you.    Sincerely,    Aneudy Zamora, DPCHARLEE    Enclosure  CC:  No Recipients    If you would like to receive this communication electronically, please contact [email or web address] or [phone] to request more information on [facility] Link access.    For providers and/or their staff who would like to refer a patient to [facility], please contact us through our one-stop-shop provider referral line, [facility] , at [phone].    If you feel you have received this communication in error or would no longer like to receive these types of communications, please e-mail [email or web address]

## 2019-04-04 NOTE — PROGRESS NOTES
Subjective:      Patient ID: Sera Boone is a 83 y.o. female.    Chief Complaint: Diabetic Foot Exam    Diabetes, increased risk amputation needing evaluation/management/optomization of foot care.    Denies current symptom.  Casual shoes daily.    Review of Systems   Constitution: Negative for chills, diaphoresis, fever, malaise/fatigue and night sweats.   Cardiovascular: Negative for claudication, cyanosis, leg swelling and syncope.   Skin: Positive for nail changes. Negative for color change, dry skin, rash, suspicious lesions and unusual hair distribution.   Musculoskeletal: Negative for falls, joint pain, joint swelling, muscle cramps, muscle weakness and stiffness.   Gastrointestinal: Negative for constipation, diarrhea, nausea and vomiting.   Neurological: Positive for numbness and sensory change. Negative for brief paralysis, disturbances in coordination, focal weakness, paresthesias and tremors.           Objective:      Physical Exam   Constitutional: She is oriented to person, place, and time. She appears well-developed and well-nourished. She is cooperative. No distress.   Cardiovascular:   Pulses:       Popliteal pulses are 2+ on the right side, and 2+ on the left side.        Dorsalis pedis pulses are 1+ on the right side, and 1+ on the left side.        Posterior tibial pulses are 1+ on the right side, and 1+ on the left side.   Capillary refill 3 seconds all toes/distal feet, all toes/both feet warm to touch.      Negative lymphadenopathy bilateral popliteal fossa and tarsal tunnel.      Negavie lower extremity edema bilateral.     Musculoskeletal:        Right ankle: She exhibits normal range of motion, no swelling, no ecchymosis, no deformity, no laceration and normal pulse. Achilles tendon normal. Achilles tendon exhibits no pain, no defect and normal Carvajal's test results.   Normal angle, base, station of gait. All ten toes without clubbing, cyanosis, or signs of ischemia.  No pain to  palpation bilateral lower extremities.  Range of motion, stability, muscle strength, and muscle tone normal bilateral feet and legs.    Lymphadenopathy: No inguinal adenopathy noted on the right or left side.   Negative lymphadenopathy bilateral popliteal fossa and tarsal tunnel.    Negative lymphangitic streaking bilateral feet/ankles/legs.   Neurological: She is alert and oriented to person, place, and time. She has normal strength. She displays no atrophy and no tremor. A sensory deficit is present. She exhibits normal muscle tone. Gait normal.   Reflex Scores:       Patellar reflexes are 2+ on the right side and 2+ on the left side.       Achilles reflexes are 2+ on the right side and 2+ on the left side.  Decreased/absent vibratory sensation bilateral feet to 128Hz tuning fork.       Skin: Skin is warm, dry and intact. Capillary refill takes 2 to 3 seconds. No abrasion, no bruising, no burn, no ecchymosis, no laceration, no lesion and no rash noted. She is not diaphoretic. No cyanosis or erythema. No pallor. Nails show no clubbing.   Skin is normal age and health appropriate color, turgor, texture, and temperature bilateral lower extremities without ulceration, hyperpigmentation, discoloration, masses nodules or cords palpated.  No ecchymosis, erythema, edema, or cardinal signs of infection bilateral lower extremities.    Toenails 2nd, right is  hypertrophic thickened 2-3 mm, dystrophic, discolored tanish brown with tan, gray crumbly subungual debris.  Not tender to distal nail plate pressure, without periungual skin abnormality of each.     Psychiatric: She has a normal mood and affect.             Assessment:       Encounter Diagnoses   Name Primary?    Type II diabetes mellitus with neurological manifestations Yes    Onychomycosis due to dermatophyte          Plan:       Sera was seen today for diabetic foot exam.    Diagnoses and all orders for this visit:    Type II diabetes mellitus with neurological  manifestations    Onychomycosis due to dermatophyte    Other orders  -     ciclopirox (PENLAC) 8 % Soln; Apply topically nightly.      I counseled the patient on her conditions, their implications and medical management.    - Shoe inspection. Diabetic Foot Education. Patient reminded of the importance of good nutrition and blood sugar control to help prevent podiatric complications of diabetes. Patient instructed on proper foot hygeine. We discussed wearing proper shoe gear, daily foot inspections, never walking without protective shoe gear, never putting sharp instruments to feet, routine podiatric visits at least annually.      Discussed conservative treatment with shoes of adequate dimensions, material, and style to alleviate symptoms and delay or prevent surgical intervention.    Rx penlac.        Follow up in about 1 year (around 4/4/2020).

## 2019-04-06 ENCOUNTER — HOSPITAL ENCOUNTER (OUTPATIENT)
Facility: HOSPITAL | Age: 84
Discharge: HOME | End: 2019-04-07
Attending: EMERGENCY MEDICINE | Admitting: HOSPITALIST
Payer: MEDICARE

## 2019-04-06 DIAGNOSIS — R20.0: Primary | ICD-10-CM

## 2019-04-06 DIAGNOSIS — I63.9 STROKE: ICD-10-CM

## 2019-04-06 PROBLEM — R42 DIZZINESS: Status: ACTIVE | Noted: 2019-04-06

## 2019-04-06 PROBLEM — H92.02 LEFT EAR PAIN: Status: ACTIVE | Noted: 2019-04-06

## 2019-04-06 PROBLEM — I77.9 BILATERAL CAROTID ARTERY DISEASE: Status: ACTIVE | Noted: 2019-04-06

## 2019-04-06 PROBLEM — R00.1 BRADYCARDIA: Status: ACTIVE | Noted: 2019-04-06

## 2019-04-06 LAB
ALBUMIN SERPL BCP-MCNC: 3.9 G/DL (ref 3.5–5.2)
ALP SERPL-CCNC: 49 U/L (ref 55–135)
ALT SERPL W/O P-5'-P-CCNC: 18 U/L (ref 10–44)
ANION GAP SERPL CALC-SCNC: 8 MMOL/L (ref 8–16)
AST SERPL-CCNC: 21 U/L (ref 10–40)
BASOPHILS # BLD AUTO: 0 K/UL (ref 0–0.2)
BASOPHILS NFR BLD: 0.5 % (ref 0–1.9)
BILIRUB SERPL-MCNC: 0.6 MG/DL (ref 0.1–1)
BUN SERPL-MCNC: 19 MG/DL (ref 8–23)
CALCIUM SERPL-MCNC: 10.7 MG/DL (ref 8.7–10.5)
CHLORIDE SERPL-SCNC: 106 MMOL/L (ref 95–110)
CHOLEST SERPL-MCNC: 171 MG/DL (ref 120–199)
CHOLEST/HDLC SERPL: 2.9 {RATIO} (ref 2–5)
CO2 SERPL-SCNC: 28 MMOL/L (ref 23–29)
CREAT SERPL-MCNC: 0.9 MG/DL (ref 0.5–1.4)
DIFFERENTIAL METHOD: ABNORMAL
EOSINOPHIL # BLD AUTO: 0.3 K/UL (ref 0–0.5)
EOSINOPHIL NFR BLD: 4.3 % (ref 0–8)
ERYTHROCYTE [DISTWIDTH] IN BLOOD BY AUTOMATED COUNT: 13.8 % (ref 11.5–14.5)
EST. GFR  (AFRICAN AMERICAN): >60 ML/MIN/1.73 M^2
EST. GFR  (NON AFRICAN AMERICAN): 59 ML/MIN/1.73 M^2
ESTIMATED AVG GLUCOSE: 140 MG/DL (ref 68–131)
GLUCOSE SERPL-MCNC: 114 MG/DL (ref 70–110)
HBA1C MFR BLD HPLC: 6.5 % (ref 4–5.6)
HCT VFR BLD AUTO: 37.6 % (ref 37–48.5)
HDLC SERPL-MCNC: 58 MG/DL (ref 40–75)
HDLC SERPL: 33.9 % (ref 20–50)
HGB BLD-MCNC: 12.3 G/DL (ref 12–16)
INR PPP: 1 (ref 0.8–1.2)
LDLC SERPL CALC-MCNC: 96.8 MG/DL (ref 63–159)
LYMPHOCYTES # BLD AUTO: 2.6 K/UL (ref 1–4.8)
LYMPHOCYTES NFR BLD: 45.6 % (ref 18–48)
MCH RBC QN AUTO: 29.7 PG (ref 27–31)
MCHC RBC AUTO-ENTMCNC: 32.8 G/DL (ref 32–36)
MCV RBC AUTO: 91 FL (ref 82–98)
MONOCYTES # BLD AUTO: 0.5 K/UL (ref 0.3–1)
MONOCYTES NFR BLD: 8.1 % (ref 4–15)
NEUTROPHILS # BLD AUTO: 2.4 K/UL (ref 1.8–7.7)
NEUTROPHILS NFR BLD: 41.5 % (ref 38–73)
NONHDLC SERPL-MCNC: 113 MG/DL
PLATELET # BLD AUTO: 217 K/UL (ref 150–350)
PMV BLD AUTO: 7.8 FL (ref 9.2–12.9)
POCT GLUCOSE: 220 MG/DL (ref 70–110)
POCT GLUCOSE: 98 MG/DL (ref 70–110)
POTASSIUM SERPL-SCNC: 4.4 MMOL/L (ref 3.5–5.1)
PROT SERPL-MCNC: 7.6 G/DL (ref 6–8.4)
PROTHROMBIN TIME: 10.3 SEC (ref 9–12.5)
RBC # BLD AUTO: 4.14 M/UL (ref 4–5.4)
SODIUM SERPL-SCNC: 142 MMOL/L (ref 136–145)
TRIGL SERPL-MCNC: 81 MG/DL (ref 30–150)
TROPONIN I SERPL DL<=0.01 NG/ML-MCNC: 0.01 NG/ML (ref 0–0.03)
TROPONIN I SERPL DL<=0.01 NG/ML-MCNC: <0.006 NG/ML (ref 0–0.03)
TSH SERPL DL<=0.005 MIU/L-ACNC: 1.55 UIU/ML (ref 0.4–4)
WBC # BLD AUTO: 5.8 K/UL (ref 3.9–12.7)

## 2019-04-06 PROCEDURE — G8997 SWALLOW GOAL STATUS: HCPCS | Mod: CH,HCNC

## 2019-04-06 PROCEDURE — 99285 EMERGENCY DEPT VISIT HI MDM: CPT | Mod: 25,HCNC

## 2019-04-06 PROCEDURE — 27000221 HC OXYGEN, UP TO 24 HOURS: Mod: HCNC

## 2019-04-06 PROCEDURE — 36415 COLL VENOUS BLD VENIPUNCTURE: CPT | Mod: HCNC

## 2019-04-06 PROCEDURE — 63600175 PHARM REV CODE 636 W HCPCS: Mod: HCNC | Performed by: NURSE PRACTITIONER

## 2019-04-06 PROCEDURE — 85610 PROTHROMBIN TIME: CPT | Mod: HCNC

## 2019-04-06 PROCEDURE — 83036 HEMOGLOBIN GLYCOSYLATED A1C: CPT | Mod: HCNC

## 2019-04-06 PROCEDURE — 94760 N-INVAS EAR/PLS OXIMETRY 1: CPT | Mod: HCNC

## 2019-04-06 PROCEDURE — G8998 SWALLOW D/C STATUS: HCPCS | Mod: CH,HCNC

## 2019-04-06 PROCEDURE — 99900035 HC TECH TIME PER 15 MIN (STAT): Mod: HCNC

## 2019-04-06 PROCEDURE — 84443 ASSAY THYROID STIM HORMONE: CPT | Mod: HCNC

## 2019-04-06 PROCEDURE — 92610 EVALUATE SWALLOWING FUNCTION: CPT | Mod: HCNC

## 2019-04-06 PROCEDURE — 85025 COMPLETE CBC W/AUTO DIFF WBC: CPT | Mod: HCNC

## 2019-04-06 PROCEDURE — 99900039 HC SLP GENERIC THERAPY SCREENING (STAT): Mod: HCNC

## 2019-04-06 PROCEDURE — 84484 ASSAY OF TROPONIN QUANT: CPT | Mod: 91,HCNC

## 2019-04-06 PROCEDURE — 80061 LIPID PANEL: CPT | Mod: HCNC

## 2019-04-06 PROCEDURE — 96360 HYDRATION IV INFUSION INIT: CPT

## 2019-04-06 PROCEDURE — 96372 THER/PROPH/DIAG INJ SC/IM: CPT

## 2019-04-06 PROCEDURE — G0378 HOSPITAL OBSERVATION PER HR: HCPCS | Mod: HCNC

## 2019-04-06 PROCEDURE — 80053 COMPREHEN METABOLIC PANEL: CPT | Mod: HCNC

## 2019-04-06 PROCEDURE — 25000003 PHARM REV CODE 250: Mod: HCNC | Performed by: NURSE PRACTITIONER

## 2019-04-06 PROCEDURE — 82962 GLUCOSE BLOOD TEST: CPT | Mod: HCNC

## 2019-04-06 PROCEDURE — G8996 SWALLOW CURRENT STATUS: HCPCS | Mod: CH,HCNC

## 2019-04-06 PROCEDURE — 25000003 PHARM REV CODE 250: Mod: HCNC | Performed by: EMERGENCY MEDICINE

## 2019-04-06 PROCEDURE — 96361 HYDRATE IV INFUSION ADD-ON: CPT

## 2019-04-06 PROCEDURE — 93005 ELECTROCARDIOGRAM TRACING: CPT | Mod: HCNC

## 2019-04-06 RX ORDER — POLYETHYLENE GLYCOL 3350 17 G/17G
17 POWDER, FOR SOLUTION ORAL 2 TIMES DAILY PRN
Status: DISCONTINUED | OUTPATIENT
Start: 2019-04-06 | End: 2019-04-07 | Stop reason: HOSPADM

## 2019-04-06 RX ORDER — ACETAMINOPHEN 325 MG/1
650 TABLET ORAL EVERY 4 HOURS PRN
Status: DISCONTINUED | OUTPATIENT
Start: 2019-04-06 | End: 2019-04-07 | Stop reason: HOSPADM

## 2019-04-06 RX ORDER — IBUPROFEN 200 MG
16 TABLET ORAL
Status: DISCONTINUED | OUTPATIENT
Start: 2019-04-06 | End: 2019-04-07 | Stop reason: HOSPADM

## 2019-04-06 RX ORDER — METFORMIN HYDROCHLORIDE 500 MG/1
500 TABLET ORAL 2 TIMES DAILY WITH MEALS
Status: DISCONTINUED | OUTPATIENT
Start: 2019-04-06 | End: 2019-04-07 | Stop reason: HOSPADM

## 2019-04-06 RX ORDER — ENOXAPARIN SODIUM 100 MG/ML
40 INJECTION SUBCUTANEOUS EVERY 24 HOURS
Status: DISCONTINUED | OUTPATIENT
Start: 2019-04-06 | End: 2019-04-07 | Stop reason: HOSPADM

## 2019-04-06 RX ORDER — IPRATROPIUM BROMIDE AND ALBUTEROL SULFATE 2.5; .5 MG/3ML; MG/3ML
3 SOLUTION RESPIRATORY (INHALATION) EVERY 4 HOURS PRN
Status: DISCONTINUED | OUTPATIENT
Start: 2019-04-06 | End: 2019-04-07 | Stop reason: HOSPADM

## 2019-04-06 RX ORDER — GLUCOSAM/CHONDRO/HERB 149/HYAL 750-100 MG
1 TABLET ORAL DAILY
Status: DISCONTINUED | OUTPATIENT
Start: 2019-04-07 | End: 2019-04-07 | Stop reason: HOSPADM

## 2019-04-06 RX ORDER — SODIUM CHLORIDE 9 MG/ML
INJECTION, SOLUTION INTRAVENOUS CONTINUOUS
Status: DISCONTINUED | OUTPATIENT
Start: 2019-04-06 | End: 2019-04-07 | Stop reason: HOSPADM

## 2019-04-06 RX ORDER — ONDANSETRON 2 MG/ML
4 INJECTION INTRAMUSCULAR; INTRAVENOUS EVERY 4 HOURS PRN
Status: DISCONTINUED | OUTPATIENT
Start: 2019-04-06 | End: 2019-04-07 | Stop reason: HOSPADM

## 2019-04-06 RX ORDER — LOSARTAN POTASSIUM 25 MG/1
50 TABLET ORAL DAILY
Status: DISCONTINUED | OUTPATIENT
Start: 2019-04-06 | End: 2019-04-07 | Stop reason: HOSPADM

## 2019-04-06 RX ORDER — ASPIRIN 325 MG
325 TABLET ORAL
Status: COMPLETED | OUTPATIENT
Start: 2019-04-06 | End: 2019-04-06

## 2019-04-06 RX ORDER — GLUCAGON 1 MG
1 KIT INJECTION
Status: DISCONTINUED | OUTPATIENT
Start: 2019-04-06 | End: 2019-04-07 | Stop reason: HOSPADM

## 2019-04-06 RX ORDER — SODIUM CHLORIDE 0.9 % (FLUSH) 0.9 %
10 SYRINGE (ML) INJECTION
Status: DISCONTINUED | OUTPATIENT
Start: 2019-04-06 | End: 2019-04-07 | Stop reason: HOSPADM

## 2019-04-06 RX ORDER — IPRATROPIUM BROMIDE AND ALBUTEROL SULFATE 2.5; .5 MG/3ML; MG/3ML
3 SOLUTION RESPIRATORY (INHALATION) EVERY 4 HOURS PRN
Status: DISCONTINUED | OUTPATIENT
Start: 2019-04-06 | End: 2019-04-06

## 2019-04-06 RX ORDER — INSULIN ASPART 100 [IU]/ML
0-5 INJECTION, SOLUTION INTRAVENOUS; SUBCUTANEOUS
Status: DISCONTINUED | OUTPATIENT
Start: 2019-04-06 | End: 2019-04-07 | Stop reason: HOSPADM

## 2019-04-06 RX ORDER — ASPIRIN 325 MG
325 TABLET, DELAYED RELEASE (ENTERIC COATED) ORAL DAILY
Status: DISCONTINUED | OUTPATIENT
Start: 2019-04-07 | End: 2019-04-07 | Stop reason: HOSPADM

## 2019-04-06 RX ORDER — ATORVASTATIN CALCIUM 40 MG/1
40 TABLET, FILM COATED ORAL DAILY
Status: DISCONTINUED | OUTPATIENT
Start: 2019-04-07 | End: 2019-04-07 | Stop reason: HOSPADM

## 2019-04-06 RX ORDER — CHOLECALCIFEROL (VITAMIN D3) 25 MCG
1000 TABLET ORAL DAILY
Status: DISCONTINUED | OUTPATIENT
Start: 2019-04-07 | End: 2019-04-07 | Stop reason: HOSPADM

## 2019-04-06 RX ORDER — IBUPROFEN 200 MG
24 TABLET ORAL
Status: DISCONTINUED | OUTPATIENT
Start: 2019-04-06 | End: 2019-04-07 | Stop reason: HOSPADM

## 2019-04-06 RX ADMIN — ENOXAPARIN SODIUM 40 MG: 100 INJECTION SUBCUTANEOUS at 06:04

## 2019-04-06 RX ADMIN — INSULIN ASPART 1 UNITS: 100 INJECTION, SOLUTION INTRAVENOUS; SUBCUTANEOUS at 09:04

## 2019-04-06 RX ADMIN — METFORMIN HYDROCHLORIDE 500 MG: 500 TABLET, FILM COATED ORAL at 06:04

## 2019-04-06 RX ADMIN — ASPIRIN 325 MG ORAL TABLET 325 MG: 325 PILL ORAL at 12:04

## 2019-04-06 RX ADMIN — SODIUM CHLORIDE: 0.9 INJECTION, SOLUTION INTRAVENOUS at 06:04

## 2019-04-06 RX ADMIN — LOSARTAN POTASSIUM 50 MG: 25 TABLET, FILM COATED ORAL at 06:04

## 2019-04-06 NOTE — ED NOTES
"Attempted report to 2nd Floor--"not yet assigned; RN to call back". Family x 2 @ bedside. Stable. No change  "

## 2019-04-06 NOTE — ASSESSMENT & PLAN NOTE
Diabetic diet   Accu-Cheks with correctional sliding scale insulin  Continue home metformin for now

## 2019-04-06 NOTE — PT/OT/SLP EVAL
Speech Language Pathology Evaluation  Cognitive Communication    Patient Name:  Sera Boone   MRN:  [id number]  Admitting Diagnosis: CVA (cerebral vascular accident)    Recommendations:     Recommendations:                General Recommendations:  Cognitive-linguistic therapy and Cognitive-linguistic evaluation  Diet recommendations:  Regular, Thin   Aspiration Precautions: Standard aspiration precautions   General Precautions: Standard, fall  Communication strategies:  Romanian     History:     Past Medical History:   Diagnosis Date    Arthritis     Breast cancer 2001    Diabetes mellitus     History of breast cancer 8/21/2013    Hyperlipidemia     Hypertension     Nuclear sclerotic cataract of right eye 10/16/2012    Osteoporosis, unspecified: see DEXA 8/15- 2/13/2017    Osteoporosis, unspecified: see DEXA 8/15- 2/13/2017    Pain of both shoulder joints 2/14/2017    Rheumatoid factor positive 2/14/2017    Stroke 4/6/2019    Tubular adenoma of colon 10/28/2013    Type II or unspecified type diabetes mellitus with neurological manifestations, not stated as uncontrolled(250.60)     Vitamin D deficiency disease 2/17/2014       Past Surgical History:   Procedure Laterality Date    BELPHAROPTOSIS REPAIR  03/15/13    bilateral-dr. coleman md    BLEPHAROPLASTY Bilateral 3/15/2013    [facility]    BREAST LUMPECTOMY      BREAST SURGERY Left 2001    lumpectomy    CATARACT EXTRACTION      both eyes -     CHOLECYSTECTOMY      Done in [place] in the 1980's    COLONOSCOPY N/A 1/19/2016    Performed by BLANCA Guerra [facility] at [facility] ENDO (4TH FLR)    COLONOSCOPY W/ POLYPECTOMY      INJECTION-STEROID-EPIDURAL-CERVICAL N/A 7/24/2017    [facility]    INSERTION, IOL PROSTHESIS Right 12/3/2012    Performed [facility] at [facility] 1ST FLR    PHACOEMULSIFICATION, CATARACT Right 12/3/2012     [facility]    REPAIR, BLEPHAROPTOSIS Bilateral 3/15/2013    Performed by [facility] at [facility] 1ST FLR       Social History: Patient lives with son    Modified Barium Swallow: no prior dysphagia    Chest X-Rays: no acute abnormality    MRI Brain - Mild brain atrophy, primarily frontal and anterior parietal lobes.  Otherwise negative MRI of the brain.    Prior diet: regular textures &liquids.    Occupation/hobbies/homemaking: indep.      Subjective     Denied speech or swallowing problems  Patient goals: home       Objective:   Pt wanted to use daughter & son to translate for her.  Pt appeared to have some use of English.  Stated she was an .     Cognitive Status:  Alert, Attentive, Cooperative.   Oriented x4, mild immediate memory deficits, severe delayed memory deficits, intact long term memory     Receptive Language/Comprehension: Intact for conversation & instructions with breakdown at moderately complex level    Pragmatics:  Intact    Expressive Language: intact for conversation, wants & needs      Motor Speech: No apparent dysarthria    Voice: WNL    Bedside Swallowing Evaluation  Consistencies assessed:  Thin Liquids via cup & straw, & 3 oz water challenge via straw  Puree via spoon  Semi Solids: peaches in thin juice  Solids: Cookie    Oral stage:  WNL    Pharyngeal Stage:  No s/s oropharyngeal dysphagia, no s/s aspiration    Compensatory strategies: None    Treatment: Education to pt & family re impressions & recommendations.    Assessment:   Sera Boone is a 83 y.o. female with an SLP diagnosis of Cognitive-Linguistic Impairment.  She presented with no s/s oropharyngeal dysphagia.  Recommend regular textures & liquids.  Recommend cognitive-linguistic tx as outpatient or home health (will require  or bilingual therapist).     Goals:   Multidisciplinary Problems     SLP Goals     Not on file                Plan:   · Plan of  Care reviewed with:    pt & family  · SLP Follow-Up:  Yes       Discharge recommendations:  Discharge Facility/Level of Care Needs: home health speech therapy, outpatient speech therapy   Barriers to Discharge:  None    Time Tracking:   SLP Treatment Date:   04/06/19  Speech Start Time:  1544  Speech Stop Time:  1620     Speech Total Time (min):  36 min    Billable Minutes: Eval Swallow and Oral Function 36    Usha Dent CCC-SLP/A  04/06/2019

## 2019-04-06 NOTE — ED NOTES
"Report called to TASNEEM Mota (2nd Floor) re: pending admit to Tele. "Room not yet clean--will call when ready to receive patient".  "

## 2019-04-06 NOTE — PLAN OF CARE
04/06/19 1606   Patient Assessment/Suction   Level of Consciousness (AVPU) alert   PRE-TX-O2   O2 Device (Oxygen Therapy) nasal cannula   $ Is the patient on Low Flow Oxygen? Yes   Flow (L/min) 2   Oxygen Concentration (%) 28   SpO2 100 %   Pulse Oximetry Type Intermittent   $ Pulse Oximetry - Single Charge Pulse Oximetry - Single   Pulse (!) 52   Resp 16   Ready to Wean/Extubation Screen   FIO2<=50 (chart decimal) 0.28

## 2019-04-06 NOTE — H&P
[facility] Hospital Medicine  History & Physical    Patient Name: Sera Boone  MRN: [id number]  Admission Date: 4/6/2019  Attending Physician: Jesus Quintanilla MD   Primary Care Provider: Joycelyn Vásquez MD    Patient only speaks Bulgarian and requested translation through her son at bedside    Patient information was obtained from patient, son,  and ER records.     Subjective:     Principal Problem:CVA (cerebral vascular accident)    Chief Complaint:   Chief Complaint   Patient presents with    Numbness     to left side of body since thursday. ambulatory to triage        HPI: Per ER report- this is a 83 y.o. female with a PMHx of DM, HTN, HLD, and T2DM who presents to the ED with son for further evaluation of numbness that started 2 days ago. The patient is Rwandan speaking and desires son to be interrupter even though Veronica was offered. Patient states that she started to have some numbness to the left side of her body. She states that it is a tingling that starts from the head and radiates down into her toes. She admits that the tingling is still present and is different than her typical fibromyalgia numbness. The patient admits that she is having some dizziness when she bends down or lays down. Patient also states that she is having some left ear pain as well. The patient also endorses some generalized fatigue since onset of the tingling and dizziness. She denies no recent trauma or injury, chest pain, SOB, headache, fever, abdominal pain, neck pain, nausea, vomiting, trouble swallowing, and visual disturbances. The patient has a PSHx of cataract extraction, breast surgery, and cholecystectomy.   patient placed in hospital for further evaluation treatment.    Patient seen and examined with son and daughter at bedside.  Patient requested son to translate during the interview and examination.  Patient reports now her initial symptoms are resolved and she feels much better and requesting  to know when she can be discharged home.      Past Medical History:   Diagnosis Date    Arthritis     Breast cancer 2001    Diabetes mellitus     History of breast cancer 8/21/2013    Hyperlipidemia     Hypertension     Nuclear sclerotic cataract of right eye 10/16/2012    Osteoporosis, unspecified: see DEXA 8/15- 2/13/2017    Osteoporosis, unspecified: see DEXA 8/15- 2/13/2017    Pain of both shoulder joints 2/14/2017    Rheumatoid factor positive 2/14/2017    Stroke 4/6/2019    Tubular adenoma of colon 10/28/2013    Type II or unspecified type diabetes mellitus with neurological manifestations, not stated as uncontrolled(250.60)     Vitamin D deficiency disease 2/17/2014       Past Surgical History:   Procedure Laterality Date    BELPHAROPTOSIS REPAIR  03/15/13    bilateral-dr. coleman md    BLEPHAROPLASTY Bilateral 3/15/2013    [facility]formed [facility] OR 1ST FLR    BREAST LUMPECTOMY      BREAST SURGERY Left 2001    lumpectomy    CATARACT EXTRACTION      both eyes -     CHOLECYSTECTOMY      Done in [place] in the 1980's    COLONOSCOPY N/A 1/19/2016    Performed by BLANCA [facility] MD at [facility] ENDO (4TH FLR)    COLONOSCOPY W/ POLYPECTOMY      INJECTION-STEROID-EPIDURAL-CERVICAL N/A 7/24/2017    [facility]    INSERTION, IOL PROSTHESIS Right 12/3/2012    Performed by Sh[facility] Hope [facility] at [facility] OR 1ST FLR    PHACOEMULSIFICATION, CATARACT Right 12/3/2012    Performed by [facility] at [facility] OR 1ST FLR    REPAIR, BLEPHAROPTOSIS Bilateral 3/15/2013    Performed by [facility] at [facility] OR 1ST FLR       Review of patient's allergies indicates:   Allergen Reactions    No known drug allergies        No current facility-administered medications on file prior to encounter.      Current Outpatient Medications on File Prior to Encounter   Medication Sig    aspirin 81 MG Chew Take 1 tablet (81 mg total) by mouth once  daily.    atorvastatin (LIPITOR) 40 MG tablet TAKE 1 TABLET BY MOUTH ONCE DAILY    cholecalciferol, vitamin D3, 1,000 unit capsule Take 2 capsules (2,000 Units total) by mouth once daily.    ciclopirox (PENLAC) 8 % Soln Apply topically nightly.    losartan (COZAAR) 50 MG tablet TAKE 1 TABLET BY MOUTH ONCE DAILY    metFORMIN (GLUCOPHAGE) 500 MG tablet TAKE 1 TABLET(500 MG) BY MOUTH TWICE DAILY    omega-3 acid ethyl esters (LOVAZA) 1 gram capsule TAKE 1 CAPSULE BY MOUTH TWICE DAILY     Family History     Problem Relation (Age of Onset)    Breast cancer Sister (48)    Cancer Sister, Mother, Father, Brother, Brother    Early death Mother    Liver cancer Mother, Father, Brother, Brother    No Known Problems Daughter, Son, Daughter, Maternal Aunt, Maternal Uncle, Paternal Aunt, Paternal Uncle, Maternal Grandmother, Maternal Grandfather, Paternal Grandmother, Paternal Grandfather        Tobacco Use    Smoking status: Never Smoker    Smokeless tobacco: Never Used   Substance and Sexual Activity    Alcohol use: No    Drug use: No    Sexual activity: Never     Birth control/protection: Post-menopausal     Review of Systems   Constitutional: Negative for activity change, appetite change, diaphoresis, fatigue and fever.   HENT: Negative for ear discharge, ear pain and facial swelling.    Eyes: Negative for pain and redness.   Respiratory: Negative for cough and shortness of breath.    Cardiovascular: Negative for chest pain, palpitations and leg swelling.   Gastrointestinal: Negative for abdominal distention, abdominal pain, blood in stool, constipation, diarrhea, nausea and vomiting.   Endocrine: Negative for polydipsia and polyphagia.   Genitourinary: Negative for difficulty urinating, dysuria, flank pain and hematuria.   Musculoskeletal: Negative for gait problem, neck pain and neck stiffness.   Skin: Negative for color change.   Allergic/Immunologic: Negative for food allergies.   Neurological: Negative for  dizziness, seizures, syncope, facial asymmetry, speech difficulty, weakness, light-headedness and headaches.        No sided weakness  Prior numbness and tingling now resolved   Hematological: Does not bruise/bleed easily.   Psychiatric/Behavioral: Negative for agitation, behavioral problems, confusion, hallucinations and suicidal ideas. The patient is not nervous/anxious.      Objective:     Vital Signs (Most Recent):  Temp: 96.2 °F (35.7 °C) (04/06/19 1533)  Pulse: (!) 52 (04/06/19 1606)  Resp: 16 (04/06/19 1606)  BP: (!) 167/74 (04/06/19 1533)  SpO2: 100 % (04/06/19 1606) Vital Signs (24h Range):  Temp:  [96.2 °F (35.7 °C)-98.8 °F (37.1 °C)] 96.2 °F (35.7 °C)  Pulse:  [51-63] 52  Resp:  [16-20] 16  SpO2:  [97 %-100 %] 100 %  BP: (149-173)/(70-75) 167/74     Weight: 60.8 kg (134 lb)  Body mass index is 26.17 kg/m².    Physical Exam   Constitutional: She is oriented to person, place, and time. She appears well-developed and well-nourished. No distress.   HENT:   Head: Normocephalic and atraumatic.   Eyes: Pupils are equal, round, and reactive to light. Conjunctivae and EOM are normal. Right eye exhibits no discharge. Left eye exhibits no discharge.   Neck: Normal range of motion. Neck supple. No JVD present.   Cardiovascular: Normal rate, regular rhythm, normal heart sounds and intact distal pulses.   Pulmonary/Chest: Effort normal and breath sounds normal. No respiratory distress. She has no wheezes.   Abdominal: Soft. Bowel sounds are normal. She exhibits no distension. There is no tenderness. There is no guarding.   Genitourinary:   Genitourinary Comments: Not examined   Musculoskeletal: Normal range of motion. She exhibits no edema.   Neurological: She is alert and oriented to person, place, and time. No cranial nerve deficit.   Only speaks Maltese   Skin: Skin is warm and dry. Capillary refill takes less than 2 seconds. She is not diaphoretic.   Psychiatric: She has a normal mood and affect. Her behavior is  normal. Judgment and thought content normal.         CRANIAL NERVES     CN III, IV, VI   Pupils are equal, round, and reactive to light.  Extraocular motions are normal.        Significant Labs: Reviewed    Significant Imaging: Reviewed    Assessment/Plan:     * CVA (cerebral vascular accident)  CVA excluded-  MRI and MRA of the brain without contrast show no acute issues  Bilateral carotid ultrasound showed minimal plaquing, no significant stenosis  Telemetry  Continue dose aspirin, continue home statin  Neurology consulted  Echocardiogram pending  PT, OT, ST consulted  Fall, skin, aspiration precautions  Check lipid panel and TSH  Add IV fluids        Bilateral carotid artery disease  Continue aspirin and statin      Has numbness  Patient reports left-sided body numbness prior to admission now resolved -neurology consulted  CVA workup in progress      Left ear pain  Monitor closely for any signs of inner ear issues      Dizziness  Monitor  Telemetry  Check orthostatic vital signs  Evaluate for any possible inner ear issues      Bradycardia  Telemetry  No home medications noted that would cause bradycardia  Monitor overnight-consider Cardiology consult  Trend troponins        Diabetic polyneuropathy associated with type 2 diabetes mellitus  Per history      Hypertension associated with diabetes  Acute CVA excluded-continue home antihypertensive      Type II diabetes mellitus with neurological manifestations  Diabetic diet   Accu-Cheks with correctional sliding scale insulin  Continue home metformin for now      Hyperlipidemia associated with type 2 diabetes mellitus  Continue home statin        VTE Risk Mitigation (From admission, onward)        Ordered     enoxaparin injection 40 mg  Daily      04/06/19 1720     IP VTE HIGH RISK PATIENT  Once      04/06/19 1720           GEO Rodriguez  Department of Hospital Medicine   [facility]    Time spent seeing patient( greater than 1/2 spent  in direct contact) : 76 minutes

## 2019-04-06 NOTE — ED PROVIDER NOTES
Encounter Date: 4/6/2019    SCRIBE #1 NOTE: IFrancisco, am scribing for, and in the presence of, Lianna URBINA.   SCRIBE #2 NOTE: I, Darshana Glasgow, am scribing for, and in the presence of,  Paco Cortes MD. I have scribed the following portions of the note - the EKG reading.     History     Chief Complaint   Patient presents with    Numbness     to left side of body since thursday. ambulatory to triage     Time seen by provider: 10:14 AM on 04/06/2019      Sera Boone is a 83 y.o. female with a PMHx of DM, HTN, HLD, and T2DM who presents to the ED with son for further evaluation of numbness that started 2 days ago. The patient is Belarusian speaking and desires son to be interrupter even though Veronica was offered. Patient states that she started to have some numbness to the left side of her body. She states that it is a tingling that starts from the head and radiates down into her toes. She admits that the tingling is still present and is different than her typical fibromyalgia numbness. The patient admits that she is having some dizziness when she bends down or lays down. Patient also states that she is having some left ear pain as well. The patient also endorses some generalized fatigue since onset of the tingling and dizziness. She denies no recent trauma or injury, chest pain, SOB, headache, fever, abdominal pain, neck pain, nausea, vomiting, trouble swallowing, and visual disturbances. The patient has a PSHx of cataract extraction, breast surgery, and cholecystectomy.     The history is provided by the patient and a relative. The history is limited by a language barrier.     Review of patient's allergies indicates:   Allergen Reactions    No known drug allergies      Past Medical History:   Diagnosis Date    Arthritis     Breast cancer 2001    Diabetes mellitus     History of breast cancer 8/21/2013    Hyperlipidemia     Hypertension     Nuclear sclerotic cataract of right eye 10/16/2012     Osteoporosis, unspecified: see DEXA 8/15- 2/13/2017    Osteoporosis, unspecified: see DEXA 8/15- 2/13/2017    Pain of both shoulder joints 2/14/2017    Rheumatoid factor positive 2/14/2017    Stroke 4/6/2019    Tubular adenoma of colon 10/28/2013    Type II or unspecified type diabetes mellitus with neurological manifestations, not stated as uncontrolled(250.60)     Vitamin D deficiency disease 2/17/2014     Past Surgical History:   Procedure Laterality Date    BELPHAROPTOSIS REPAIR  03/15/13    bilateral-dr. coleman md    BLEPHAROPLASTY Bilateral 3/15/2013    Performed [facility] at [facility] OR 1ST FLR    BREAST LUMPECTOMY      BREAST SURGERY Left 2001    lumpectomy    CATARACT EXTRACTION      both eyes -     CHOLECYSTECTOMY      Done in [place] in the 1980's    COLONOSCOPY N/A 1/19/2016    Performed by BLANCA Guerra MD at [facility] ENDO (4TH FLR)    COLONOSCOPY W/ POLYPECTOMY      INJECTION-STEROID-EPIDURAL-CERVICAL N/A 7/24/2017    [facility]    INSERTION, IOL PROSTHESIS Right 12/3/2012    Performed by Shasha Hope [facility] at [facility] OR 1ST FLR    PHACOEMULSIFICATION, CATARACT Right 12/3/2012    Performed by [facility] at [facility] OR 1ST FLR    REPAIR, BLEPHAROPTOSIS Bilateral 3/15/2013    Performed by Lynn [facility]i [facility] at [facility] OR 1ST FLR     Family History   Problem Relation Age of Onset    Breast cancer Sister 48        55 second, bilateral    Cancer Sister         Breast    Liver cancer Mother     Early death Mother     Cancer Mother         liver    Liver cancer Father     Cancer Father     Liver cancer Brother     Cancer Brother         ? liver    No Known Problems Daughter     No Known Problems Son     Liver cancer Brother     Cancer Brother         ? liver    No Known Problems Daughter     No Known Problems Maternal Aunt     No Known Problems Maternal Uncle     No Known Problems Paternal Aunt     No Known  Problems Paternal Uncle     No Known Problems Maternal Grandmother     No Known Problems Maternal Grandfather     No Known Problems Paternal Grandmother     No Known Problems Paternal Grandfather     Glaucoma Neg Hx     Amblyopia Neg Hx     Blindness Neg Hx     Cataracts Neg Hx     Diabetes Neg Hx     Hypertension Neg Hx     Macular degeneration Neg Hx     Retinal detachment Neg Hx     Strabismus Neg Hx     Stroke Neg Hx     Thyroid disease Neg Hx     Cervical cancer Neg Hx     Vaginal cancer Neg Hx     Endometrial cancer Neg Hx      Social History     Tobacco Use    Smoking status: Never Smoker    Smokeless tobacco: Never Used   Substance Use Topics    Alcohol use: No    Drug use: No     Review of Systems   Constitutional: Positive for fatigue.   HENT: Positive for ear pain. Negative for trouble swallowing.    Eyes: Negative for visual disturbance.   Respiratory: Negative for shortness of breath.    Cardiovascular: Negative for chest pain.   Gastrointestinal: Negative for abdominal pain, nausea and vomiting.   Genitourinary: Negative for decreased urine volume.   Musculoskeletal: Negative for neck pain.   Skin: Negative for rash.   Neurological: Positive for dizziness and numbness. Negative for headaches.       Physical Exam     Initial Vitals [04/06/19 1008]   BP Pulse Resp Temp SpO2   (!) 149/70 63 20 98.8 °F (37.1 °C) 97 %      MAP       --         Physical Exam    Nursing note and vitals reviewed.  Constitutional: Vital signs are normal. She appears well-developed and well-nourished.   HENT:   Head: Normocephalic and atraumatic.   Right Ear: Tympanic membrane and external ear normal.   Left Ear: Tympanic membrane and external ear normal.   Eyes: Pupils are equal, round, and reactive to light.   Neck: Neck supple.   Cardiovascular: Normal rate, regular rhythm, normal heart sounds and intact distal pulses. Exam reveals no gallop and no friction rub.    No murmur heard.  Pulmonary/Chest:  Breath sounds normal. She has no wheezes. She has no rhonchi. She has no rales.   Abdominal: Soft. Normal appearance and bowel sounds are normal. There is no tenderness.   Neurological: She is alert and oriented to person, place, and time. She has normal strength. A sensory deficit is present. Coordination and gait normal.   Cranial nerve III-XII intact with the exception of altered sensation to left side of body from scalp to foot. Normal rapid alternating movements. Speech is normal. 5/5 strength and sensation to the bilateral upper and lower extremities.   Skin: Skin is warm, dry and intact.   Psychiatric: She has a normal mood and affect. Her speech is normal and behavior is normal.         ED Course   Procedures  Labs Reviewed   CBC W/ AUTO DIFFERENTIAL - Abnormal; Notable for the following components:       Result Value    MPV 7.8 (*)     All other components within normal limits   COMPREHENSIVE METABOLIC PANEL - Abnormal; Notable for the following components:    Glucose 114 (*)     Calcium 10.7 (*)     Alkaline Phosphatase 49 (*)     eGFR if non  59 (*)     All other components within normal limits   PROTIME-INR   TSH   LIPID PANEL   POCT GLUCOSE, HAND-HELD DEVICE   POCT GLUCOSE     EKG Readings: (Independently Interpreted)   Initial Reading: No STEMI. Rhythm: Sinus Bradycardia. Heart Rate: 52. Ectopy: No Ectopy. Conduction: Normal. ST Segments: Normal ST Segments. T Waves: Normal. Clinical Impression: Normal Sinus Rhythm       Imaging Results          US Carotid Bilateral (Final result)  Result time 04/06/19 14:13:35    Final result by Gordon Gray Jr., MD (04/06/19 14:13:35)                 Impression:      No evidence of a hemodynamically significant carotid bifurcation stenosis.      Electronically signed by: Gordon Gray MD  Date:    04/06/2019  Time:    14:13             Narrative:    EXAMINATION:  US CAROTID BILATERAL    TECHNIQUE:  Grayscale and color Doppler ultrasound  "examination of the carotid and vertebral artery systems bilaterally.  Stenosis estimates are per the NASCET measurement criteria.    COMPARISON:  None.    FINDINGS:  Right:    Internal Carotid Artery (ICA) peak systolic velocity 67 cm/sec    ICA/CCA peak systolic velocity ratio: 1.0    Plaque formation: Intimal thickening without calcified plaque is noted    Vertebral artery: Antegrade flow and normal waveform.    Left:    Internal Carotid Artery (ICA)  peak systolic velocity 73 cm/sec    ICA/CCA peak systolic velocity ratio: 1.2    Plaque formation: Intimal thickening without calcified plaque is noted.    Vertebral artery: Antegrade flow and normal waveform.                               MRI Brain Without Contrast (Final result)  Result time 04/06/19 13:44:39    Final result by Gordon Gray Jr., MD (04/06/19 13:44:39)                 Impression:      Possible "empty sella syndrome".  Mild brain atrophy, primarily frontal and anterior parietal lobes.  Otherwise negative MRI of the brain.      Electronically signed by: Gordon Gray MD  Date:    04/06/2019  Time:    13:44             Narrative:    EXAMINATION:  MRI BRAIN WITHOUT CONTRAST    CLINICAL HISTORY:  Neuro deficit(s), subacute;    TECHNIQUE:  Multiplanar multisequence MR imaging of the brain was performed without contrast.    COMPARISON:  CT head of 6 April 2019.    FINDINGS:  The general brain anatomy appears normal with normal medulla, carolynn, brainstem, basal ganglion, corpus callosum and cerebral and cerebellar lobar structure.  The pituitary is not enlarged.  In fact it is situated at the floor of the sella turcica possible empty sella syndrome.  The internal auditory canals are sharp and symmetric.  The basal cisterns are clear and symmetric.    There is no mass effect or midline shift.  The ventricles are of normal size for the patient's age and symmetric.  These subarachnoid spaces are enlarged particularly over the frontal and anterior " parietal lobes consistent with brain atrophy.  The gray-white matter differentiation is within normal limits.  Within the brain parenchyma hyper or hypointense lesions consistent with tumor, edema, CVA or hemorrhage is not seen.  No acute lesions on diffusion weighting are demonstrated.  No intracranial hemorrhage or hematoma is noted.                               MRA Brain without contrast (Final result)  Result time 04/06/19 13:49:12    Final result by Gordon Gray Jr., MD (04/06/19 13:49:12)                 Impression:      Small A1 segment of the right anterior cerebral artery may be secondary to stenosis or congenital.  There is additional supply of the right anterior cerebral via the anterior communicating artery from the left circulation.  Otherwise negative MRA of the brain.      Electronically signed by: Gordon Gray MD  Date:    04/06/2019  Time:    13:49             Narrative:    EXAMINATION:  MRA BRAIN WITHOUT CONTRAST    CLINICAL HISTORY:  Dizziness;    TECHNIQUE:  Non-contrast 3-D time-of-flight intracranial MR angiography was performed through the [place] [facility] [place] with MIP reformatting.    COMPARISON:  None    FINDINGS:  There is flow in both vertebral arteries although the left vertebral is small in relation to the right.  There is then flow in the basilar artery to the qkxgud-rk-Jxwign.  There is flow in both internal carotid arteries to the yztiby-le-Cymtlj    At the gdakhn-yh-Jnplab an aneurysm is not seen.  The A1 segment of the right anterior cerebral artery is rather small whereas the anterior cerebral artery past the anterior communicating artery is more normal in caliber.  The A1 segment of the left anterior cerebral artery is of normal caliber.  There is good flow in both anterior cerebral arteries and both middle cerebral arteries to the trifurcation vessels.  No mass effect, aneurysm or AVM is seen.  There is adequate flow in both posterior cerebral arteries without peripheral  AVM or mass demonstrated.                               CT Head Without Contrast (Final result)  Result time 04/06/19 11:47:59    Final result by Gordon Gray Jr., MD (04/06/19 11:47:59)                 Impression:      Mild brain atrophy, primarily frontal lobe.  Otherwise negative head CT.      Electronically signed by: Gordon Gray MD  Date:    04/06/2019  Time:    11:47             Narrative:    EXAMINATION:  CT HEAD WITHOUT CONTRAST    CLINICAL HISTORY:  Dizziness;    TECHNIQUE:  Low dose axial images were obtained through the head.  Coronal and sagittal reformations were also performed. Contrast was not administered.    COMPARISON:  None.    FINDINGS:  No cranial fracture is identified.  Scalp edema or hematoma is not seen.  The internal auditory canals are sharp and symmetric.    There is mild brain atrophy primarily in the frontal lobes.  The ventricles are only mildly enlarged but symmetric.  The gray-white matter differentiation is within normal limits.  Within the brain parenchyma hyper or hypodense lesions consistent with tumor, edema, CVA or hemorrhage is not seen.  No intracranial hemorrhage or hematoma is noted.                               X-Ray Chest AP Portable (Final result)  Result time 04/06/19 10:39:37    Final result by Gordon Gray Jr., MD (04/06/19 10:39:37)                 Impression:      No acute abnormality.      Electronically signed by: Gordon Gray MD  Date:    04/06/2019  Time:    10:39             Narrative:    EXAMINATION:  XR CHEST AP PORTABLE    CLINICAL HISTORY:  Stroke;    TECHNIQUE:  Single frontal view of the chest was performed.    COMPARISON:  None    FINDINGS:  The lungs are clear, with normal appearance of pulmonary vasculature and no pleural effusion or pneumothorax.    The cardiac silhouette is normal in size. The hilar and mediastinal contours are unremarkable.    Bones are intact.                                 Medical Decision Making:   History:    Old Medical Records: I decided to obtain old medical records.  Differential Diagnosis:   Subacute CVA  TIA  BPPV    Clinical Tests:   Lab Tests: Reviewed and Ordered  Radiological Study: Ordered and Reviewed  Medical Tests: Ordered and Reviewed       APC / Resident Notes:   Patient is a 83 y.o. female who presents to the ED 04/06/2019 who underwent emergent evaluation for numbness for a few days to the left side of her body.  Cranial nerves 3-12 intact with the exception of altered sensation to the left side of her body.  She also complains of dizziness that is reproducible with certain head movements.  Patient is ambulatory. No nystagmus. Normal cover/uncover test. Normal KRISS. Negative romberg and no pronator drift. She does not appear in any acute distress. Head CT without acute findings.  Do not think intracranial hemorrhage.  Labs noted without acute findings.  There are no signs of any acute infection.  Case is discussed with neurologist Dr. Migel Stone who requests further testing and admission for observation overnight for further evaluation for possible subacute stroke.  Case is also discussed with hospital medicine team who is accepting of this admission on behalf of hospital medicine team.  Findings and plan of care discussed with patient and patient's family member who are agreeable with this plan of care.  Patient is offered  but declines.  Case also discussed with Dr. Salguero who evaluated patient as well and agrees with plan of care.  Vital signs stable in the emergency department.       Scribe Attestation:   Scribe #1: I performed the above scribed service and the documentation accurately describes the services I performed. I attest to the accuracy of the note.  Scribe #2: I performed the above scribed service and the documentation accurately describes the services I performed. I attest to the accuracy of the note.    Attending Attestation:     Physician Attestation Statement for NP/PA:   I  "have conducted a face to face encounter with this patient in addition to the NP/PA, due to Medical Complexity    Other NP/PA Attestation Additions:      Medical Decision Making: I provided a face to face evaluation of this patient.  I discussed the patient's care with Advanced Practice Clinician.  I reviewed their note and agree with the history, physical, assessment, diagnosis, treatment, and plan provided by the Advanced Practice Clinician. My overall impression is left side tingling, numbness.         Physician Attestation for Scribe:  Physician Attestation Statement for Scribe #1: I, Lianna West, reviewed documentation, as scribed by in my presence, and it is both accurate and complete.     Comments: I, Lianna West, NP-C, personally performed the services described in this documentation. All medical record entries made by the scribe were at my direction and in my presence.  I have reviewed the chart and agree that the record reflects my personal performance and is accurate and complete. CARLITOS Morales.  2:58 PM 04/06/2019             ED Course as of Apr 06 1458   Sat Apr 06, 2019   1049 SpO2: 97 % [EF]   1049 Resp: 20 [EF]   1049 Pulse: 63 [EF]   1049 Temp src: Oral [EF]   1049 Temp: 98.8 °F (37.1 °C) [EF]   1050 BP(!): 149/70 [EF]   1102 Patient presents to the emergency room complaining of left-sided numbness for several days.  Weakness is "in my entire body."  No focal deficits.  No dizziness unless she moves her head or stands up or attempts to tire shoes according to her.  I doubt cerebellar stroke.  The numbness to the left side is concerning for a stroke.  Patient will need to be admitted.  testing will be undertaken in the emergency room and case will be discussed with Neurology.    [EF]   1120 X-Ray Chest AP Portable [EF]   1159 CT Head Without Contrast [EF]      ED Course User Index  [EF] Paco Cortes MD     Clinical Impression:       ICD-10-CM ICD-9-CM   1. Stroke I63.9 434.91 "         Disposition:   Disposition: Placed in Observation  Condition: Stable                        Lianna West NP  04/06/19 9968       Paco Cortes MD  04/06/19 4719

## 2019-04-06 NOTE — HPI
Per ER report- this is a 83 y.o. female with a PMHx of DM, HTN, HLD, and T2DM who presents to the ED with son for further evaluation of numbness that started 2 days ago. The patient is Fijian speaking and desires son to be interrupter even though Veronica was offered. Patient states that she started to have some numbness to the left side of her body. She states that it is a tingling that starts from the head and radiates down into her toes. She admits that the tingling is still present and is different than her typical fibromyalgia numbness. The patient admits that she is having some dizziness when she bends down or lays down. Patient also states that she is having some left ear pain as well. The patient also endorses some generalized fatigue since onset of the tingling and dizziness. She denies no recent trauma or injury, chest pain, SOB, headache, fever, abdominal pain, neck pain, nausea, vomiting, trouble swallowing, and visual disturbances. The patient has a PSHx of cataract extraction, breast surgery, and cholecystectomy.  patient placed in hospital for further evaluation treatment.    Patient seen and examined with son and daughter at bedside.  Patient requested son to translate during the interview and examination.  Patient reports now her initial symptoms are resolved and she feels much better and requesting to know when she can be discharged home.

## 2019-04-06 NOTE — SUBJECTIVE & OBJECTIVE
Past Medical History:   Diagnosis Date    Arthritis     Breast cancer 2001    Diabetes mellitus     History of breast cancer 8/21/2013    Hyperlipidemia     Hypertension     Nuclear sclerotic cataract of right eye 10/16/2012    Osteoporosis, unspecified: see DEXA 8/15- 2/13/2017    Osteoporosis, unspecified: see DEXA 8/15- 2/13/2017    Pain of both shoulder joints 2/14/2017    Rheumatoid factor positive 2/14/2017    Stroke 4/6/2019    Tubular adenoma of colon 10/28/2013    Type II or unspecified type diabetes mellitus with neurological manifestations, not stated as uncontrolled(250.60)     Vitamin D deficiency disease 2/17/2014       Past Surgical History:   Procedure Laterality Date    BELPHAROPTOSIS REPAIR  03/15/13    bilateral-dr. coleman md    BLEPHAROPLASTY Bilateral 3/15/2013    Performed by Lynn Soni [facility] at [facility] OR 1ST FLR    BREAST LUMPECTOMY      BREAST SURGERY Left 2001    lumpectomy    CATARACT EXTRACTION      both eyes -     CHOLECYSTECTOMY      Done in [place] in the 1980's    COLONOSCOPY N/A 1/19/2016    Performed by BLANCA Guerra MD at [facility] ENDO (4TH FLR)    COLONOSCOPY W/ POLYPECTOMY      INJECTION-STEROID-EPIDURAL-CERVICAL N/A 7/24/2017    Performed by [facility] at [facility]    INSERTION, IOL PROSTHESIS Right 12/3/2012    Performed by Shasha Hope MD at [facility] OR 1ST FLR    PHACOEMULSIFICATION, CATARACT Right 12/3/2012    Performed by Shasha Hope MD at [facility] OR 1ST FLR    REPAIR, BLEPHAROPTOSIS Bilateral 3/15/2013    Performed by Lynn Soni MD at [facility] OR 1ST FLR       Review of patient's allergies indicates:   Allergen Reactions    No known drug allergies        No current facility-administered medications on file prior to encounter.      Current Outpatient Medications on File Prior to Encounter   Medication Sig    aspirin 81 MG Chew Take 1 tablet (81 mg total) by mouth once daily.    atorvastatin (LIPITOR) 40 MG tablet  TAKE 1 TABLET BY MOUTH ONCE DAILY    cholecalciferol, vitamin D3, 1,000 unit capsule Take 2 capsules (2,000 Units total) by mouth once daily.    ciclopirox (PENLAC) 8 % Soln Apply topically nightly.    losartan (COZAAR) 50 MG tablet TAKE 1 TABLET BY MOUTH ONCE DAILY    metFORMIN (GLUCOPHAGE) 500 MG tablet TAKE 1 TABLET(500 MG) BY MOUTH TWICE DAILY    omega-3 acid ethyl esters (LOVAZA) 1 gram capsule TAKE 1 CAPSULE BY MOUTH TWICE DAILY     Family History     Problem Relation (Age of Onset)    Breast cancer Sister (48)    Cancer Sister, Mother, Father, Brother, Brother    Early death Mother    Liver cancer Mother, Father, Brother, Brother    No Known Problems Daughter, Son, Daughter, Maternal Aunt, Maternal Uncle, Paternal Aunt, Paternal Uncle, Maternal Grandmother, Maternal Grandfather, Paternal Grandmother, Paternal Grandfather        Tobacco Use    Smoking status: Never Smoker    Smokeless tobacco: Never Used   Substance and Sexual Activity    Alcohol use: No    Drug use: No    Sexual activity: Never     Birth control/protection: Post-menopausal     Review of Systems   Constitutional: Negative for activity change, appetite change, diaphoresis, fatigue and fever.   HENT: Negative for ear discharge, ear pain and facial swelling.    Eyes: Negative for pain and redness.   Respiratory: Negative for cough and shortness of breath.    Cardiovascular: Negative for chest pain, palpitations and leg swelling.   Gastrointestinal: Negative for abdominal distention, abdominal pain, blood in stool, constipation, diarrhea, nausea and vomiting.   Endocrine: Negative for polydipsia and polyphagia.   Genitourinary: Negative for difficulty urinating, dysuria, flank pain and hematuria.   Musculoskeletal: Negative for gait problem, neck pain and neck stiffness.   Skin: Negative for color change.   Allergic/Immunologic: Negative for food allergies.   Neurological: Negative for dizziness, seizures, syncope, facial asymmetry,  speech difficulty, weakness, light-headedness and headaches.        No sided weakness  Prior numbness and tingling now resolved   Hematological: Does not bruise/bleed easily.   Psychiatric/Behavioral: Negative for agitation, behavioral problems, confusion, hallucinations and suicidal ideas. The patient is not nervous/anxious.      Objective:     Vital Signs (Most Recent):  Temp: 96.2 °F (35.7 °C) (04/06/19 1533)  Pulse: (!) 52 (04/06/19 1606)  Resp: 16 (04/06/19 1606)  BP: (!) 167/74 (04/06/19 1533)  SpO2: 100 % (04/06/19 1606) Vital Signs (24h Range):  Temp:  [96.2 °F (35.7 °C)-98.8 °F (37.1 °C)] 96.2 °F (35.7 °C)  Pulse:  [51-63] 52  Resp:  [16-20] 16  SpO2:  [97 %-100 %] 100 %  BP: (149-173)/(70-75) 167/74     Weight: 60.8 kg (134 lb)  Body mass index is 26.17 kg/m².    Physical Exam   Constitutional: She is oriented to person, place, and time. She appears well-developed and well-nourished. No distress.   HENT:   Head: Normocephalic and atraumatic.   Eyes: Pupils are equal, round, and reactive to light. Conjunctivae and EOM are normal. Right eye exhibits no discharge. Left eye exhibits no discharge.   Neck: Normal range of motion. Neck supple. No JVD present.   Cardiovascular: Normal rate, regular rhythm, normal heart sounds and intact distal pulses.   Pulmonary/Chest: Effort normal and breath sounds normal. No respiratory distress. She has no wheezes.   Abdominal: Soft. Bowel sounds are normal. She exhibits no distension. There is no tenderness. There is no guarding.   Genitourinary:   Genitourinary Comments: Not examined   Musculoskeletal: Normal range of motion. She exhibits no edema.   Neurological: She is alert and oriented to person, place, and time. No cranial nerve deficit.   Only speaks Arabic   Skin: Skin is warm and dry. Capillary refill takes less than 2 seconds. She is not diaphoretic.   Psychiatric: She has a normal mood and affect. Her behavior is normal. Judgment and thought content normal.          CRANIAL NERVES     CN III, IV, VI   Pupils are equal, round, and reactive to light.  Extraocular motions are normal.        Significant Labs: Reviewed    Significant Imaging: Reviewed

## 2019-04-06 NOTE — ASSESSMENT & PLAN NOTE
Patient reports left-sided body numbness prior to admission now resolved -neurology consulted  CVA workup in progress

## 2019-04-06 NOTE — ASSESSMENT & PLAN NOTE
Telemetry  No home medications noted that would cause bradycardia  Monitor overnight-consider Cardiology consult  Trend troponins

## 2019-04-06 NOTE — ASSESSMENT & PLAN NOTE
CVA excluded-  MRI and MRA of the brain without contrast show no acute issues  Bilateral carotid ultrasound showed minimal plaquing, no significant stenosis  Telemetry  Continue dose aspirin, continue home statin  Neurology consulted  Echocardiogram pending  PT, OT, ST consulted  Fall, skin, aspiration precautions  Check lipid panel and TSH  Add IV fluids

## 2019-04-07 VITALS
BODY MASS INDEX: 26.31 KG/M2 | TEMPERATURE: 98 F | OXYGEN SATURATION: 100 % | DIASTOLIC BLOOD PRESSURE: 58 MMHG | RESPIRATION RATE: 20 BRPM | HEIGHT: 60 IN | WEIGHT: 134 LBS | HEART RATE: 54 BPM | SYSTOLIC BLOOD PRESSURE: 118 MMHG

## 2019-04-07 PROBLEM — R20.0: Status: RESOLVED | Noted: 2019-04-06 | Resolved: 2019-04-07

## 2019-04-07 PROBLEM — R42 DIZZINESS: Status: RESOLVED | Noted: 2019-04-06 | Resolved: 2019-04-07

## 2019-04-07 LAB
AORTIC ROOT ANNULUS: 2.33 CM
AORTIC VALVE CUSP SEPERATION: 1.87 CM
AV INDEX (PROSTH): 0.58
AV MEAN GRADIENT: 5.44 MMHG
AV PEAK GRADIENT: 9.86 MMHG
AV VALVE AREA: 1.8 CM2
AV VELOCITY RATIO: 0.58
BSA FOR ECHO PROCEDURE: 1.6 M2
CV ECHO LV RWT: 0.45 CM
DOP CALC AO PEAK VEL: 1.57 M/S
DOP CALC AO VTI: 33.03 CM
DOP CALC LVOT AREA: 3.11 CM2
DOP CALC LVOT DIAMETER: 1.99 CM
DOP CALC LVOT PEAK VEL: 0.91 M/S
DOP CALC LVOT STROKE VOLUME: 59.47 CM3
DOP CALCLVOT PEAK VEL VTI: 19.13 CM
E WAVE DECELERATION TIME: 245.64 MSEC
E/A RATIO: 0.68
E/E' RATIO: 9.43
ECHO LV POSTERIOR WALL: 0.9 CM (ref 0.6–1.1)
FRACTIONAL SHORTENING: 42 % (ref 28–44)
INTERVENTRICULAR SEPTUM: 0.7 CM (ref 0.6–1.1)
IVRT: 0.17 MSEC
LEFT ATRIUM SIZE: 3.37 CM
LEFT INTERNAL DIMENSION IN SYSTOLE: 2.34 CM (ref 2.1–4)
LEFT VENTRICLE DIASTOLIC VOLUME INDEX: 31.34 ML/M2
LEFT VENTRICLE DIASTOLIC VOLUME: 49.34 ML
LEFT VENTRICLE MASS INDEX: 59.4 G/M2
LEFT VENTRICLE SYSTOLIC VOLUME INDEX: 12 ML/M2
LEFT VENTRICLE SYSTOLIC VOLUME: 18.91 ML
LEFT VENTRICULAR INTERNAL DIMENSION IN DIASTOLE: 4 CM (ref 3.5–6)
LEFT VENTRICULAR MASS: 93.46 G
LV LATERAL E/E' RATIO: 8.25
LV SEPTAL E/E' RATIO: 11
MV PEAK A VEL: 0.97 M/S
MV PEAK E VEL: 0.66 M/S
PISA TR MAX VEL: 2.56 M/S
POCT GLUCOSE: 104 MG/DL (ref 70–110)
POCT GLUCOSE: 158 MG/DL (ref 70–110)
PULM VEIN A" WAVE DURATION": 111 MSEC
PV PEAK VELOCITY: 1.01 CM/S
RA PRESSURE: 8 MMHG
RIGHT VENTRICULAR END-DIASTOLIC DIMENSION: 2.79 CM
TDI LATERAL: 0.08
TDI SEPTAL: 0.06
TDI: 0.07
TR MAX PG: 26.21 MMHG
TRICUSPID ANNULAR PLANE SYSTOLIC EXCURSION: 3.02 CM
TV REST PULMONARY ARTERY PRESSURE: 34 MMHG

## 2019-04-07 PROCEDURE — G8978 MOBILITY CURRENT STATUS: HCPCS | Mod: CI,HCNC

## 2019-04-07 PROCEDURE — 97161 PT EVAL LOW COMPLEX 20 MIN: CPT | Mod: HCNC

## 2019-04-07 PROCEDURE — 25000003 PHARM REV CODE 250: Mod: HCNC | Performed by: NURSE PRACTITIONER

## 2019-04-07 PROCEDURE — G0378 HOSPITAL OBSERVATION PER HR: HCPCS | Mod: HCNC

## 2019-04-07 PROCEDURE — G8980 MOBILITY D/C STATUS: HCPCS | Mod: CI,HCNC

## 2019-04-07 PROCEDURE — G8979 MOBILITY GOAL STATUS: HCPCS | Mod: CI,HCNC

## 2019-04-07 PROCEDURE — 96361 HYDRATE IV INFUSION ADD-ON: CPT

## 2019-04-07 RX ORDER — ACETAMINOPHEN 325 MG/1
650 TABLET ORAL EVERY 4 HOURS PRN
Refills: 0 | COMMUNITY
Start: 2019-04-07 | End: 2021-08-11

## 2019-04-07 RX ADMIN — LOSARTAN POTASSIUM 50 MG: 25 TABLET, FILM COATED ORAL at 09:04

## 2019-04-07 RX ADMIN — VITAMIN D, TAB 1000IU (100/BT) 1000 UNITS: 25 TAB at 09:04

## 2019-04-07 RX ADMIN — METFORMIN HYDROCHLORIDE 500 MG: 500 TABLET, FILM COATED ORAL at 07:04

## 2019-04-07 RX ADMIN — OMEGA-3 FATTY ACIDS CAP 1000 MG 1 CAPSULE: 1000 CAP at 09:04

## 2019-04-07 RX ADMIN — ASPIRIN 325 MG: 325 TABLET, DELAYED RELEASE ORAL at 09:04

## 2019-04-07 RX ADMIN — SODIUM CHLORIDE: 0.9 INJECTION, SOLUTION INTRAVENOUS at 04:04

## 2019-04-07 RX ADMIN — ATORVASTATIN CALCIUM 40 MG: 40 TABLET, FILM COATED ORAL at 09:04

## 2019-04-07 NOTE — PLAN OF CARE
04/06/19 2100   Patient Assessment/Suction   Level of Consciousness (AVPU) alert   Respiratory Effort Unlabored   Expansion/Accessory Muscles/Retractions no use of accessory muscles   All Lung Fields Breath Sounds clear   PRE-TX-O2   O2 Device (Oxygen Therapy) room air   SpO2 99 %   Pulse Oximetry Type Intermittent   Pulse 69   Resp 16   Aerosol Therapy   $ Aerosol Therapy Charges PRN treatment not required

## 2019-04-07 NOTE — CONSULTS
"[facility]  Neurology  Consult Note    Patient Name: Sera Boone  MRN: [id number]  Admission Date: 4/6/2019  Hospital Length of Stay: 0 days  Code Status: Full Code   Attending Provider: Jesus Quintanilla MD   Consulting Provider: GEO Benson  Primary Care Physician: Joycelyn Vásquez MD  Principal Problem:CVA (cerebral vascular accident)    Consults  Subjective:     Chief Complaint:  weakness     HPI: Patient seen and examined    Patient is a 84 y/o female with a PMH of: DM, HTN, HLD, and T2DM     All medication reviewed      Patient presented with:numbness that started 2 days ago. The patient is Cambodian speaking and desires son to be interrupter even though Veronica was offered. Patient states that she started to have some numbness to the left side of her body. She states that it is a tingling that starts from the head and radiates down into her toes. She admits that the tingling is still present and is different than her typical fibromyalgia numbness. The patient admits that she is having some dizziness when she bends down or lays down. Patient also states that she is having some left ear pain as well. The patient also endorses some generalized fatigue since onset of the tingling and dizziness. She denies no recent trauma or injury, chest pain, SOB, headache, fever, abdominal pain, neck pain, nausea, vomiting, trouble swallowing, and visual disturbances.  Upon exam, pt is back to baseline.     CRT:0.9  LDL:96  A1c:6.5    Brain imaging:MRI head: Possible "empty sella syndrome".  Mild brain atrophy, primarily frontal and anterior parietal lobes.  Otherwise negative MRI of the brain.    MRA brain: Small A1 segment of the right anterior cerebral artery may be secondary to stenosis or congenital.  There is additional supply of the right anterior cerebral via the anterior communicating artery from the left circulation.  Otherwise negative MRA of the brain.    Neck Imaging: CUS: No " evidence of a hemodynamically significant carotid bifurcation stenosis.    Cardiac imaging:P      Past Medical History:   Diagnosis Date    Arthritis     Breast cancer 2001    Diabetes mellitus     History of breast cancer 8/21/2013    Hyperlipidemia     Hypertension     Nuclear sclerotic cataract of right eye 10/16/2012    Osteoporosis, unspecified: see DEXA 8/15- 2/13/2017    Osteoporosis, unspecified: see DEXA 8/15- 2/13/2017    Pain of both shoulder joints 2/14/2017    Rheumatoid factor positive 2/14/2017    Stroke 4/6/2019    Tubular adenoma of colon 10/28/2013    Type II or unspecified type diabetes mellitus with neurological manifestations, not stated as uncontrolled(250.60)     Vitamin D deficiency disease 2/17/2014       Past Surgical History:   Procedure Laterality Date    BELPHAROPTOSIS REPAIR  03/15/13    bilateral-dr. coleman md    BLEPHAROPLASTY Bilateral 3/15/2013    [facility]formed [facility] at [facility] 1ST [facility]LR    BREAST LUMPECTOMY      BREAST SURGERY Left 2001    lumpectomy    CATARACT EXTRACTION      both eyes -     CHOLECYSTECTOMY      Done in [place] in the 1980's    COLONOSCOPY N/A 1/19/2016    Performed by BLANCA Guerra MD at [facility] ENDO (4TH FLR)    COLONOSCOPY W/ POLYPECTOMY      INJECTION-STEROID-EPIDURAL-CERVICAL N/A 7/24/2017    [facility]    INSERTION, IOL PROSTHESIS Right 12/3/2012    Performed by [facility] Hope [facility] at [facility] 1ST FLR    PHACOEMULSIFICATION, CATARACT Right 12/3/2012    Performed by [facility] at [facility] 1ST [facility]LR    REPAIR, BLEPHAROPTOSIS Bilateral 3/15/2013    Performed by [facility] at [facility] OR 1ST FLR       Review of patient's allergies indicates:   Allergen Reactions    No known drug allergies        Current Neurological Medications: reviewed    No current facility-administered medications on file prior to encounter.      Current Outpatient Medications on File Prior  to Encounter   Medication Sig    cholecalciferol, vitamin D3, 1,000 unit capsule Take 2 capsules (2,000 Units total) by mouth once daily.    losartan (COZAAR) 50 MG tablet TAKE 1 TABLET BY MOUTH ONCE DAILY    metFORMIN (GLUCOPHAGE) 500 MG tablet TAKE 1 TABLET(500 MG) BY MOUTH TWICE DAILY    omega-3 acid ethyl esters (LOVAZA) 1 gram capsule TAKE 1 CAPSULE BY MOUTH TWICE DAILY    aspirin 81 MG Chew Take 1 tablet (81 mg total) by mouth once daily.    atorvastatin (LIPITOR) 40 MG tablet TAKE 1 TABLET BY MOUTH ONCE DAILY    ciclopirox (PENLAC) 8 % Soln Apply topically nightly.      Family History     Problem Relation (Age of Onset)    Breast cancer Sister (48)    Cancer Sister, Mother, Father, Brother, Brother    Early death Mother    Liver cancer Mother, Father, Brother, Brother    No Known Problems Daughter, Son, Daughter, Maternal Aunt, Maternal Uncle, Paternal Aunt, Paternal Uncle, Maternal Grandmother, Maternal Grandfather, Paternal Grandmother, Paternal Grandfather        Tobacco Use    Smoking status: Never Smoker    Smokeless tobacco: Never Used   Substance and Sexual Activity    Alcohol use: No    Drug use: No    Sexual activity: Never     Birth control/protection: Post-menopausal     Review of Systems   Constitutional: Negative.    HENT: Negative.    Eyes: Negative.    Respiratory: Negative.    Cardiovascular: Negative.    Gastrointestinal: Negative.    Genitourinary: Negative.    Musculoskeletal: Negative.    Neurological: Positive for numbness. Negative for seizures, syncope, speech difficulty and light-headedness.   Psychiatric/Behavioral: Negative.      Objective:     Vital Signs (Most Recent):  Temp: 97 °F (36.1 °C) (04/07/19 0400)  Pulse: (!) 54 (04/07/19 0400)  Resp: 18 (04/07/19 0400)  BP: (!) 103/54 (04/07/19 0400)  SpO2: 100 % (04/07/19 0400) Vital Signs (24h Range):  Temp:  [96.2 °F (35.7 °C)-98.8 °F (37.1 °C)] 97 °F (36.1 °C)  Pulse:  [51-71] 54  Resp:  [16-20] 18  SpO2:  [97 %-100 %]  100 %  BP: (103-173)/(54-75) 103/54     Weight: 60.8 kg (134 lb)  Body mass index is 26.17 kg/m².    Physical Exam  Constitutional: She is oriented to person, place, and time. She appears well-developed and well-nourished. No distress.   HENT:   Head: Normocephalic and atraumatic.   Eyes: Pupils are equal, round, and reactive to light. Conjunctivae and EOM are normal. Right eye exhibits no discharge. Left eye exhibits no discharge.   Neck: Normal range of motion. Neck supple. No JVD present.   Cardiovascular: Normal rate, regular rhythm, normal heart sounds and intact distal pulses.   Pulmonary/Chest: Effort normal and breath sounds normal. No respiratory distress. She has no wheezes.   Abdominal: Soft. Bowel sounds are normal. She exhibits no distension. There is no tenderness. There is no guarding.   Musculoskeletal: Normal range of motion. She exhibits no edema.   Neurological: She is alert and oriented to person, place, and time. No cranial nerve deficit.   Only speaks Syriac   Skin: Skin is warm and dry. Capillary refill takes less than 2 seconds. She is not diaphoretic.   Psychiatric: She has a normal mood and affect. Her behavior is normal. Judgment and thought content normal.            CRANIAL NERVES      CN III, IV, VI   Pupils are equal, round, and reactive to light.  Extraocular motions are normal.          Significant Labs: All pertinent lab results from the past 24 hours have been reviewed.    Significant Imaging: I have reviewed and interpreted all pertinent imaging results/findings within the past 24 hours.    Assessment and Plan:   TIA   - presented with left side paresthesia, now resolved   - MRI brain neg acute   - PT/OT/ST evals   - recommend ASA QD for stroke prevention    Empty sella   - pt to f/u with endocrine as outpt    Stroke education was provided.  If patient has acute neurological changes including weakness, confusion, speech changes, facial droop, difficulty walking, and sensory  changes immediately call 911.  Follow up Neurology in 2 weeks at [phone].  Medication side effects discussed with the patient and/or caregiver.    Active Diagnoses:    Diagnosis Date Noted POA    PRINCIPAL PROBLEM:  CVA (cerebral vascular accident) [I63.9] 04/06/2019 Yes    Bradycardia [R00.1] 04/06/2019 Yes    Dizziness [R42] 04/06/2019 Yes    Left ear pain [H92.02] 04/06/2019 Yes    Has numbness [R20.8] 04/06/2019 Yes    Bilateral carotid artery disease [I77.9] 04/06/2019 Yes    Diabetic polyneuropathy associated with type 2 diabetes mellitus [E11.42] 08/10/2015 Yes    Hypertension associated with diabetes [E11.59, I10] 05/15/2015 Yes    History of breast cancer [Z85.3] 08/21/2013 Not Applicable    Type II diabetes mellitus with neurological manifestations [E11.49] 02/22/2013 Yes    Hyperlipidemia associated with type 2 diabetes mellitus [E11.69, E78.5]  Yes      Problems Resolved During this Admission:       VTE Risk Mitigation (From admission, onward)        Ordered     enoxaparin injection 40 mg  Daily      04/06/19 1720     IP VTE HIGH RISK PATIENT  Once      04/06/19 1720          Thank you for your consult. I will follow-up with patient. Please contact us if you have any additional questions.    Debi Murguia, ABBIP  [facility]    I, Dr. Migel Stone, have personally seen and examined the patient with my advanced provider and agree with above. I personally did a focused exam, and reviewed all necessary clinical information. I discussed my management plan with my NP and agree with above. Patient and family updated at bedside.

## 2019-04-07 NOTE — PLAN OF CARE
SW met w/ pt and daughter for assessment.  Dtr assisted as pt speaks minimal English.  Pt lives w/ son who assists w/ pt care.  Pt denies HH or use of DME, denies Advanced Directives. Pt does not anticipate any d/c needs at present time.       04/07/19 1013   Discharge Assessment   Assessment Type Discharge Planning Assessment   Confirmed/corrected address and phone number on facesheet? Yes   Assessment information obtained from? Caregiver   Communicated expected length of stay with patient/caregiver yes   Prior to hospitilization cognitive status: Alert/Oriented   Prior to hospitalization functional status: Independent   Current cognitive status: Alert/Oriented   Current Functional Status: Independent   Facility Arrived From: home   Lives With child(nory), adult   Able to Return to Prior Arrangements yes   Is patient able to care for self after discharge? Yes   Who are your caregiver(s) and their phone number(s)? son:  Bienvenido Vargas  [phone]   daughter:  Yara Yancey  [phone]   Patient's perception of discharge disposition home or selfcare   Readmission Within the Last 30 Days no previous admission in last 30 days   Patient currently being followed by outpatient case management? No   Patient currently receives any other outside agency services? No   Equipment Currently Used at Home none   Do you have any problems affording any of your prescribed medications? No   Is the patient taking medications as prescribed? yes   Does the patient have transportation home? Yes   Transportation Anticipated family or friend will provide   Does the patient receive services at the [facility]? No   Discharge Plan A Home with family   Discharge Plan B Home with family   DME Needed Upon Discharge  none   Patient/Family in Agreement with Plan yes

## 2019-04-07 NOTE — PLAN OF CARE
04/07/19 1252   Final Note   Assessment Type Final Discharge Note   Anticipated Discharge Disposition Home

## 2019-04-07 NOTE — PLAN OF CARE
Problem: Adult Inpatient Plan of Care  Goal: Plan of Care Review  Medications reviewed and administered  Call light within reach   Bed low/wheels locked  Telemetry  Up with standby assist  SR up x 2  Safety maintained  NAD noted. No SOB symptoms  Family at bedside.

## 2019-04-07 NOTE — PLAN OF CARE
Problem: Adult Inpatient Plan of Care  Goal: Plan of Care Review  PT eval and treat completed. Gait 250ft x2 A-

## 2019-04-07 NOTE — PT/OT/SLP EVAL
Physical Therapy Evaluation and Discharge Note    Patient Name:  Sera Boone   MRN:  [id number]    Recommendations:     Discharge Recommendations:  home   Discharge Equipment Recommendations: none   Barriers to discharge: None    Assessment:     Sera Boone is a 83 y.o. female admitted with a medical diagnosis of CVA (cerebral vascular accident). .  At this time, patient is functioning at their prior level of function and does not require further acute PT services.     Recent Surgery: * No surgery found *      Plan:     During this hospitalization, patient does not require further acute PT services.  Please re-consult if situation changes.      Subjective   Pt stated that she feels better now, denies dizziness/tingling  Daughter stated pt is independent and does cooking, house chores, climbs up and down steps several times a day- lived in a 2 dafne home with son  Pt is talkative in Pashto- daughter at bedside translating  Chief Complaint: none  Patient/Family Comments/goals: go home today  Pain/Comfort:  · Pain Rating 1: 0/10    Patients cultural, spiritual, Episcopal conflicts given the current situation:      Living Environment:  Home with son  Prior to admission, patients level of function was independent, no DME.  Equipment used at home: none.  DME owned (not currently used): none.  Upon discharge, patient will have assistance from family.    Objective:     Communicated with nurse Mota prior to session.  Patient found HOB elevated with peripheral IV upon PT entry to room.    General Precautions: Standard,     Orthopedic Precautions:N/A   Braces: N/A     Exams:  · RLE ROM: WFL  · RLE Strength: WFL  · LLE ROM: WFL  · LLE Strength: WFL    Functional Mobility:  · Bed Mobility:     · Rolling Right: independence  · Scooting: independence  · Supine to Sit: supervision  · Sit to Supine: supervision  · Transfers:     · Sit to Stand:  supervision with no AD  · Gait: 250ft x2 with CGA- no gait  deviations    AM-PAC 6 CLICK MOBILITY  Total Score:23       Therapeutic Activities and Exercises:   back to ed post PT  Pt encouraged activities  Daughter at bedside supportive and assisting with care    AM-PAC 6 CLICK MOBILITY  Total Score:23     Patient left HOB elevated with all lines intact and call button in reach.    GOALS:   Multidisciplinary Problems     Physical Therapy Goals     Not on file                History:     Past Medical History:   Diagnosis Date    Arthritis     Breast cancer 2001    Diabetes mellitus     History of breast cancer 8/21/2013    Hyperlipidemia     Hypertension     Nuclear sclerotic cataract of right eye 10/16/2012    Osteoporosis, unspecified: see DEXA 8/15- 2/13/2017    Osteoporosis, unspecified: see DEXA 8/15- 2/13/2017    Pain of both shoulder joints 2/14/2017    Rheumatoid factor positive 2/14/2017    Stroke 4/6/2019    Tubular adenoma of colon 10/28/2013    Type II or unspecified type diabetes mellitus with neurological manifestations, not stated as uncontrolled(250.60)     Vitamin D deficiency disease 2/17/2014       Past Surgical History:   Procedure Laterality Date    BELPHAROPTOSIS REPAIR  03/15/13    bilateral-dr. coleman md    BLEPHAROPLASTY Bilateral 3/15/2013    Performed by [facility] at [facility] OR 1ST FLR    BREAST LUMPECTOMY      BREAST SURGERY Left 2001    lumpectomy    CATARACT EXTRACTION      both eyes -     CHOLECYSTECTOMY      Done in [place] in the 1980's    COLONOSCOPY N/A 1/19/2016    Performed by BLANCA Guerra MD at [facility] ENDO (4TH FLR)    COLONOSCOPY W/ POLYPECTOMY      INJECTION-STEROID-EPIDURAL-CERVICAL N/A 7/24/2017    [facility] at [facility] OR    INSERTION, IOL PROSTHESIS Right 12/3/2012    Performed by [facility] Hope MD at [facility] OR 1ST FLR    PHACOEMULSIFICATION, CATARACT Right 12/3/2012    Performed by [facility] MD at [facility] OR 1ST FLR    REPAIR, BLEPHAROPTOSIS Bilateral  3/15/2013    Performed by [facility] at [facility] OR [address] [place] [address]       Time Tracking:     PT Received On: 04/07/19  PT Start Time: 0958     PT Stop Time: 1011  PT Total Time (min): 13 min     Billable Minutes: Evaluation 13      Radha Wade, PT  04/07/2019

## 2019-04-07 NOTE — ASSESSMENT & PLAN NOTE
Chronic-prior records reviewed showed patient heart rate normally in the 60s and mild bradycardia is not a new finding for her  Telemetry  No home medications noted that would cause bradycardia  Monitor overnight-consider Cardiology consult  Trend troponins

## 2019-04-07 NOTE — PLAN OF CARE
Problem: Adult Inpatient Plan of Care  Goal: Plan of Care Review  Outcome: Ongoing (interventions implemented as appropriate)     04/07/19 0305   Plan of Care Review   Plan of Care Reviewed With patient   Pt alert and oriented. No distress noted. Up to BR tolerated well. VSS. Daughter at bedside all shift. Denies chest pain. 3L nc. BG monitored, insulin given per sliding scale. NS infusing 100ml/hr. HOB 30-45 degrees. Bed alarm on. Free of fall or injury. Side rails up x2. Call light in reach. Will continue to monitor.

## 2019-04-07 NOTE — HOSPITAL COURSE
The patient was monitored closely during her stay.  Neurology was consulted.   Her initial symptoms resolved on their own.  Patient was kept on continuous telemetry monitoring where she remained in sinus James/sinus rhythm. Prior records reviewed from the clinic shows patient's heart rate is usually in the 60s so mild bradycardia it is not new for her. Her orthostatic vital signs remained negative.  Her MRI and MRA of the brain without contrast showed no acute issues.  Her bilateral carotid ultrasound showed no significant stenosis.  The patient was seen by PT, OT, and ST.  She was not felt to need any further physical therapy.  The patient's overall condition remained stable and improved and she was discharged to home once cleared by Neurology.

## 2019-04-07 NOTE — DISCHARGE SUMMARY
[facility] Hospital Medicine  Discharge Summary    Patient Name: Sera Boone  MRN: [id number]  Admission Date: 4/6/2019  Hospital Length of Stay: 0 days  Discharge Date and Time: 4/7/2019  3:30 PM  Attending Physician: Naty att. providers found   Discharging Provider: GEO Rodriguez  Primary Care Provider: Joycelyn Vásquez MD    HPI:   Per ER report- this is a 83 y.o. female with a PMHx of DM, HTN, HLD, and T2DM who presents to the ED with son for further evaluation of numbness that started 2 days ago. The patient is German speaking and desires son to be interrupter even though Veronica was offered. Patient states that she started to have some numbness to the left side of her body. She states that it is a tingling that starts from the head and radiates down into her toes. She admits that the tingling is still present and is different than her typical fibromyalgia numbness. The patient admits that she is having some dizziness when she bends down or lays down. Patient also states that she is having some left ear pain as well. The patient also endorses some generalized fatigue since onset of the tingling and dizziness. She denies no recent trauma or injury, chest pain, SOB, headache, fever, abdominal pain, neck pain, nausea, vomiting, trouble swallowing, and visual disturbances. The patient has a PSHx of cataract extraction, breast surgery, and cholecystectomy.   patient placed in hospital for further evaluation treatment.    Patient seen and examined with son and daughter at bedside.  Patient requested son to translate during the interview and examination.  Patient reports now her initial symptoms are resolved and she feels much better and requesting to know when she can be discharged home.      * No surgery found *      Hospital Course:   The patient was monitored closely during her stay.  Neurology was consulted.   Her initial symptoms resolved on their own.  Patient was kept on  continuous telemetry monitoring where she remained in sinus James/sinus rhythm. Prior records reviewed from the clinic shows patient's heart rate is usually in the 60s so mild bradycardia it is not new for her. Her orthostatic vital signs remained negative.  Her MRI and MRA of the brain without contrast showed no acute issues.  Her bilateral carotid ultrasound showed no significant stenosis.  Patient also had an echocardiogram that showed patient had an EF of 55%, grade 1 diastolic dysfunction, the bubble study was negative. The patient was seen by PT, OT, and ST.  She was not felt to need any further physical therapy.  The patient's overall condition remained stable and improved and she was discharged to home once cleared by Neurology.       Consults:   Consults (From admission, onward)        Status Ordering Provider     Inpatient consult to neurology  Once     Provider:  DERRICK Mckenna          Final Active Diagnoses:    Diagnosis Date Noted POA    PRINCIPAL PROBLEM:  CVA (cerebral vascular accident) [I63.9] 04/06/2019 Yes    Bradycardia [R00.1] 04/06/2019 Yes    Left ear pain [H92.02] 04/06/2019 Yes    Bilateral carotid artery disease [I77.9] 04/06/2019 Yes    Diabetic polyneuropathy associated with type 2 diabetes mellitus [E11.42] 08/10/2015 Yes    Hypertension associated with diabetes [E11.59, I10] 05/15/2015 Yes    History of breast cancer [Z85.3] 08/21/2013 Not Applicable    Type II diabetes mellitus with neurological manifestations [E11.49] 02/22/2013 Yes    Hyperlipidemia associated with type 2 diabetes mellitus [E11.69, E78.5]  Yes      Problems Resolved During this Admission:    Diagnosis Date Noted Date Resolved POA    Dizziness [R42] 04/06/2019 04/07/2019 Yes    Has numbness [R20.8] 04/06/2019 04/07/2019 Yes     Discharged Condition: stable    Disposition: Home or Self Care    Follow Up:  Follow-up Information     Joycelyn Vásquez MD In 1 week.    Specialty:   Internal Medicine  Why:  Take blood pressure and pulse 2 times a day and keep log for follow-up  Contact information:  [address]  [facility] [id number]  [phone]               Patient was also to follow up with Dr. Migel Stone  in 1-2 weeks in the clinic      Patient Instructions:      Diet diabetic   Order Comments: 1800 ADA diet     Notify your health care provider if you experience any of the following:  severe uncontrolled pain     Notify your health care provider if you experience any of the following:  persistent nausea and vomiting or diarrhea     Notify your health care provider if you experience any of the following:  temperature >100.4     Notify your health care provider if you experience any of the following:  difficulty breathing or increased cough     Notify your health care provider if you experience any of the following:  persistent dizziness, light-headedness, or visual disturbances     Notify your health care provider if you experience any of the following:  increased confusion or weakness     Notify your health care provider if you experience any of the following:     Activity as tolerated   Order Comments: Fall precautions  No driving till cleared by Neurology     Significant Diagnostic Studies:     PT 10.3 INR 1.0    TSH 1.546    CT scan of the head without contrast- Mild brain atrophy, primarily frontal lobe.  Otherwise negative head CT.    Portable chest x-ray- No acute abnormality.    MRA of the brain without contrast-   COMPARISON:  None    FINDINGS:  There is flow in both vertebral arteries although the left vertebral is small in relation to the right.  There is then flow in the basilar artery to the [id number]-gh-[id number].  There is flow in both internal carotid arteries to the gqqbnu-eq-Swkvwd    At the [id number]-tx-Ylwhkn an aneurysm is not seen.  The A1 segment of the right anterior cerebral artery is rather small whereas the anterior cerebral artery past the anterior communicating  "artery is more normal in caliber.  The A1 segment of the left anterior cerebral artery is of normal caliber.  There is good flow in both anterior cerebral arteries and both middle cerebral arteries to the trifurcation vessels.  No mass effect, aneurysm or AVM is seen.  There is adequate flow in both posterior cerebral arteries without peripheral AVM or mass demonstrated.      Impression       Small A1 segment of the right anterior cerebral artery may be secondary to stenosis or congenital.  There is additional supply of the right anterior cerebral via the anterior communicating artery from the left circulation.  Otherwise negative MRA of the brain.      Electronically signed by: Gordon Gray MD  Date: 04/06/2019  Time: 13:49        MRI of the brain without contrast-     COMPARISON:  CT head of 6 April 2019.    FINDINGS:  The general brain anatomy appears normal with normal medulla, carolynn, brainstem, basal ganglion, corpus callosum and cerebral and cerebellar lobar structure.  The pituitary is not enlarged.  In fact it is situated at the floor of the sella turcica possible empty sella syndrome.  The internal auditory canals are sharp and symmetric.  The basal cisterns are clear and symmetric.    There is no mass effect or midline shift.  The ventricles are of normal size for the patient's age and symmetric.  These subarachnoid spaces are enlarged particularly over the frontal and anterior parietal lobes consistent with brain atrophy.  The gray-white matter differentiation is within normal limits.  Within the brain parenchyma hyper or hypointense lesions consistent with tumor, edema, CVA or hemorrhage is not seen.  No acute lesions on diffusion weighting are demonstrated.  No intracranial hemorrhage or hematoma is noted.      Impression       Possible "empty sella syndrome".  Mild brain atrophy, primarily frontal and anterior parietal lobes.  Otherwise negative MRI of the brain.      Electronically signed by: Gordon" MD Isaac  Date: 04/06/2019  Time: 13:44       Bilateral carotid ultrasound-    COMPARISON:  None.    FINDINGS:  Right:    Internal Carotid Artery (ICA) peak systolic velocity 67 cm/sec    ICA/CCA peak systolic velocity ratio: 1.0    Plaque formation: Intimal thickening without calcified plaque is noted    Vertebral artery: Antegrade flow and normal waveform.    Left:    Internal Carotid Artery (ICA)  peak systolic velocity 73 cm/sec    ICA/CCA peak systolic velocity ratio: 1.2    Plaque formation: Intimal thickening without calcified plaque is noted.    Vertebral artery: Antegrade flow and normal waveform.      Impression       No evidence of a hemodynamically significant carotid bifurcation stenosis.      Electronically signed by: Gordon Gray MD  Date: 04/06/2019  Time: 14:13       2D echocardiogram-    TRANSTHORACIC ECHO (TTE) COMPLETE W/ CONTRAST   Conclusion     · Normal left ventricular size and systolic function. The estimated ejection fraction is 55%. Grade 1 diastolic dysfunction.  · Normal right ventricular systolic function.  · No significant valvular disorder observed.  · Bubble study negative for R-L intra cardiac shunt.  · The estimated PA systolic pressure is 34 mm Hg      Vitals     Height Weight BMI (Calculated) BSA (Calculated - sq m) BP   5' (1.524 m) 60.8 kg (134 lb) 26.2 1.6 sq meters 118/58     Study Details     A complete echo was performed using complete 2D, color flow Doppler and spectral Doppler. Saline (bubble) contrast was used during the study.   Echocardiography Findings     Left Ventricle Normal ejection fraction at 55%. Normal left ventricular cavity size. Normal wall thickness observed. Grade I (mild) left ventricular diastolic dysfunction consistent with impaired relaxation. Normal left atrial pressure. No wall motion abnormalities.   Right Ventricle Normal cavity size and ejection fraction.   Left Atrium Normal atrial size. . Normal pulmonary venous flow.   Right Atrium  "Normal atrial size.   Aortic Valve The valve is trileaflet. Aortic valve sclerosis is mild. No stenosis. No regurgitation. Mobility is normal   Mitral Valve The following structural abnormalities were observed: non-specific thickening. No stenosis. No regurgitation. Normal leaflet mobility.   Tricuspid Valve The tricuspid valve is normal. No stenosis. Trace regurgitation. The estimated PA systolic pressure is 34 mmHg.   Pulmonic Valve The pulmonic valve was not well visualized. No stenosis. Trace regurgitation.   IVC/SVC Intermediate central venous pressure (8 mm Hg).   Ascending Aorta Aortic root normal in size.   Pericardium No pericardial effusion.   2D Measurements     Dimensions   LVIDD 4 cm      LVIDS 2.34 cm      IVS 0.7 cm      PW 0.9 cm      FS 42 %      LA size 3.37 cm      LV mass 93.46 g       Dimensions   RVDD 2.79 cm      TAPSE 3.02 cm       Aortic Root - End Diastolic   Ao root annulus 2.33 cm           Doppler Measurements - Diastolic Filling     Diastolic Filling   E/A ratio 0.68       IVRT 0.17 msec      Mean e' 0.07        E wave decelartion time 245.64 msec      Pulm vein "A" wave 111 msec      E/E' ratio 9.43          Doppler Measurements - Aortic Valve     Stenosis   LVOT diameter 1.99 cm      LVOT area 3.11 cm2      LVOT peak miguel ángel [id number] m/s      LVOT peak VTI 19.13 cm      Ao peak miguel ángel 1.57 m/s      Ao VTI 33.03 cm      AV valve area 1.8 cm2      AV mean gradient 5.44 mmHg      AV peak gradient 9.86 mmHg      AV Velocity Ratio 0.58       AV index (prosthetic) 0.58             Doppler Measurements - Mitral Valve     PISA-MS   MV Peak E Miguel Ángel 0.66 m/s             Doppler Measurements - Shunt Ratio     Shunt Ratio   LVOT stroke volume 59.47 cm3          Doppler Measurements - Tricuspid Valve     PISA   TR Max Miguel Ángel 2.56 m/s       Stress Evaluation   TV rest pulmonary artery pressure 34 mmHg           Signed     Electronically signed by Tammy Colon MD on 4/7/19 at 1028 CDT     [facility]   Recent Labs "   Lab 04/06/19  1030      K 4.4      CO2 28   *   BUN 19   CREATININE 0.9   CALCIUM 10.7*   PROT 7.6   ALBUMIN 3.9   BILITOT 0.6   ALKPHOS 49*   AST 21   ALT 18   ANIONGAP 8   ESTGFRAFRICA >60   EGFRNONAA 59*   CBC   Recent Labs   Lab 04/06/19  1030   WBC 5.80   HGB 12.3   HCT 37.6      INR   Lab Results   Component Value Date    INR 1.0 04/06/2019   Lipid Panel   Lab Results   Component Value Date    CHOL 171 04/06/2019    HDL 58 04/06/2019    LDLCALC 96.8 04/06/2019    TRIG 81 04/06/2019    CHOLHDL 33.9 04/06/2019   Troponin   Recent Labs   Lab 04/06/19  2150   TROPONINI <0.006      Recent Labs   Lab 01/25/19  0850 04/06/19  1757   HGBA1C 6.5* 6.5*       Pending Diagnostic Studies:     None         Medications:  Reconciled Home Medications:      Medication List      START taking these medications    acetaminophen 325 MG tablet  Commonly known as:  TYLENOL  Take 2 tablets (650 mg total) by mouth every 4 (four) hours as needed.        CONTINUE taking these medications    aspirin 81 MG Chew  Take 1 tablet (81 mg total) by mouth once daily.     atorvastatin 40 MG tablet  Commonly known as:  LIPITOR  TAKE 1 TABLET BY MOUTH ONCE DAILY     cholecalciferol (vitamin D3) 1,000 unit capsule  Commonly known as:  VITAMIN D3  Take 2 capsules (2,000 Units total) by mouth once daily.     ciclopirox 8 % Soln  Commonly known as:  PENLAC  Apply topically nightly.     losartan 50 MG tablet  Commonly known as:  COZAAR  TAKE 1 TABLET BY MOUTH ONCE DAILY     metFORMIN 500 MG tablet  Commonly known as:  GLUCOPHAGE  TAKE 1 TABLET(500 MG) BY MOUTH TWICE DAILY     omega-3 acid ethyl esters 1 gram capsule  Commonly known as:  LOVAZA  TAKE 1 CAPSULE BY MOUTH TWICE DAILY            Indwelling Lines/Drains at time of discharge:   Lines/Drains/Airways          None          Time spent on the discharge of patient: 58 minutes  Patient was seen and examined on the date of discharge and determined to be suitable for  discharge.      Yenny Pham, ABBIP  Department of Hospital Medicine  [facility]

## 2019-04-08 ENCOUNTER — TELEPHONE (OUTPATIENT)
Dept: INTERNAL MEDICINE | Facility: CLINIC | Age: 84
End: 2019-04-08

## 2019-04-08 ENCOUNTER — TELEPHONE (OUTPATIENT)
Dept: MEDSURG UNIT | Facility: HOSPITAL | Age: 84
End: 2019-04-08

## 2019-04-08 DIAGNOSIS — G45.9 TIA (TRANSIENT ISCHEMIC ATTACK): Primary | ICD-10-CM

## 2019-04-08 NOTE — TELEPHONE ENCOUNTER
Please contact her.  She was just in the hospital.  It does not look like she had a stroke but it is recommended that she see vascular neurology in 2 weeks; this was not scheduled.  Please assist with scheduling.  Thank you    Also, should see me within the next 2-3 weeks time as well.  Please schedule and let me know.

## 2019-04-09 ENCOUNTER — HOSPITAL ENCOUNTER (OUTPATIENT)
Dept: RADIOLOGY | Facility: HOSPITAL | Age: 84
Discharge: HOME | End: 2019-04-09
Attending: INTERNAL MEDICINE
Payer: MEDICARE

## 2019-04-09 ENCOUNTER — PATIENT MESSAGE (OUTPATIENT)
Dept: INTERNAL MEDICINE | Facility: CLINIC | Age: 84
End: 2019-04-09

## 2019-04-09 VITALS — HEIGHT: 60 IN | BODY MASS INDEX: 26.31 KG/M2 | WEIGHT: 134 LBS

## 2019-04-09 PROCEDURE — 77066 DX MAMMO INCL CAD BI: CPT | Mod: 26,HCNC,, | Performed by: RADIOLOGY

## 2019-04-09 PROCEDURE — 77066 DX MAMMO INCL CAD BI: CPT | Mod: TC,HCNC,PO

## 2019-04-09 PROCEDURE — 77062 BREAST TOMOSYNTHESIS BI: CPT | Mod: 26,HCNC,, | Performed by: RADIOLOGY

## 2019-04-09 PROCEDURE — 77062 MAMMO DIGITAL DIAGNOSTIC BILAT WITH TOMOSYNTHESIS_CAD: ICD-10-PCS | Mod: 26,HCNC,, | Performed by: RADIOLOGY

## 2019-04-09 PROCEDURE — 77066 MAMMO DIGITAL DIAGNOSTIC BILAT WITH TOMOSYNTHESIS_CAD: ICD-10-PCS | Mod: 26,HCNC,, | Performed by: RADIOLOGY

## 2019-04-10 ENCOUNTER — OFFICE VISIT (OUTPATIENT)
Dept: RHEUMATOLOGY | Facility: CLINIC | Age: 84
End: 2019-04-10
Payer: MEDICARE

## 2019-04-10 VITALS
BODY MASS INDEX: 27.61 KG/M2 | HEART RATE: 65 BPM | HEIGHT: 60 IN | WEIGHT: 140.63 LBS | DIASTOLIC BLOOD PRESSURE: 75 MMHG | SYSTOLIC BLOOD PRESSURE: 133 MMHG

## 2019-04-10 DIAGNOSIS — Z55.9 EDUCATIONAL CIRCUMSTANCE: ICD-10-CM

## 2019-04-10 DIAGNOSIS — M75.02 ADHESIVE CAPSULITIS OF LEFT SHOULDER: ICD-10-CM

## 2019-04-10 DIAGNOSIS — M15.9 GENERALIZED OSTEOARTHRITIS OF MULTIPLE SITES: Primary | ICD-10-CM

## 2019-04-10 DIAGNOSIS — M25.512 CHRONIC LEFT SHOULDER PAIN: ICD-10-CM

## 2019-04-10 DIAGNOSIS — G89.29 CHRONIC LEFT SHOULDER PAIN: ICD-10-CM

## 2019-04-10 DIAGNOSIS — M79.7 FIBROMYALGIA: ICD-10-CM

## 2019-04-10 DIAGNOSIS — R76.8 RHEUMATOID FACTOR POSITIVE: ICD-10-CM

## 2019-04-10 PROCEDURE — 3075F SYST BP GE 130 - 139MM HG: CPT | Mod: HCNC,CPTII,S$GLB, | Performed by: INTERNAL MEDICINE

## 2019-04-10 PROCEDURE — 99999 PR PBB SHADOW E&M-EST. PATIENT-LVL III: ICD-10-PCS | Mod: PBBFAC,HCNC,, | Performed by: INTERNAL MEDICINE

## 2019-04-10 PROCEDURE — 3078F DIAST BP <80 MM HG: CPT | Mod: HCNC,CPTII,S$GLB, | Performed by: INTERNAL MEDICINE

## 2019-04-10 PROCEDURE — 3075F PR MOST RECENT SYSTOLIC BLOOD PRESS GE 130-139MM HG: ICD-10-PCS | Mod: HCNC,CPTII,S$GLB, | Performed by: INTERNAL MEDICINE

## 2019-04-10 PROCEDURE — 99214 OFFICE O/P EST MOD 30 MIN: CPT | Mod: HCNC,25,S$GLB, | Performed by: INTERNAL MEDICINE

## 2019-04-10 PROCEDURE — 1101F PT FALLS ASSESS-DOCD LE1/YR: CPT | Mod: HCNC,CPTII,S$GLB, | Performed by: INTERNAL MEDICINE

## 2019-04-10 PROCEDURE — 99999 PR PBB SHADOW E&M-EST. PATIENT-LVL III: CPT | Mod: PBBFAC,HCNC,, | Performed by: INTERNAL MEDICINE

## 2019-04-10 PROCEDURE — 99214 PR OFFICE/OUTPT VISIT, EST, LEVL IV, 30-39 MIN: ICD-10-PCS | Mod: HCNC,25,S$GLB, | Performed by: INTERNAL MEDICINE

## 2019-04-10 PROCEDURE — 3078F PR MOST RECENT DIASTOLIC BLOOD PRESSURE < 80 MM HG: ICD-10-PCS | Mod: HCNC,CPTII,S$GLB, | Performed by: INTERNAL MEDICINE

## 2019-04-10 PROCEDURE — 20605 PR DRAIN/INJECT INTERMEDIATE JOINT/BURSA: ICD-10-PCS | Mod: HCNC,LT,S$GLB, | Performed by: INTERNAL MEDICINE

## 2019-04-10 PROCEDURE — 1101F PR PT FALLS ASSESS DOC 0-1 FALLS W/OUT INJ PAST YR: ICD-10-PCS | Mod: HCNC,CPTII,S$GLB, | Performed by: INTERNAL MEDICINE

## 2019-04-10 PROCEDURE — 20605 DRAIN/INJ JOINT/BURSA W/O US: CPT | Mod: HCNC,LT,S$GLB, | Performed by: INTERNAL MEDICINE

## 2019-04-10 RX ORDER — TRIAMCINOLONE ACETONIDE 40 MG/ML
40 INJECTION, SUSPENSION INTRA-ARTICULAR; INTRAMUSCULAR ONCE
Status: COMPLETED | OUTPATIENT
Start: 2019-04-10 | End: 2019-04-10

## 2019-04-10 RX ADMIN — TRIAMCINOLONE ACETONIDE 40 MG: 40 INJECTION, SUSPENSION INTRA-ARTICULAR; INTRAMUSCULAR at 09:04

## 2019-04-10 SDOH — SOCIAL DETERMINANTS OF HEALTH (SDOH): PROBLEMS RELATED TO EDUCATION AND LITERACY, UNSPECIFIED: Z55.9

## 2019-04-10 ASSESSMENT — ROUTINE ASSESSMENT OF PATIENT INDEX DATA (RAPID3)
MDHAQ FUNCTION SCORE: 1.3
PSYCHOLOGICAL DISTRESS SCORE: 0
PATIENT GLOBAL ASSESSMENT SCORE: 6
WHEN YOU AWAKENED IN THE MORNING OVER THE LAST WEEK, PLEASE INDICATE THE AMOUNT OF TIME IT TAKES UNTIL YOU ARE AS LIMBER AS YOU WILL BE FOR THE DAY: 2.5HRS
FATIGUE SCORE: 0
TOTAL RAPID3 SCORE: 6.11
PAIN SCORE: 8
AM STIFFNESS SCORE: 1, YES

## 2019-04-10 NOTE — PATIENT INSTRUCTIONS
Do the shoulder exercises shown.    Try Paraffin wax baths for the hands as discussed.    Use topicals    Can use up to 3,000 mg of acetaminophen/day.

## 2019-04-10 NOTE — PROGRESS NOTES
Subjective:     Patient ID: Sera Boone is a 83 y.o. female     Chief Complaint: No chief complaint on file.       HPI   83 yr old lady Monegasque speaking lady seen by Dr. Cheema once & seen by me once 1 year ago on 3/29/18. She has generalized osteoarthritis & fibromyalgia and the presence of low level +RF. At her last visit she has L bicipital tendinitis and L AC arthritis and both were injected. She was provided with fibromyalgia info in Monegasque.    Now having problems with bilateral traps L>R and left shoulder especially with certain movements and at night.  The injections of one year ago helped for a while, at least several months. She is requesting injections today. She also is c/o bilateral base of thumbs pain w movements. No swelling. Has variable AM stiffness.  Goes to a gym with her son periodically.    Denies SLE & CTD sxs.         Current Outpatient Medications   Medication Sig Dispense Refill    acetaminophen (TYLENOL) 325 MG tablet Take 2 tablets (650 mg total) by mouth every 4 (four) hours as needed.  0    aspirin 81 MG Chew Take 1 tablet (81 mg total) by mouth once daily.      atorvastatin (LIPITOR) 40 MG tablet TAKE 1 TABLET BY MOUTH ONCE DAILY 90 tablet 0    cholecalciferol, vitamin D3, 1,000 unit capsule Take 2 capsules (2,000 Units total) by mouth once daily. 60 capsule 12    ciclopirox (PENLAC) 8 % Soln Apply topically nightly. 6.6 mL 11    losartan (COZAAR) 50 MG tablet TAKE 1 TABLET BY MOUTH ONCE DAILY 90 tablet 0    metFORMIN (GLUCOPHAGE) 500 MG tablet TAKE 1 TABLET(500 MG) BY MOUTH TWICE DAILY 180 tablet 0    omega-3 acid ethyl esters (LOVAZA) 1 gram capsule TAKE 1 CAPSULE BY MOUTH TWICE DAILY 180 capsule 0     No current facility-administered medications for this visit.          Review of patient's allergies indicates:   Allergen Reactions    No known drug allergies        Review of Systems   Constitutional: Negative.  Negative for fatigue.   HENT: Negative.  Negative for mouth  sores and trouble swallowing.    Eyes: Negative.  Negative for photophobia and pain.        Dry eyes   Respiratory: Negative.  Negative for chest tightness and shortness of breath.    Cardiovascular: Negative for chest pain and palpitations.   Gastrointestinal: Negative.  Negative for abdominal pain, diarrhea and nausea.   Endocrine: Negative.  Negative for cold intolerance and heat intolerance.   Genitourinary: Negative for dysuria and urgency.   Musculoskeletal: Positive for arthralgias, myalgias and neck stiffness. Negative for back pain and neck pain.   Skin: Negative.  Negative for rash.   Neurological: Positive for numbness. Negative for dizziness and light-headedness.   Hematological: Negative.  Does not bruise/bleed easily.   Psychiatric/Behavioral: Positive for sleep disturbance (only falling asleep is a problem). Negative for dysphoric mood.       Past Medical History:   Diagnosis Date    Arthritis     Breast cancer 2001    Diabetes mellitus     History of breast cancer 8/21/2013    Hyperlipidemia     Hypertension     Nuclear sclerotic cataract of right eye 10/16/2012    Osteoporosis, unspecified: see DEXA 8/15- 2/13/2017    Osteoporosis, unspecified: see DEXA 8/15- 2/13/2017    Pain of both shoulder joints 2/14/2017    Rheumatoid factor positive 2/14/2017    Stroke 4/6/2019    Tubular adenoma of colon 10/28/2013    Type II or unspecified type diabetes mellitus with neurological manifestations, not stated as uncontrolled(250.60)     Vitamin D deficiency disease 2/17/2014       Past Surgical History:   Procedure Laterality Date    BELPHAROPTOSIS REPAIR  03/15/13    bilateral-dr. coleman md    BLEPHAROPLASTY Bilateral 3/15/2013    [facility] [address] [place] [address]    BREAST BIOPSY      BREAST LUMPECTOMY      BREAST SURGERY Left 2001    lumpectomy    CATARACT EXTRACTION      both eyes -     CHOLECYSTECTOMY      Done in [place] in the 1980's     COLONOSCOPY N/A 1/19/2016    Performed by BLANCA Guerra [facility] at [facility] ENDO (4TH FLR)    COLONOSCOPY W/ POLYPECTOMY      INJECTION-STEROID-EPIDURAL-CERVICAL N/A 7/24/2017    Performed by Francisco Rosenthal MD at [facility]    INSERTION, IOL PROSTHESIS Right 12/3/2012    Performed by Shasha Hope MD at [facility] OR 1ST FLR    PHACOEMULSIFICATION, CATARACT Right 12/3/2012    Performed by Sh[facility] at [facility] OR 1ST FLR    REPAIR, BLEPHAROPTOSIS Bilateral 3/15/2013    Performed by Lynn [facility]i [facility] at [facility] OR 1ST FLR       Family History   Problem Relation Age of Onset    Breast cancer Sister 48        55 second, bilateral    Cancer Sister         Breast    Liver cancer Mother     Early death Mother     Cancer Mother         liver    Liver cancer Father     Cancer Father     Liver cancer Brother     Cancer Brother         ? liver    No Known Problems Daughter     No Known Problems Son     Liver cancer Brother     Cancer Brother         ? liver    No Known Problems Daughter     No Known Problems Maternal Aunt     No Known Problems Maternal Uncle     No Known Problems Paternal Aunt     No Known Problems Paternal Uncle     No Known Problems Maternal Grandmother     No Known Problems Maternal Grandfather     No Known Problems Paternal Grandmother     No Known Problems Paternal Grandfather     Glaucoma Neg Hx     Amblyopia Neg Hx     Blindness Neg Hx     Cataracts Neg Hx     Diabetes Neg Hx     Hypertension Neg Hx     Macular degeneration Neg Hx     Retinal detachment Neg Hx     Strabismus Neg Hx     Stroke Neg Hx     Thyroid disease Neg Hx     Cervical cancer Neg Hx     Vaginal cancer Neg Hx     Endometrial cancer Neg Hx        Social History     Tobacco Use    Smoking status: Never Smoker    Smokeless tobacco: Never Used   Substance Use Topics    Alcohol use: No    Drug use: No       Objective:     /75   Pulse 65   Ht 5' (1.524 m)   Wt 63.8 kg (140 lb 10.5 oz)   BMI  27.47 kg/m²     Physical Exam   Vitals reviewed.  Constitutional: She is oriented to person, place, and time and well-developed, well-nourished, and in no distress. No distress.   HENT:   Head: Normocephalic and atraumatic.   Mouth/Throat: Oropharynx is clear and moist. No oropharyngeal exudate.   No facial rashes  Parotids not enlarged  No oral ulcers   Eyes: Conjunctivae and EOM are normal. Pupils are equal, round, and reactive to light. Right eye exhibits no discharge. Left eye exhibits no discharge. No scleral icterus.   Neck: Neck supple. No JVD present. No tracheal deviation present. No thyromegaly present.   Cardiovascular: Normal rate, regular rhythm, normal heart sounds and intact distal pulses.  Exam reveals no gallop and no friction rub.    No murmur heard.  Pulmonary/Chest: Effort normal and breath sounds normal. No respiratory distress. She has no wheezes. She has no rales. She exhibits no tenderness.   Abdominal: Soft. Bowel sounds are normal. She exhibits no distension and no mass. There is no splenomegaly or hepatomegaly. There is no tenderness. There is no rebound and no guarding.   Lymphadenopathy:     She has no cervical adenopathy.        Right: No inguinal adenopathy present.        Left: No inguinal adenopathy present.   Neurological: She is alert and oriented to person, place, and time. She has normal reflexes. No cranial nerve deficit. Gait normal.   Proximal and distal muscle strength 5/5.   Skin: Skin is warm and dry. No rash noted. She is not diaphoretic.     Psychiatric: Mood, memory, affect and judgment normal.   Limited assessment due to language barrier.   Musculoskeletal: Normal range of motion. She exhibits tenderness (several  tender pts rated 5/5; also diffuse trap tenderness L>R). She exhibits no edema.   Cspine decrease extension, lateral flexion and rotation  Tspine mild kyphosis; no tenderness  Lspine adequate ROM;  no spinal tenderness.  TMJ: unremarkable  Shoulders: L missing  about 20 degrees; mild bicipital tenderness; AC joint ok;  limited internal rotation; no synovitis; R ok;   Elbows: FROM; no synovitis; no tophi or nodules  Wrists: FROM; no synovitis;    MCPs: Tenderness of both 1st C-MCPs; no synovitis; negative Finkelstein's; FROM; no metacarpalgia;  ok;  PIPs:FROM; no synovitis;   DIPs: FROM; no synovitis;   HIPS: FROM  Knees: FROM; no synovitis; no instability;  Ankles: FROM: no synovitis   Toes: ok; no metatarsalgia.              4/6/19: CBC ok; CMP GFR 59;   3/29/18: ESR 18; CRP 2.6; CBC Ht 36.2; CMP ok; RF 18; Vit D 55  5/30/17: RF/CCP neg;    3/21/18: LSpine: personally reviewed: mild dextroconvex curvature; mod OA L2 to S1;   3/21/18: Bilateral hips: personally reviewed: mild OA hips/SI jts; symphysis pubis.  6/6/17: CT Cspine; mod OA kel C4-5 & C5-5.  Assessment:   Left shoulder pain  L adhesive capsulitis  Generalized osteoarthritis   OA of hands  L bicipital tendinitis  Fibromyalgia   But denies fatigue  +RF (low level) intermittently with no evidence of inflammatory disease    Plan:   PROCEDURE NOTE:  Local and systemic side effects and toxicities of steroids were discussed prior to procedure. Hyperglycemia in a diabetic was especially pointed out.   With patient's request and permission the left shoulder joint was sterilely prepped. Topical anesthetic was sprayed and a mixture of  lidocaine 1% and 40 mg  Kenalog was injected into the joint using the posterior approach Patient tolerated the procedure well. Icing and contacting us was recommended it if it begins to hurt.     Posture elements reviewed.   Keep exercising.  Showed 3 types of shoulder exercises to do.  Avoid NSAIDs; acetaminophen in moderation; topicals preferred.  Ok for Curcumin use as per patient's request.   Paraffin wax to hands.    RTC prn.

## 2019-04-11 NOTE — TELEPHONE ENCOUNTER
Called Neurology, left message for Ava who works for [facility] to call me back and schedule appt.

## 2019-04-16 ENCOUNTER — PATIENT OUTREACH (OUTPATIENT)
Dept: ADMINISTRATIVE | Facility: HOSPITAL | Age: 84
End: 2019-04-16

## 2019-04-16 NOTE — PROGRESS NOTES
Chart review completed. Immunizations reconciled, HM modifiers updated, HM duplicate entries deleted, care team updated, old orders deleted. Pt health maintenance is currently up to date.

## 2019-04-17 ENCOUNTER — OFFICE VISIT (OUTPATIENT)
Dept: INTERNAL MEDICINE | Facility: CLINIC | Age: 84
End: 2019-04-17
Payer: MEDICARE

## 2019-04-17 ENCOUNTER — TELEPHONE (OUTPATIENT)
Dept: INTERNAL MEDICINE | Facility: CLINIC | Age: 84
End: 2019-04-17

## 2019-04-17 VITALS
BODY MASS INDEX: 25.97 KG/M2 | HEIGHT: 60 IN | WEIGHT: 132.25 LBS | DIASTOLIC BLOOD PRESSURE: 65 MMHG | SYSTOLIC BLOOD PRESSURE: 125 MMHG

## 2019-04-17 DIAGNOSIS — N25.81 SECONDARY HYPERPARATHYROIDISM: ICD-10-CM

## 2019-04-17 DIAGNOSIS — E78.5 HYPERLIPIDEMIA ASSOCIATED WITH TYPE 2 DIABETES MELLITUS: ICD-10-CM

## 2019-04-17 DIAGNOSIS — I15.2 HYPERTENSION ASSOCIATED WITH DIABETES: ICD-10-CM

## 2019-04-17 DIAGNOSIS — I63.9 CEREBROVASCULAR ACCIDENT (CVA), UNSPECIFIED MECHANISM: Primary | ICD-10-CM

## 2019-04-17 DIAGNOSIS — E11.49 TYPE II DIABETES MELLITUS WITH NEUROLOGICAL MANIFESTATIONS: ICD-10-CM

## 2019-04-17 DIAGNOSIS — M81.0 OSTEOPOROSIS WITHOUT CURRENT PATHOLOGICAL FRACTURE, UNSPECIFIED OSTEOPOROSIS TYPE: ICD-10-CM

## 2019-04-17 DIAGNOSIS — E11.59 HYPERTENSION ASSOCIATED WITH DIABETES: ICD-10-CM

## 2019-04-17 DIAGNOSIS — E23.6 EMPTY SELLA: ICD-10-CM

## 2019-04-17 DIAGNOSIS — N18.30 STAGE 3 CHRONIC KIDNEY DISEASE: ICD-10-CM

## 2019-04-17 DIAGNOSIS — E11.69 HYPERLIPIDEMIA ASSOCIATED WITH TYPE 2 DIABETES MELLITUS: ICD-10-CM

## 2019-04-17 PROBLEM — R74.8 ELEVATED LIVER ENZYMES: Status: RESOLVED | Noted: 2017-02-13 | Resolved: 2019-04-17

## 2019-04-17 PROBLEM — H92.02 LEFT EAR PAIN: Status: RESOLVED | Noted: 2019-04-06 | Resolved: 2019-04-17

## 2019-04-17 PROBLEM — N13.30 HYDRONEPHROSIS: Status: RESOLVED | Noted: 2017-03-16 | Resolved: 2019-04-17

## 2019-04-17 PROBLEM — I77.9 BILATERAL CAROTID ARTERY DISEASE: Status: RESOLVED | Noted: 2019-04-06 | Resolved: 2019-04-17

## 2019-04-17 PROCEDURE — 3078F DIAST BP <80 MM HG: CPT | Mod: CPTII,S$GLB,, | Performed by: INTERNAL MEDICINE

## 2019-04-17 PROCEDURE — 99999 PR PBB SHADOW E&M-EST. PATIENT-LVL V: CPT | Mod: PBBFAC,HCNC,, | Performed by: INTERNAL MEDICINE

## 2019-04-17 PROCEDURE — 99215 PR OFFICE/OUTPT VISIT, EST, LEVL V, 40-54 MIN: ICD-10-PCS | Mod: S$GLB,,, | Performed by: INTERNAL MEDICINE

## 2019-04-17 PROCEDURE — 3078F PR MOST RECENT DIASTOLIC BLOOD PRESSURE < 80 MM HG: ICD-10-PCS | Mod: CPTII,S$GLB,, | Performed by: INTERNAL MEDICINE

## 2019-04-17 PROCEDURE — 99215 OFFICE O/P EST HI 40 MIN: CPT | Mod: S$GLB,,, | Performed by: INTERNAL MEDICINE

## 2019-04-17 PROCEDURE — 3074F PR MOST RECENT SYSTOLIC BLOOD PRESSURE < 130 MM HG: ICD-10-PCS | Mod: CPTII,S$GLB,, | Performed by: INTERNAL MEDICINE

## 2019-04-17 PROCEDURE — 99999 PR PBB SHADOW E&M-EST. PATIENT-LVL V: ICD-10-PCS | Mod: PBBFAC,HCNC,, | Performed by: INTERNAL MEDICINE

## 2019-04-17 PROCEDURE — 3074F SYST BP LT 130 MM HG: CPT | Mod: CPTII,S$GLB,, | Performed by: INTERNAL MEDICINE

## 2019-04-17 PROCEDURE — 1101F PR PT FALLS ASSESS DOC 0-1 FALLS W/OUT INJ PAST YR: ICD-10-PCS | Mod: CPTII,S$GLB,, | Performed by: INTERNAL MEDICINE

## 2019-04-17 PROCEDURE — 99499 RISK ADDL DX/OHS AUDIT: ICD-10-PCS | Mod: HCNC,S$GLB,, | Performed by: INTERNAL MEDICINE

## 2019-04-17 PROCEDURE — 1101F PT FALLS ASSESS-DOCD LE1/YR: CPT | Mod: CPTII,S$GLB,, | Performed by: INTERNAL MEDICINE

## 2019-04-17 PROCEDURE — 99499 UNLISTED E&M SERVICE: CPT | Mod: HCNC,S$GLB,, | Performed by: INTERNAL MEDICINE

## 2019-04-17 RX ORDER — ALENDRONATE SODIUM 35 MG/1
35 TABLET ORAL
Qty: 4 TABLET | Refills: 11 | Status: SHIPPED | OUTPATIENT
Start: 2019-04-17 | End: 2020-03-03

## 2019-04-17 RX ORDER — INSULIN PUMP SYRINGE, 3 ML
EACH MISCELLANEOUS
Qty: 1 EACH | Refills: 0 | Status: SHIPPED | OUTPATIENT
Start: 2019-04-17 | End: 2023-09-28

## 2019-04-17 NOTE — PROGRESS NOTES
"Transitional Care Note  Subjective:       Patient ID: Sera Boone is a 83 y.o. female.  Chief Complaint: Hospital Follow Up    Family and/or Caretaker present at visit?  No.  Diagnostic tests reviewed/disposition: I have reviewed all completed as well as pending diagnostic tests at the time of discharge.  Disease/illness education: yes  Home health/community services discussion/referrals: Patient does not have home health  Establishment or re-establishment of referral orders for community resources: No other necessary community resources.   Discussion with other health care providers: No discussion with other health care providers necessary.     Hospital summary:    "The patient is Turkish speaking and desires son to be interrupter even though Veronica was offered. Patient states that she started to have some numbness to the left side of her body. She states that it is a tingling that starts from the head and radiates down into her toes. She admits that the tingling is still present and is different than her typical fibromyalgia numbness. The patient admits that she is having some dizziness when she bends down or lays down. Patient also states that she is having some left ear pain as well. The patient also endorses some generalized fatigue since onset of the tingling and dizziness. She denies no recent trauma or injury, chest pain, SOB, headache, fever, abdominal pain, neck pain, nausea, vomiting, trouble swallowing, and visual disturbances. The patient has a PSHx of cataract extraction, breast surgery, and cholecystectomy.   patient placed in hospital for further evaluation treatment.     Patient seen and examined with son and daughter at bedside.  Patient requested son to translate during the interview and examination.  Patient reports now her initial symptoms are resolved and she feels much better and requesting to know when she can be discharged home.         Hospital Course:   The patient was monitored " "closely during her stay.  Neurology was consulted.   Her initial symptoms resolved on their own.  Patient was kept on continuous telemetry monitoring where she remained in sinus James/sinus rhythm. Prior records reviewed from the clinic shows patient's heart rate is usually in the 60s so mild bradycardia it is not new for her. Her orthostatic vital signs remained negative.  Her MRI and MRA of the brain without contrast showed no acute issues.  Her bilateral carotid ultrasound showed no significant stenosis.  Patient also had an echocardiogram that showed patient had an EF of 55%, grade 1 diastolic dysfunction, the bubble study was negative. The patient was seen by PT, OT, and ST.  She was not felt to need any further physical therapy.  The patient's overall condition remained stable and improved and she was discharged to home once cleared by Neurology."     I reviewed the neurology consultation which indicated she had had a TIA an aspirin daily was recommended.  Endocrinology follow-up for empty sella was also recommended.  Neither appointment was scheduled for the patient.    Other assessments were reviewed.  Bone density revealed osteopenia, it is recommended that she be on treatment.    She has mild CKD and secondary hyperparathyroidism which is very mild with a calcium of 10.7.  This was reviewed.    Previously, has a diagnosis of hydronephrosis although this was not seen on most recent assessment.  She had been seen in Urology in 2017 and discharged.    Patient Active Problem List   Diagnosis    Hyperlipidemia associated with type 2 diabetes mellitus    Type II diabetes mellitus with neurological manifestations    Ptosis of eyebrow    History of breast cancer    Vitamin D deficiency disease    Hypertension associated with diabetes    History of adenomatous polyp of colon 1/16 no need for further follow up per Dr Guerar    Diabetic polyneuropathy associated with type 2 diabetes mellitus    Fatty liver: " see ultrasound 1/17    Rheumatoid factor positive    Stage 3 chronic kidney disease    Spondylosis of cervical region without myelopathy or radiculopathy    Primary osteoarthritis involving multiple joints    Osteoporosis without current pathological fracture    DDD (degenerative disc disease), cervical    Bradycardia    CVA (cerebral vascular accident)    Secondary hyperparathyroidism                HPI  Review of Systems   Constitutional: Negative for activity change, appetite change, chills, fatigue and fever.   HENT: Negative for congestion, hearing loss, sinus pressure and sore throat.    Eyes: Negative for visual disturbance.   Respiratory: Negative for apnea, cough, shortness of breath and wheezing.    Cardiovascular: Negative for chest pain, palpitations and leg swelling.   Gastrointestinal: Negative for abdominal distention, abdominal pain, constipation, diarrhea, nausea and vomiting.   Genitourinary: Negative for dysuria, frequency, hematuria and vaginal bleeding.   Musculoskeletal: Negative for gait problem, joint swelling and myalgias.   Skin: Negative for rash.   Neurological: Negative for dizziness, weakness, light-headedness and headaches.        No further symptoms   Hematological: Negative for adenopathy. Does not bruise/bleed easily.   Psychiatric/Behavioral: Negative for confusion, hallucinations, sleep disturbance and suicidal ideas.       Objective:      Physical Exam   Constitutional: She is oriented to person, place, and time. She appears well-developed and well-nourished.   HENT:   Head: Normocephalic and atraumatic.   Right Ear: External ear normal.   Left Ear: External ear normal.   Nose: Nose normal.   Mouth/Throat: Oropharynx is clear and moist. No oropharyngeal exudate.   Eyes: Conjunctivae and EOM are normal. No scleral icterus.   Neck: Normal range of motion. Neck supple. No JVD present. No thyromegaly present.   Cardiovascular: Normal rate, regular rhythm, normal heart sounds  and intact distal pulses. Exam reveals no gallop.   No murmur heard.  Pulmonary/Chest: Effort normal and breath sounds normal. No respiratory distress. She has no wheezes.   Abdominal: Soft. Bowel sounds are normal. She exhibits no distension and no mass. There is no tenderness. There is no rebound and no guarding.   Musculoskeletal: Normal range of motion. She exhibits no edema or tenderness.   Lymphadenopathy:     She has no cervical adenopathy.   Neurological: She is alert and oriented to person, place, and time. She displays normal reflexes. No cranial nerve deficit. Coordination normal.   Skin: Skin is warm. No rash noted. No erythema.   Psychiatric: She has a normal mood and affect. Her behavior is normal. Judgment and thought content normal.   Nursing note and vitals reviewed.      Assessment:       1. Cerebrovascular accident (CVA), unspecified mechanism    2. Hyperlipidemia associated with type 2 diabetes mellitus    3. Hypertension associated with diabetes    4. Stage 3 chronic kidney disease    5. Type II diabetes mellitus with neurological manifestations    6. Osteoporosis without current pathological fracture, unspecified osteoporosis type    7. Secondary hyperparathyroidism    8. Empty sella        Plan:       Sera was seen today for hospital follow up.    Diagnoses and all orders for this visit:    Cerebrovascular accident (CVA), unspecified mechanism:  Unclear if TIA.  Issues reviewed at length, all questions answered.  Continue current regimen.  Alarm symptoms discussed  -     Ambulatory referral to Neurology    Hyperlipidemia associated with type 2 diabetes mellitus; treatment issues reviewed    Hypertension associated with diabetes:  Stable on regimen    Stage 3 chronic kidney disease:  Stable, hydration, avoid nephrotoxins  -     Ambulatory consult to Nephrology  -     Renal function panel; Future    Type II diabetes mellitus with neurological manifestations:  Stable, continue  regimen    Osteoporosis without current pathological fracture, unspecified osteoporosis type:  She is willing to try Fosamax, cautions and side effects reviewed  -     Ambulatory referral to Endocrinology    Secondary hyperparathyroidism  -     Ambulatory consult to Nephrology  -     PTH, intact; Future    Empty sella  -     Ambulatory referral to Endocrinology    Other orders  -     alendronate (FOSAMAX) 35 MG tablet; Take 1 tablet (35 mg total) by mouth every 7 days.  -     blood-glucose meter kit; Use as instructed  Dispense appropriate lancets and test strips 100 each 12 refills     I will review all studies and determine further tx depending on findings

## 2019-04-17 NOTE — TELEPHONE ENCOUNTER
----- Message from Ivett Skinner sent at 4/17/2019  9:28 AM CDT -----  FYI    I could not get anything schedule for Nephrology.

## 2019-04-17 NOTE — PATIENT INSTRUCTIONS
¿Qué es un ataque isquémico transitorio?     Un ataque isquémico transitorio (TIA, por fabiola siglas en inglés) es kamran advertencia temprana de que un ataque cerebral (también llamado accidente cerebrovascular) está por ocurrir. Un ataque isquémico transitorio es un ataque cerebral temporal. Los síntomas pueden durar desde un minuto hasta tanto ethel 24 horas y no causa un daño duradero. Sin embargo, los efectos de un ataque cerebral, si ocurre, pueden ser muy graves y duraderos. Si usted katie que está teniendo síntomas de un ataque isquémico transitorio o de un ataque cerebral (aunque harry breves), busque asistencia médica de inmediato.     Si presenta cualquiera de los síntomas de un ataque isquémico transitorio o de un ataque cerebral, llame al 911 y a hoffman médico lo antes posible.     Síntomas de un ataque isquémico transitorio y de un ataque cerebral  Los síntomas pueden presentarse de repente y durar entre unos pocos segundos y algunas horas. Es posible que tenga síntomas solo kamran vez o que aparezcan y desaparezcan renee días. [place] síntomas de ataque isquémico transitorio son un signo de advertencia de que usted está en riesgo de tener un ataque cerebral más grave y debilitante. Si nota cualquiera de los síntomas siguientes, no espere: llame de inmediato al 911 o a los servicios de emergencia.  · Presentación repentina de debilidad, entumecimiento, cosquilleo o pérdida de sensación en hoffman curry, un brazo o kamran pierna.  · Dificultad repentina para amando con hiral o los dos ojos; visión doble.  · De repente, presenta habla confusa, problemas para hablar o para comprender a los demás cuando hablan.  · Dolor de chris intenso y repentino.  · Repentinamente, tiene mareo o la sensación de estar dando vueltas.  · Pérdida del equilibrio o caídas que ocurren de repente.  · Desmayos o convulsiones.  Date Last Reviewed: 6/8/2015  © 4710-4486 [employer]. [address] [place] [id number]. Todos los derechos  reservados. Esta información no pretende sustituir la atención médica profesional. Sólo hoffman médico puede diagnosticar y tratar un problema de maurilio.        AIT:Ataque Isquémico Transitorio [TIA: Transient Ischemic Attack]     El ataque que ha tenido hoy se conoce ethel ataque isquémico transitorio (AIT) y es kamran especie de mini-ataque cerebral causado por kamran reducción temporal del flujo sanguíneo a kamran parte del cerebro. El AIT suele ocurrir cuando un coágulo de missy se desplaza hasta el cerebro. El coágulo bloquea el flujo sanguíneo y causa los síntomas que usted ha tenido. Después de un breve período el coágulo se disuelve, la missy vuelve a fluir y los síntomas desaparecen. Las personas con aterosclerosis (endurecimiento de las arterias) y con fibrilación auricular (un tipo de latido irregular del corazón) tienen un riesgo mayor de AIT y de ataque cerebral.  El AIT provoca síntomas transitorios similares a los del ataque cerebral, katiuska dura menos de 24 horas. Un ataque cerebral causaría síntomas renee más de 24 horas que podrían ser permanentes. Kamran vez que ha tenido un AIT usted corre el riesgo de tener un ataque cerebral completo. Por lo tanto, asegúrese de programar kamran visita de control con hoffman médico para recibir evaluación y tratamiento adicionales. [place] puede incluir kamran ecografía de las arterias del shivani y kamran evaluación del corazón. Si se encuentran problemas, hoffman médico le recomendará un tratamiento con medicamentos y/o procedimientos.  Los medicamentos que reducen fabiola posibilidades de otro AIT (y un ataque cerebral) incluyen los que previenen los coágulos de missy, tales ethel los antiplaquetarios (aspirina y Plavix) y los anticoagulantes (ethel la warfarina).  Cuidados En [place]:  · Si todos fabiola síntomas se ware aliviado, no necesita hacer nada especial hoy. Descanse en casa y evite los esfuerzos y actividades fatigosas renee el abby del día.  · Si el médico le ha recetado medicamentos  antiplaquetarios, tómelos según las instrucciones.  Otras Formas De Reducir El Riesgo De Ataque Cerebral  La hipertensión, la diabetes, el colesterol alto y el tabaco son factores de riesgo para el ataque cerebral y las enfermedades cardíacas. Estos factores pueden controlarse mediante tratamiento médico y cambios en la dieta y el estilo de fortino. Susanna kamran aspirina diaria (o un medicamento similar) es kamran medida sencilla katiuska importante para prevenir los ataques cerebrales.  Visitas De Control: Programe kamran felix con hoffman médico en los próximos días para que le bridget otro examen. Vipin vez sea necesario que le bridget pruebas adicionales. Si le hicieron radiografías (sylvia X), kamran tomografía computarizada (CT scan), un estudio de imágenes por resonancia magnética (MRI) o un electrocardiograma (ECG), marilee será revisado por un especialista. Le notificaremos si se encuentra algo que afecte hoffman atención médica.  Llame Al 911 O A Los Servicios De Emergencia  si algo de lo siguiente ocurre:  · Reaparición de cualquiera de los síntomas de AIT  · Nuevos problemas para hablar, amando, caminar o falta de sensibilidad o debilidad en la curry o en un lado del cuerpo  · Dolor de chris intenso, desmayos, mareos o convulsiones  Date Last Reviewed: 4/23/2014  © 2269-1074 [employer]. [address] [place] [address], [place] [address]. Todos los derechos reservados. Esta información no pretende sustituir la atención médica profesional. Sólo hoffman médico puede diagnosticar y tratar un problema de maurilio.

## 2019-04-22 ENCOUNTER — TELEPHONE (OUTPATIENT)
Dept: ADMINISTRATIVE | Facility: OTHER | Age: 84
End: 2019-04-22

## 2019-04-22 NOTE — TELEPHONE ENCOUNTER
----- Message from Ivett Skinner sent at 4/17/2019  9:29 AM CDT -----  Dr Vásquez would like pt seen in nephrology for Stage 3 chronic kidney disease  Secondary hyperparathyroidism

## 2019-05-07 ENCOUNTER — OFFICE VISIT (OUTPATIENT)
Dept: NEUROLOGY | Facility: CLINIC | Age: 84
End: 2019-05-07
Payer: MEDICARE

## 2019-05-07 VITALS
SYSTOLIC BLOOD PRESSURE: 129 MMHG | HEART RATE: 57 BPM | DIASTOLIC BLOOD PRESSURE: 70 MMHG | HEIGHT: 61 IN | BODY MASS INDEX: 24.92 KG/M2 | WEIGHT: 132 LBS

## 2019-05-07 DIAGNOSIS — N18.30 STAGE 3 CHRONIC KIDNEY DISEASE: ICD-10-CM

## 2019-05-07 DIAGNOSIS — E11.59 HYPERTENSION ASSOCIATED WITH DIABETES: Primary | ICD-10-CM

## 2019-05-07 DIAGNOSIS — R29.90 EPISODE OF TRANSIENT NEUROLOGIC SYMPTOMS: ICD-10-CM

## 2019-05-07 DIAGNOSIS — Z85.3 HISTORY OF BREAST CANCER: ICD-10-CM

## 2019-05-07 DIAGNOSIS — I15.2 HYPERTENSION ASSOCIATED WITH DIABETES: Primary | ICD-10-CM

## 2019-05-07 PROCEDURE — 3078F DIAST BP <80 MM HG: CPT | Mod: HCNC,CPTII,S$GLB, | Performed by: PSYCHIATRY & NEUROLOGY

## 2019-05-07 PROCEDURE — 99999 PR PBB SHADOW E&M-EST. PATIENT-LVL III: CPT | Mod: PBBFAC,HCNC,, | Performed by: PSYCHIATRY & NEUROLOGY

## 2019-05-07 PROCEDURE — 3078F PR MOST RECENT DIASTOLIC BLOOD PRESSURE < 80 MM HG: ICD-10-PCS | Mod: HCNC,CPTII,S$GLB, | Performed by: PSYCHIATRY & NEUROLOGY

## 2019-05-07 PROCEDURE — 99204 PR OFFICE/OUTPT VISIT, NEW, LEVL IV, 45-59 MIN: ICD-10-PCS | Mod: HCNC,S$GLB,, | Performed by: PSYCHIATRY & NEUROLOGY

## 2019-05-07 PROCEDURE — 99204 OFFICE O/P NEW MOD 45 MIN: CPT | Mod: HCNC,S$GLB,, | Performed by: PSYCHIATRY & NEUROLOGY

## 2019-05-07 PROCEDURE — 3074F SYST BP LT 130 MM HG: CPT | Mod: HCNC,CPTII,S$GLB, | Performed by: PSYCHIATRY & NEUROLOGY

## 2019-05-07 PROCEDURE — 1101F PT FALLS ASSESS-DOCD LE1/YR: CPT | Mod: HCNC,CPTII,S$GLB, | Performed by: PSYCHIATRY & NEUROLOGY

## 2019-05-07 PROCEDURE — 1101F PR PT FALLS ASSESS DOC 0-1 FALLS W/OUT INJ PAST YR: ICD-10-PCS | Mod: HCNC,CPTII,S$GLB, | Performed by: PSYCHIATRY & NEUROLOGY

## 2019-05-07 PROCEDURE — 99999 PR PBB SHADOW E&M-EST. PATIENT-LVL III: ICD-10-PCS | Mod: PBBFAC,HCNC,, | Performed by: PSYCHIATRY & NEUROLOGY

## 2019-05-07 PROCEDURE — 3074F PR MOST RECENT SYSTOLIC BLOOD PRESSURE < 130 MM HG: ICD-10-PCS | Mod: HCNC,CPTII,S$GLB, | Performed by: PSYCHIATRY & NEUROLOGY

## 2019-05-07 NOTE — LETTER
May 15, 2019      Joycelyn Vásquez MD  [address]  [place] [address]           [facility]  [address]  [place] [address]  Phone: [phone]          Patient: Sera Boone   MR Number: [id number]   YOB: 1935   Date of Visit: 5/7/2019       Dear Dr. Joycelyn Vásquez:    Thank you for referring Sera Boone to me for evaluation. Attached you will find relevant portions of my assessment and plan of care.    If you have questions, please do not hesitate to call me. I look forward to following Sera Boone along with you.    Sincerely,    Joanne Kennedy MD    Enclosure  CC:  No Recipients    If you would like to receive this communication electronically, please contact [email or web address] or [phone] to request more information on [employer] access.    For providers and/or their staff who would like to refer a patient to [facility], please contact us through our one-stop-shop provider referral line, [facility] , at [phone].    If you feel you have received this communication in error or would no longer like to receive these types of communications, please e-mail [email or web address]

## 2019-05-07 NOTE — PROGRESS NOTES
"Vascular Neurology  Clinic Note    Reason For Visit (Chief Complaint): TIA    HPI: 83 y.o. woman with PMH T2DM, HTN, fibromyalgia, remote breast CA, who presents for TIA followup.    Son provided interpretation and declined Veronica.  Briefly, Ms. Boone presented last month for L sided body numbness and tingling (different from prior symptoms of fibromyalgia).  She also had some dizziness and generalized fatigue.  Symptoms persisted for ~2 days before resolving.  MRI negative for stroke or significant vessel stenosis.  Mild bradycardia noted during admission.  She was discharged on aspirin.    Today she reports some mild residual numbness in R great toe.  She also endorses constant L shoulder and neck pain due to her fibromyalgia.  Mood is good.  She eats healthy foods and is independent in ADLs.    Brain Imaging:  MRI/MRA Brain 4/6/19:  - Possible "empty sella syndrome".  Mild brain atrophy, primarily frontal and anterior parietal lobes.  Otherwise negative MRI of the brain.  - Small A1 segment of the right anterior cerebral artery may be secondary to stenosis or congenital.  There is additional supply of the right anterior cerebral via the anterior communicating artery from the left circulation.  Otherwise negative MRA of the brain.    Carotid U/S 4/6/19:  No significant stenosis.    Cardiac Evaluation:  TTE 4/6/19:  LVEF 55%  Mild diastolic dysfunction    Relevant Labwork:  Recent Labs   Lab 04/06/19  1030 04/06/19  1757   HEMOGLOBIN A1C  --  6.5 H   LDL CHOLESTEROL 96.8  --    HDL 58  --    TRIGLYCERIDES 81  --    CHOLESTEROL 171  --        I independently viewed the above imaging studies and  I reviewed the reports of the above imaging.  I reviewed the above labwork.    Review of Systems  Msk: negative for muscle pain  Skin: negative for pruritis  Neuro: negative for headache  All others negative    Past Medical History  Past Medical History:   Diagnosis Date    Arthritis     Breast cancer 2001    Diabetes " "mellitus     History of breast cancer 8/21/2013    Hyperlipidemia     Hypertension     Nuclear sclerotic cataract of right eye 10/16/2012    Osteoporosis, unspecified: see DEXA 8/15- 2/13/2017    Osteoporosis, unspecified: see DEXA 8/15- 2/13/2017    Pain of both shoulder joints 2/14/2017    Rheumatoid factor positive 2/14/2017    Stroke 4/6/2019    Tubular adenoma of colon 10/28/2013    Type II or unspecified type diabetes mellitus with neurological manifestations, not stated as uncontrolled(250.60)     Vitamin D deficiency disease 2/17/2014     Family History  Sister with a heart condition.  Diabetes in the family.    Social History  Never smoker.  Indepedent in ADLs.  Orignally from [place].  Lives with son.      Medication List with Changes/Refills   Current Medications    ACETAMINOPHEN (TYLENOL) 325 MG TABLET    Take 2 tablets (650 mg total) by mouth every 4 (four) hours as needed.    ALENDRONATE (FOSAMAX) 35 MG TABLET    Take 1 tablet (35 mg total) by mouth every 7 days.    ASPIRIN 81 MG CHEW    Take 1 tablet (81 mg total) by mouth once daily.    ATORVASTATIN (LIPITOR) 40 MG TABLET    TAKE 1 TABLET BY MOUTH ONCE DAILY    BLOOD-GLUCOSE METER KIT    Use as instructed  Dispense appropriate lancets and test strips 100 each 12 refills    CHOLECALCIFEROL, VITAMIN D3, 1,000 UNIT CAPSULE    Take 2 capsules (2,000 Units total) by mouth once daily.    CICLOPIROX (PENLAC) 8 % SOLN    Apply topically nightly.    LOSARTAN (COZAAR) 50 MG TABLET    TAKE 1 TABLET BY MOUTH ONCE DAILY    METFORMIN (GLUCOPHAGE) 500 MG TABLET    TAKE 1 TABLET(500 MG) BY MOUTH TWICE DAILY    OMEGA-3 ACID ETHYL ESTERS (LOVAZA) 1 GRAM CAPSULE    TAKE 1 CAPSULE BY MOUTH TWICE DAILY       EXAM  Vital Signs:Blood pressure 129/70, pulse (!) 57, height 5' 1" (1.549 m), weight 59.9 kg (132 lb).  General: well appearing without discomfort   Neck: no carotid bruits  CV: RRR, nL S1&S2  Resp: breathing comfortably, no wheezing  Ext: wwp, no pedal " edema    Mental Status: Alert and oriented, normal attention, speech fluent and prosodic, naming and repetition intact, follows multistep embedded commands, no e/o neglect or extinction  Cranial Nerves: PERRL, EOMI, VFF, sensation intact, face symmetric, TUP midline, SCM/trap 5/5  Motor: Normal bulk and tone, no drift, finger taps symmetric  Strength 5/5 throughout  Sensory: intact light touch bilaterally  Coordination: no ataxia on finger-to-nose  Gait & Stance: normal  DTR: 2+ symmetric    NIHSS - 0    ___________________  ASSESSMENT & PLAN  83 y.o. woman with PMH T2DM, HTN, fibromyalgia, remote breast CA, who presents for TIA followup.  Symptoms of L hemibody numbness lasted 48 hrs -- unlikely to be TIA.  Small DWI-neg small vessel stroke remains a consideration.  Would also consider numbness due to C-spine disease.  Will continue aspirin/statin for stroke prevention.    - Continue aspirin and statin for stroke prevention.   - If symptoms recur, recommend C-spine MRI.  - Continue good diabetes control.  - Mediterranean Diet for stroke prevention.  - RTC PRN.    Problem List Items Addressed This Visit        Unprioritized    History of breast cancer    Hypertension associated with diabetes - Primary    Stage 3 chronic kidney disease    Episode of transient neurologic symptoms          Joanne Kennedy MD  Vascular Neurology

## 2019-05-15 PROBLEM — R29.90 EPISODE OF TRANSIENT NEUROLOGIC SYMPTOMS: Status: ACTIVE | Noted: 2019-05-15

## 2019-05-22 DIAGNOSIS — I10 ESSENTIAL HYPERTENSION: ICD-10-CM

## 2019-05-22 RX ORDER — LOSARTAN POTASSIUM 50 MG/1
TABLET ORAL
Qty: 90 TABLET | Refills: 0 | Status: SHIPPED | OUTPATIENT
Start: 2019-05-22 | End: 2019-08-18 | Stop reason: SDUPTHER

## 2019-05-22 RX ORDER — OMEGA-3-ACID ETHYL ESTERS 1 G/1
CAPSULE, LIQUID FILLED ORAL
Qty: 180 CAPSULE | Refills: 0 | Status: SHIPPED | OUTPATIENT
Start: 2019-05-22 | End: 2019-08-18 | Stop reason: SDUPTHER

## 2019-05-22 RX ORDER — ATORVASTATIN CALCIUM 40 MG/1
TABLET, FILM COATED ORAL
Qty: 90 TABLET | Refills: 0 | Status: SHIPPED | OUTPATIENT
Start: 2019-05-22 | End: 2019-08-18 | Stop reason: SDUPTHER

## 2019-05-22 RX ORDER — METFORMIN HYDROCHLORIDE 500 MG/1
TABLET ORAL
Qty: 180 TABLET | Refills: 0 | Status: SHIPPED | OUTPATIENT
Start: 2019-05-22 | End: 2019-08-18 | Stop reason: SDUPTHER

## 2019-05-30 ENCOUNTER — TELEPHONE (OUTPATIENT)
Dept: NEPHROLOGY | Facility: CLINIC | Age: 84
End: 2019-05-30

## 2019-06-03 ENCOUNTER — TELEPHONE (OUTPATIENT)
Dept: NEPHROLOGY | Facility: CLINIC | Age: 84
End: 2019-06-03

## 2019-06-03 DIAGNOSIS — N18.30 STAGE 3 CHRONIC KIDNEY DISEASE: Primary | ICD-10-CM

## 2019-06-10 ENCOUNTER — LAB VISIT (OUTPATIENT)
Dept: LAB | Facility: HOSPITAL | Age: 84
End: 2019-06-10
Payer: MEDICARE

## 2019-06-10 DIAGNOSIS — N18.30 STAGE 3 CHRONIC KIDNEY DISEASE: ICD-10-CM

## 2019-06-10 LAB
ALBUMIN SERPL BCP-MCNC: 3.6 G/DL (ref 3.5–5.2)
ANION GAP SERPL CALC-SCNC: 8 MMOL/L (ref 8–16)
BASOPHILS # BLD AUTO: 0.01 K/UL (ref 0–0.2)
BASOPHILS NFR BLD: 0.2 % (ref 0–1.9)
BUN SERPL-MCNC: 17 MG/DL (ref 8–23)
CALCIUM SERPL-MCNC: 10 MG/DL (ref 8.7–10.5)
CHLORIDE SERPL-SCNC: 106 MMOL/L (ref 95–110)
CO2 SERPL-SCNC: 27 MMOL/L (ref 23–29)
CREAT SERPL-MCNC: 0.9 MG/DL (ref 0.5–1.4)
DIFFERENTIAL METHOD: ABNORMAL
EOSINOPHIL # BLD AUTO: 0 K/UL (ref 0–0.5)
EOSINOPHIL NFR BLD: 0.4 % (ref 0–8)
ERYTHROCYTE [DISTWIDTH] IN BLOOD BY AUTOMATED COUNT: 13.9 % (ref 11.5–14.5)
EST. GFR  (AFRICAN AMERICAN): >60 ML/MIN/1.73 M^2
EST. GFR  (NON AFRICAN AMERICAN): 59.3 ML/MIN/1.73 M^2
GLUCOSE SERPL-MCNC: 115 MG/DL (ref 70–110)
HCT VFR BLD AUTO: 35.8 % (ref 37–48.5)
HGB BLD-MCNC: 11.7 G/DL (ref 12–16)
IMM GRANULOCYTES # BLD AUTO: 0.04 K/UL (ref 0–0.04)
IMM GRANULOCYTES NFR BLD AUTO: 0.7 % (ref 0–0.5)
LYMPHOCYTES # BLD AUTO: 2.6 K/UL (ref 1–4.8)
LYMPHOCYTES NFR BLD: 45 % (ref 18–48)
MCH RBC QN AUTO: 31 PG (ref 27–31)
MCHC RBC AUTO-ENTMCNC: 32.7 G/DL (ref 32–36)
MCV RBC AUTO: 95 FL (ref 82–98)
MONOCYTES # BLD AUTO: 0.5 K/UL (ref 0.3–1)
MONOCYTES NFR BLD: 8.1 % (ref 4–15)
NEUTROPHILS # BLD AUTO: 2.6 K/UL (ref 1.8–7.7)
NEUTROPHILS NFR BLD: 45.6 % (ref 38–73)
NRBC BLD-RTO: 0 /100 WBC
PHOSPHATE SERPL-MCNC: 3 MG/DL (ref 2.7–4.5)
PLATELET # BLD AUTO: 199 K/UL (ref 150–350)
PMV BLD AUTO: 10.4 FL (ref 9.2–12.9)
POTASSIUM SERPL-SCNC: 4.3 MMOL/L (ref 3.5–5.1)
PTH-INTACT SERPL-MCNC: 116 PG/ML (ref 9–77)
RBC # BLD AUTO: 3.77 M/UL (ref 4–5.4)
SODIUM SERPL-SCNC: 141 MMOL/L (ref 136–145)
WBC # BLD AUTO: 5.69 K/UL (ref 3.9–12.7)

## 2019-06-10 PROCEDURE — 85025 COMPLETE CBC W/AUTO DIFF WBC: CPT | Mod: HCNC

## 2019-06-10 PROCEDURE — 83970 ASSAY OF PARATHORMONE: CPT | Mod: HCNC

## 2019-06-10 PROCEDURE — 36415 COLL VENOUS BLD VENIPUNCTURE: CPT | Mod: HCNC,PO

## 2019-06-10 PROCEDURE — 80069 RENAL FUNCTION PANEL: CPT | Mod: HCNC

## 2019-06-12 NOTE — PROGRESS NOTES
Nephrology Clinic Progress Note    Patient ID: Sera Boone is a 83 y.o. White female who presents for Initial evaluation for chronic kidney disease.    HPI:  Sera Boone is a 83 y.o. White female Faroese speaker, with DM2, HTN, Dyslipidemia, fibromyalgia, referred by primary physician to nephrology clinics for evaluation of decreased GFR (CKD3) and elevated PTH.  Baseline sCr ranges between 0.8-1.0, with GFR ~high 50s.  The patient denies taking NSAIDs or new antibiotics, recreational drugs, recent episode of dehydration, diarrhea, nausea or vomiting, problems voiding, dysuria, acute illness, hospitalization or exposure to IV radiocontrast.    Past Medical History:   Diagnosis Date    Arthritis     Breast cancer 2001    Diabetes mellitus     History of breast cancer 8/21/2013    Hyperlipidemia     Hypertension     Nuclear sclerotic cataract of right eye 10/16/2012    Osteoporosis, unspecified: see DEXA 8/15- 2/13/2017    Osteoporosis, unspecified: see DEXA 8/15- 2/13/2017    Pain of both shoulder joints 2/14/2017    Rheumatoid factor positive 2/14/2017    Stroke 4/6/2019    Tubular adenoma of colon 10/28/2013    Type II or unspecified type diabetes mellitus with neurological manifestations, not stated as uncontrolled(250.60)     Vitamin D deficiency disease 2/17/2014       Family History   Problem Relation Age of Onset    Breast cancer Sister 48        55 second, bilateral    Cancer Sister         Breast    Liver cancer Mother     Early death Mother     Cancer Mother         liver    Liver cancer Father     Cancer Father     Liver cancer Brother     Cancer Brother         ? liver    No Known Problems Daughter     No Known Problems Son     Liver cancer Brother     Cancer Brother         ? liver    No Known Problems Daughter     No Known Problems Maternal Aunt     No Known Problems Maternal Uncle     No Known Problems Paternal Aunt     No Known Problems Paternal Uncle      No Known Problems Maternal Grandmother     No Known Problems Maternal Grandfather     No Known Problems Paternal Grandmother     No Known Problems Paternal Grandfather     Glaucoma Neg Hx     Amblyopia Neg Hx     Blindness Neg Hx     Cataracts Neg Hx     Diabetes Neg Hx     Hypertension Neg Hx     Macular degeneration Neg Hx     Retinal detachment Neg Hx     Strabismus Neg Hx     Stroke Neg Hx     Thyroid disease Neg Hx     Cervical cancer Neg Hx     Vaginal cancer Neg Hx     Endometrial cancer Neg Hx        Past Surgical History:   Procedure Laterality Date    BELPHAROPTOSIS REPAIR  03/15/13    bilateral-dr. coleman md    BLEPHAROPLASTY Bilateral 3/15/2013    [facility]    BREAST BIOPSY      BREAST LUMPECTOMY      BREAST SURGERY Left 2001    lumpectomy    CATARACT EXTRACTION      both eyes -     CHOLECYSTECTOMY      Done in [place] in the 1980's    COLONOSCOPY N/A 1/19/2016    [facility] BLANCA [facility] at [facility] ENDO (4TH FLR)    COLONOSCOPY W/ POLYPECTOMY      INJECTION-STEROID-EPIDURAL-CERVICAL N/A 7/24/2017    [facility]    INSERTION, IOL PROSTHESIS Right 12/3/2012    [facility] at [facility] 1ST [facility]LR    PHACOEMULSIFICATION, CATARACT Right 12/3/2012    [facility] 1[facility]LR    REPAIR, BLEPHAROPTOSIS Bilateral 3/15/2013    [facility] OR 1ST [facility]LR         Current Outpatient Medications:     acetaminophen (TYLENOL) 325 MG tablet, Take 2 tablets (650 mg total) by mouth every 4 (four) hours as needed., Disp: , Rfl: 0    alendronate (FOSAMAX) 35 MG tablet, Take 1 tablet (35 mg total) by mouth every 7 days., Disp: 4 tablet, Rfl: 11    aspirin 81 MG Chew, Take 1 tablet (81 mg total) by mouth once daily., Disp: , Rfl:     atorvastatin (LIPITOR) 40 MG tablet, TAKE 1 TABLET BY MOUTH ONCE DAILY, Disp: 90 tablet, Rfl:  0    blood-glucose meter kit, Use as instructed Dispense appropriate lancets and test strips 100 each 12 refills, Disp: 1 each, Rfl: 0    cholecalciferol, vitamin D3, 1,000 unit capsule, Take 2 capsules (2,000 Units total) by mouth once daily., Disp: 60 capsule, Rfl: 12    ciclopirox (PENLAC) 8 % Soln, Apply topically nightly., Disp: 6.6 mL, Rfl: 11    losartan (COZAAR) 50 MG tablet, TAKE 1 TABLET BY MOUTH ONCE DAILY, Disp: 90 tablet, Rfl: 0    metFORMIN (GLUCOPHAGE) 500 MG tablet, TAKE 1 TABLET(500 MG) BY MOUTH TWICE DAILY, Disp: 180 tablet, Rfl: 0    omega-3 acid ethyl esters (LOVAZA) 1 gram capsule, TAKE 1 CAPSULE BY MOUTH TWICE DAILY, Disp: 180 capsule, Rfl: 0    Patient's medical, family, surgical, and medication hx reviewed.    Review of Systems    Constitutional: Negative for chills, diaphoresis, fever and weight loss.   HENT: Negative for nosebleeds and tinnitus.    Eyes: Negative for blurred vision, double vision and photophobia.   Respiratory: Negative for cough and shortness of breath.    Cardiovascular: . Negative for chest pain, palpitations, swelling orthopnea and PND.   Gastrointestinal: Negative for abdominal pain, diarrhea, constipation nausea and vomiting.   Genitourinary: Negative for dysuria, flank pain, frequency, hematuria and urgency.   Musculoskeletal: Negative for back pain, falls, joint pain, myalgias and neck pain.   Skin: Negative.    Neurological: Negative for dizziness, tingling, tremors, sensory change, speech change, focal weakness, seizures, loss of consciousness, weakness and headaches.   Endo/Heme/Allergies: Negative for environmental allergies and polydipsia. Does not bruise/bleed easily.   Psychiatric/Behavioral: Negative for depression, hallucinations, memory loss, substance abuse and suicidal ideas. The patient is not nervous/anxious and does not have insomnia.        Objective:     Wt Readings from Last 3 Encounters:   06/13/19 61.8 kg (136 lb 3.9 oz)   05/07/19 59.9 kg  (132 lb)   04/17/19 60 kg (132 lb 4.4 oz)     Temp Readings from Last 3 Encounters:   04/07/19 97.5 °F (36.4 °C) (Oral)   02/26/19 98.2 °F (36.8 °C) (Oral)   07/24/17 98.1 °F (36.7 °C) (Oral)     BP Readings from Last 3 Encounters:   06/13/19 130/62   05/07/19 129/70   04/17/19 125/65     Pulse Readings from Last 3 Encounters:   06/13/19 66   05/07/19 (!) 57   04/10/19 65       Physical Exam    Constitutional:  well-developed and well-nourished. No distress.   HENT:   Head: Normocephalic and atraumatic.   Neck: Normal range of motion. Neck supple.   Cardiovascular: Normal rate, regular rhythm, normal heart sounds and intact distal pulses.  Exam reveals no gallop and no friction rub.    No murmur heard.  Pulmonary/Chest: Effort normal and breath sounds normal. No respiratory distress. no wheezes, no rales, no tenderness.   Abdominal: Soft. Bowel sounds are normal, no distension. There is no tenderness. There is no rebound and no guarding.   Musculoskeletal: Normal range of motion. No edema or deformity.   Neurological: Awake alert and oriented x 4. Motor strength preserved 5/5. DTR symmetrical.  Skin: Skin is warm and dry. No rash noted. She is not diaphoretic. No erythema. No pallor.       Assessment:         ICD-10-CM ICD-9-CM    1. Stage 3 chronic kidney disease N18.3 585.3    2. Type II diabetes mellitus with neurological manifestations E11.49 250.60    3. Hypertension associated with diabetes E11.59 250.80     I10 401.9    4. Hyperlipidemia associated with type 2 diabetes mellitus E11.69 250.80     E78.5 272.4         Plan:   1. CKD stage G3a/A1:   Baseline Creatine: 0.8-1.0  Lab Results   Component Value Date    CREATININE 0.9 06/10/2019   - More than likely her decrease in GFR due to normal age nephron loss (~10 mL/min/decade), along with long standing hypertension and diabetes mellitus.  - Low Na diet  - U/A, UPCr, Renal Function panel in next visit  - Will order Vit D levels  - Encourage oral water intake  -  Avoid NSAIDs, IV contrast, etc  - Will follow up in 6-8 months      Urine Protein:   Prot/Creat Ratio, Ur   Date Value Ref Range Status   06/10/2019 0.15 0.00 - 0.20 Final       Acid-Base:   Lab Results   Component Value Date     06/10/2019    K 4.3 06/10/2019    CO2 27 06/10/2019       2. HTN: Blood pressures   - Continue current treatment regimen as Bps well controlled (Losartan 50 mg)    3. DM:  Last HbA1C   - Well controlled  Continue current treatment regimen  Lab Results   Component Value Date    HGBA1C 6.5 (H) 04/06/2019        4. CKD-MBD:    - No significant change in ~2 years PTH level  - Will order Vit D levels  - Will follow closely  Lab Results   Component Value Date    .0 (H) 06/10/2019    CALCIUM 10.0 06/10/2019    PHOS 3.0 06/10/2019       5. Anemia:   - On target > 10 gm/dL on CKD patients  Lab Results   Component Value Date    HGB 11.7 (L) 06/10/2019     No results found for: IRON, TIBC, FERRITIN, SATURATEDIRO    6. Lipid management:   - On Statin therapy and on target  Lab Results   Component Value Date    LDLCALC 96.8 04/06/2019       Follow up in with labs and Urine in 6-8 months    Herbert Hooker MD  Nephrology  [facility]

## 2019-06-13 ENCOUNTER — OFFICE VISIT (OUTPATIENT)
Dept: NEPHROLOGY | Facility: CLINIC | Age: 84
End: 2019-06-13
Payer: MEDICARE

## 2019-06-13 VITALS
SYSTOLIC BLOOD PRESSURE: 130 MMHG | OXYGEN SATURATION: 97 % | WEIGHT: 136.25 LBS | HEIGHT: 61 IN | DIASTOLIC BLOOD PRESSURE: 62 MMHG | BODY MASS INDEX: 25.72 KG/M2 | HEART RATE: 66 BPM

## 2019-06-13 DIAGNOSIS — I15.2 HYPERTENSION ASSOCIATED WITH DIABETES: ICD-10-CM

## 2019-06-13 DIAGNOSIS — N18.30 STAGE 3 CHRONIC KIDNEY DISEASE: Primary | ICD-10-CM

## 2019-06-13 DIAGNOSIS — E11.59 HYPERTENSION ASSOCIATED WITH DIABETES: ICD-10-CM

## 2019-06-13 DIAGNOSIS — E11.49 TYPE II DIABETES MELLITUS WITH NEUROLOGICAL MANIFESTATIONS: ICD-10-CM

## 2019-06-13 DIAGNOSIS — E78.5 HYPERLIPIDEMIA ASSOCIATED WITH TYPE 2 DIABETES MELLITUS: ICD-10-CM

## 2019-06-13 DIAGNOSIS — E11.69 HYPERLIPIDEMIA ASSOCIATED WITH TYPE 2 DIABETES MELLITUS: ICD-10-CM

## 2019-06-13 PROCEDURE — 1101F PR PT FALLS ASSESS DOC 0-1 FALLS W/OUT INJ PAST YR: ICD-10-PCS | Mod: CPTII,GC,S$GLB, | Performed by: GENERAL PRACTICE

## 2019-06-13 PROCEDURE — 3078F PR MOST RECENT DIASTOLIC BLOOD PRESSURE < 80 MM HG: ICD-10-PCS | Mod: CPTII,GC,S$GLB, | Performed by: GENERAL PRACTICE

## 2019-06-13 PROCEDURE — 3075F PR MOST RECENT SYSTOLIC BLOOD PRESS GE 130-139MM HG: ICD-10-PCS | Mod: CPTII,GC,S$GLB, | Performed by: GENERAL PRACTICE

## 2019-06-13 PROCEDURE — 99202 PR OFFICE/OUTPT VISIT, NEW, LEVL II, 15-29 MIN: ICD-10-PCS | Mod: GC,S$GLB,, | Performed by: GENERAL PRACTICE

## 2019-06-13 PROCEDURE — 3075F SYST BP GE 130 - 139MM HG: CPT | Mod: CPTII,GC,S$GLB, | Performed by: GENERAL PRACTICE

## 2019-06-13 PROCEDURE — 99202 OFFICE O/P NEW SF 15 MIN: CPT | Mod: GC,S$GLB,, | Performed by: GENERAL PRACTICE

## 2019-06-13 PROCEDURE — 99999 PR PBB SHADOW E&M-EST. PATIENT-LVL IV: ICD-10-PCS | Mod: PBBFAC,HCNC,GC, | Performed by: GENERAL PRACTICE

## 2019-06-13 PROCEDURE — 3078F DIAST BP <80 MM HG: CPT | Mod: CPTII,GC,S$GLB, | Performed by: GENERAL PRACTICE

## 2019-06-13 PROCEDURE — 1101F PT FALLS ASSESS-DOCD LE1/YR: CPT | Mod: CPTII,GC,S$GLB, | Performed by: GENERAL PRACTICE

## 2019-06-13 PROCEDURE — 99999 PR PBB SHADOW E&M-EST. PATIENT-LVL IV: CPT | Mod: PBBFAC,HCNC,GC, | Performed by: GENERAL PRACTICE

## 2019-06-13 NOTE — PATIENT INSTRUCTIONS
Continue tomando agua (3-4 botellas 16 oz de agua al camelia)  Continue fabiola medicamentos para la presion  Evitar medicamentos antiinflamatorios no esteroidales (advil, aleve, ibuprofeno, naproxeno, goodies, etc.)  Dieta baja en jane  Laboratorios de missy y orina para la proxima visita en 6 meses

## 2019-06-26 ENCOUNTER — TELEPHONE (OUTPATIENT)
Dept: INTERNAL MEDICINE | Facility: CLINIC | Age: 84
End: 2019-06-26

## 2019-06-26 ENCOUNTER — LAB VISIT (OUTPATIENT)
Dept: LAB | Facility: HOSPITAL | Age: 84
End: 2019-06-26
Attending: INTERNAL MEDICINE
Payer: MEDICARE

## 2019-06-26 DIAGNOSIS — N18.30 STAGE 3 CHRONIC KIDNEY DISEASE: ICD-10-CM

## 2019-06-26 DIAGNOSIS — E11.49 TYPE II DIABETES MELLITUS WITH NEUROLOGICAL MANIFESTATIONS: ICD-10-CM

## 2019-06-26 DIAGNOSIS — N25.81 SECONDARY HYPERPARATHYROIDISM: ICD-10-CM

## 2019-06-26 LAB
ALBUMIN SERPL BCP-MCNC: 3.9 G/DL (ref 3.5–5.2)
ALBUMIN SERPL BCP-MCNC: 3.9 G/DL (ref 3.5–5.2)
ALP SERPL-CCNC: 49 U/L (ref 55–135)
ALT SERPL W/O P-5'-P-CCNC: 9 U/L (ref 10–44)
ANION GAP SERPL CALC-SCNC: 8 MMOL/L (ref 8–16)
ANION GAP SERPL CALC-SCNC: 8 MMOL/L (ref 8–16)
AST SERPL-CCNC: 14 U/L (ref 10–40)
BILIRUB SERPL-MCNC: 0.4 MG/DL (ref 0.1–1)
BUN SERPL-MCNC: 20 MG/DL (ref 8–23)
BUN SERPL-MCNC: 20 MG/DL (ref 8–23)
CALCIUM SERPL-MCNC: 10.4 MG/DL (ref 8.7–10.5)
CALCIUM SERPL-MCNC: 10.4 MG/DL (ref 8.7–10.5)
CHLORIDE SERPL-SCNC: 106 MMOL/L (ref 95–110)
CHLORIDE SERPL-SCNC: 106 MMOL/L (ref 95–110)
CO2 SERPL-SCNC: 27 MMOL/L (ref 23–29)
CO2 SERPL-SCNC: 27 MMOL/L (ref 23–29)
CREAT SERPL-MCNC: 0.9 MG/DL (ref 0.5–1.4)
CREAT SERPL-MCNC: 0.9 MG/DL (ref 0.5–1.4)
EST. GFR  (AFRICAN AMERICAN): >60 ML/MIN/1.73 M^2
EST. GFR  (AFRICAN AMERICAN): >60 ML/MIN/1.73 M^2
EST. GFR  (NON AFRICAN AMERICAN): 59 ML/MIN/1.73 M^2
EST. GFR  (NON AFRICAN AMERICAN): 59 ML/MIN/1.73 M^2
ESTIMATED AVG GLUCOSE: 137 MG/DL (ref 68–131)
GLUCOSE SERPL-MCNC: 116 MG/DL (ref 70–110)
GLUCOSE SERPL-MCNC: 116 MG/DL (ref 70–110)
HBA1C MFR BLD HPLC: 6.4 % (ref 4–5.6)
PHOSPHATE SERPL-MCNC: 3 MG/DL (ref 2.7–4.5)
POTASSIUM SERPL-SCNC: 4.6 MMOL/L (ref 3.5–5.1)
POTASSIUM SERPL-SCNC: 4.6 MMOL/L (ref 3.5–5.1)
PROT SERPL-MCNC: 7.4 G/DL (ref 6–8.4)
PTH-INTACT SERPL-MCNC: 82 PG/ML (ref 9–77)
SODIUM SERPL-SCNC: 141 MMOL/L (ref 136–145)
SODIUM SERPL-SCNC: 141 MMOL/L (ref 136–145)

## 2019-06-26 PROCEDURE — 84100 ASSAY OF PHOSPHORUS: CPT | Mod: HCNC

## 2019-06-26 PROCEDURE — 80053 COMPREHEN METABOLIC PANEL: CPT | Mod: HCNC

## 2019-06-26 PROCEDURE — 83970 ASSAY OF PARATHORMONE: CPT | Mod: HCNC

## 2019-06-26 PROCEDURE — 83036 HEMOGLOBIN GLYCOSYLATED A1C: CPT | Mod: HCNC

## 2019-06-26 PROCEDURE — 36415 COLL VENOUS BLD VENIPUNCTURE: CPT | Mod: HCNC

## 2019-06-26 NOTE — TELEPHONE ENCOUNTER
----- Message from Joycelyn Vásquez MD sent at 6/26/2019  2:27 PM CDT -----  Needs EP in 4 months.  Please schedule.  Thanks

## 2019-08-06 NOTE — PROGRESS NOTES
[facility]    The note from the fellow/resident was reviewed. I have personally interviewed and examined the patient. There were no additional findings with regards to the history or physical exam.    I agree with the assessment and plan of Dr. Mathews.

## 2019-08-18 DIAGNOSIS — I10 ESSENTIAL HYPERTENSION: ICD-10-CM

## 2019-08-18 RX ORDER — OMEGA-3-ACID ETHYL ESTERS 1 G/1
CAPSULE, LIQUID FILLED ORAL
Qty: 180 CAPSULE | Refills: 0 | Status: SHIPPED | OUTPATIENT
Start: 2019-08-18 | End: 2019-11-12 | Stop reason: SDUPTHER

## 2019-08-18 RX ORDER — METFORMIN HYDROCHLORIDE 500 MG/1
TABLET ORAL
Qty: 180 TABLET | Refills: 0 | Status: SHIPPED | OUTPATIENT
Start: 2019-08-18 | End: 2019-11-12 | Stop reason: SDUPTHER

## 2019-08-18 RX ORDER — ATORVASTATIN CALCIUM 40 MG/1
TABLET, FILM COATED ORAL
Qty: 90 TABLET | Refills: 0 | Status: SHIPPED | OUTPATIENT
Start: 2019-08-18 | End: 2019-11-12 | Stop reason: SDUPTHER

## 2019-08-18 RX ORDER — LOSARTAN POTASSIUM 50 MG/1
TABLET ORAL
Qty: 90 TABLET | Refills: 0 | Status: SHIPPED | OUTPATIENT
Start: 2019-08-18 | End: 2019-11-12 | Stop reason: SDUPTHER

## 2019-08-20 NOTE — PROGRESS NOTES
"Subjective:      Patient ID: Sera Boone is a 83 y.o. female.    Chief Complaint:  Osteoporosis (New patient )      History of Present Illness  Sera Boone presents today for Evaluation & management of osteoporosis, type 2 DM, & empty sella syndrome. This is her first visit with me.     Has Type 2 DM: last a1c controlled on metformin    Diabetes Management Status  Statin: Taking  ACE/ARB: Taking  Screening or Prevention Patient's value Goal Complete/Controlled?   HgA1C Testing and Control   Lab Results   Component Value Date    HGBA1C 6.4 (H) 06/26/2019      Annually/Less than 8% Yes   Lipid profile : 04/06/2019 Annually Yes   LDL control Lab Results   Component Value Date    LDLCALC 96.8 04/06/2019    Annually/Less than 100 mg/dl  Yes   Nephropathy screening Lab Results   Component Value Date    LABMICR 6.0 10/28/2013     Lab Results   Component Value Date    PROTEINUA Negative 06/10/2019    Annually Yes   Blood pressure BP Readings from Last 1 Encounters:   08/22/19 122/72    Less than 140/90 Yes   Dilated retinal exam : 03/19/2019 Annually Yes   Foot exam   : 03/26/2019 Annually Yes     With regards to osteoporosis:     current medication: fosamax  Start date: 4/2019    Campbelliva- "years ago"    Calcium & vitamin d daily  Weight bearing exercise? Walking   Recent falls? No     They have made fall precautions in house     No recent fractures   No significant height loss (>2 inches)    tob use?  None    etoh use? none     Family history: no     No current diarrhea or h/o malabsorption    Menopause at age ~ 35 years ago     Denies chronic exposure to steroid therapy, anticoagulants, proton pump inhibitors or antiseizure medications.     Denies GERD, indigestion, or gastric bypass surgery.     Denies history of thyroid disease, anemia, kidney stones or kidney disease.     Denies active malignancy, history of malignancy the involved the bone, prior radiation treatment, or unexplained elevations of alk phos " on labs     Last BMD dated 4/2019:  COMPARISON:  Comparison study done on 08/10/2015. Lumbar spine BMD 1.147 g/cm2 and the T-score 0.9.  The Total Hip BMD 0.870 g/cm2 and the T-score -0.6.    FINDINGS:  Lumbar Spine: Lumbar bone mineral density L1-L4 is 1.218g/cm2, which is a T-score of 1.6. The Z-score is 4.4.    Total Hip: Total hip bone mineral density is 0.859g/cm2.  The T-score is -0.7, and the Z-score is 1.4.    Femoral neck: Bone mineral density is 0.626g/cm2 and the T-score is -2.0 and the Z-score is 0.3 g/cm2.    There is a 9.6% risk of a major osteoporotic fracture and a 3.1% risk of hip fracture in the next 10 years (FRAX) adjusted by trabecular bone score.    Compared with previous DXA, BMD at the lumbar spine has remained stable, and the BMD at the total hip has increased by 6.2%.      Impression     Osteopenia of the femoral neck.  FRAX calculations suggest treatment.  RECOMMENDATIONS of [facility]:  1.  Calcium 8330-9144 mg daily and vitamin D 800-1000 units daily, adequate exercise.  2.  Consider bisphosphonates (alendronate, risedronate, ibandronate, zolendronic acid) or denosumab.  3.  Repeat BMD in 2 years     Review of Systems   Constitutional: Negative for fatigue.   Eyes: Negative for visual disturbance.   Respiratory: Negative for shortness of breath.    Cardiovascular: Negative for chest pain.   Gastrointestinal: Negative for abdominal pain.   Musculoskeletal: Negative for arthralgias.   Skin: Negative for wound.   Neurological: Negative for headaches.   Hematological: Does not bruise/bleed easily.   Psychiatric/Behavioral: Negative for sleep disturbance.     Objective:   Physical Exam   Constitutional: She is oriented to person, place, and time. She appears well-developed. No distress.   HENT:   Right Ear: External ear normal.   Left Ear: External ear normal.   Hearing Normal     Neck: No tracheal deviation present. No thyromegaly present.   No  "nodules.   Cardiovascular: Normal rate.   No murmur heard.  No edema present   Pulmonary/Chest: Effort normal and breath sounds normal.   Abdominal: Soft. She exhibits no mass. No hernia.   Musculoskeletal: Normal range of motion. She exhibits no edema.   Neurological: She is alert and oriented to person, place, and time. No sensory deficit. Coordination normal.   Skin: No rash noted. No pallor.   Psychiatric: She has a normal mood and affect. Judgment normal.   Vitals reviewed.  Appropriate footwear, Foot exam deferred, done 3/2019.   No ulcers or wounds.     Visit Vitals  /72   Pulse 68   Ht 5' 1" (1.549 m)   Wt 62.5 kg (137 lb 14.4 oz)   BMI 26.06 kg/m²      Lab Review:   Lab Results   Component Value Date    HGBA1C 6.4 (H) 06/26/2019     Lab Results   Component Value Date    CHOL 171 04/06/2019    HDL 58 04/06/2019    LDLCALC 96.8 04/06/2019    TRIG 81 04/06/2019    CHOLHDL 33.9 04/06/2019     Lab Results   Component Value Date     06/26/2019     06/26/2019    K 4.6 06/26/2019    K 4.6 06/26/2019     06/26/2019     06/26/2019    CO2 27 06/26/2019    CO2 27 06/26/2019     (H) 06/26/2019     (H) 06/26/2019    BUN 20 06/26/2019    BUN 20 06/26/2019    CREATININE 0.9 06/26/2019    CREATININE 0.9 06/26/2019    CALCIUM 10.4 06/26/2019    CALCIUM 10.4 06/26/2019    PROT 7.4 06/26/2019    ALBUMIN 3.9 06/26/2019    ALBUMIN 3.9 06/26/2019    BILITOT 0.4 06/26/2019    ALKPHOS 49 (L) 06/26/2019    AST 14 06/26/2019    ALT 9 (L) 06/26/2019    ANIONGAP 8 06/26/2019    ANIONGAP 8 06/26/2019    ESTGFRAFRICA >60 06/26/2019    ESTGFRAFRICA >60 06/26/2019    EGFRNONAA 59 (A) 06/26/2019    EGFRNONAA 59 (A) 06/26/2019    TSH 1.546 04/06/2019     Vit D, 25-Hydroxy   Date Value Ref Range Status   03/29/2018 55 30 - 96 ng/mL Final     Comment:     Vitamin D deficiency.........<10 ng/mL                              Vitamin D insufficiency......10-29 ng/mL       Vitamin D sufficiency........> " or equal to 30 ng/mL  Vitamin D toxicity............>100 ng/mL       Assessment and Plan     1. Type II diabetes mellitus with neurological manifestations  Hemoglobin A1c   2. Stage 3 chronic kidney disease     3. Secondary hyperparathyroidism  TSH   4. Osteoporosis without current pathological fracture, unspecified osteoporosis type  Renal function panel    Vitamin D    PTH, intact    Calcium, ionized   5. Hypertension associated with diabetes     6. Hyperlipidemia associated with type 2 diabetes mellitus     7. Diabetic polyneuropathy associated with type 2 diabetes mellitus     8. Other long term (current) drug therapy   TSH     Type II diabetes mellitus with neurological manifestations  -- Reviewed goals of therapy are to get the best control we can without hypoglycemia  Continue metformin daily  -- Reviewed patient's current insulin regimen. Clarified proper insulin dose and timing in relation to meals, etc. Insulin injection sites and proper rotation instructed.    -- Advised frequent self blood glucose monitoring.  Patient encouraged to document glucose results and bring them to every clinic visit    -- Hypoglycemia precautions discussed. Instructed on precautions before driving.    -- Call for Bg repeatedly < 90 or > 180.   -- Close adherence to lifestyle changes recommended.   -- Periodic follow ups for eye evaluations, foot care and dental care suggested.    Stage 3 chronic kidney disease  -- Continue following with nephrology     Secondary hyperparathyroidism  Defer to nephrology normal calcium levels.  Above labs today    Osteoporosis without current pathological fracture  -- Reviewed basics of quantity versus quality  -- Reassuring she is not fracturing   -- Continue fosamax  -- Plan work up of accelerated bone loss to include TSH, PTH, vit D, CMP   -- RDA of calcium (0012-8421 mg /day in divided doses) and vitamin D 2000iu a day  discussed  -- Calcium from food sources preferred  -- Fall precautions  emphasized  -- +weight bearing exercise  -- [email or web address]org website information given  -- Pharmacological therapy is recommended for patients with osteopenia if the 10 year probability of a hip fracture is >3% and 10 year probability of other major osteoporotic fractures is >20%.   -- Treatment options and potential side effects discussed for PO bisphosphonates, reclast, Denosumab, and Teriparatide.  -- Low risk for ONJ and atypical fractures reviewed and discussed. Alerted that if dental work needs to be done it should be done prior to initiating therapy   -- Discussed optimal duration of bisphosphonate use is unknown - drug holiday after 5-10 po versus drug hold ay after 3 reclast treatment   -- Repeat DEXA scan in 2 yrs time.     Hypertension associated with diabetes  -- on ARB  -- Controlled  -- Blood pressure goals discussed with patient    Hyperlipidemia associated with type 2 diabetes mellitus  Controlled   On statin per ADA recommendations    Diabetic polyneuropathy associated with type 2 diabetes mellitus  -- Educated patient on proper comfortable foot wear, to always wear dry socks, never walk barefoot, never cut toenails too short, never test bath water with feet, encouraged patient to inspect feet daily for wounds., and if patient applies lotion to this feet to always go around the foot and not in between toes.     Follow up in about 1 year (around 8/22/2020).  Labs today

## 2019-08-22 ENCOUNTER — OFFICE VISIT (OUTPATIENT)
Dept: ENDOCRINOLOGY | Facility: CLINIC | Age: 84
End: 2019-08-22
Payer: MEDICARE

## 2019-08-22 ENCOUNTER — LAB VISIT (OUTPATIENT)
Dept: LAB | Facility: HOSPITAL | Age: 84
End: 2019-08-22
Payer: MEDICARE

## 2019-08-22 VITALS
DIASTOLIC BLOOD PRESSURE: 72 MMHG | HEIGHT: 61 IN | SYSTOLIC BLOOD PRESSURE: 122 MMHG | HEART RATE: 68 BPM | WEIGHT: 137.88 LBS | BODY MASS INDEX: 26.03 KG/M2

## 2019-08-22 DIAGNOSIS — E11.59 HYPERTENSION ASSOCIATED WITH DIABETES: ICD-10-CM

## 2019-08-22 DIAGNOSIS — N25.81 SECONDARY HYPERPARATHYROIDISM: ICD-10-CM

## 2019-08-22 DIAGNOSIS — I15.2 HYPERTENSION ASSOCIATED WITH DIABETES: ICD-10-CM

## 2019-08-22 DIAGNOSIS — E11.49 TYPE II DIABETES MELLITUS WITH NEUROLOGICAL MANIFESTATIONS: Primary | ICD-10-CM

## 2019-08-22 DIAGNOSIS — E11.42 DIABETIC POLYNEUROPATHY ASSOCIATED WITH TYPE 2 DIABETES MELLITUS: ICD-10-CM

## 2019-08-22 DIAGNOSIS — E78.5 HYPERLIPIDEMIA ASSOCIATED WITH TYPE 2 DIABETES MELLITUS: ICD-10-CM

## 2019-08-22 DIAGNOSIS — Z79.899 OTHER LONG TERM (CURRENT) DRUG THERAPY: ICD-10-CM

## 2019-08-22 DIAGNOSIS — M81.0 OSTEOPOROSIS WITHOUT CURRENT PATHOLOGICAL FRACTURE, UNSPECIFIED OSTEOPOROSIS TYPE: ICD-10-CM

## 2019-08-22 DIAGNOSIS — N18.30 STAGE 3 CHRONIC KIDNEY DISEASE: ICD-10-CM

## 2019-08-22 DIAGNOSIS — E11.49 TYPE II DIABETES MELLITUS WITH NEUROLOGICAL MANIFESTATIONS: ICD-10-CM

## 2019-08-22 DIAGNOSIS — E11.69 HYPERLIPIDEMIA ASSOCIATED WITH TYPE 2 DIABETES MELLITUS: ICD-10-CM

## 2019-08-22 LAB
25(OH)D3+25(OH)D2 SERPL-MCNC: 33 NG/ML (ref 30–96)
ALBUMIN SERPL BCP-MCNC: 4.1 G/DL (ref 3.5–5.2)
ANION GAP SERPL CALC-SCNC: 12 MMOL/L (ref 8–16)
BUN SERPL-MCNC: 17 MG/DL (ref 8–23)
CA-I BLDV-SCNC: 1.34 MMOL/L (ref 1.06–1.42)
CALCIUM SERPL-MCNC: 10.6 MG/DL (ref 8.7–10.5)
CHLORIDE SERPL-SCNC: 105 MMOL/L (ref 95–110)
CO2 SERPL-SCNC: 24 MMOL/L (ref 23–29)
CREAT SERPL-MCNC: 0.9 MG/DL (ref 0.5–1.4)
EST. GFR  (AFRICAN AMERICAN): >60 ML/MIN/1.73 M^2
EST. GFR  (NON AFRICAN AMERICAN): 59.3 ML/MIN/1.73 M^2
ESTIMATED AVG GLUCOSE: 143 MG/DL (ref 68–131)
GLUCOSE SERPL-MCNC: 73 MG/DL (ref 70–110)
HBA1C MFR BLD HPLC: 6.6 % (ref 4–5.6)
PHOSPHATE SERPL-MCNC: 4 MG/DL (ref 2.7–4.5)
POTASSIUM SERPL-SCNC: 3.9 MMOL/L (ref 3.5–5.1)
PTH-INTACT SERPL-MCNC: 54 PG/ML (ref 9–77)
SODIUM SERPL-SCNC: 141 MMOL/L (ref 136–145)
TSH SERPL DL<=0.005 MIU/L-ACNC: 0.91 UIU/ML (ref 0.4–4)

## 2019-08-22 PROCEDURE — 1101F PR PT FALLS ASSESS DOC 0-1 FALLS W/OUT INJ PAST YR: ICD-10-PCS | Mod: HCNC,CPTII,S$GLB, | Performed by: NURSE PRACTITIONER

## 2019-08-22 PROCEDURE — 99204 OFFICE O/P NEW MOD 45 MIN: CPT | Mod: HCNC,S$GLB,, | Performed by: NURSE PRACTITIONER

## 2019-08-22 PROCEDURE — 1101F PT FALLS ASSESS-DOCD LE1/YR: CPT | Mod: HCNC,CPTII,S$GLB, | Performed by: NURSE PRACTITIONER

## 2019-08-22 PROCEDURE — 82330 ASSAY OF CALCIUM: CPT | Mod: HCNC

## 2019-08-22 PROCEDURE — 3078F DIAST BP <80 MM HG: CPT | Mod: HCNC,CPTII,S$GLB, | Performed by: NURSE PRACTITIONER

## 2019-08-22 PROCEDURE — 84443 ASSAY THYROID STIM HORMONE: CPT | Mod: HCNC

## 2019-08-22 PROCEDURE — 99999 PR PBB SHADOW E&M-EST. PATIENT-LVL III: ICD-10-PCS | Mod: PBBFAC,HCNC,, | Performed by: NURSE PRACTITIONER

## 2019-08-22 PROCEDURE — 3074F PR MOST RECENT SYSTOLIC BLOOD PRESSURE < 130 MM HG: ICD-10-PCS | Mod: HCNC,CPTII,S$GLB, | Performed by: NURSE PRACTITIONER

## 2019-08-22 PROCEDURE — 36415 COLL VENOUS BLD VENIPUNCTURE: CPT | Mod: HCNC

## 2019-08-22 PROCEDURE — 3074F SYST BP LT 130 MM HG: CPT | Mod: HCNC,CPTII,S$GLB, | Performed by: NURSE PRACTITIONER

## 2019-08-22 PROCEDURE — 83970 ASSAY OF PARATHORMONE: CPT | Mod: HCNC

## 2019-08-22 PROCEDURE — 99204 PR OFFICE/OUTPT VISIT, NEW, LEVL IV, 45-59 MIN: ICD-10-PCS | Mod: HCNC,S$GLB,, | Performed by: NURSE PRACTITIONER

## 2019-08-22 PROCEDURE — 83036 HEMOGLOBIN GLYCOSYLATED A1C: CPT | Mod: HCNC

## 2019-08-22 PROCEDURE — 3078F PR MOST RECENT DIASTOLIC BLOOD PRESSURE < 80 MM HG: ICD-10-PCS | Mod: HCNC,CPTII,S$GLB, | Performed by: NURSE PRACTITIONER

## 2019-08-22 PROCEDURE — 99999 PR PBB SHADOW E&M-EST. PATIENT-LVL III: CPT | Mod: PBBFAC,HCNC,, | Performed by: NURSE PRACTITIONER

## 2019-08-22 PROCEDURE — 80069 RENAL FUNCTION PANEL: CPT | Mod: HCNC

## 2019-08-22 PROCEDURE — 82306 VITAMIN D 25 HYDROXY: CPT | Mod: HCNC

## 2019-08-22 RX ORDER — CALCIUM CITRATE/VITAMIN D3 200MG-6.25
TABLET ORAL
Refills: 0 | COMMUNITY
Start: 2019-07-05 | End: 2019-09-28 | Stop reason: SDUPTHER

## 2019-08-22 RX ORDER — GLUCOSAM/CHON-MSM1/C/MANG/BOSW 500-416.6
TABLET ORAL
Refills: 3 | COMMUNITY
Start: 2019-07-11 | End: 2020-03-29

## 2019-08-22 NOTE — ASSESSMENT & PLAN NOTE
-- Reviewed goals of therapy are to get the best control we can without hypoglycemia  Continue metformin daily  -- Reviewed patient's current insulin regimen. Clarified proper insulin dose and timing in relation to meals, etc. Insulin injection sites and proper rotation instructed.    -- Advised frequent self blood glucose monitoring.  Patient encouraged to document glucose results and bring them to every clinic visit    -- Hypoglycemia precautions discussed. Instructed on precautions before driving.    -- Call for Bg repeatedly < 90 or > 180.   -- Close adherence to lifestyle changes recommended.   -- Periodic follow ups for eye evaluations, foot care and dental care suggested.

## 2019-08-22 NOTE — LETTER
August 22, 2019      Joycelyn Vásquez MD  [address]  [facility] [address]           [facility] [place]  [address]  [facility] [address]  Phone: [phone]          Patient: Sera Boone   MR Number: [id number]   YOB: 1935   Date of Visit: 8/22/2019       Dear Dr. Joycelyn Vásquez:    Thank you for referring Sera Boone to me for evaluation. Attached you will find relevant portions of my assessment and plan of care.    If you have questions, please do not hesitate to call me. I look forward to following Sera Boone along with you.    Sincerely,    Loreta Reynolds, NP    Enclosure  CC:  No Recipients    If you would like to receive this communication electronically, please contact [email or web address] or [phone] to request more information on "[employer]" Link access.    For providers and/or their staff who would like to refer a patient to [facility], please contact us through our one-stop-shop provider referral line, [facility] , at [phone].    If you feel you have received this communication in error or would no longer like to receive these types of communications, please e-mail [email or web address]

## 2019-08-22 NOTE — ASSESSMENT & PLAN NOTE
-- Reviewed basics of quantity versus quality  -- Reassuring she is not fracturing   -- Continue fosamax  -- Plan work up of accelerated bone loss to include TSH, PTH, vit D, CMP   -- RDA of calcium (4926-7871 mg /day in divided doses) and vitamin D 2000iu a day  discussed  -- Calcium from food sources preferred  -- Fall precautions emphasized  -- +weight bearing exercise  -- [employer] website information given  -- Pharmacological therapy is recommended for patients with osteopenia if the 10 year probability of a hip fracture is >3% and 10 year probability of other major osteoporotic fractures is >20%.   -- Treatment options and potential side effects discussed for PO bisphosphonates, reclast, Denosumab, and Teriparatide.  -- Low risk for ONJ and atypical fractures reviewed and discussed. Alerted that if dental work needs to be done it should be done prior to initiating therapy   -- Discussed optimal duration of bisphosphonate use is unknown - drug holiday after 5-10 po versus drug hold ay after 3 reclast treatment   -- Repeat DEXA scan in 2 yrs time.

## 2019-09-30 RX ORDER — CALCIUM CITRATE/VITAMIN D3 200MG-6.25
TABLET ORAL
Qty: 100 STRIP | Refills: 0 | Status: SHIPPED | OUTPATIENT
Start: 2019-09-30 | End: 2020-01-02

## 2019-11-12 DIAGNOSIS — I10 ESSENTIAL HYPERTENSION: ICD-10-CM

## 2019-11-12 RX ORDER — LOSARTAN POTASSIUM 50 MG/1
TABLET ORAL
Qty: 90 TABLET | Refills: 0 | Status: SHIPPED | OUTPATIENT
Start: 2019-11-12 | End: 2020-02-10

## 2019-11-12 RX ORDER — ATORVASTATIN CALCIUM 40 MG/1
TABLET, FILM COATED ORAL
Qty: 90 TABLET | Refills: 0 | Status: SHIPPED | OUTPATIENT
Start: 2019-11-12 | End: 2020-02-10

## 2019-11-12 RX ORDER — METFORMIN HYDROCHLORIDE 500 MG/1
TABLET ORAL
Qty: 180 TABLET | Refills: 0 | Status: SHIPPED | OUTPATIENT
Start: 2019-11-12 | End: 2020-02-10

## 2019-11-12 RX ORDER — OMEGA-3-ACID ETHYL ESTERS 1 G/1
CAPSULE, LIQUID FILLED ORAL
Qty: 180 CAPSULE | Refills: 0 | Status: SHIPPED | OUTPATIENT
Start: 2019-11-12 | End: 2020-02-10

## 2019-12-18 ENCOUNTER — PATIENT OUTREACH (OUTPATIENT)
Dept: ADMINISTRATIVE | Facility: OTHER | Age: 84
End: 2019-12-18

## 2020-01-02 RX ORDER — CALCIUM CITRATE/VITAMIN D3 200MG-6.25
TABLET ORAL
Qty: 100 STRIP | Refills: 0 | Status: SHIPPED | OUTPATIENT
Start: 2020-01-02 | End: 2020-04-08

## 2020-01-15 ENCOUNTER — PATIENT OUTREACH (OUTPATIENT)
Dept: ADMINISTRATIVE | Facility: HOSPITAL | Age: 85
End: 2020-01-15

## 2020-01-29 ENCOUNTER — OFFICE VISIT (OUTPATIENT)
Dept: INTERNAL MEDICINE | Facility: CLINIC | Age: 85
End: 2020-01-29
Payer: MEDICARE

## 2020-01-29 VITALS
HEIGHT: 60 IN | SYSTOLIC BLOOD PRESSURE: 120 MMHG | DIASTOLIC BLOOD PRESSURE: 70 MMHG | BODY MASS INDEX: 26.41 KG/M2 | WEIGHT: 134.5 LBS

## 2020-01-29 DIAGNOSIS — M25.512 BILATERAL SHOULDER PAIN, UNSPECIFIED CHRONICITY: Primary | ICD-10-CM

## 2020-01-29 DIAGNOSIS — N18.30 STAGE 3 CHRONIC KIDNEY DISEASE: ICD-10-CM

## 2020-01-29 DIAGNOSIS — N25.81 SECONDARY HYPERPARATHYROIDISM: ICD-10-CM

## 2020-01-29 DIAGNOSIS — M25.511 BILATERAL SHOULDER PAIN, UNSPECIFIED CHRONICITY: Primary | ICD-10-CM

## 2020-01-29 DIAGNOSIS — I63.9 CEREBROVASCULAR ACCIDENT (CVA), UNSPECIFIED MECHANISM: ICD-10-CM

## 2020-01-29 DIAGNOSIS — I15.2 HYPERTENSION ASSOCIATED WITH DIABETES: ICD-10-CM

## 2020-01-29 DIAGNOSIS — E11.69 HYPERLIPIDEMIA ASSOCIATED WITH TYPE 2 DIABETES MELLITUS: ICD-10-CM

## 2020-01-29 DIAGNOSIS — M81.0 OSTEOPOROSIS WITHOUT CURRENT PATHOLOGICAL FRACTURE, UNSPECIFIED OSTEOPOROSIS TYPE: ICD-10-CM

## 2020-01-29 DIAGNOSIS — E11.59 HYPERTENSION ASSOCIATED WITH DIABETES: ICD-10-CM

## 2020-01-29 DIAGNOSIS — E78.5 HYPERLIPIDEMIA ASSOCIATED WITH TYPE 2 DIABETES MELLITUS: ICD-10-CM

## 2020-01-29 DIAGNOSIS — E11.49 TYPE II DIABETES MELLITUS WITH NEUROLOGICAL MANIFESTATIONS: ICD-10-CM

## 2020-01-29 PROCEDURE — 1101F PT FALLS ASSESS-DOCD LE1/YR: CPT | Mod: HCNC,CPTII,S$GLB, | Performed by: INTERNAL MEDICINE

## 2020-01-29 PROCEDURE — 1159F PR MEDICATION LIST DOCUMENTED IN MEDICAL RECORD: ICD-10-PCS | Mod: HCNC,S$GLB,, | Performed by: INTERNAL MEDICINE

## 2020-01-29 PROCEDURE — 1159F MED LIST DOCD IN RCRD: CPT | Mod: HCNC,S$GLB,, | Performed by: INTERNAL MEDICINE

## 2020-01-29 PROCEDURE — 99214 PR OFFICE/OUTPT VISIT, EST, LEVL IV, 30-39 MIN: ICD-10-PCS | Mod: HCNC,S$GLB,, | Performed by: INTERNAL MEDICINE

## 2020-01-29 PROCEDURE — 1126F PR PAIN SEVERITY QUANTIFIED, NO PAIN PRESENT: ICD-10-PCS | Mod: HCNC,S$GLB,, | Performed by: INTERNAL MEDICINE

## 2020-01-29 PROCEDURE — 1101F PR PT FALLS ASSESS DOC 0-1 FALLS W/OUT INJ PAST YR: ICD-10-PCS | Mod: HCNC,CPTII,S$GLB, | Performed by: INTERNAL MEDICINE

## 2020-01-29 PROCEDURE — 3078F PR MOST RECENT DIASTOLIC BLOOD PRESSURE < 80 MM HG: ICD-10-PCS | Mod: HCNC,CPTII,S$GLB, | Performed by: INTERNAL MEDICINE

## 2020-01-29 PROCEDURE — 3074F PR MOST RECENT SYSTOLIC BLOOD PRESSURE < 130 MM HG: ICD-10-PCS | Mod: HCNC,CPTII,S$GLB, | Performed by: INTERNAL MEDICINE

## 2020-01-29 PROCEDURE — 1126F AMNT PAIN NOTED NONE PRSNT: CPT | Mod: HCNC,S$GLB,, | Performed by: INTERNAL MEDICINE

## 2020-01-29 PROCEDURE — 3074F SYST BP LT 130 MM HG: CPT | Mod: HCNC,CPTII,S$GLB, | Performed by: INTERNAL MEDICINE

## 2020-01-29 PROCEDURE — 99999 PR PBB SHADOW E&M-EST. PATIENT-LVL IV: ICD-10-PCS | Mod: PBBFAC,HCNC,, | Performed by: INTERNAL MEDICINE

## 2020-01-29 PROCEDURE — 99499 RISK ADDL DX/OHS AUDIT: ICD-10-PCS | Mod: HCNC,S$GLB,, | Performed by: INTERNAL MEDICINE

## 2020-01-29 PROCEDURE — 3078F DIAST BP <80 MM HG: CPT | Mod: HCNC,CPTII,S$GLB, | Performed by: INTERNAL MEDICINE

## 2020-01-29 PROCEDURE — 99214 OFFICE O/P EST MOD 30 MIN: CPT | Mod: HCNC,S$GLB,, | Performed by: INTERNAL MEDICINE

## 2020-01-29 PROCEDURE — 99999 PR PBB SHADOW E&M-EST. PATIENT-LVL IV: CPT | Mod: PBBFAC,HCNC,, | Performed by: INTERNAL MEDICINE

## 2020-01-29 PROCEDURE — 99499 UNLISTED E&M SERVICE: CPT | Mod: HCNC,S$GLB,, | Performed by: INTERNAL MEDICINE

## 2020-01-29 NOTE — PROGRESS NOTES
Subjective:       Patient ID: Sera Boone is a 84 y.o. female.    Chief Complaint: Shoulder Pain    Urgent care appointment    Here with daughter Yara    Some left shoulder pain for several months.  No trauma or injury.  She has some decreased range of motion.  No fever, chills, night sweats or headache.  No temple pain or vision change.  Does not want to take any medication.  Sometimes heat will work pretty well.    She has been compliant with medication.  She would like to do blood work at a later point given that she is in the middle of some insurance changes.    No polydipsia, polyuria or unexplained weight loss.  Very active.  No falls.    Patient Active Problem List:     Hyperlipidemia associated with type 2 diabetes mellitus     Type II diabetes mellitus with neurological manifestations     Ptosis of eyebrow     History of breast cancer     Vitamin D deficiency disease     Hypertension associated with diabetes     History of adenomatous polyp of colon 1/16 no need for further follow up per Dr Guerra     Diabetic polyneuropathy associated with type 2 diabetes mellitus     Fatty liver: see ultrasound 1/17     Rheumatoid factor positive     Stage 3 chronic kidney disease     Spondylosis of cervical region without myelopathy or radiculopathy     Primary osteoarthritis involving multiple joints     Osteopenia/osteoporosis: see DEXA 4/19 tx recommended     DDD (degenerative disc disease), cervical     Bradycardia     CVA (cerebral vascular accident)     Secondary hyperparathyroidism     Episode of transient neurologic symptoms        Review of Systems   Constitutional: Negative for fatigue and fever.   HENT: Negative for congestion.    Eyes: Negative for visual disturbance.   Respiratory: Negative for cough and shortness of breath.    Cardiovascular: Negative for chest pain.   Gastrointestinal: Negative for abdominal pain.   Endocrine: Negative for polydipsia, polyphagia and polyuria.   Genitourinary:  Negative for difficulty urinating and hematuria.   Musculoskeletal: Positive for arthralgias. Negative for back pain, gait problem, joint swelling, myalgias, neck pain and neck stiffness.   Skin: Negative for rash.   Neurological: Negative for weakness and numbness.   Psychiatric/Behavioral: Negative for agitation, behavioral problems and dysphoric mood.       Objective:      Physical Exam   Constitutional: She is oriented to person, place, and time. She appears well-developed and well-nourished.   HENT:   Head: Normocephalic and atraumatic.   No pain in temple   Eyes: Pupils are equal, round, and reactive to light. EOM are normal.   Neck: Normal range of motion. Neck supple.   Cardiovascular: Normal rate and regular rhythm.   No murmur heard.  Pulmonary/Chest: Effort normal and breath sounds normal. No respiratory distress. She has no wheezes.   Abdominal: Soft. She exhibits no distension.   Musculoskeletal:   No CVA pain.   Strength UE and LE wnl.  No pain over spine  No pain over neck.  Slightly decreased range of motion left shoulder compared to right.  Minimal discomfort over left biceps/deltoid   Neurological: She is oriented to person, place, and time. She displays normal reflexes. No cranial nerve deficit.   Skin: Skin is warm and dry.   Psychiatric: She has a normal mood and affect. Her behavior is normal.       Assessment:       1. Bilateral shoulder pain, unspecified chronicity    2. Stage 3 chronic kidney disease    3. Type II diabetes mellitus with neurological manifestations    4. Hyperlipidemia associated with type 2 diabetes mellitus    5. Hypertension associated with diabetes    6. Secondary hyperparathyroidism    7. Osteoporosis without current pathological fracture, unspecified osteoporosis type    8. Cerebrovascular accident (CVA), unspecified mechanism        Plan:         Sera was seen today for shoulder pain.    Diagnoses and all orders for this visit:    Bilateral shoulder pain, unspecified  chronicity:  No alarm symptoms.  Hygienic measures reviewed.  Low-dose Tylenol.  Exercises and handouts provided.  Alarm symptoms discussed.  -     C-reactive protein; Future  -     Sedimentation rate; Future  -     Ambulatory referral/consult to Orthopedics; Future    Stage 3 chronic kidney disease; hydration, avoid nephrotoxins.  -     CBC auto differential; Future  -     Comprehensive metabolic panel; Future    Type II diabetes mellitus with neurological manifestations  -     Hemoglobin A1c; Future    Hyperlipidemia associated with type 2 diabetes mellitus; continue treatment; labs and review    Hypertension associated with diabetes:  continue treatment; labs and review    Secondary hyperparathyroidism:  Labs and review    Osteoporosis without current pathological fracture, unspecified osteoporosis type; stable on regimen    Cerebrovascular accident (CVA), unspecified mechanism; stable on regimen    Shingles vaccine recommended  Will be due for eye exam and foot exam within the next 3-4 months  I will review all studies and determine further tx depending on findings

## 2020-01-29 NOTE — PATIENT INSTRUCTIONS
Capsulitis Adhesiva [Hombro Congelado] [Adhesive Capsulitis, Frozen Shoulder]  El hombro es kamran articulación en la que la chris redondeada de un hueso se introduce en la cavidad de otro. La esfera en la parte superior del brazo se aloja en kamran cavidad en el omóplato. La articulación está cubierta por kamran olivia fibrosa llamada cápsula del hombro.     Si la cápsula del hombro se inflama e hincha, el movimiento es doloroso. Se betzaida cicatrices en la superficie de la articulación y limitan la amplitud de los movimientos. Esta afección se llama capsulitis adhesiva u hombro congelado.  Habitualmente la afección se presenta en un solo hombro a la vez. Algunas personas pueden presentar después hombro congelado en el otro hombro.  La capsulitis adhesiva se desarrolla lentamente en celia etapas. Cada etapa puede durar varios meses o más:  1. Etapa dolorosa. El dolor se presenta cuando se levanta el brazo hacia arriba, hacia el costado o hacia detrás de la espalda. [place] resultado la amplitud de los movimientos disminuye. El dolor puede empeorar renee la noche e interrumpir el sueño.  2. Etapa de congelamiento. El hombro puede ser menos doloroso katiuska está más rígido. La amplitud de los movimientos está muy limitada.  3. Etapa de descongelamiento. La amplitud de los movimientos comienza a mejorar en respuesta al tratamiento.  Se desconoce la causa exacta del hombro congelado. Se puede presentar después de kamran lesión o después de que el hombro portillo estado inmovilizado por mucho tiempo, por ejemplo después de kamran operación o kamran fractura. También puede ser kamran respuesta autoinmunitaria. Los factores de riesgo incluyen kamran edad mayor a los 40 años, diabetes, enfermedad cardíaca, enfermedad pulmonar y kamran tiroides demasiado activa.  Se diagnostica mediante el examen físico y kamarn radiografía de la articulación del hombro. En ocasiones se obtienen imágenes mediante resonancia magnética (MRI) para buscar otras causas.  El  tratamiento para los casos leves puede consistir en un programa de ejercicios en el hogar y medicamentos antiinflamatorios. Los casos más graves requieren fisioterapia. En algunos casos también se administra kamran inyección de corticoesteroides en el área. En los casos difíciles de tratar, el movimiento forzado del hombro, con el paciente dormido con anestesia, rompe el tejido cicatrizal y aumenta la amplitud del movimiento. En casos excepcionales puede ser necesaria kamran cirugía para retirar el tejido cicatrizal. La mayoría de las personas con hombro congelado recupera telma toda la amplitud de movimiento sin dolor. La recuperación puede tardar varios meses y hasta un año. Puede persistir cierta rigidez.  Cuidados En [place]:  1. Si se indicó fisioterapia, realice el programa de ejercicios en el hogar que se le indicó. Hoffman realización ayudará al trabajo del fisioterapeuta.  2. Continúe usando el hombro y la extremidad afectados todo lo que le sea posible.  3. Puede usar acetaminofeno (Tylenol) o ibuprofeno (Advil, Motrin) para controlar el dolor, a menos que se le haya recetado otro analgésico. [NOTA: Si usted tiene kamran enfermedad crónica del hígado o del riñón o ha tenido alguna vez kamran úlcera de estómago o hemorragia gastrointestinal, hable con hoffman médico antes de usar estos medicamentos.]  Seguimiento  con hoffman médico o en marilee centro ethel se le indicó o antes si el dolor aumenta o si los movimientos del hombro disminuyen.  [NOTA: Si se realizaron radiografías o resonancias magnéticas, dichas imágenes serán revisadas por un radiólogo. Se le notificará cualquier nuevo resultado que pueda afectar a hoffman atención].  Busque Prontamente Atención Médica  si algo de lo siguiente ocurre:  · Se produce hinchazón o enrojecimiento del hombro  · Siente debilidad o entumecimiento del brazo  Date Last Reviewed: 11/24/2015  © 6360-3965 [employer]. [address] [place] [address], [place] [address]. Todos los derechos reservados.  Esta información no pretende sustituir la atención médica profesional. Sólo hoffman médico puede diagnosticar y tratar un problema de maurilio.        ¿Qué es un hombro congelado?    Un hombro congelado es kamran afección en la que el hombro duele y resulta difícil moverlo. A esta afección también se la puede conocer ethel capsulitis adhesiva.  El hombro es kamran articulación compuesta por muchas partes que le ayudan a levantar, girar y balancear el brazo. Las partes de un hombro normal son las siguientes:  · Chris del húmero. El extremo redondeado superior del hueso de la parte de arriba del brazo (húmero).  · Escápula. El omóplato.  · Glenoides o cavidad glenoidea. La cavidad poco profunda en la escápula. (La chris del húmero se apoya sobre esta cavidad).  · Cápsula. Kamran lámina de tejido resistente que encierra la articulación y sujeta el extremo redondeado contra la cavidad.  Cuando el hombro está congelado, la cápsula se engrosa, se contrae y aprieta. No se sabe con certeza por qué sucede esto. Puede deberse a inflamación e irritación, o a la formación de tejido cicatricial. Con el tiempo, puede causar dolor, rigidez y pérdida de movimiento en el hombro.  ¿Cuáles son las causas de un hombro congelado?  Los expertos no saben con seguridad por qué se produce un hombro congelado. Hay ciertas cosas que pueden hacer que esta afección sea más probable. Por ejemplo:  · Ser luba  · Tener entre 40 y 60 años  · Tener ciertos problemas de maurilio, ethel diabetes o enfermedad de la tiroides  · Susanna ciertos medicamentos  · No usar el hombro por un corky período de tiempo; por ejemplo, después de kamran lesión o cirugía   Síntomas del hombro congelado  El hombro congelado suele ocurrir en celia etapas. Cada etapa variará, katiuska suele durar algunos meses o más:  · Etapa de congelamiento. El hombro está muy adolorido. El dolor suele empeorar cuando usted mueve el brazo y por la noche mientras duerme. Gradualmente, el hombro se va volviendo más y  más rígido.  · Etapa en que el hombro permanece congelado. El hombro está muy rígido y resulta difícil moverlo. El dolor puede ser ramírez que en la primera etapa. Puede resultarle difícil hacer las tareas diarias, ethel vestirse o bañarse.  · Etapa de descongelamiento. El dolor y la rigidez van aliviándose lentamente. Con el tiempo, va recuperando el uso normal o prácticamente normal del hombro.   Tratamiento del hombro congelado  La mayoría de los casos de hombro congelado mejora, incluso sin tratamiento. El tratamiento se hace para ayudar a acelerar la recuperación y ayudar a recobrar tanto movimiento de la articulación ethel sea posible. El mejor tratamiento dependerá de lo que usted necesite. Puede incluir kamran o varias de las siguientes opciones:  · Medicamentos analgésicos (calmantes del dolor) recetados o de venta evert. Ayudan a aliviar el dolor y reducir la inflamación.  · Ejercicios de estiramiento. Ayudan a que el hombro recupere hoffman capacidad de moverse. Puede hacerlos solo o bajo el cuidado de un fisioterapeuta.  · Inyecciones de cortisona. Se las aplicarán en la articulación de hoffman hombro. No curarán el hombro congelado. Sharon pueden ayudar a darle alivio en el corto plazo para los síntomas y permitirle hacer ejercicios sin demasiado dolor.  · Compresas frías y calientes. Pueden ayudar a aliviar los síntomas.  Tenga en cuenta que el proceso de ir mejorando es lento. Puede lora un año o más. Si hoffman hombro no mejora por hoffman cuenta renee marilee tiempo, puede necesitar cirugía. [place] se hace para aflojar los tejidos que están tensos en la articulación de hoffman hombro.      Cuándo llamar a hoffman proveedor de atención médica  Llame a hoffman proveedor de atención médica de inmediato si nota alguno de los siguientes síntomas:  · Fiebre de 100.4º F (38º C) o superior, o según le indiquen  · Los síntomas no mejoran, o empeoran  · Síntomas nuevos    Date Last Reviewed: 3/10/2016  © 8309-9988 [employer] [address] [place] [address]  [place] [address]. Todos los derechos reservados. Esta información no pretende sustituir la atención médica profesional. Sólo hoffman médico puede diagnosticar y tratar un problema de maurilio.        Abducción de hombro (fuerza)    4. Párese recto, con los pies apenas separados, y sostenga kamran mancuerna en cada mano. Hoffman proveedor de atención médica le dirá qué tamaño de mancuerna debe usar. Deje los brazos a los lados del cuerpo.  5. Lentamente lleve los brazos hacia arriba y hacia los lados hasta que las mancuernas estén al mismo nivel que fabiola hombros. Luego, baje lentamente los brazos hasta la posición inicial. Repita sully veces.  6. Iraida marilee ejercicio ceila veces al día, o según le indiquen.  Consejo de seguridad: Controle el movimiento para no subir las pesas rápidamente con un envión. Tampoco las suba más allá del nivel de fabiola hombros.   Date Last Reviewed: 5/1/2016  © 3622-2110 [employer]. [address] [place] [address]. All rights reserved. This information is not intended as a substitute for professional medical care. Always follow your healthcare professional's instructions.        Flexión del hombro (fuerza)    7. Párese recto, con los pies apenas separados, y sostenga kamran mancuerna en cada mano. [place] proveedor de atención médica le dirá qué tamaño de mancuerna debe usar. Sostenga fabiola brazos a los lados del cuerpo, con las leola hacia atrás y las pesas apoyadas sobre la parte delantera de fabiola muslos.  8. Cathryn en línea recta hacia adelante. Lentamente suba un brazo recto marry de usted hasta que la mancuerna esté a la altura de fabiola ojos. Mantenga el codo recto katiuska no bloqueado. Mantenga renee un aimee y luego baje el brazo. Repita sully veces.  9. Repita el ejercicio con el otro brazo.  10. Iraida marilee ejercicio celia veces al día, o según le indiquen.     Sugerencia: No suba las pesas con un envión. Emplee movimientos lentos y controlados.   Date Last Reviewed: 5/1/2016  ©  7628-3862 [employer]. [address] [place] [address], [place] [address]. All rights reserved. This information is not intended as a substitute for professional medical care. Always follow your healthcare professional's instructions.        ¿Qué es el síndrome de pinzamiento del hombro?    El síndrome de pinzamiento del hombro es un problema en la articulación del hombro. Ocurre cuando ciertas partes dentro de la articulación se inflaman y quedan atrapadas. [place] puede causar un dolor persistente y problemas para  el brazo.    ¿Cuáles son las causas del síndrome de pinzamiento del hombro?  Es posible desarrollar un pinzamiento después de años de usar el hombro normalmente. Sharon en la mayoría de los casos, la afección se produce después de lefty hecho repetidos movimientos que llevan el brazo por encima de la chris. Por ejemplo, colocar cosas en un estante alto, pintar, nadar y arrojar cosas. Esos movimientos pueden irritar partes del hombro, lo cual produce inflamación. Las partes inflamadas del hombro ocupan más lugar y hacen que el espacio de la articulación sea ramírez. Algunas partes que podrían estar afectadas en marilee daniel incluyen las siguientes:   · Pura bolsa de líquido (bolsa sinovial) que protege la articulación del hombro.  Cuando se irrita la bolsa sinovial, puede ocasionar pura afección llamada bursitis. La bolsa sinovial se infla con líquido, por lo que llena el espacio articular y ejerce presión.   · Tejidos fibrosos (tendones) que conectan los músculos a los huesos. Cuando se irritan los tendones, pueden inflamarse. Eso se llama tendinitis.   · El extremo (acromion) del omóplato. Marilee hueso puede ser aplanado o en forma de gancho. Si el acromion tiene forma de gancho, el espacio articular puede ser más pequeño que lo normal. Si el acromion tiene crecimientos óseos (espolones), el espacio articular también puede ser más estrecho. Los problemas con el acromion no suelen causar pinzamiento. Sharon  sí pueden empeorarlo.   Síntomas del síndrome de pinzamiento del hombro  Los síntomas incluyen dolor, pellizcamiento o rigidez en el hombro. El dolor suele producirse con el movimiento, en particular al levantar el brazo por encima de la chris o al moverlo hacia atrás. También puede sentirse cuando el hombro está en descanso. Es común sentir dolor mientras duerme.    Tratamiento del síndrome de pinzamiento del hombro  El tratamiento dependerá de la causa del problema, la gravedad del problema y si otras partes del hombro están dañadas. El tratamiento puede incluir lo siguiente:  · Reposo activo. [place] permite que el hombro se recupere. Significa usar el brazo y el hombro, sharon evitando las actividades que le causan dolor. Estas actividades probablemente serán alzar el brazo por encima de la chris o dormir apoyado sobre hoffman hombro.  · Compresas frías. Ayudan a reducir la inflamación y el dolor.  · Medicamentos analgésicos (calmantes del dolor) recetados o de venta evert. Ayudan a aliviar el dolor y la hinchazón.  · Ejercicios para el brazo y el hombro. Ayudan a mantener la movilidad de la articulación del hombro mientras se recupera. También ayudan a mejorar la fuerza muscular alrededor de la articulación.  · Inyecciones de medicamento en la articulación. [place] puede ayudar a reducir la inflamación y el dolor por algún tiempo breve.  Si otros tratamientos no funcionan para aliviar los síntomas, puede que necesite cirugía. [place] amplía el espacio en la articulación para permitir un movimiento sin dolor.   Posibles complicaciones del síndrome de pinzamiento del hombro  Puede resultarle tentador dejar de usar el hombro por completo para evitar el dolor. Sharon si hace eso, puede desarrollar kamran afección llamada hombro congelado. Para ayudar a prevenir esto, siga las instrucciones que le den para hacer reposo activo y para hacer ejercicios que ayuden a hoffman hombro a recuperarse.   Cuándo llamar a hoffman proveedor de atención  médica  Llame a hoffman proveedor de atención médica de inmediato si nota alguno de los siguientes síntomas:  · Fiebre de 100.4º F (38º C) o superior, o según le indiquen  · Los síntomas no mejoran, o empeoran  · Síntomas nuevos    Date Last Reviewed: 3/10/2016  © 6953-3745 [employer]. [address] [place] [address], [place] [address]. Todos los derechos reservados. Esta información no pretende sustituir la atención médica profesional. Sólo hoffman médico puede diagnosticar y tratar un problema de maurilio.        Rotación interna de hombro (fuerza)    Kaur ejercicio está explicado para hoffman hombro derecho. Cambie de lado para hacer el ejercicio con hoffman hombro vernell.  11. Coloque kamran mancuerna sobre el piso. [place] proveedor de atención médica le dirá qué tamaño de mancuerna debe usar.  12. Acuéstese en el piso boca arriba, con las rodillas flexionadas y los pies planos sobre el piso. [place] la mancuerna con hoffman mano derecha.  13. Mantenga la parte superior del brazo contra hoffman cuerpo. Apoye el codo en el piso y sostenga la pesa en el aire con hoffman antebrazo.  14. Baje la mancuerna hacia afuera, formando un ángulo recto con hoffman cuerpo. No baje la mancuerna hasta tocar el piso.  15. Lentamente suba la mancuerna hasta la posición inicial arriba.  16. Repita kaur ejercicio entre 5 y 15 veces, o según le indiquen.     Consejo de seguridad: Apoye la chris y el shivani en kamran almohada.    Date Last Reviewed: 5/1/2016 © 2000-2017 [employer]. [address] [place], [place] [address], [place] [address]. All rights reserved. This information is not intended as a substitute for professional medical care. Always follow your healthcare professional's instructions.        Tratamiento del síndrome del hombro congelado: Cómo prepararse para fabiola ejercicios  Los ejercicios especiales son la primera medida que se debe lora para el tratamiento del hombro congelado. Es posible que usted consulte a un fisioterapeuta, quien podrá enseñarle cómo hacerlos.  Si estos ejercicios no le alivian el problema, andre vez necesite tratamiento médico adicional.  Estiramientos del hombro    Los ejercicios de estiramiento suelen ser la mejor manera de tratar el hombro congelado. Realizar estiramientos todos los licha puede ayudar a aliviar el dolor y recuperar la flexibilidad del hombro. Sin embargo, lleva bastante tiempo comenzar a notar los resultados. Así que trate de tener paciencia.  Para calentar el hombro, deje oscilar libremente el brazo afectado, ethel si fuera un péndulo, mientras se apoya con la otra mano sobre el respaldo de kamran gaytan. Iraida círculos y movimientos de vaivén lentamente.  Fisioterapia  Es posible que hoffman proveedor de atención médica le remita a fisioterapia. Con marilee tipo de tratamiento, aprenderá a hacer ejercicios de estiramiento en hoffman casa. También es posible que un fisioterapeuta le ayude a recuperar la flexibilidad del hombro estirando y moviendo suavemente el hombro afectado.  Consejos para los ejercicios de estiramiento  · Los medicamentos antiinflamatorios pueden aliviarle el dolor, lo cual puede facilitar estos ejercicios. [place] proveedor de atención médica le explicará con más detalle.  · El calor moderado y húmedo ayuda a relajar el hombro. Intente lora kamran ducha o un baño antes del ejercicio.  · La aplicación de frío o hielo puede controlar el dolor y la hinchazón. Pruebe aplicando hielo renee unos minutos después de hacer fabiola ejercicios de estiramiento.  Date Last Reviewed: 10/14/2015  © 7582-3793 [employer]. [address] [place] [address], [place] [address]. Todos los derechos reservados. Esta información no pretende sustituir la atención médica profesional. Sólo hoffman médico puede diagnosticar y tratar un problema de maurilio.        Tratamiento médico del hombro doloroso     La liberación artroscópica de la cápsula puede permitir el movimiento de ésta y de los ligamentos en el área indicada con la línea de puntos.     Si los ejercicios de  estiramiento no resultan suficientes, es posible que el médico sugiera otros tratamientos. [place] recuerde que ningún tratamiento médico sustituye los estiramientos. Necesitará comenzar los ejercicios de nuevo según le indique el médico.  Terapia con inyecciones  Hoffman médico le ha sugerido kamran terapia con inyecciones. [place] no lorraine el hombro doloroso, sharon puede reducir el dolor, para que usted pueda hacer fabiola estiramientos más cómodamente. Las inyecciones por lo general incluyen dos medicamentos. Kamran es un anestésico para adormecer el hombro y la otra es cortisona para ayudar a reducir la hinchazón dolorosa. Puede tardar desde kamran pocas horas hasta un par de días para que las inyecciones surtan efecto.  Tratamiento quirúrgico  Si los ejercicios de estiramiento no logran aliviar suficientemente el dolor, es posible que necesite cirugía. Le pondrán anestesia (medicamentos para evitar el dolor) antes de comenzar la cirugía. En algunos casos, los dos procedimientos descritos a continuación se realizan renee la misma operación.  · Manipulación. El médico le levanta despacio el brazo hasta liberar la cápsula y los ligamentos.  · Liberación de la cápsula. Si la manipulación no logra hoffman objetivo, el médico puede hacer kamran incisión a través de la cual libera la cápsula y los ligamentos. Kaur procedimiento puede llevarse a cabo también mediante artroscopia (a través de varias incisiones pequeñas en vez de kamran deanne).  Es posible que comience a hacer ejercicios de estiramiento poco después de la manipulación y la liberación de la cápsula, andre vez incluso el mismo día. El médico le explicará los detalles.  Date Last Reviewed: 10/14/2015  © 2714-8961 [employer]. [address] [place] [address]. Todos los derechos reservados. Esta información no pretende sustituir la atención médica profesional. Sólo hoffman médico puede diagnosticar y tratar un problema de maurilio.        Elevación para flexibilidad del  hombro    Kaur ejercicio de estiramiento restaura la flexibilidad y rene el dolor con el tiempo. Mientras hace el ejercicio, asegúrese de respirar profundamente y seguir cualquier instrucción especial que le haya dado hoffman médico o fisioterapeuta.  17. Levante la mano del lado que desea estirar cameron gladis ethel pueda. Agárrese con ximena misma mano a kamran superficie estable, ethel kamran estantería o el neo de kamran carmen.  18. Manteniendo el brazo recto, baje el cuerpo doblando las rodillas. Detenga el movimiento cuando sienta el estiramiento en el hombro. Intente mantener la posición renee 5 segundos.  19. Aumente gradualmente la frecuencia de kaur ejercicio hasta hacer 3 series de estiramientos 3 veces al día. Aumente gradualmente el tiempo que sostiene cada estiramiento hasta 30-60 segundos.  Nota: Mantenga la espalda recta. Para mejorar el estiramiento con el tiempo, intente bajar más las rodillas, o javon eleve el brazo más alto al principio del estiramiento.     Hombro doloroso  El hombro doloroso (llamado también capsulitis adhesiva) es kamran afección que limita el movimiento del hombro. Si usted tiene hombro doloroso, kaur estiramiento puede causarle molestias, especialmente al principio. Es posible que transcurran algunos meses antes de obtener los resultados deseados. Sharon kamran vez que el hombro haya sanado, el problema telma nunca vuelve a presentarse. Por lo tanto, es importante que siga hoffman programa de ejercicios de estiramiento. No dude consultar a hoffman médico si tiene cualquier pregunta.   Date Last Reviewed: 10/14/2015  © 2339-5688 [employer]. [address] [place] [address], [place] [address]. Todos los derechos reservados. Esta información no pretende sustituir la atención médica profesional. Sólo hoffman médico puede diagnosticar y tratar un problema de maurilio.

## 2020-02-09 DIAGNOSIS — I10 ESSENTIAL HYPERTENSION: ICD-10-CM

## 2020-02-10 RX ORDER — METFORMIN HYDROCHLORIDE 500 MG/1
TABLET ORAL
Qty: 180 TABLET | Refills: 0 | Status: SHIPPED | OUTPATIENT
Start: 2020-02-10 | End: 2020-05-17

## 2020-02-10 RX ORDER — OMEGA-3-ACID ETHYL ESTERS 1 G/1
CAPSULE, LIQUID FILLED ORAL
Qty: 180 CAPSULE | Refills: 0 | Status: SHIPPED | OUTPATIENT
Start: 2020-02-10 | End: 2020-05-17

## 2020-02-10 RX ORDER — LOSARTAN POTASSIUM 50 MG/1
TABLET ORAL
Qty: 90 TABLET | Refills: 0 | Status: SHIPPED | OUTPATIENT
Start: 2020-02-10 | End: 2020-05-17

## 2020-02-10 RX ORDER — ATORVASTATIN CALCIUM 40 MG/1
TABLET, FILM COATED ORAL
Qty: 90 TABLET | Refills: 0 | Status: SHIPPED | OUTPATIENT
Start: 2020-02-10 | End: 2020-05-17

## 2020-02-24 ENCOUNTER — PES CALL (OUTPATIENT)
Dept: ADMINISTRATIVE | Facility: CLINIC | Age: 85
End: 2020-02-24

## 2020-03-03 RX ORDER — ALENDRONATE SODIUM 35 MG/1
TABLET ORAL
Qty: 4 TABLET | Refills: 11 | Status: SHIPPED | OUTPATIENT
Start: 2020-03-03 | End: 2021-01-26

## 2020-03-29 RX ORDER — GLUCOSAM/CHON-MSM1/C/MANG/BOSW 500-416.6
TABLET ORAL
Qty: 300 EACH | Refills: 12 | Status: SHIPPED | OUTPATIENT
Start: 2020-03-29 | End: 2021-03-30

## 2020-04-08 RX ORDER — CALCIUM CITRATE/VITAMIN D3 200MG-6.25
TABLET ORAL
Qty: 100 STRIP | Refills: 0 | Status: SHIPPED | OUTPATIENT
Start: 2020-04-08 | End: 2020-07-04

## 2020-05-15 ENCOUNTER — OFFICE VISIT (OUTPATIENT)
Dept: INTERNAL MEDICINE | Facility: CLINIC | Age: 85
End: 2020-05-15
Payer: MEDICARE

## 2020-05-15 VITALS
WEIGHT: 136 LBS | SYSTOLIC BLOOD PRESSURE: 172 MMHG | OXYGEN SATURATION: 96 % | TEMPERATURE: 98 F | DIASTOLIC BLOOD PRESSURE: 72 MMHG | HEIGHT: 60 IN | HEART RATE: 65 BPM | BODY MASS INDEX: 26.7 KG/M2

## 2020-05-15 DIAGNOSIS — M79.7 FIBROMYALGIA: Primary | ICD-10-CM

## 2020-05-15 PROCEDURE — 99999 PR PBB SHADOW E&M-EST. PATIENT-LVL IV: CPT | Mod: PBBFAC,HCNC,, | Performed by: INTERNAL MEDICINE

## 2020-05-15 PROCEDURE — 3077F SYST BP >= 140 MM HG: CPT | Mod: HCNC,CPTII,S$GLB, | Performed by: INTERNAL MEDICINE

## 2020-05-15 PROCEDURE — 99213 PR OFFICE/OUTPT VISIT, EST, LEVL III, 20-29 MIN: ICD-10-PCS | Mod: HCNC,25,S$GLB, | Performed by: INTERNAL MEDICINE

## 2020-05-15 PROCEDURE — 3077F PR MOST RECENT SYSTOLIC BLOOD PRESSURE >= 140 MM HG: ICD-10-PCS | Mod: HCNC,CPTII,S$GLB, | Performed by: INTERNAL MEDICINE

## 2020-05-15 PROCEDURE — 99999 PR PBB SHADOW E&M-EST. PATIENT-LVL IV: ICD-10-PCS | Mod: PBBFAC,HCNC,, | Performed by: INTERNAL MEDICINE

## 2020-05-15 PROCEDURE — 1125F PR PAIN SEVERITY QUANTIFIED, PAIN PRESENT: ICD-10-PCS | Mod: HCNC,S$GLB,, | Performed by: INTERNAL MEDICINE

## 2020-05-15 PROCEDURE — 1101F PT FALLS ASSESS-DOCD LE1/YR: CPT | Mod: HCNC,CPTII,S$GLB, | Performed by: INTERNAL MEDICINE

## 2020-05-15 PROCEDURE — 1159F PR MEDICATION LIST DOCUMENTED IN MEDICAL RECORD: ICD-10-PCS | Mod: HCNC,S$GLB,, | Performed by: INTERNAL MEDICINE

## 2020-05-15 PROCEDURE — 96372 THER/PROPH/DIAG INJ SC/IM: CPT | Mod: HCNC,S$GLB,, | Performed by: INTERNAL MEDICINE

## 2020-05-15 PROCEDURE — 99213 OFFICE O/P EST LOW 20 MIN: CPT | Mod: HCNC,25,S$GLB, | Performed by: INTERNAL MEDICINE

## 2020-05-15 PROCEDURE — 3078F PR MOST RECENT DIASTOLIC BLOOD PRESSURE < 80 MM HG: ICD-10-PCS | Mod: HCNC,CPTII,S$GLB, | Performed by: INTERNAL MEDICINE

## 2020-05-15 PROCEDURE — 1101F PR PT FALLS ASSESS DOC 0-1 FALLS W/OUT INJ PAST YR: ICD-10-PCS | Mod: HCNC,CPTII,S$GLB, | Performed by: INTERNAL MEDICINE

## 2020-05-15 PROCEDURE — 1125F AMNT PAIN NOTED PAIN PRSNT: CPT | Mod: HCNC,S$GLB,, | Performed by: INTERNAL MEDICINE

## 2020-05-15 PROCEDURE — 96372 PR INJECTION,THERAP/PROPH/DIAG2ST, IM OR SUBCUT: ICD-10-PCS | Mod: HCNC,S$GLB,, | Performed by: INTERNAL MEDICINE

## 2020-05-15 PROCEDURE — 3078F DIAST BP <80 MM HG: CPT | Mod: HCNC,CPTII,S$GLB, | Performed by: INTERNAL MEDICINE

## 2020-05-15 PROCEDURE — 1159F MED LIST DOCD IN RCRD: CPT | Mod: HCNC,S$GLB,, | Performed by: INTERNAL MEDICINE

## 2020-05-15 RX ORDER — BETAMETHASONE SODIUM PHOSPHATE AND BETAMETHASONE ACETATE 3; 3 MG/ML; MG/ML
6 INJECTION, SUSPENSION INTRA-ARTICULAR; INTRALESIONAL; INTRAMUSCULAR; SOFT TISSUE
Status: COMPLETED | OUTPATIENT
Start: 2020-05-15 | End: 2020-05-15

## 2020-05-15 RX ADMIN — BETAMETHASONE SODIUM PHOSPHATE AND BETAMETHASONE ACETATE 6 MG: 3; 3 INJECTION, SUSPENSION INTRA-ARTICULAR; INTRALESIONAL; INTRAMUSCULAR; SOFT TISSUE at 05:05

## 2020-05-15 NOTE — PROGRESS NOTES
Subjective:       Patient ID: Sera Boone is a 84 y.o. female.    Chief Complaint: Leg Pain and Generalized Body Aches    84 year old lady with history of fibromyalgia complains thru her son as  of general body aches.  No fever, chills, cough, GI or  complaints.    Review of Systems   Constitutional: Negative for activity change, chills, fatigue and fever.   HENT: Negative for congestion, ear pain, nosebleeds, postnasal drip, sinus pressure and sore throat.    Eyes: Negative.  Negative for visual disturbance.   Respiratory: Negative for cough, chest tightness, shortness of breath and wheezing.    Cardiovascular: Negative for chest pain.   Gastrointestinal: Negative for abdominal pain, diarrhea, nausea and vomiting.   Genitourinary: Negative for difficulty urinating, dysuria, frequency and urgency.   Musculoskeletal: Negative for arthralgias and neck stiffness.   Skin: Negative for rash.   Neurological: Negative for dizziness, weakness and headaches.   Psychiatric/Behavioral: Negative for sleep disturbance. The patient is not nervous/anxious.        Objective:      Physical Exam   Constitutional: She is oriented to person, place, and time. She appears well-developed and well-nourished.  Non-toxic appearance. No distress.   HENT:   Head: Normocephalic and atraumatic.   Right Ear: Tympanic membrane, external ear and ear canal normal.   Left Ear: Tympanic membrane, external ear and ear canal normal.   Eyes: Pupils are equal, round, and reactive to light. EOM are normal. No scleral icterus.   Neck: Normal range of motion. Neck supple. No thyromegaly present.   Cardiovascular: Normal rate, regular rhythm and normal heart sounds.   Pulmonary/Chest: Effort normal and breath sounds normal.   Abdominal: Soft. Bowel sounds are normal. She exhibits no mass. There is no tenderness. There is no rebound.   Musculoskeletal: Normal range of motion.   Lymphadenopathy:     She has no cervical adenopathy.    Neurological: She is alert and oriented to person, place, and time. She has normal reflexes. She displays normal reflexes. No cranial nerve deficit. She exhibits normal muscle tone. Coordination normal.   Skin: Skin is warm and dry.   Psychiatric: She has a normal mood and affect. Her behavior is normal.       Assessment:       1. Fibromyalgia        Plan:   Sera was seen today for leg pain and generalized body aches.    Diagnoses and all orders for this visit:    Fibromyalgia    Other orders  -     betamethasone acetate-betamethasone sodium phosphate injection 6 mg

## 2020-05-17 ENCOUNTER — TELEPHONE (OUTPATIENT)
Dept: INTERNAL MEDICINE | Facility: CLINIC | Age: 85
End: 2020-05-17

## 2020-05-17 DIAGNOSIS — E55.9 VITAMIN D DEFICIENCY DISEASE: Primary | ICD-10-CM

## 2020-05-17 DIAGNOSIS — E11.49 TYPE II DIABETES MELLITUS WITH NEUROLOGICAL MANIFESTATIONS: ICD-10-CM

## 2020-05-17 DIAGNOSIS — Z85.3 HISTORY OF BREAST CANCER: ICD-10-CM

## 2020-05-17 DIAGNOSIS — N25.81 SECONDARY HYPERPARATHYROIDISM: ICD-10-CM

## 2020-05-17 DIAGNOSIS — I10 ESSENTIAL HYPERTENSION: ICD-10-CM

## 2020-05-17 RX ORDER — ATORVASTATIN CALCIUM 40 MG/1
TABLET, FILM COATED ORAL
Qty: 90 TABLET | Refills: 0 | Status: SHIPPED | OUTPATIENT
Start: 2020-05-17 | End: 2020-08-16 | Stop reason: SDUPTHER

## 2020-05-17 RX ORDER — OMEGA-3-ACID ETHYL ESTERS 1 G/1
CAPSULE, LIQUID FILLED ORAL
Qty: 180 CAPSULE | Refills: 0 | Status: SHIPPED | OUTPATIENT
Start: 2020-05-17 | End: 2020-08-16 | Stop reason: SDUPTHER

## 2020-05-17 RX ORDER — LOSARTAN POTASSIUM 50 MG/1
TABLET ORAL
Qty: 90 TABLET | Refills: 0 | Status: SHIPPED | OUTPATIENT
Start: 2020-05-17 | End: 2020-08-16 | Stop reason: SDUPTHER

## 2020-05-17 RX ORDER — LANCETS 33 GAUGE
EACH MISCELLANEOUS
COMMUNITY
Start: 2020-03-31

## 2020-05-17 RX ORDER — METFORMIN HYDROCHLORIDE 500 MG/1
TABLET ORAL
Qty: 180 TABLET | Refills: 0 | Status: SHIPPED | OUTPATIENT
Start: 2020-05-17 | End: 2020-08-16 | Stop reason: SDUPTHER

## 2020-05-17 NOTE — TELEPHONE ENCOUNTER
Please call    Overdue for labs and mammogram NOW and should see me for EPP in the next 1-2 months    Please schedule and let me know, orders in thanks    Also due for Opto and Podiatry, both ordered

## 2020-06-04 ENCOUNTER — TELEPHONE (OUTPATIENT)
Dept: INTERNAL MEDICINE | Facility: CLINIC | Age: 85
End: 2020-06-04

## 2020-06-04 NOTE — TELEPHONE ENCOUNTER
Please call  She needs Podiatry and opto appointments and EPP with me as well    OK to schedule thanks please t me know

## 2020-07-01 ENCOUNTER — TELEPHONE (OUTPATIENT)
Dept: INTERNAL MEDICINE | Facility: CLINIC | Age: 85
End: 2020-07-01

## 2020-07-01 NOTE — TELEPHONE ENCOUNTER
She canceled or did not keep her appointment for lab work.  Please call her to reschedule as well as her appointment with me sometime in the next 2-3 months, thank you, please let me know when scheduled

## 2020-07-06 ENCOUNTER — LAB VISIT (OUTPATIENT)
Dept: LAB | Facility: HOSPITAL | Age: 85
End: 2020-07-06
Attending: INTERNAL MEDICINE
Payer: MEDICARE

## 2020-07-06 DIAGNOSIS — E11.49 TYPE II DIABETES MELLITUS WITH NEUROLOGICAL MANIFESTATIONS: ICD-10-CM

## 2020-07-06 DIAGNOSIS — I10 ESSENTIAL HYPERTENSION: ICD-10-CM

## 2020-07-06 DIAGNOSIS — N25.81 SECONDARY HYPERPARATHYROIDISM: ICD-10-CM

## 2020-07-06 DIAGNOSIS — E55.9 VITAMIN D DEFICIENCY DISEASE: ICD-10-CM

## 2020-07-06 LAB
25(OH)D3+25(OH)D2 SERPL-MCNC: 38 NG/ML (ref 30–96)
ALBUMIN SERPL BCP-MCNC: 4 G/DL (ref 3.5–5.2)
ALP SERPL-CCNC: 50 U/L (ref 55–135)
ALT SERPL W/O P-5'-P-CCNC: 11 U/L (ref 10–44)
ANION GAP SERPL CALC-SCNC: 7 MMOL/L (ref 8–16)
AST SERPL-CCNC: 16 U/L (ref 10–40)
BASOPHILS # BLD AUTO: 0.01 K/UL (ref 0–0.2)
BASOPHILS NFR BLD: 0.2 % (ref 0–1.9)
BILIRUB SERPL-MCNC: 0.5 MG/DL (ref 0.1–1)
BUN SERPL-MCNC: 19 MG/DL (ref 8–23)
CALCIUM SERPL-MCNC: 9.8 MG/DL (ref 8.7–10.5)
CHLORIDE SERPL-SCNC: 109 MMOL/L (ref 95–110)
CHOLEST SERPL-MCNC: 160 MG/DL (ref 120–199)
CHOLEST/HDLC SERPL: 2.8 {RATIO} (ref 2–5)
CO2 SERPL-SCNC: 26 MMOL/L (ref 23–29)
CREAT SERPL-MCNC: 0.8 MG/DL (ref 0.5–1.4)
DIFFERENTIAL METHOD: ABNORMAL
EOSINOPHIL # BLD AUTO: 0 K/UL (ref 0–0.5)
EOSINOPHIL NFR BLD: 0.2 % (ref 0–8)
ERYTHROCYTE [DISTWIDTH] IN BLOOD BY AUTOMATED COUNT: 13.3 % (ref 11.5–14.5)
EST. GFR  (AFRICAN AMERICAN): >60 ML/MIN/1.73 M^2
EST. GFR  (NON AFRICAN AMERICAN): >60 ML/MIN/1.73 M^2
GLUCOSE SERPL-MCNC: 108 MG/DL (ref 70–110)
HCT VFR BLD AUTO: 35.2 % (ref 37–48.5)
HDLC SERPL-MCNC: 58 MG/DL (ref 40–75)
HDLC SERPL: 36.3 % (ref 20–50)
HGB BLD-MCNC: 11.1 G/DL (ref 12–16)
IMM GRANULOCYTES # BLD AUTO: 0.03 K/UL (ref 0–0.04)
IMM GRANULOCYTES NFR BLD AUTO: 0.6 % (ref 0–0.5)
LDLC SERPL CALC-MCNC: 90.4 MG/DL (ref 63–159)
LYMPHOCYTES # BLD AUTO: 2.5 K/UL (ref 1–4.8)
LYMPHOCYTES NFR BLD: 50.2 % (ref 18–48)
MCH RBC QN AUTO: 30.1 PG (ref 27–31)
MCHC RBC AUTO-ENTMCNC: 31.5 G/DL (ref 32–36)
MCV RBC AUTO: 95 FL (ref 82–98)
MONOCYTES # BLD AUTO: 0.4 K/UL (ref 0.3–1)
MONOCYTES NFR BLD: 8.5 % (ref 4–15)
NEUTROPHILS # BLD AUTO: 2 K/UL (ref 1.8–7.7)
NEUTROPHILS NFR BLD: 40.3 % (ref 38–73)
NONHDLC SERPL-MCNC: 102 MG/DL
NRBC BLD-RTO: 0 /100 WBC
PLATELET # BLD AUTO: 177 K/UL (ref 150–350)
PMV BLD AUTO: 11 FL (ref 9.2–12.9)
POTASSIUM SERPL-SCNC: 4.3 MMOL/L (ref 3.5–5.1)
PROT SERPL-MCNC: 7.6 G/DL (ref 6–8.4)
RBC # BLD AUTO: 3.69 M/UL (ref 4–5.4)
SODIUM SERPL-SCNC: 142 MMOL/L (ref 136–145)
TRIGL SERPL-MCNC: 58 MG/DL (ref 30–150)
WBC # BLD AUTO: 4.96 K/UL (ref 3.9–12.7)

## 2020-07-06 PROCEDURE — 80061 LIPID PANEL: CPT | Mod: HCNC

## 2020-07-06 PROCEDURE — 82306 VITAMIN D 25 HYDROXY: CPT | Mod: HCNC

## 2020-07-06 PROCEDURE — 85025 COMPLETE CBC W/AUTO DIFF WBC: CPT | Mod: HCNC

## 2020-07-06 PROCEDURE — 83036 HEMOGLOBIN GLYCOSYLATED A1C: CPT | Mod: HCNC

## 2020-07-06 PROCEDURE — 80053 COMPREHEN METABOLIC PANEL: CPT | Mod: HCNC

## 2020-07-06 PROCEDURE — 83970 ASSAY OF PARATHORMONE: CPT | Mod: HCNC

## 2020-07-06 PROCEDURE — 36415 COLL VENOUS BLD VENIPUNCTURE: CPT | Mod: HCNC,PO

## 2020-07-07 LAB
ESTIMATED AVG GLUCOSE: 134 MG/DL (ref 68–131)
HBA1C MFR BLD HPLC: 6.3 % (ref 4–5.6)
PTH-INTACT SERPL-MCNC: 102 PG/ML (ref 9–77)

## 2020-07-08 ENCOUNTER — TELEPHONE (OUTPATIENT)
Dept: INTERNAL MEDICINE | Facility: CLINIC | Age: 85
End: 2020-07-08

## 2020-07-08 DIAGNOSIS — E11.49 TYPE II DIABETES MELLITUS WITH NEUROLOGICAL MANIFESTATIONS: Primary | ICD-10-CM

## 2020-07-09 NOTE — TELEPHONE ENCOUNTER
Stable labs.  Continue meds for now    Please see me for EP in 3 months thanks    Also due for eye exam and Podiatry order in thanks

## 2020-07-15 ENCOUNTER — HOSPITAL ENCOUNTER (OUTPATIENT)
Dept: RADIOLOGY | Facility: HOSPITAL | Age: 85
Discharge: HOME | End: 2020-07-15
Attending: INTERNAL MEDICINE
Payer: MEDICARE

## 2020-07-15 VITALS — WEIGHT: 136 LBS | HEIGHT: 60 IN | BODY MASS INDEX: 26.7 KG/M2

## 2020-07-15 DIAGNOSIS — Z85.3 HISTORY OF BREAST CANCER: ICD-10-CM

## 2020-07-15 PROCEDURE — 77066 DX MAMMO INCL CAD BI: CPT | Mod: TC,PO

## 2020-10-19 ENCOUNTER — IMMUNIZATION (OUTPATIENT)
Dept: PHARMACY | Facility: CLINIC | Age: 85
End: 2020-10-19
Payer: MEDICARE

## 2020-10-19 ENCOUNTER — PATIENT MESSAGE (OUTPATIENT)
Dept: PHARMACY | Facility: CLINIC | Age: 85
End: 2020-10-19

## 2020-10-19 ENCOUNTER — OFFICE VISIT (OUTPATIENT)
Dept: INTERNAL MEDICINE | Facility: CLINIC | Age: 85
End: 2020-10-19
Payer: MEDICARE

## 2020-10-19 VITALS
RESPIRATION RATE: 18 BRPM | OXYGEN SATURATION: 97 % | HEART RATE: 60 BPM | BODY MASS INDEX: 27.26 KG/M2 | DIASTOLIC BLOOD PRESSURE: 85 MMHG | SYSTOLIC BLOOD PRESSURE: 140 MMHG | HEIGHT: 60 IN | WEIGHT: 138.88 LBS

## 2020-10-19 DIAGNOSIS — D64.9 ANEMIA, UNSPECIFIED TYPE: Primary | ICD-10-CM

## 2020-10-19 DIAGNOSIS — E55.9 VITAMIN D DEFICIENCY DISEASE: ICD-10-CM

## 2020-10-19 DIAGNOSIS — M79.7 FIBROMYALGIA: ICD-10-CM

## 2020-10-19 DIAGNOSIS — M75.02 ADHESIVE CAPSULITIS OF LEFT SHOULDER: ICD-10-CM

## 2020-10-19 DIAGNOSIS — M85.80 OSTEOPENIA, UNSPECIFIED LOCATION: ICD-10-CM

## 2020-10-19 PROCEDURE — 99999 PR PBB SHADOW E&M-EST. PATIENT-LVL V: ICD-10-PCS | Mod: PBBFAC,HCNC,GC, | Performed by: STUDENT IN AN ORGANIZED HEALTH CARE EDUCATION/TRAINING PROGRAM

## 2020-10-19 PROCEDURE — 99999 PR PBB SHADOW E&M-EST. PATIENT-LVL V: CPT | Mod: PBBFAC,HCNC,GC, | Performed by: STUDENT IN AN ORGANIZED HEALTH CARE EDUCATION/TRAINING PROGRAM

## 2020-10-19 PROCEDURE — 99214 OFFICE O/P EST MOD 30 MIN: CPT | Mod: HCNC,GC,S$GLB, | Performed by: STUDENT IN AN ORGANIZED HEALTH CARE EDUCATION/TRAINING PROGRAM

## 2020-10-19 PROCEDURE — 99499 RISK ADDL DX/OHS AUDIT: ICD-10-PCS | Mod: S$GLB,,, | Performed by: STUDENT IN AN ORGANIZED HEALTH CARE EDUCATION/TRAINING PROGRAM

## 2020-10-19 PROCEDURE — 3077F SYST BP >= 140 MM HG: CPT | Mod: HCNC,CPTII,GC,S$GLB | Performed by: STUDENT IN AN ORGANIZED HEALTH CARE EDUCATION/TRAINING PROGRAM

## 2020-10-19 PROCEDURE — 3079F PR MOST RECENT DIASTOLIC BLOOD PRESSURE 80-89 MM HG: ICD-10-PCS | Mod: HCNC,CPTII,GC,S$GLB | Performed by: STUDENT IN AN ORGANIZED HEALTH CARE EDUCATION/TRAINING PROGRAM

## 2020-10-19 PROCEDURE — 1159F PR MEDICATION LIST DOCUMENTED IN MEDICAL RECORD: ICD-10-PCS | Mod: HCNC,GC,S$GLB, | Performed by: STUDENT IN AN ORGANIZED HEALTH CARE EDUCATION/TRAINING PROGRAM

## 2020-10-19 PROCEDURE — 99499 UNLISTED E&M SERVICE: CPT | Mod: S$GLB,,, | Performed by: STUDENT IN AN ORGANIZED HEALTH CARE EDUCATION/TRAINING PROGRAM

## 2020-10-19 PROCEDURE — 3079F DIAST BP 80-89 MM HG: CPT | Mod: HCNC,CPTII,GC,S$GLB | Performed by: STUDENT IN AN ORGANIZED HEALTH CARE EDUCATION/TRAINING PROGRAM

## 2020-10-19 PROCEDURE — 99214 PR OFFICE/OUTPT VISIT, EST, LEVL IV, 30-39 MIN: ICD-10-PCS | Mod: HCNC,GC,S$GLB, | Performed by: STUDENT IN AN ORGANIZED HEALTH CARE EDUCATION/TRAINING PROGRAM

## 2020-10-19 PROCEDURE — 1101F PT FALLS ASSESS-DOCD LE1/YR: CPT | Mod: HCNC,CPTII,GC,S$GLB | Performed by: STUDENT IN AN ORGANIZED HEALTH CARE EDUCATION/TRAINING PROGRAM

## 2020-10-19 PROCEDURE — 1159F MED LIST DOCD IN RCRD: CPT | Mod: HCNC,GC,S$GLB, | Performed by: STUDENT IN AN ORGANIZED HEALTH CARE EDUCATION/TRAINING PROGRAM

## 2020-10-19 PROCEDURE — 1101F PR PT FALLS ASSESS DOC 0-1 FALLS W/OUT INJ PAST YR: ICD-10-PCS | Mod: HCNC,CPTII,GC,S$GLB | Performed by: STUDENT IN AN ORGANIZED HEALTH CARE EDUCATION/TRAINING PROGRAM

## 2020-10-19 PROCEDURE — 3077F PR MOST RECENT SYSTOLIC BLOOD PRESSURE >= 140 MM HG: ICD-10-PCS | Mod: HCNC,CPTII,GC,S$GLB | Performed by: STUDENT IN AN ORGANIZED HEALTH CARE EDUCATION/TRAINING PROGRAM

## 2020-10-19 RX ORDER — NAPROXEN 500 MG/1
500 TABLET ORAL 2 TIMES DAILY WITH MEALS
Qty: 24 TABLET | Refills: 0 | Status: SHIPPED | OUTPATIENT
Start: 2020-10-19 | End: 2020-10-31

## 2020-10-19 RX ORDER — METFORMIN HYDROCHLORIDE 500 MG/1
500 TABLET ORAL 2 TIMES DAILY
Qty: 180 TABLET | Refills: 0 | Status: SHIPPED | OUTPATIENT
Start: 2020-10-19 | End: 2022-01-03

## 2020-10-19 RX ORDER — SERTRALINE HYDROCHLORIDE 25 MG/1
25 TABLET, FILM COATED ORAL DAILY
Qty: 30 TABLET | Refills: 11 | Status: SHIPPED | OUTPATIENT
Start: 2020-10-19 | End: 2022-01-03

## 2020-10-19 RX ORDER — AMITRIPTYLINE HYDROCHLORIDE 10 MG/1
10 TABLET, FILM COATED ORAL EVERY MORNING
Qty: 30 TABLET | Refills: 1 | Status: SHIPPED | OUTPATIENT
Start: 2020-10-19 | End: 2022-01-03 | Stop reason: SDUPTHER

## 2020-10-19 RX ORDER — VIT C/E/ZN/COPPR/LUTEIN/ZEAXAN 250MG-90MG
2000 CAPSULE ORAL DAILY
Qty: 60 CAPSULE | Refills: 12 | Status: SHIPPED | OUTPATIENT
Start: 2020-10-19 | End: 2022-01-03 | Stop reason: SDUPTHER

## 2020-10-19 NOTE — PROGRESS NOTES
84 F presents for evaluation and follow up of her fibromyalgia, osteoporosis, HTN and DMT2. Currently c/o flare of her fibromyalgia and adhesive capsulitis pain.     A/P  1. Fibromyalgia - sertraline and amitriptyline trial.   2. Osteopenia with secondary hyperparathyroidism - continue preventive alendronate      RTC in 6 weeks.     Behram Khan, MD  Chief Medical Resident  #[phone]

## 2020-10-19 NOTE — PROGRESS NOTES
Clinic Note  10/19/2020      Subjective:      Chief Complaint: Fibromyalgia    Sera Boone is a 84 y.o. [place]-speaking female with T2DM, HTN, fibromyalgia, osteopenia, and remote history of breast cancer (2001) here for fibromyalgia and to discuss lab results from 7/2020.     She states she was diagnosed with fibromyalgia 2-3 years ago. Pain is worse in her upper arms. It is deep and is generally controlled with daily tylenol 325 mg. She has fibromyalgia flares 2-3x/month for which she takes tylenol 325 mg BID, without much relief. She would like something to help manage her pain when she has flares.    In addition to her fibromyalgia, she also has reduced mobility of her Left shoulder with adhesive capsulitis. She has received steroid injections over 3 months ago, and states it was extremely helpful in managing the associated pain. She has been to PT 4 times and now does daily PT exercises at home.    Her last A1c was 6.3. Takes metformin 500 mg daily.    BP managed with losartan 50 mg daily. She measures her BP daily and keeps a log. Her home BP readings are in the 120s-130s/70-90s. Highest reading recently has been at today's visit, 140/85.     Last DEXA 2019 with osteopenia. Takes over the counter vit D, and fosamax 35 mg.     Normocytic anemia on previous lab results, no history of iron studies, will f/u.     ROS otherwise negative.    Review of Systems   Constitutional: Negative for chills, diaphoresis and fever.   HENT: Negative for congestion, rhinorrhea, sore throat and trouble swallowing.    Eyes: Negative for pain and visual disturbance.   Respiratory: Negative for cough, chest tightness and shortness of breath.    Cardiovascular: Negative for chest pain.   Gastrointestinal: Negative for abdominal pain, constipation and diarrhea.   Endocrine: Negative for polydipsia and polyuria.   Genitourinary: Negative for difficulty urinating, dysuria, flank pain, frequency and urgency.   Musculoskeletal:  Positive for arthralgias (L shoulder) and myalgias (upper arms bilaterally). Negative for gait problem, joint swelling and neck pain.   Skin: Negative for color change and rash.   Neurological: Negative for tremors, syncope, speech difficulty, light-headedness and headaches.   Psychiatric/Behavioral: Negative for agitation, confusion, dysphoric mood, hallucinations, sleep disturbance and suicidal ideas.       Patient's Medications   New Prescriptions    AMITRIPTYLINE (ELAVIL) 10 MG TABLET    Take 1 tablet (10 mg total) by mouth every morning.    NAPROXEN (NAPROSYN) 500 MG TABLET    Take 1 tablet (500 mg total) by mouth 2 (two) times daily with meals. for 12 days    SERTRALINE (ZOLOFT) 25 MG TABLET    Take 1 tablet (25 mg total) by mouth once daily.    VARICELLA-ZOSTER GE-AS01B, PF, (SHINGRIX, PF,) 50 MCG/0.5 ML INJECTION    Inject 0.5 mLs into the muscle once. for 1 dose   Previous Medications    ACETAMINOPHEN (TYLENOL) 325 MG TABLET    Take 2 tablets (650 mg total) by mouth every 4 (four) hours as needed.    ALENDRONATE (FOSAMAX) 35 MG TABLET    TAKE 1 TABLET BY MOUTH EVERY 7 DAYS    ASPIRIN 81 MG CHEW    Take 1 tablet (81 mg total) by mouth once daily.    ATORVASTATIN (LIPITOR) 40 MG TABLET    TAKE 1 TABLET BY MOUTH EVERY DAY    BLOOD-GLUCOSE METER KIT    Use as instructed  Dispense appropriate lancets and test strips 100 each 12 refills    CICLOPIROX (PENLAC) 8 % SOLN    Apply topically nightly.    LOSARTAN (COZAAR) 50 MG TABLET    TAKE 1 TABLET BY MOUTH EVERY DAY    OMEGA-3 ACID ETHYL ESTERS (LOVAZA) 1 GRAM CAPSULE    TAKE 1 CAPSULE BY MOUTH TWICE DAILY    TRUE METRIX GLUCOSE TEST STRIP STRP    USE TO TEST BLOOD GLUCOSE DAILY    TRUEPLUS LANCETS 30 GAUGE MISC    AS DIRECTED    TRUEPLUS LANCETS 33 GAUGE MISC    CHECK BLOOD GLUCOSE DAILY.   Modified Medications    Modified Medication Previous Medication    CHOLECALCIFEROL, VITAMIN D3, (VITAMIN D3) 25 MCG (1,000 UNIT) CAPSULE cholecalciferol, vitamin D3, 1,000 unit  capsule       Take 2 capsules (2,000 Units total) by mouth once daily.    Take 2 capsules (2,000 Units total) by mouth once daily.    METFORMIN (GLUCOPHAGE) 500 MG TABLET metFORMIN (GLUCOPHAGE) 500 MG tablet       Take 1 tablet (500 mg total) by mouth 2 (two) times a day.    TAKE 1 TABLET(500 MG) BY MOUTH TWICE DAILY   Discontinued Medications    No medications on file       Patient Active Problem List    Diagnosis Date Noted    Episode of transient neurologic symptoms 05/15/2019    Secondary hyperparathyroidism 04/17/2019    Bradycardia 04/06/2019    CVA (cerebral vascular accident) 04/06/2019     Excluded      DDD (degenerative disc disease), cervical 07/24/2017    Osteopenia/osteoporosis: see DEXA 4/19 tx recommended 05/30/2017    Spondylosis of cervical region without myelopathy or radiculopathy 04/22/2017    Primary osteoarthritis involving multiple joints 04/22/2017    Stage 3 chronic kidney disease 03/16/2017    Rheumatoid factor positive 02/14/2017    Fatty liver: see ultrasound 1/17 02/13/2017    History of adenomatous polyp of colon 1/16 no need for further follow up per Dr Guerra 08/10/2015     History of adenomatous polyp of colon 1/16 no need for further follow up per Dr Guerra      Diabetic polyneuropathy associated with type 2 diabetes mellitus 08/10/2015    Hypertension associated with diabetes 05/15/2015    Vitamin D deficiency disease 02/17/2014    History of breast cancer 08/21/2013    Type II diabetes mellitus with neurological manifestations 02/22/2013    Ptosis of eyebrow 02/22/2013    Hyperlipidemia associated with type 2 diabetes mellitus            Objective:      BP (!) 140/85 (BP Location: Left arm, Patient Position: Sitting)   Pulse 60   Resp 18   Ht 5' (1.524 m)   Wt 63 kg (138 lb 14.2 oz)   SpO2 97%   BMI 27.13 kg/m²   Estimated body mass index is 27.13 kg/m² as calculated from the following:    Height as of this encounter: 5' (1.524 m).    Weight as of this  encounter: 63 kg (138 lb 14.2 oz).    Physical Exam  Constitutional:       General: She is not in acute distress.  HENT:      Head: Normocephalic and atraumatic.      Nose: No congestion.   Eyes:      General: No scleral icterus.        Right eye: No discharge.         Left eye: No discharge.      Comments: Conjunctival pallor   Neck:      Musculoskeletal: Normal range of motion and neck supple. No neck rigidity.   Cardiovascular:      Rate and Rhythm: Normal rate and regular rhythm.      Pulses: Normal pulses.   Pulmonary:      Effort: Pulmonary effort is normal. No respiratory distress.      Breath sounds: Normal breath sounds. No stridor.   Abdominal:      General: Bowel sounds are normal.      Palpations: Abdomen is soft.      Tenderness: There is no abdominal tenderness. There is no guarding or rebound.   Musculoskeletal:         General: Tenderness (to palpation biceps and triceps, L shoulder tenderness at glenohumeral joint line) present. No swelling, deformity or signs of injury.      Right lower leg: No edema.      Left lower leg: No edema.      Comments: Decreased range of motion of L shoulder, unable to raise above 90 degrees. 5/5 strength bilateral upper extremities.    Skin:     General: Skin is warm and dry.      Coloration: Skin is not pale.   Neurological:      Mental Status: She is alert and oriented to person, place, and time. Mental status is at baseline.      Cranial Nerves: No cranial nerve deficit.   Psychiatric:         Mood and Affect: Mood normal.         Assessment & Plan:   Sera was seen today for fibromyalgia and discuss lab results.     Diagnoses and all orders for this visit:    Fibromyalgia  -     sertraline (ZOLOFT) 25 MG tablet; Take 1 tablet (25 mg total) by mouth once daily.  -     naproxen (NAPROSYN) 500 MG tablet; Take 1 tablet (500 mg total) by mouth 2 (two) times daily with meals. for 12 days  -     amitriptyline (ELAVIL) 10 MG tablet; Take 1 tablet (10 mg total) by mouth  every morning.    Type 2 DM  HgA1c 6.3%. Has been holding metformin due to concern for recall. Would like new prescription.   - continue metformin 500 mg BID    Adhesive capsulitis  Left shoulder. Does daily physical therapy exercises and has been to physical therapy 4 times.   - Ortho referral placed by PCP for joint injection.    Anemia, unspecified type  Previous labs with Hg 11.1 with MCV 95. No iron studies on record. No melena or hematemesis.   -     IRON AND TIBC; Future  -     FERRITIN; Future    Vitamin D deficiency disease  Osteopenia, unspecified location  -     PTH, intact; Future  -     cholecalciferol, vitamin D3, (VITAMIN D3) 25 mcg (1,000 unit) capsule; Take 2 capsules (2,000 Units total) by mouth once daily.  -  Continue fosamax 50 weekly for prevention    HTN  Well controlled per home readings on losartan 50 mg daily. No pedal edema or SOB.   - continue losartan 50 mg daily    Patient seen and plan of care discussed with Dr. Balbuena.   RTC in 6 weeks.     Rosemary Doherty MD  [facility]

## 2020-10-23 ENCOUNTER — TELEPHONE (OUTPATIENT)
Dept: PHARMACY | Facility: CLINIC | Age: 85
End: 2020-10-23

## 2020-11-23 ENCOUNTER — LAB VISIT (OUTPATIENT)
Dept: LAB | Facility: HOSPITAL | Age: 85
End: 2020-11-23
Payer: MEDICARE

## 2020-11-23 ENCOUNTER — OFFICE VISIT (OUTPATIENT)
Dept: OPTOMETRY | Facility: CLINIC | Age: 85
End: 2020-11-23
Payer: COMMERCIAL

## 2020-11-23 DIAGNOSIS — Z96.1 PSEUDOPHAKIA OF BOTH EYES: ICD-10-CM

## 2020-11-23 DIAGNOSIS — Z01.00 EYE EXAM, ROUTINE: Primary | ICD-10-CM

## 2020-11-23 DIAGNOSIS — E78.5 HYPERLIPIDEMIA ASSOCIATED WITH TYPE 2 DIABETES MELLITUS: ICD-10-CM

## 2020-11-23 DIAGNOSIS — E11.69 HYPERLIPIDEMIA ASSOCIATED WITH TYPE 2 DIABETES MELLITUS: ICD-10-CM

## 2020-11-23 DIAGNOSIS — H26.492 PCO (POSTERIOR CAPSULAR OPACIFICATION), LEFT: ICD-10-CM

## 2020-11-23 DIAGNOSIS — D64.9 ANEMIA, UNSPECIFIED TYPE: ICD-10-CM

## 2020-11-23 DIAGNOSIS — H52.203 MYOPIA WITH ASTIGMATISM, BILATERAL: ICD-10-CM

## 2020-11-23 DIAGNOSIS — H52.13 MYOPIA WITH ASTIGMATISM, BILATERAL: ICD-10-CM

## 2020-11-23 DIAGNOSIS — E11.49 TYPE II DIABETES MELLITUS WITH NEUROLOGICAL MANIFESTATIONS: ICD-10-CM

## 2020-11-23 DIAGNOSIS — E11.9 TYPE 2 DIABETES MELLITUS WITHOUT OPHTHALMIC MANIFESTATIONS: ICD-10-CM

## 2020-11-23 DIAGNOSIS — M85.80 OSTEOPENIA, UNSPECIFIED LOCATION: ICD-10-CM

## 2020-11-23 LAB
FERRITIN SERPL-MCNC: 49 NG/ML (ref 20–300)
IRON SERPL-MCNC: 124 UG/DL (ref 30–160)
PTH-INTACT SERPL-MCNC: 113 PG/ML (ref 9–77)
SATURATED IRON: 33 % (ref 20–50)
TOTAL IRON BINDING CAPACITY: 380 UG/DL (ref 250–450)
TRANSFERRIN SERPL-MCNC: 257 MG/DL (ref 200–375)

## 2020-11-23 PROCEDURE — 36415 COLL VENOUS BLD VENIPUNCTURE: CPT | Mod: HCNC

## 2020-11-23 PROCEDURE — 83970 ASSAY OF PARATHORMONE: CPT | Mod: HCNC

## 2020-11-23 PROCEDURE — 92014 PR EYE EXAM, EST PATIENT,COMPREHESV: ICD-10-PCS | Mod: S$GLB,,, | Performed by: OPTOMETRIST

## 2020-11-23 PROCEDURE — 99999 PR PBB SHADOW E&M-EST. PATIENT-LVL III: ICD-10-PCS | Mod: PBBFAC,,, | Performed by: OPTOMETRIST

## 2020-11-23 PROCEDURE — 99999 PR PBB SHADOW E&M-EST. PATIENT-LVL III: CPT | Mod: PBBFAC,,, | Performed by: OPTOMETRIST

## 2020-11-23 PROCEDURE — 83540 ASSAY OF IRON: CPT | Mod: HCNC

## 2020-11-23 PROCEDURE — 92015 DETERMINE REFRACTIVE STATE: CPT | Mod: S$GLB,,, | Performed by: OPTOMETRIST

## 2020-11-23 PROCEDURE — 92015 PR REFRACTION: ICD-10-PCS | Mod: S$GLB,,, | Performed by: OPTOMETRIST

## 2020-11-23 PROCEDURE — 82728 ASSAY OF FERRITIN: CPT | Mod: HCNC

## 2020-11-23 PROCEDURE — 92014 COMPRE OPH EXAM EST PT 1/>: CPT | Mod: S$GLB,,, | Performed by: OPTOMETRIST

## 2020-11-23 NOTE — PROGRESS NOTES
HPI     Pt here for annual visit  Has noticed changes in va with OS mostly  Was supposed to f/u with heather for YAG OS last yr but never did   Using +3 readers only  Patient denies diplopia, headaches, flashes/floaters, pain, and   itching/burning/tearing.    Has burning sometimes   Uses refresh daily with relief       Last edited by Liz Frye on 11/23/2020  9:01 AM. (History)            Assessment /Plan     For exam results, see Encounter Report.        Myopia with astigmatism, bilateral    Rx specs after yag    Type II diabetes mellitus with neurological manifestations  Hyperlipidemia associated with type 2 diabetes mellitus  Type 2 diabetes mellitus without ophthalmic manifestations     -No retinopathy noted today.  Continued control with primary care physician and annual comprehensive eye exam.     Myopia with astigmatism, bilateral  -hold     Pseudophakia of both eyes  PCO (posterior capsular opacification), left  -     Ambulatory Referral to Ophthalmology  -YAG Consult OS     RTC annual

## 2020-12-04 ENCOUNTER — OFFICE VISIT (OUTPATIENT)
Dept: INTERNAL MEDICINE | Facility: CLINIC | Age: 85
End: 2020-12-04
Payer: MEDICARE

## 2020-12-04 VITALS — HEART RATE: 64 BPM | BODY MASS INDEX: 27.26 KG/M2 | WEIGHT: 138.88 LBS | HEIGHT: 60 IN

## 2020-12-04 DIAGNOSIS — E55.9 VITAMIN D DEFICIENCY DISEASE: ICD-10-CM

## 2020-12-04 DIAGNOSIS — I10 ESSENTIAL HYPERTENSION: ICD-10-CM

## 2020-12-04 DIAGNOSIS — M75.02 ADHESIVE CAPSULITIS OF LEFT SHOULDER: ICD-10-CM

## 2020-12-04 DIAGNOSIS — D64.9 ANEMIA, UNSPECIFIED TYPE: ICD-10-CM

## 2020-12-04 DIAGNOSIS — E11.49 TYPE II DIABETES MELLITUS WITH NEUROLOGICAL MANIFESTATIONS: ICD-10-CM

## 2020-12-04 DIAGNOSIS — M79.7 FIBROMYALGIA: Primary | ICD-10-CM

## 2020-12-04 DIAGNOSIS — M85.80 OSTEOPENIA, UNSPECIFIED LOCATION: ICD-10-CM

## 2020-12-04 PROCEDURE — 1159F PR MEDICATION LIST DOCUMENTED IN MEDICAL RECORD: ICD-10-PCS | Mod: HCNC,GC,S$GLB, | Performed by: STUDENT IN AN ORGANIZED HEALTH CARE EDUCATION/TRAINING PROGRAM

## 2020-12-04 PROCEDURE — 99213 OFFICE O/P EST LOW 20 MIN: CPT | Mod: HCNC,GC,S$GLB, | Performed by: STUDENT IN AN ORGANIZED HEALTH CARE EDUCATION/TRAINING PROGRAM

## 2020-12-04 PROCEDURE — 3074F SYST BP LT 130 MM HG: CPT | Mod: HCNC,CPTII,GC,S$GLB | Performed by: STUDENT IN AN ORGANIZED HEALTH CARE EDUCATION/TRAINING PROGRAM

## 2020-12-04 PROCEDURE — 3078F DIAST BP <80 MM HG: CPT | Mod: HCNC,CPTII,GC,S$GLB | Performed by: STUDENT IN AN ORGANIZED HEALTH CARE EDUCATION/TRAINING PROGRAM

## 2020-12-04 PROCEDURE — 3078F PR MOST RECENT DIASTOLIC BLOOD PRESSURE < 80 MM HG: ICD-10-PCS | Mod: HCNC,CPTII,GC,S$GLB | Performed by: STUDENT IN AN ORGANIZED HEALTH CARE EDUCATION/TRAINING PROGRAM

## 2020-12-04 PROCEDURE — 1159F MED LIST DOCD IN RCRD: CPT | Mod: HCNC,GC,S$GLB, | Performed by: STUDENT IN AN ORGANIZED HEALTH CARE EDUCATION/TRAINING PROGRAM

## 2020-12-04 PROCEDURE — 99999 PR PBB SHADOW E&M-EST. PATIENT-LVL II: CPT | Mod: PBBFAC,HCNC,GC, | Performed by: STUDENT IN AN ORGANIZED HEALTH CARE EDUCATION/TRAINING PROGRAM

## 2020-12-04 PROCEDURE — 3074F PR MOST RECENT SYSTOLIC BLOOD PRESSURE < 130 MM HG: ICD-10-PCS | Mod: HCNC,CPTII,GC,S$GLB | Performed by: STUDENT IN AN ORGANIZED HEALTH CARE EDUCATION/TRAINING PROGRAM

## 2020-12-04 PROCEDURE — 99999 PR PBB SHADOW E&M-EST. PATIENT-LVL II: ICD-10-PCS | Mod: PBBFAC,HCNC,GC, | Performed by: STUDENT IN AN ORGANIZED HEALTH CARE EDUCATION/TRAINING PROGRAM

## 2020-12-04 PROCEDURE — 99213 PR OFFICE/OUTPT VISIT, EST, LEVL III, 20-29 MIN: ICD-10-PCS | Mod: HCNC,GC,S$GLB, | Performed by: STUDENT IN AN ORGANIZED HEALTH CARE EDUCATION/TRAINING PROGRAM

## 2020-12-04 NOTE — PROGRESS NOTES
.  Clinic Note  12/04/2020      Subjective:      Chief Complaint: Follow-up    Sera Boone is a 84 y.o. [place]-speaking female with T2DM, HTN, fibromyalgia, osteopenia with secondary hyperparathyroidism, and remote history of breast cancer (2001) here for fibromyalgia here for follow up after starting zoloft and amitriptyline.     Patient is very happy with how she has been feeling with zoloft and amitriptyline. Has been taking it regularly for 3 weeks now. Had one day with myalgia flare, improved with naproxen. Was diagnosed with fibromyalgia around 2017. Typical pain is deep and worse in her upper arms.     In addition to her fibromyalgia, she also has reduced mobility of her Left shoulder with adhesive capsulitis. She has received steroid injections over 3 months ago, and states it was extremely helpful in managing the associated pain. She has been to PT 4 times and now does daily PT exercises at home. She has continued to use bands and is improving in strength and mobility, now able to raise arm above head.     Her last A1c was 6.3. Takes metformin 500 mg daily. Checks BG at home, 117 this AM.     BP managed with losartan 50 mg daily. She measures her BP daily and keeps a log. Her home BP readings are in the 120s-130s/70-90s. Today /65.     Last DEXA 2019 with osteopenia. Takes over the counter vit D, and fosamax 35 mg.    Normocytic anemia on previous lab results, normal iron studies. Uptodate on colonoscopy.     ROS otherwise negative.    Review of Systems   Constitutional: Negative for chills, diaphoresis and fever.   HENT: Negative for congestion, rhinorrhea, sore throat and trouble swallowing.    Eyes: Negative for pain and visual disturbance.   Respiratory: Negative for cough, chest tightness and shortness of breath.    Cardiovascular: Negative for chest pain.   Gastrointestinal: Negative for abdominal pain, constipation and diarrhea.   Endocrine: Negative for polydipsia and polyuria.    Genitourinary: Negative for difficulty urinating, dysuria, flank pain, frequency and urgency.   Musculoskeletal: Positive for arthralgias (L shoulder). Negative for gait problem, joint swelling, myalgias (upper arms bilaterally) and neck pain.   Skin: Negative for color change and rash.   Neurological: Negative for tremors, syncope, speech difficulty, light-headedness and headaches.   Psychiatric/Behavioral: Negative for agitation, confusion, dysphoric mood, hallucinations, sleep disturbance and suicidal ideas.       Patient's Medications   New Prescriptions    No medications on file   Previous Medications    ACETAMINOPHEN (TYLENOL) 325 MG TABLET    Take 2 tablets (650 mg total) by mouth every 4 (four) hours as needed.    ALENDRONATE (FOSAMAX) 35 MG TABLET    TAKE 1 TABLET BY MOUTH EVERY 7 DAYS    AMITRIPTYLINE (ELAVIL) 10 MG TABLET    Take 1 tablet (10 mg total) by mouth every morning.    ASPIRIN 81 MG CHEW    Take 1 tablet (81 mg total) by mouth once daily.    ATORVASTATIN (LIPITOR) 40 MG TABLET    TAKE 1 TABLET BY MOUTH EVERY DAY    BLOOD-GLUCOSE METER KIT    Use as instructed  Dispense appropriate lancets and test strips 100 each 12 refills    CHOLECALCIFEROL, VITAMIN D3, (VITAMIN D3) 25 MCG (1,000 UNIT) CAPSULE    Take 2 capsules (2,000 Units total) by mouth once daily.    CICLOPIROX (PENLAC) 8 % SOLN    Apply topically nightly.    LOSARTAN (COZAAR) 50 MG TABLET    TAKE 1 TABLET BY MOUTH EVERY DAY    METFORMIN (GLUCOPHAGE) 500 MG TABLET    Take 1 tablet (500 mg total) by mouth 2 (two) times a day.    OMEGA-3 ACID ETHYL ESTERS (LOVAZA) 1 GRAM CAPSULE    TAKE 1 CAPSULE BY MOUTH TWICE DAILY    SERTRALINE (ZOLOFT) 25 MG TABLET    Take 1 tablet (25 mg total) by mouth once daily.    TRUE METRIX GLUCOSE TEST STRIP STRP    USE TO TEST BLOOD GLUCOSE DAILY    TRUEPLUS LANCETS 30 GAUGE MISC    AS DIRECTED    TRUEPLUS LANCETS 33 GAUGE MISC    CHECK BLOOD GLUCOSE DAILY.   Modified Medications    No medications on file    Discontinued Medications    No medications on file       Patient Active Problem List    Diagnosis Date Noted    Anemia 10/19/2020    Fibromyalgia 10/19/2020    Osteopenia 10/19/2020    Adhesive capsulitis of left shoulder 10/19/2020    Episode of transient neurologic symptoms 05/15/2019    Secondary hyperparathyroidism 04/17/2019    Bradycardia 04/06/2019    CVA (cerebral vascular accident) 04/06/2019     Excluded      DDD (degenerative disc disease), cervical 07/24/2017    Osteopenia/osteoporosis: see DEXA 4/19 tx recommended 05/30/2017    Spondylosis of cervical region without myelopathy or radiculopathy 04/22/2017    Primary osteoarthritis involving multiple joints 04/22/2017    Stage 3 chronic kidney disease 03/16/2017    Rheumatoid factor positive 02/14/2017    Fatty liver: see ultrasound 1/17 02/13/2017    History of adenomatous polyp of colon 1/16 no need for further follow up per Dr Guerra 08/10/2015     History of adenomatous polyp of colon 1/16 no need for further follow up per Dr Guerra      Diabetic polyneuropathy associated with type 2 diabetes mellitus 08/10/2015    Hypertension associated with diabetes 05/15/2015    Vitamin D deficiency disease 02/17/2014    History of breast cancer 08/21/2013    Type II diabetes mellitus with neurological manifestations 02/22/2013    Ptosis of eyebrow 02/22/2013    Hyperlipidemia associated with type 2 diabetes mellitus            Objective:      BP (P) 115/65   Pulse 64   Ht 5' (1.524 m)   Wt 63 kg (138 lb 14.2 oz)   BMI 27.13 kg/m²   Estimated body mass index is 27.13 kg/m² as calculated from the following:    Height as of this encounter: 5' (1.524 m).    Weight as of this encounter: 63 kg (138 lb 14.2 oz).    Physical Exam  Constitutional:       General: She is not in acute distress.  HENT:      Head: Normocephalic and atraumatic.      Nose: No congestion.   Eyes:      General: No scleral icterus.        Right eye: No discharge.          Left eye: No discharge.   Neck:      Musculoskeletal: Normal range of motion and neck supple. No neck rigidity.   Cardiovascular:      Rate and Rhythm: Normal rate and regular rhythm.      Pulses: Normal pulses.   Pulmonary:      Effort: Pulmonary effort is normal. No respiratory distress.      Breath sounds: Normal breath sounds. No stridor.   Abdominal:      General: Bowel sounds are normal.      Palpations: Abdomen is soft.      Tenderness: There is no abdominal tenderness. There is no guarding or rebound.   Musculoskeletal:         General: Tenderness (to palpation biceps and triceps, L shoulder tenderness at glenohumeral joint line) present. No swelling, deformity or signs of injury.      Right lower leg: No edema.      Left lower leg: No edema.      Comments: Improving ROM of left shoulder, able to raise up to 140 degrees. 5/5 strength of bilateral upper extremities.    Skin:     General: Skin is warm and dry.      Coloration: Skin is not pale.   Neurological:      Mental Status: She is alert and oriented to person, place, and time. Mental status is at baseline.      Cranial Nerves: No cranial nerve deficit.   Psychiatric:         Mood and Affect: Mood normal.         Assessment & Plan:   Sera was seen today for fibromyalgia and discuss lab results.     Diagnoses and all orders for this visit:    Fibromyalgia  Well managed. Continue zoloft, amitriptyline, and naproxen for occasional flares.  -     sertraline (ZOLOFT) 25 MG tablet; Take 1 tablet (25 mg total) by mouth once daily.  -     naproxen (NAPROSYN) 500 MG tablet; Take 1 tablet (500 mg total) by mouth 2 (two) times daily with meals. for 12 days  -     amitriptyline (ELAVIL) 10 MG tablet; Take 1 tablet (10 mg total) by mouth every morning.    Type 2 DM  HgA1c 6.3%.   - continue metformin 500 mg BID    Adhesive capsulitis  Left shoulder. Does daily physical therapy exercises and has been to physical therapy 4 times.   - continues PT exercises with band  strengthening at home    Anemia, unspecified type  Previous labs with Hg 11.1 with MCV 95. No melena or hematemesis. Iron studies wnl. Cr and eGFR wnl, hx of CKD.  - ctm    Vitamin D deficiency disease  Osteopenia, secondary hyperparathyroidism  Ionized Ca wnl. PTH elevated.   -     cholecalciferol, vitamin D3, (VITAMIN D3) 25 mcg (1,000 unit) capsule; Take 2 capsules (2,000 Units total) by mouth once daily.  -  Continue fosamax 50 weekly for prevention    HTN  BP in office wnl. Well controlled per home readings on losartan 50 mg daily. No pedal edema or SOB.   - continue losartan 50 mg daily    Patient seen and plan of care discussed with Dr. Claros.   RTC in 3 months.     Rosemary Doherty MD  [facility]

## 2020-12-11 ENCOUNTER — OFFICE VISIT (OUTPATIENT)
Dept: OPHTHALMOLOGY | Facility: CLINIC | Age: 85
End: 2020-12-11
Payer: MEDICARE

## 2020-12-11 DIAGNOSIS — H26.492 POSTERIOR CAPSULAR OPACIFICATION VISUALLY SIGNIFICANT, LEFT EYE: Primary | ICD-10-CM

## 2020-12-11 PROCEDURE — 92014 COMPRE OPH EXAM EST PT 1/>: CPT | Mod: 57,HCNC,S$GLB, | Performed by: OPHTHALMOLOGY

## 2020-12-11 PROCEDURE — 99999 PR PBB SHADOW E&M-EST. PATIENT-LVL III: ICD-10-PCS | Mod: PBBFAC,HCNC,, | Performed by: OPHTHALMOLOGY

## 2020-12-11 PROCEDURE — 1101F PT FALLS ASSESS-DOCD LE1/YR: CPT | Mod: HCNC,CPTII,S$GLB, | Performed by: OPHTHALMOLOGY

## 2020-12-11 PROCEDURE — 1101F PR PT FALLS ASSESS DOC 0-1 FALLS W/OUT INJ PAST YR: ICD-10-PCS | Mod: HCNC,CPTII,S$GLB, | Performed by: OPHTHALMOLOGY

## 2020-12-11 PROCEDURE — 92014 PR EYE EXAM, EST PATIENT,COMPREHESV: ICD-10-PCS | Mod: 57,HCNC,S$GLB, | Performed by: OPHTHALMOLOGY

## 2020-12-11 PROCEDURE — 1126F AMNT PAIN NOTED NONE PRSNT: CPT | Mod: HCNC,S$GLB,, | Performed by: OPHTHALMOLOGY

## 2020-12-11 PROCEDURE — 3288F PR FALLS RISK ASSESSMENT DOCUMENTED: ICD-10-PCS | Mod: HCNC,CPTII,S$GLB, | Performed by: OPHTHALMOLOGY

## 2020-12-11 PROCEDURE — 1126F PR PAIN SEVERITY QUANTIFIED, NO PAIN PRESENT: ICD-10-PCS | Mod: HCNC,S$GLB,, | Performed by: OPHTHALMOLOGY

## 2020-12-11 PROCEDURE — 3288F FALL RISK ASSESSMENT DOCD: CPT | Mod: HCNC,CPTII,S$GLB, | Performed by: OPHTHALMOLOGY

## 2020-12-11 PROCEDURE — 66821 YAG CAPSULOTOMY - OS - LEFT EYE: ICD-10-PCS | Mod: HCNC,LT,S$GLB, | Performed by: OPHTHALMOLOGY

## 2020-12-11 PROCEDURE — 99999 PR PBB SHADOW E&M-EST. PATIENT-LVL III: CPT | Mod: PBBFAC,HCNC,, | Performed by: OPHTHALMOLOGY

## 2020-12-11 PROCEDURE — 66821 AFTER CATARACT LASER SURGERY: CPT | Mod: HCNC,LT,S$GLB, | Performed by: OPHTHALMOLOGY

## 2020-12-11 NOTE — PROGRESS NOTES
HPI     Referred by Dr Myrick / Dr. Barajas pt    S/P Phaco IOL OS 9/8/12   S/p phaco IOL OD 12/3/12   PCO OS     Refresh ou prn    83 YO female here for yag eval OS. Pt here with son who is translating; pt   reports that OS vision had decreased. Denies eye pain, flashes nor   floaters.     Last edited by Yumi Key on 12/11/2020  8:35 AM. (History)            Assessment /Plan     For exam results, see Encounter Report.    Posterior capsular opacification visually significant, left eye  -     Yag Capsulotomy - OS - Left Eye      S/P Phaco IOL OS 9/8/12   S/p phaco IOL OD 12/3/12   PCO OS     Visually significant posterior capsular opacity present.  OS  - discussed risks, benefits, and alternatives to laser surgery - pt wishes to proceed with yag laser  - Informed consent obtained and correct eye(s) verified with patient.  - Intraocular Pressure to be taken 10- 30 minutes post procedure.   - PF QID x 4 days then d/c  - f/up as scheduled    DIAGNOSIS: Visually significant posterior capsular opacity    PROCEDURE: YAG Laser Capsulotomy OS    COMPLICATIONS: none     DESCRIPTION OF PROCEDURE IN DETAIL:  1 drop of topical Proparacaine and Iopidine instilled, and eye(s) dilated with 1% Tropicamide 2.5% Phenylephrine. YAG laser applied to posterior capsule in cruciate pattern.      DISPOSITION:  Patient tolerated procedure well.        Will call pt next wk to ensure doing well s/p yag cap       f/up dr barajas as scheduled.

## 2020-12-18 ENCOUNTER — TELEPHONE (OUTPATIENT)
Dept: OPHTHALMOLOGY | Facility: CLINIC | Age: 85
End: 2020-12-18

## 2020-12-18 NOTE — TELEPHONE ENCOUNTER
----- Message from Lianna Yi sent at 12/11/2020 11:17 AM CST -----  Will call pt next wk to ensure doing well s/p shree cap

## 2021-01-15 ENCOUNTER — IMMUNIZATION (OUTPATIENT)
Dept: PRIMARY CARE CLINIC | Facility: CLINIC | Age: 86
End: 2021-01-15
Payer: MEDICARE

## 2021-01-15 DIAGNOSIS — Z23 NEED FOR VACCINATION: Primary | ICD-10-CM

## 2021-01-15 PROCEDURE — 91300 COVID-19, MRNA, LNP-S, PF, 30 MCG/0.3 ML DOSE VACCINE: CPT | Mod: S$GLB,,, | Performed by: FAMILY MEDICINE

## 2021-01-15 PROCEDURE — 91300 COVID-19, MRNA, LNP-S, PF, 30 MCG/0.3 ML DOSE VACCINE: ICD-10-PCS | Mod: S$GLB,,, | Performed by: FAMILY MEDICINE

## 2021-01-15 PROCEDURE — 0001A COVID-19, MRNA, LNP-S, PF, 30 MCG/0.3 ML DOSE VACCINE: CPT | Mod: S$GLB,,, | Performed by: FAMILY MEDICINE

## 2021-01-15 PROCEDURE — 0001A COVID-19, MRNA, LNP-S, PF, 30 MCG/0.3 ML DOSE VACCINE: ICD-10-PCS | Mod: S$GLB,,, | Performed by: FAMILY MEDICINE

## 2021-02-05 ENCOUNTER — IMMUNIZATION (OUTPATIENT)
Dept: PRIMARY CARE CLINIC | Facility: CLINIC | Age: 86
End: 2021-02-05
Payer: MEDICARE

## 2021-02-05 DIAGNOSIS — Z23 NEED FOR VACCINATION: Primary | ICD-10-CM

## 2021-02-05 PROCEDURE — 0002A COVID-19, MRNA, LNP-S, PF, 30 MCG/0.3 ML DOSE VACCINE: ICD-10-PCS | Mod: S$GLB,,, | Performed by: FAMILY MEDICINE

## 2021-02-05 PROCEDURE — 0002A COVID-19, MRNA, LNP-S, PF, 30 MCG/0.3 ML DOSE VACCINE: CPT | Mod: S$GLB,,, | Performed by: FAMILY MEDICINE

## 2021-02-05 PROCEDURE — 91300 COVID-19, MRNA, LNP-S, PF, 30 MCG/0.3 ML DOSE VACCINE: CPT | Mod: S$GLB,,, | Performed by: FAMILY MEDICINE

## 2021-02-05 PROCEDURE — 91300 COVID-19, MRNA, LNP-S, PF, 30 MCG/0.3 ML DOSE VACCINE: ICD-10-PCS | Mod: S$GLB,,, | Performed by: FAMILY MEDICINE

## 2021-03-17 ENCOUNTER — TELEPHONE (OUTPATIENT)
Dept: INTERNAL MEDICINE | Facility: CLINIC | Age: 86
End: 2021-03-17

## 2021-03-17 ENCOUNTER — OFFICE VISIT (OUTPATIENT)
Dept: INTERNAL MEDICINE | Facility: CLINIC | Age: 86
End: 2021-03-17
Payer: MEDICARE

## 2021-03-17 ENCOUNTER — HOSPITAL ENCOUNTER (OUTPATIENT)
Dept: RADIOLOGY | Facility: HOSPITAL | Age: 86
Discharge: HOME | End: 2021-03-17
Attending: NURSE PRACTITIONER
Payer: MEDICARE

## 2021-03-17 VITALS
WEIGHT: 138 LBS | HEART RATE: 73 BPM | OXYGEN SATURATION: 98 % | BODY MASS INDEX: 27.09 KG/M2 | DIASTOLIC BLOOD PRESSURE: 76 MMHG | HEIGHT: 60 IN | SYSTOLIC BLOOD PRESSURE: 126 MMHG

## 2021-03-17 DIAGNOSIS — M54.32 SCIATICA OF LEFT SIDE: Primary | ICD-10-CM

## 2021-03-17 DIAGNOSIS — M54.41 LOW BACK PAIN WITH RIGHT-SIDED SCIATICA, UNSPECIFIED BACK PAIN LATERALITY, UNSPECIFIED CHRONICITY: Primary | ICD-10-CM

## 2021-03-17 DIAGNOSIS — M54.9 DORSALGIA, UNSPECIFIED: ICD-10-CM

## 2021-03-17 PROCEDURE — 1159F PR MEDICATION LIST DOCUMENTED IN MEDICAL RECORD: ICD-10-PCS | Mod: S$GLB,,, | Performed by: NURSE PRACTITIONER

## 2021-03-17 PROCEDURE — 1101F PR PT FALLS ASSESS DOC 0-1 FALLS W/OUT INJ PAST YR: ICD-10-PCS | Mod: CPTII,S$GLB,, | Performed by: NURSE PRACTITIONER

## 2021-03-17 PROCEDURE — 1126F AMNT PAIN NOTED NONE PRSNT: CPT | Mod: S$GLB,,, | Performed by: NURSE PRACTITIONER

## 2021-03-17 PROCEDURE — 99214 OFFICE O/P EST MOD 30 MIN: CPT | Mod: S$GLB,,, | Performed by: NURSE PRACTITIONER

## 2021-03-17 PROCEDURE — 3288F FALL RISK ASSESSMENT DOCD: CPT | Mod: CPTII,S$GLB,, | Performed by: NURSE PRACTITIONER

## 2021-03-17 PROCEDURE — 72110 X-RAY EXAM L-2 SPINE 4/>VWS: CPT | Mod: 26,,, | Performed by: RADIOLOGY

## 2021-03-17 PROCEDURE — 3078F PR MOST RECENT DIASTOLIC BLOOD PRESSURE < 80 MM HG: ICD-10-PCS | Mod: CPTII,S$GLB,, | Performed by: NURSE PRACTITIONER

## 2021-03-17 PROCEDURE — 3078F DIAST BP <80 MM HG: CPT | Mod: CPTII,S$GLB,, | Performed by: NURSE PRACTITIONER

## 2021-03-17 PROCEDURE — 99999 PR PBB SHADOW E&M-EST. PATIENT-LVL V: CPT | Mod: PBBFAC,,, | Performed by: NURSE PRACTITIONER

## 2021-03-17 PROCEDURE — 72110 X-RAY EXAM L-2 SPINE 4/>VWS: CPT | Mod: TC

## 2021-03-17 PROCEDURE — 72110 XR LUMBAR SPINE COMPLETE 5 VIEW: ICD-10-PCS | Mod: 26,,, | Performed by: RADIOLOGY

## 2021-03-17 PROCEDURE — 99214 PR OFFICE/OUTPT VISIT, EST, LEVL IV, 30-39 MIN: ICD-10-PCS | Mod: S$GLB,,, | Performed by: NURSE PRACTITIONER

## 2021-03-17 PROCEDURE — 3074F PR MOST RECENT SYSTOLIC BLOOD PRESSURE < 130 MM HG: ICD-10-PCS | Mod: CPTII,S$GLB,, | Performed by: NURSE PRACTITIONER

## 2021-03-17 PROCEDURE — 3072F LOW RISK FOR RETINOPATHY: CPT | Mod: S$GLB,,, | Performed by: NURSE PRACTITIONER

## 2021-03-17 PROCEDURE — 3288F PR FALLS RISK ASSESSMENT DOCUMENTED: ICD-10-PCS | Mod: CPTII,S$GLB,, | Performed by: NURSE PRACTITIONER

## 2021-03-17 PROCEDURE — 1126F PR PAIN SEVERITY QUANTIFIED, NO PAIN PRESENT: ICD-10-PCS | Mod: S$GLB,,, | Performed by: NURSE PRACTITIONER

## 2021-03-17 PROCEDURE — 3074F SYST BP LT 130 MM HG: CPT | Mod: CPTII,S$GLB,, | Performed by: NURSE PRACTITIONER

## 2021-03-17 PROCEDURE — 1159F MED LIST DOCD IN RCRD: CPT | Mod: S$GLB,,, | Performed by: NURSE PRACTITIONER

## 2021-03-17 PROCEDURE — 3072F PR LOW RISK FOR RETINOPATHY: ICD-10-PCS | Mod: S$GLB,,, | Performed by: NURSE PRACTITIONER

## 2021-03-17 PROCEDURE — 99999 PR PBB SHADOW E&M-EST. PATIENT-LVL V: ICD-10-PCS | Mod: PBBFAC,,, | Performed by: NURSE PRACTITIONER

## 2021-03-17 PROCEDURE — 1101F PT FALLS ASSESS-DOCD LE1/YR: CPT | Mod: CPTII,S$GLB,, | Performed by: NURSE PRACTITIONER

## 2021-03-17 RX ORDER — GABAPENTIN 100 MG/1
100 CAPSULE ORAL NIGHTLY PRN
Qty: 30 CAPSULE | Refills: 1 | Status: SHIPPED | OUTPATIENT
Start: 2021-03-17 | End: 2021-08-11

## 2021-03-17 RX ORDER — MELOXICAM 7.5 MG/1
7.5 TABLET ORAL DAILY
Qty: 20 TABLET | Refills: 0 | Status: SHIPPED | OUTPATIENT
Start: 2021-03-17 | End: 2021-08-11

## 2021-03-17 RX ORDER — DICLOFENAC SODIUM 10 MG/G
2 GEL TOPICAL DAILY
Qty: 1 TUBE | Refills: 1 | Status: SHIPPED | OUTPATIENT
Start: 2021-03-17 | End: 2021-08-11

## 2021-03-23 ENCOUNTER — PES CALL (OUTPATIENT)
Dept: ADMINISTRATIVE | Facility: CLINIC | Age: 86
End: 2021-03-23

## 2021-03-30 ENCOUNTER — TELEPHONE (OUTPATIENT)
Dept: INTERNAL MEDICINE | Facility: CLINIC | Age: 86
End: 2021-03-30

## 2021-03-30 DIAGNOSIS — E78.5 HYPERLIPIDEMIA ASSOCIATED WITH TYPE 2 DIABETES MELLITUS: Primary | ICD-10-CM

## 2021-03-30 DIAGNOSIS — M81.0 OSTEOPOROSIS WITHOUT CURRENT PATHOLOGICAL FRACTURE, UNSPECIFIED OSTEOPOROSIS TYPE: ICD-10-CM

## 2021-03-30 DIAGNOSIS — E11.49 TYPE II DIABETES MELLITUS WITH NEUROLOGICAL MANIFESTATIONS: ICD-10-CM

## 2021-03-30 DIAGNOSIS — E11.69 HYPERLIPIDEMIA ASSOCIATED WITH TYPE 2 DIABETES MELLITUS: Primary | ICD-10-CM

## 2021-03-30 DIAGNOSIS — E55.9 VITAMIN D DEFICIENCY DISEASE: ICD-10-CM

## 2021-03-30 RX ORDER — CALCIUM CARB/VITAMIN D3/VIT K1 500-500-40
TABLET,CHEWABLE ORAL
Qty: 100 EACH | Refills: 0 | Status: SHIPPED | OUTPATIENT
Start: 2021-03-30

## 2021-03-30 RX ORDER — CALCIUM CITRATE/VITAMIN D3 200MG-6.25
TABLET ORAL
Qty: 100 STRIP | Refills: 0 | Status: SHIPPED | OUTPATIENT
Start: 2021-03-30 | End: 2021-07-06

## 2021-04-09 ENCOUNTER — TELEPHONE (OUTPATIENT)
Dept: ADMINISTRATIVE | Facility: CLINIC | Age: 86
End: 2021-04-09

## 2021-04-14 ENCOUNTER — TELEPHONE (OUTPATIENT)
Dept: ADMINISTRATIVE | Facility: CLINIC | Age: 86
End: 2021-04-14

## 2021-07-07 ENCOUNTER — LAB VISIT (OUTPATIENT)
Dept: LAB | Facility: HOSPITAL | Age: 86
End: 2021-07-07
Attending: STUDENT IN AN ORGANIZED HEALTH CARE EDUCATION/TRAINING PROGRAM
Payer: MEDICARE

## 2021-07-07 ENCOUNTER — OFFICE VISIT (OUTPATIENT)
Dept: INTERNAL MEDICINE | Facility: CLINIC | Age: 86
End: 2021-07-07
Payer: MEDICARE

## 2021-07-07 VITALS
RESPIRATION RATE: 16 BRPM | SYSTOLIC BLOOD PRESSURE: 125 MMHG | HEART RATE: 75 BPM | OXYGEN SATURATION: 98 % | DIASTOLIC BLOOD PRESSURE: 78 MMHG

## 2021-07-07 DIAGNOSIS — Z00.00 HEALTHCARE MAINTENANCE: ICD-10-CM

## 2021-07-07 DIAGNOSIS — R22.2 SUBCUTANEOUS MASS OF BACK: ICD-10-CM

## 2021-07-07 DIAGNOSIS — L98.9 BACK SKIN LESION: ICD-10-CM

## 2021-07-07 DIAGNOSIS — E11.49 TYPE II DIABETES MELLITUS WITH NEUROLOGICAL MANIFESTATIONS: Primary | ICD-10-CM

## 2021-07-07 DIAGNOSIS — E11.49 TYPE II DIABETES MELLITUS WITH NEUROLOGICAL MANIFESTATIONS: ICD-10-CM

## 2021-07-07 DIAGNOSIS — I10 ESSENTIAL HYPERTENSION: ICD-10-CM

## 2021-07-07 DIAGNOSIS — M54.41 LOW BACK PAIN WITH RIGHT-SIDED SCIATICA, UNSPECIFIED BACK PAIN LATERALITY, UNSPECIFIED CHRONICITY: ICD-10-CM

## 2021-07-07 DIAGNOSIS — M85.80 OSTEOPENIA, UNSPECIFIED LOCATION: ICD-10-CM

## 2021-07-07 DIAGNOSIS — M81.0 AGE-RELATED OSTEOPOROSIS WITHOUT CURRENT PATHOLOGICAL FRACTURE: ICD-10-CM

## 2021-07-07 DIAGNOSIS — M79.7 FIBROMYALGIA: ICD-10-CM

## 2021-07-07 DIAGNOSIS — N18.31 STAGE 3A CHRONIC KIDNEY DISEASE: ICD-10-CM

## 2021-07-07 LAB
ALBUMIN SERPL BCP-MCNC: 4.2 G/DL (ref 3.5–5.2)
ALP SERPL-CCNC: 63 U/L (ref 55–135)
ALT SERPL W/O P-5'-P-CCNC: 16 U/L (ref 10–44)
ANION GAP SERPL CALC-SCNC: 10 MMOL/L (ref 8–16)
AST SERPL-CCNC: 20 U/L (ref 10–40)
BASOPHILS # BLD AUTO: 0.01 K/UL (ref 0–0.2)
BASOPHILS NFR BLD: 0.1 % (ref 0–1.9)
BILIRUB SERPL-MCNC: 0.6 MG/DL (ref 0.1–1)
BUN SERPL-MCNC: 15 MG/DL (ref 8–23)
CALCIUM SERPL-MCNC: 10.5 MG/DL (ref 8.7–10.5)
CHLORIDE SERPL-SCNC: 106 MMOL/L (ref 95–110)
CO2 SERPL-SCNC: 25 MMOL/L (ref 23–29)
CREAT SERPL-MCNC: 0.9 MG/DL (ref 0.5–1.4)
DIFFERENTIAL METHOD: ABNORMAL
EOSINOPHIL # BLD AUTO: 0 K/UL (ref 0–0.5)
EOSINOPHIL NFR BLD: 0 % (ref 0–8)
ERYTHROCYTE [DISTWIDTH] IN BLOOD BY AUTOMATED COUNT: 12.6 % (ref 11.5–14.5)
EST. GFR  (AFRICAN AMERICAN): >60 ML/MIN/1.73 M^2
EST. GFR  (NON AFRICAN AMERICAN): 58.5 ML/MIN/1.73 M^2
ESTIMATED AVG GLUCOSE: 151 MG/DL (ref 68–131)
GLUCOSE SERPL-MCNC: 96 MG/DL (ref 70–110)
HBA1C MFR BLD: 6.9 % (ref 4–5.6)
HCT VFR BLD AUTO: 37.5 % (ref 37–48.5)
HGB BLD-MCNC: 12.1 G/DL (ref 12–16)
IMM GRANULOCYTES # BLD AUTO: 0.02 K/UL (ref 0–0.04)
IMM GRANULOCYTES NFR BLD AUTO: 0.3 % (ref 0–0.5)
LYMPHOCYTES # BLD AUTO: 3.4 K/UL (ref 1–4.8)
LYMPHOCYTES NFR BLD: 49.4 % (ref 18–48)
MCH RBC QN AUTO: 30.1 PG (ref 27–31)
MCHC RBC AUTO-ENTMCNC: 32.3 G/DL (ref 32–36)
MCV RBC AUTO: 93 FL (ref 82–98)
MONOCYTES # BLD AUTO: 0.6 K/UL (ref 0.3–1)
MONOCYTES NFR BLD: 9.1 % (ref 4–15)
NEUTROPHILS # BLD AUTO: 2.8 K/UL (ref 1.8–7.7)
NEUTROPHILS NFR BLD: 41.1 % (ref 38–73)
NRBC BLD-RTO: 0 /100 WBC
PLATELET # BLD AUTO: 171 K/UL (ref 150–450)
PMV BLD AUTO: 10.5 FL (ref 9.2–12.9)
POTASSIUM SERPL-SCNC: 4.4 MMOL/L (ref 3.5–5.1)
PROT SERPL-MCNC: 8.4 G/DL (ref 6–8.4)
RBC # BLD AUTO: 4.02 M/UL (ref 4–5.4)
SODIUM SERPL-SCNC: 141 MMOL/L (ref 136–145)
WBC # BLD AUTO: 6.82 K/UL (ref 3.9–12.7)

## 2021-07-07 PROCEDURE — 85025 COMPLETE CBC W/AUTO DIFF WBC: CPT | Performed by: STUDENT IN AN ORGANIZED HEALTH CARE EDUCATION/TRAINING PROGRAM

## 2021-07-07 PROCEDURE — 1159F PR MEDICATION LIST DOCUMENTED IN MEDICAL RECORD: ICD-10-PCS | Mod: GC,S$GLB,, | Performed by: STUDENT IN AN ORGANIZED HEALTH CARE EDUCATION/TRAINING PROGRAM

## 2021-07-07 PROCEDURE — 99999 PR PBB SHADOW E&M-EST. PATIENT-LVL III: CPT | Mod: PBBFAC,GC,, | Performed by: STUDENT IN AN ORGANIZED HEALTH CARE EDUCATION/TRAINING PROGRAM

## 2021-07-07 PROCEDURE — 99213 PR OFFICE/OUTPT VISIT, EST, LEVL III, 20-29 MIN: ICD-10-PCS | Mod: GC,S$GLB,, | Performed by: STUDENT IN AN ORGANIZED HEALTH CARE EDUCATION/TRAINING PROGRAM

## 2021-07-07 PROCEDURE — 99213 OFFICE O/P EST LOW 20 MIN: CPT | Mod: GC,S$GLB,, | Performed by: STUDENT IN AN ORGANIZED HEALTH CARE EDUCATION/TRAINING PROGRAM

## 2021-07-07 PROCEDURE — 36415 COLL VENOUS BLD VENIPUNCTURE: CPT | Performed by: STUDENT IN AN ORGANIZED HEALTH CARE EDUCATION/TRAINING PROGRAM

## 2021-07-07 PROCEDURE — 1159F MED LIST DOCD IN RCRD: CPT | Mod: GC,S$GLB,, | Performed by: STUDENT IN AN ORGANIZED HEALTH CARE EDUCATION/TRAINING PROGRAM

## 2021-07-07 PROCEDURE — 99999 PR PBB SHADOW E&M-EST. PATIENT-LVL III: ICD-10-PCS | Mod: PBBFAC,GC,, | Performed by: STUDENT IN AN ORGANIZED HEALTH CARE EDUCATION/TRAINING PROGRAM

## 2021-07-07 PROCEDURE — 83036 HEMOGLOBIN GLYCOSYLATED A1C: CPT | Performed by: STUDENT IN AN ORGANIZED HEALTH CARE EDUCATION/TRAINING PROGRAM

## 2021-07-07 PROCEDURE — 80053 COMPREHEN METABOLIC PANEL: CPT | Performed by: STUDENT IN AN ORGANIZED HEALTH CARE EDUCATION/TRAINING PROGRAM

## 2021-07-12 ENCOUNTER — HOSPITAL ENCOUNTER (OUTPATIENT)
Dept: RADIOLOGY | Facility: HOSPITAL | Age: 86
Discharge: HOME | End: 2021-07-12
Attending: STUDENT IN AN ORGANIZED HEALTH CARE EDUCATION/TRAINING PROGRAM
Payer: MEDICARE

## 2021-07-12 DIAGNOSIS — R22.2 SUBCUTANEOUS MASS OF BACK: ICD-10-CM

## 2021-07-12 PROCEDURE — 76604 US SOFT TISSUE CHEST_UPPER BACK: ICD-10-PCS | Mod: 26,,, | Performed by: RADIOLOGY

## 2021-07-12 PROCEDURE — 76604 US EXAM CHEST: CPT | Mod: 26,,, | Performed by: RADIOLOGY

## 2021-07-12 PROCEDURE — 76604 US EXAM CHEST: CPT | Mod: TC

## 2021-07-14 ENCOUNTER — HOSPITAL ENCOUNTER (OUTPATIENT)
Dept: RADIOLOGY | Facility: CLINIC | Age: 86
Discharge: HOME | End: 2021-07-14
Attending: STUDENT IN AN ORGANIZED HEALTH CARE EDUCATION/TRAINING PROGRAM
Payer: MEDICARE

## 2021-07-14 DIAGNOSIS — E11.49 TYPE II DIABETES MELLITUS WITH NEUROLOGICAL MANIFESTATIONS: ICD-10-CM

## 2021-07-14 DIAGNOSIS — M81.0 AGE-RELATED OSTEOPOROSIS WITHOUT CURRENT PATHOLOGICAL FRACTURE: ICD-10-CM

## 2021-07-14 PROCEDURE — 77080 DEXA BONE DENSITY SPINE HIP: ICD-10-PCS | Mod: 26,,, | Performed by: INTERNAL MEDICINE

## 2021-07-14 PROCEDURE — 77080 DXA BONE DENSITY AXIAL: CPT | Mod: 26,,, | Performed by: INTERNAL MEDICINE

## 2021-07-14 PROCEDURE — 77080 DXA BONE DENSITY AXIAL: CPT | Mod: TC

## 2021-07-26 ENCOUNTER — PES CALL (OUTPATIENT)
Dept: ADMINISTRATIVE | Facility: CLINIC | Age: 86
End: 2021-07-26

## 2021-08-11 ENCOUNTER — OFFICE VISIT (OUTPATIENT)
Dept: INTERNAL MEDICINE | Facility: CLINIC | Age: 86
End: 2021-08-11
Payer: MEDICARE

## 2021-08-11 VITALS
WEIGHT: 141.13 LBS | HEART RATE: 68 BPM | SYSTOLIC BLOOD PRESSURE: 118 MMHG | BODY MASS INDEX: 25.01 KG/M2 | OXYGEN SATURATION: 97 % | HEIGHT: 63 IN | DIASTOLIC BLOOD PRESSURE: 70 MMHG

## 2021-08-11 DIAGNOSIS — Z12.31 ENCOUNTER FOR SCREENING MAMMOGRAM FOR MALIGNANT NEOPLASM OF BREAST: ICD-10-CM

## 2021-08-11 DIAGNOSIS — Z00.00 ENCOUNTER FOR PREVENTIVE HEALTH EXAMINATION: Primary | ICD-10-CM

## 2021-08-11 DIAGNOSIS — E11.49 TYPE II DIABETES MELLITUS WITH NEUROLOGICAL MANIFESTATIONS: ICD-10-CM

## 2021-08-11 DIAGNOSIS — E11.69 HYPERLIPIDEMIA ASSOCIATED WITH TYPE 2 DIABETES MELLITUS: ICD-10-CM

## 2021-08-11 DIAGNOSIS — I15.2 HYPERTENSION ASSOCIATED WITH DIABETES: ICD-10-CM

## 2021-08-11 DIAGNOSIS — E78.5 HYPERLIPIDEMIA ASSOCIATED WITH TYPE 2 DIABETES MELLITUS: ICD-10-CM

## 2021-08-11 DIAGNOSIS — N18.30 STAGE 3 CHRONIC KIDNEY DISEASE, UNSPECIFIED WHETHER STAGE 3A OR 3B CKD: ICD-10-CM

## 2021-08-11 DIAGNOSIS — E11.59 HYPERTENSION ASSOCIATED WITH DIABETES: ICD-10-CM

## 2021-08-11 DIAGNOSIS — Z85.3 HISTORY OF BREAST CANCER: ICD-10-CM

## 2021-08-11 DIAGNOSIS — E11.42 DIABETIC POLYNEUROPATHY ASSOCIATED WITH TYPE 2 DIABETES MELLITUS: ICD-10-CM

## 2021-08-11 PROCEDURE — 1159F MED LIST DOCD IN RCRD: CPT | Mod: CPTII,S$GLB,, | Performed by: NURSE PRACTITIONER

## 2021-08-11 PROCEDURE — 3074F PR MOST RECENT SYSTOLIC BLOOD PRESSURE < 130 MM HG: ICD-10-PCS | Mod: CPTII,S$GLB,, | Performed by: NURSE PRACTITIONER

## 2021-08-11 PROCEDURE — G9920 SCRNING PERF AND NEGATIVE: HCPCS | Mod: CPTII,S$GLB,, | Performed by: NURSE PRACTITIONER

## 2021-08-11 PROCEDURE — 1160F RVW MEDS BY RX/DR IN RCRD: CPT | Mod: CPTII,S$GLB,, | Performed by: NURSE PRACTITIONER

## 2021-08-11 PROCEDURE — 1101F PR PT FALLS ASSESS DOC 0-1 FALLS W/OUT INJ PAST YR: ICD-10-PCS | Mod: CPTII,S$GLB,, | Performed by: NURSE PRACTITIONER

## 2021-08-11 PROCEDURE — 3072F LOW RISK FOR RETINOPATHY: CPT | Mod: CPTII,S$GLB,, | Performed by: NURSE PRACTITIONER

## 2021-08-11 PROCEDURE — 1101F PT FALLS ASSESS-DOCD LE1/YR: CPT | Mod: CPTII,S$GLB,, | Performed by: NURSE PRACTITIONER

## 2021-08-11 PROCEDURE — 1126F AMNT PAIN NOTED NONE PRSNT: CPT | Mod: CPTII,S$GLB,, | Performed by: NURSE PRACTITIONER

## 2021-08-11 PROCEDURE — 3072F PR LOW RISK FOR RETINOPATHY: ICD-10-PCS | Mod: CPTII,S$GLB,, | Performed by: NURSE PRACTITIONER

## 2021-08-11 PROCEDURE — 99999 PR PBB SHADOW E&M-EST. PATIENT-LVL V: CPT | Mod: PBBFAC,,, | Performed by: NURSE PRACTITIONER

## 2021-08-11 PROCEDURE — G0439 PR MEDICARE ANNUAL WELLNESS SUBSEQUENT VISIT: ICD-10-PCS | Mod: S$GLB,,, | Performed by: NURSE PRACTITIONER

## 2021-08-11 PROCEDURE — 1159F PR MEDICATION LIST DOCUMENTED IN MEDICAL RECORD: ICD-10-PCS | Mod: CPTII,S$GLB,, | Performed by: NURSE PRACTITIONER

## 2021-08-11 PROCEDURE — 1160F PR REVIEW ALL MEDS BY PRESCRIBER/CLIN PHARMACIST DOCUMENTED: ICD-10-PCS | Mod: CPTII,S$GLB,, | Performed by: NURSE PRACTITIONER

## 2021-08-11 PROCEDURE — 3078F DIAST BP <80 MM HG: CPT | Mod: CPTII,S$GLB,, | Performed by: NURSE PRACTITIONER

## 2021-08-11 PROCEDURE — 3078F PR MOST RECENT DIASTOLIC BLOOD PRESSURE < 80 MM HG: ICD-10-PCS | Mod: CPTII,S$GLB,, | Performed by: NURSE PRACTITIONER

## 2021-08-11 PROCEDURE — G0439 PPPS, SUBSEQ VISIT: HCPCS | Mod: S$GLB,,, | Performed by: NURSE PRACTITIONER

## 2021-08-11 PROCEDURE — 99999 PR PBB SHADOW E&M-EST. PATIENT-LVL V: ICD-10-PCS | Mod: PBBFAC,,, | Performed by: NURSE PRACTITIONER

## 2021-08-11 PROCEDURE — G9920 PR SCREENING AND NEGATIVE: ICD-10-PCS | Mod: CPTII,S$GLB,, | Performed by: NURSE PRACTITIONER

## 2021-08-11 PROCEDURE — 3074F SYST BP LT 130 MM HG: CPT | Mod: CPTII,S$GLB,, | Performed by: NURSE PRACTITIONER

## 2021-08-11 PROCEDURE — 3288F FALL RISK ASSESSMENT DOCD: CPT | Mod: CPTII,S$GLB,, | Performed by: NURSE PRACTITIONER

## 2021-08-11 PROCEDURE — 1126F PR PAIN SEVERITY QUANTIFIED, NO PAIN PRESENT: ICD-10-PCS | Mod: CPTII,S$GLB,, | Performed by: NURSE PRACTITIONER

## 2021-08-11 PROCEDURE — 3288F PR FALLS RISK ASSESSMENT DOCUMENTED: ICD-10-PCS | Mod: CPTII,S$GLB,, | Performed by: NURSE PRACTITIONER

## 2022-01-03 ENCOUNTER — LAB VISIT (OUTPATIENT)
Dept: LAB | Facility: HOSPITAL | Age: 87
End: 2022-01-03
Payer: MEDICARE

## 2022-01-03 ENCOUNTER — OFFICE VISIT (OUTPATIENT)
Dept: INTERNAL MEDICINE | Facility: CLINIC | Age: 87
End: 2022-01-03
Payer: MEDICARE

## 2022-01-03 VITALS
HEART RATE: 70 BPM | OXYGEN SATURATION: 98 % | SYSTOLIC BLOOD PRESSURE: 122 MMHG | RESPIRATION RATE: 18 BRPM | DIASTOLIC BLOOD PRESSURE: 82 MMHG

## 2022-01-03 DIAGNOSIS — E11.69 HYPERLIPIDEMIA ASSOCIATED WITH TYPE 2 DIABETES MELLITUS: ICD-10-CM

## 2022-01-03 DIAGNOSIS — R22.2 SUBCUTANEOUS MASS OF BACK: ICD-10-CM

## 2022-01-03 DIAGNOSIS — M81.0 OSTEOPOROSIS WITHOUT CURRENT PATHOLOGICAL FRACTURE, UNSPECIFIED OSTEOPOROSIS TYPE: ICD-10-CM

## 2022-01-03 DIAGNOSIS — E11.49 TYPE II DIABETES MELLITUS WITH NEUROLOGICAL MANIFESTATIONS: ICD-10-CM

## 2022-01-03 DIAGNOSIS — N18.30 STAGE 3 CHRONIC KIDNEY DISEASE, UNSPECIFIED WHETHER STAGE 3A OR 3B CKD: ICD-10-CM

## 2022-01-03 DIAGNOSIS — I10 ESSENTIAL HYPERTENSION: ICD-10-CM

## 2022-01-03 DIAGNOSIS — E55.9 VITAMIN D DEFICIENCY DISEASE: ICD-10-CM

## 2022-01-03 DIAGNOSIS — Z12.31 ENCOUNTER FOR SCREENING MAMMOGRAM FOR MALIGNANT NEOPLASM OF BREAST: ICD-10-CM

## 2022-01-03 DIAGNOSIS — M79.7 FIBROMYALGIA: Primary | ICD-10-CM

## 2022-01-03 DIAGNOSIS — E78.5 HYPERLIPIDEMIA ASSOCIATED WITH TYPE 2 DIABETES MELLITUS: ICD-10-CM

## 2022-01-03 PROBLEM — D17.1 LIPOMA OF BACK: Status: ACTIVE | Noted: 2022-01-03

## 2022-01-03 PROCEDURE — 99213 PR OFFICE/OUTPT VISIT, EST, LEVL III, 20-29 MIN: ICD-10-PCS | Mod: HCNC,GC,S$GLB, | Performed by: STUDENT IN AN ORGANIZED HEALTH CARE EDUCATION/TRAINING PROGRAM

## 2022-01-03 PROCEDURE — 99999 PR PBB SHADOW E&M-EST. PATIENT-LVL IV: CPT | Mod: PBBFAC,HCNC,GC, | Performed by: STUDENT IN AN ORGANIZED HEALTH CARE EDUCATION/TRAINING PROGRAM

## 2022-01-03 PROCEDURE — 36415 COLL VENOUS BLD VENIPUNCTURE: CPT | Mod: HCNC | Performed by: STUDENT IN AN ORGANIZED HEALTH CARE EDUCATION/TRAINING PROGRAM

## 2022-01-03 PROCEDURE — 99213 OFFICE O/P EST LOW 20 MIN: CPT | Mod: HCNC,GC,S$GLB, | Performed by: STUDENT IN AN ORGANIZED HEALTH CARE EDUCATION/TRAINING PROGRAM

## 2022-01-03 PROCEDURE — 99499 RISK ADDL DX/OHS AUDIT: ICD-10-PCS | Mod: S$GLB,,, | Performed by: STUDENT IN AN ORGANIZED HEALTH CARE EDUCATION/TRAINING PROGRAM

## 2022-01-03 PROCEDURE — 99999 PR PBB SHADOW E&M-EST. PATIENT-LVL IV: ICD-10-PCS | Mod: PBBFAC,HCNC,GC, | Performed by: STUDENT IN AN ORGANIZED HEALTH CARE EDUCATION/TRAINING PROGRAM

## 2022-01-03 PROCEDURE — 99499 UNLISTED E&M SERVICE: CPT | Mod: S$GLB,,, | Performed by: STUDENT IN AN ORGANIZED HEALTH CARE EDUCATION/TRAINING PROGRAM

## 2022-01-03 PROCEDURE — 83036 HEMOGLOBIN GLYCOSYLATED A1C: CPT | Mod: HCNC | Performed by: STUDENT IN AN ORGANIZED HEALTH CARE EDUCATION/TRAINING PROGRAM

## 2022-01-03 RX ORDER — LIDOCAINE 50 MG/G
1 PATCH TOPICAL DAILY PRN
Qty: 15 PATCH | Refills: 5 | Status: SHIPPED | OUTPATIENT
Start: 2022-01-03

## 2022-01-03 RX ORDER — DICLOFENAC SODIUM 10 MG/G
2 GEL TOPICAL DAILY
Status: CANCELLED | OUTPATIENT
Start: 2022-01-03

## 2022-01-03 RX ORDER — ALENDRONATE SODIUM 35 MG/1
35 TABLET ORAL
Qty: 4 TABLET | Refills: 11 | Status: SHIPPED | OUTPATIENT
Start: 2022-01-03 | End: 2022-07-20 | Stop reason: SDUPTHER

## 2022-01-03 RX ORDER — METFORMIN HYDROCHLORIDE 500 MG/1
500 TABLET ORAL 2 TIMES DAILY
Qty: 180 TABLET | Refills: 0 | Status: SHIPPED | OUTPATIENT
Start: 2022-01-03 | End: 2022-03-18 | Stop reason: SDUPTHER

## 2022-01-03 RX ORDER — VIT C/E/ZN/COPPR/LUTEIN/ZEAXAN 250MG-90MG
2000 CAPSULE ORAL DAILY
Qty: 60 CAPSULE | Refills: 12 | Status: SHIPPED | OUTPATIENT
Start: 2022-01-03 | End: 2022-01-03

## 2022-01-03 RX ORDER — AMITRIPTYLINE HYDROCHLORIDE 10 MG/1
10 TABLET, FILM COATED ORAL EVERY MORNING
Qty: 30 TABLET | Refills: 1 | Status: SHIPPED | OUTPATIENT
Start: 2022-01-03 | End: 2022-03-18 | Stop reason: SDUPTHER

## 2022-01-03 RX ORDER — OMEGA-3-ACID ETHYL ESTERS 1 G/1
1 CAPSULE, LIQUID FILLED ORAL 2 TIMES DAILY
Qty: 180 CAPSULE | Refills: 0 | Status: SHIPPED | OUTPATIENT
Start: 2022-01-03 | End: 2022-07-20 | Stop reason: SDUPTHER

## 2022-01-03 RX ORDER — DICLOFENAC SODIUM 10 MG/G
2 GEL TOPICAL 4 TIMES DAILY PRN
Qty: 20 G | Refills: 5 | Status: SHIPPED | OUTPATIENT
Start: 2022-01-03 | End: 2022-07-20 | Stop reason: SDUPTHER

## 2022-01-03 RX ORDER — VIT C/E/ZN/COPPR/LUTEIN/ZEAXAN 250MG-90MG
2000 CAPSULE ORAL DAILY
Qty: 60 CAPSULE | Refills: 12 | Status: SHIPPED | OUTPATIENT
Start: 2022-01-03

## 2022-01-03 RX ORDER — ATORVASTATIN CALCIUM 40 MG/1
40 TABLET, FILM COATED ORAL DAILY
Qty: 90 TABLET | Refills: 0 | Status: SHIPPED | OUTPATIENT
Start: 2022-01-03 | End: 2022-03-18 | Stop reason: SDUPTHER

## 2022-01-03 NOTE — PROGRESS NOTES
I have reviewed the notes, assessments, and/or procedures performed this visit, and I concur with the documentation.    Wenceslao Gill M.D.    Hospital Medicine Assistant Staff  [facility] [place]

## 2022-01-03 NOTE — PROGRESS NOTES
"Clinic Note  01/03/2022      Subjective:      Chief Complaint: Chronic Pain    Sera Boone is a 84 y.o. [place]ian-speaking female with T2DM, HTN, fibromyalgia, osteopenia with secondary hyperparathyroidism, and remote history of breast cancer (2001), suspected lipoma or R back here for established visit.     Fibromyalgia: Diagnosed 2017. Typical pain is deep and worse in her upper arms. R hip pain improves when she rubs diclofenac gel. No associated trauma. Does not notice R hip pain during the day when she is busy or when she is doing PT exercises, but feels discomfort when laying in bed at night. Worsening typical chronic pain on R side. Has not been out of meds for ~3 months. Not taking amitriptyline or sertraline. Taking prn naproxen without much relief. Discussed restarting amitriptyline as this had significantly controlled her symptoms prior.     In addition to her fibromyalgia, she has reduced mobility of her Left shoulder with adhesive capsulitis. She has received steroid injections in 2020, and states it was extremely helpful in managing the associated pain. States pain is well controlled at this time. She has been to PT 4 times and now does daily PT exercises at home. She has continued to use bands and is improving in strength and mobility, now able to raise arm above head.     Type II diabetes mellitus: Last A1c 6.9%. Recently out of metformin, refilled. Up to date on foot exam. Optometry appointment scheduled for March per son.     Subcutaneous mass of back: Noted by family summer of 2021. Stable in size since last visit.   US soft tissue 7/2021:  Impression:   "There is a 5.3 x 3.9 x 1.8 cm isoechoic solid nodule in the subcutaneous tissue of the posterior right back.  Recommend clinical correlation to determine if this is a lipoma or more serious"    Osteoporosis: no recent fractures or falls. Out of fosamax and vitamin D3.   Last DEXA 7/2021:   "*Osteoporosis based on T-score between -1.0 and " "-2.5 and elevated risk based on FRAX  *Fracture risk is high  *Compared with previous DXA, BMD at the lumbar spine has increased by 3.5%, and the BMD at the total hip has remained stable."    Hyperlipidemia out of lipitor 40 mg qd.     Essential hypertension: BP wnl in clinic. Has been out of losartan for 3 months. Asymptomatic.     HPI obtained by me--fluent in Tajik.       ROS otherwise negative.    Review of Systems   Constitutional: Negative for chills, diaphoresis and fever.   HENT: Negative for congestion, rhinorrhea, sore throat and trouble swallowing.    Eyes: Negative for pain and visual disturbance.   Respiratory: Negative for cough, chest tightness and shortness of breath.    Cardiovascular: Negative for chest pain.   Gastrointestinal: Negative for abdominal pain, constipation and diarrhea.   Endocrine: Negative for polydipsia and polyuria.   Genitourinary: Negative for difficulty urinating, dysuria, flank pain, frequency and urgency.   Musculoskeletal: Positive for arthralgias (R hip) and myalgias (upper arms bilaterally). Negative for gait problem, joint swelling and neck pain.        L upper back lump   Skin: Negative for color change and rash.   Neurological: Negative for tremors, syncope, speech difficulty, light-headedness and headaches.   Psychiatric/Behavioral: Negative for agitation, confusion, dysphoric mood, hallucinations, sleep disturbance and suicidal ideas.       Patient's Medications   New Prescriptions    DICLOFENAC SODIUM (VOLTAREN) 1 % GEL    Apply 2 g topically 4 (four) times daily as needed (pain).    LIDOCAINE (LIDODERM) 5 %    Place 1 patch onto the skin daily as needed (pain). Remove & Discard patch within 24 hours or as directed by MD   Previous Medications    BLOOD-GLUCOSE METER KIT    Use as instructed  Dispense appropriate lancets and test strips 100 each 12 refills    MICRO THIN LANCETS 33 GAUGE [facility]    CHECK BLOOD GLUCOSE DAILY.    TRUE METRIX GLUCOSE TEST STRIP STRP    USE " TO TEST BLOOD GLUCOSE DAILY    TRUEPLUS LANCETS 33 GAUGE MISC    CHECK BLOOD GLUCOSE DAILY.   Modified Medications    Modified Medication Previous Medication    ALENDRONATE (FOSAMAX) 35 MG TABLET alendronate (FOSAMAX) 35 MG tablet       Take 1 tablet (35 mg total) by mouth every 7 days.    TAKE 1 TABLET BY MOUTH EVERY 7 DAYS    AMITRIPTYLINE (ELAVIL) 10 MG TABLET amitriptyline (ELAVIL) 10 MG tablet       Take 1 tablet (10 mg total) by mouth every morning.    Take 1 tablet (10 mg total) by mouth every morning.    ATORVASTATIN (LIPITOR) 40 MG TABLET atorvastatin (LIPITOR) 40 MG tablet       Take 1 tablet (40 mg total) by mouth once daily.    TAKE 1 TABLET BY MOUTH EVERY DAY    CHOLECALCIFEROL, VITAMIN D3, (VITAMIN D3) 25 MCG (1,000 UNIT) CAPSULE cholecalciferol, vitamin D3, (VITAMIN D3) 25 mcg (1,000 unit) capsule       Take 2 capsules (2,000 Units total) by mouth once daily.    Take 2 capsules (2,000 Units total) by mouth once daily.    METFORMIN (GLUCOPHAGE) 500 MG TABLET metFORMIN (GLUCOPHAGE) 500 MG tablet       Take 1 tablet (500 mg total) by mouth 2 (two) times a day.    Take 1 tablet (500 mg total) by mouth 2 (two) times a day.    OMEGA-3 ACID ETHYL ESTERS (LOVAZA) 1 GRAM CAPSULE omega-3 acid ethyl esters (LOVAZA) 1 gram capsule       Take 1 capsule (1 g total) by mouth 2 (two) times daily.    TAKE 1 CAPSULE BY MOUTH TWICE DAILY   Discontinued Medications    LOSARTAN (COZAAR) 50 MG TABLET    TAKE 1 TABLET BY MOUTH EVERY DAY    SERTRALINE (ZOLOFT) 25 MG TABLET    Take 1 tablet (25 mg total) by mouth once daily.       Patient Active Problem List    Diagnosis Date Noted    Lipoma of back 01/03/2022    Anemia 10/19/2020    Fibromyalgia 10/19/2020    Osteopenia 10/19/2020    Adhesive capsulitis of left shoulder 10/19/2020    Episode of transient neurologic symptoms 05/15/2019    Secondary hyperparathyroidism 04/17/2019    Bradycardia 04/06/2019    CVA (cerebral vascular accident) 04/06/2019     Excluded    "   DDD (degenerative disc disease), cervical 07/24/2017    Osteopenia/osteoporosis: see DEXA 4/19 tx recommended 05/30/2017    Spondylosis of cervical region without myelopathy or radiculopathy 04/22/2017    Primary osteoarthritis involving multiple joints 04/22/2017    Stage 3 chronic kidney disease 03/16/2017    Rheumatoid factor positive 02/14/2017    Fatty liver: see ultrasound 1/17 02/13/2017    History of adenomatous polyp of colon 1/16 no need for further follow up per Dr Guerra 08/10/2015     History of adenomatous polyp of colon 1/16 no need for further follow up per Dr Guerra      Diabetic polyneuropathy associated with type 2 diabetes mellitus 08/10/2015    Hypertension associated with diabetes 05/15/2015    Vitamin D deficiency disease 02/17/2014    History of breast cancer 08/21/2013    Type II diabetes mellitus with neurological manifestations 02/22/2013    Ptosis of eyebrow 02/22/2013    Hyperlipidemia associated with type 2 diabetes mellitus            Objective:      /82 (BP Location: Right arm)   Pulse 70   Resp 18   SpO2 98%   Estimated body mass index is 24.99 kg/m² as calculated from the following:    Height as of 8/11/21: 5' 3" (1.6 m).    Weight as of 8/11/21: 64 kg (141 lb 1.5 oz).    Physical Exam  Constitutional:       General: She is not in acute distress.  HENT:      Head: Normocephalic and atraumatic.      Nose: No congestion.   Eyes:      General: No scleral icterus.        Right eye: No discharge.         Left eye: No discharge.   Cardiovascular:      Rate and Rhythm: Normal rate and regular rhythm.      Pulses: Normal pulses.   Pulmonary:      Effort: Pulmonary effort is normal. No respiratory distress.      Breath sounds: Normal breath sounds. No stridor.   Abdominal:      General: Bowel sounds are normal.      Palpations: Abdomen is soft.      Tenderness: There is no abdominal tenderness. There is no guarding or rebound.   Musculoskeletal:         General: " Tenderness (TTP at R paraspinal muscles, no ttp of L upper back or of L shoulder. ) present. No swelling, deformity or signs of injury. Normal range of motion.      Cervical back: Normal range of motion and neck supple. No rigidity.      Right lower leg: No edema.      Left lower leg: No edema.      Comments: L upper back with soft-tissue mass, without overlaying skin changes or rash. No ttp.   ~5 cm diameter, unchanged from prior.    Skin:     General: Skin is warm and dry.      Coloration: Skin is not pale.   Neurological:      Mental Status: She is alert and oriented to person, place, and time. Mental status is at baseline.      Cranial Nerves: No cranial nerve deficit.      Motor: No weakness.   Psychiatric:         Mood and Affect: Mood normal.         Assessment & Plan:     Sera was seen today for chronic pain.    Diagnoses and all orders for this visit:    Fibromyalgia  Diagnosed 2017.   Worsening typical chronic pain on R side. Has not been out of meds for ~3 months. Not taking amitriptyline or sertraline. Taking prn naproxen without much relief. Discussed restarting amitriptyline as this had significantly controlled her symptoms prior. Will discontinue sertraline for now, add back as needed vs Cymbalta. Will add prn voltaren gel and lidocaine patches.  -     amitriptyline (ELAVIL) 10 MG tablet; Take 1 tablet (10 mg total) by mouth every morning.  -     diclofenac sodium (VOLTAREN) 1 % Gel; Apply 2 g topically 4 (four) times daily as needed (pain).  -     LIDOcaine (LIDODERM) 5 %; Place 1 patch onto the skin daily as needed (pain). Remove & Discard patch within 24 hours or as directed by MD  -  Discontinued sertraline for now.     Vitamin D deficiency disease  -     Discontinue: cholecalciferol, vitamin D3, (VITAMIN D3) 25 mcg (1,000 unit) capsule; Take 2 capsules (2,000 Units total) by mouth once daily.  -     cholecalciferol, vitamin D3, (VITAMIN D3) 25 mcg (1,000 unit) capsule; Take 2 capsules (2,000  "Units total) by mouth once daily.    Encounter for screening mammogram for malignant neoplasm of breast   Patient has personal hx of breast cancer. Last mammography 2020 without suspicion for malignancy.   Of note, 92 y.o. sister in [place] recently diagnosed with breast cancer and had mastectomy.  Patient and family have preference for continued yearly mammographies.   -     Mammo Digital Screening Bilat; Future    Type II diabetes mellitus with neurological manifestations  A1c 6.9%. Recently out of metformin, refilled. Up to date on foot exam.   -     metFORMIN (GLUCOPHAGE) 500 MG tablet; Take 1 tablet (500 mg total) by mouth 2 (two) times a day.  -     Hemoglobin A1C; Future  -  Optometry appointment scheduled for March per son.     Subcutaneous mass of back  Noted by family summer of 2021.   US soft tissue 7/2021:  Impression:   "There is a 5.3 x 3.9 x 1.8 cm isoechoic solid nodule in the subcutaneous tissue of the posterior right back.  Recommend clinical correlation to determine if this is a lipoma or more serious"  Stable in size since last visit.   Suspect lipoma.     - US results reviewed with patient   - Discussed general surgery referral. Will hold on removal per patient preference.   - continue to monitor size       Stage 3 chronic kidney disease, unspecified whether stage 3a or 3b CKD  Recent CMP 7/2021  - stable renal fx    Osteoporosis without current pathological fracture, unspecified osteoporosis type  Last DEXA 7/2021:   "*Osteoporosis based on T-score between -1.0 and -2.5 and elevated risk based on FRAX  *Fracture risk is high  *Compared with previous DXA, BMD at the lumbar spine has increased by 3.5%, and the BMD at the total hip has remained stable."  No recent falls.   Meds refilled for fracture prevention.     -     alendronate (FOSAMAX) 35 MG tablet; Take 1 tablet (35 mg total) by mouth every 7 days.   -     cholecalciferol, vitamin D3, (VITAMIN D3) 25 mcg (1,000 unit) capsule; Take 2 " capsules (2,000 Units total) by mouth once daily.    Hyperlipidemia associated with type 2 diabetes mellitus  -     atorvastatin (LIPITOR) 40 MG tablet; Take 1 tablet (40 mg total) by mouth once daily.    Essential hypertension  BP wnl in clinic. Has been out of losartan for 3 months.     - discontinued losartan 50 mg qd for now  - instructed to keep BP log      Patient seen and plan of care discussed with Dr. Wenceslao Gill.   RTC in 6 months.     Rosemary Doherty MD  [facility]

## 2022-01-04 LAB
ESTIMATED AVG GLUCOSE: 157 MG/DL (ref 68–131)
HBA1C MFR BLD: 7.1 % (ref 4–5.6)

## 2022-01-05 ENCOUNTER — HOSPITAL ENCOUNTER (OUTPATIENT)
Dept: RADIOLOGY | Facility: HOSPITAL | Age: 87
Discharge: HOME | End: 2022-01-05
Attending: STUDENT IN AN ORGANIZED HEALTH CARE EDUCATION/TRAINING PROGRAM
Payer: MEDICARE

## 2022-01-05 DIAGNOSIS — Z12.31 ENCOUNTER FOR SCREENING MAMMOGRAM FOR MALIGNANT NEOPLASM OF BREAST: ICD-10-CM

## 2022-01-05 PROCEDURE — 77063 BREAST TOMOSYNTHESIS BI: CPT | Mod: TC,HCNC

## 2022-01-05 PROCEDURE — 77067 SCR MAMMO BI INCL CAD: CPT | Mod: 26,HCNC,, | Performed by: RADIOLOGY

## 2022-01-05 PROCEDURE — 77067 MAMMO DIGITAL SCREENING BILAT WITH TOMO: ICD-10-PCS | Mod: 26,HCNC,, | Performed by: RADIOLOGY

## 2022-01-05 PROCEDURE — 77063 BREAST TOMOSYNTHESIS BI: CPT | Mod: 26,HCNC,, | Performed by: RADIOLOGY

## 2022-01-05 PROCEDURE — 77063 MAMMO DIGITAL SCREENING BILAT WITH TOMO: ICD-10-PCS | Mod: 26,HCNC,, | Performed by: RADIOLOGY

## 2022-01-05 PROCEDURE — 77067 SCR MAMMO BI INCL CAD: CPT | Mod: TC,HCNC

## 2022-03-18 DIAGNOSIS — E78.5 HYPERLIPIDEMIA ASSOCIATED WITH TYPE 2 DIABETES MELLITUS: ICD-10-CM

## 2022-03-18 DIAGNOSIS — M79.7 FIBROMYALGIA: ICD-10-CM

## 2022-03-18 DIAGNOSIS — E11.69 HYPERLIPIDEMIA ASSOCIATED WITH TYPE 2 DIABETES MELLITUS: ICD-10-CM

## 2022-03-18 DIAGNOSIS — E11.49 TYPE II DIABETES MELLITUS WITH NEUROLOGICAL MANIFESTATIONS: ICD-10-CM

## 2022-03-18 NOTE — TELEPHONE ENCOUNTER
----- Message from Jess Ramirez sent at 3/18/2022  2:19 PM CDT -----  Regarding: refill request  Contact: Luis [phone]  Requesting an RX refill or new RX.  Is this a refill or new RX:   RX name and strength (metFORMIN (GLUCOPHAGE) 500 MG tablet  Is this a 30 day or 90 day RX:   Pharmacy name and phone # (   [facility] #[id number] - [place], [place] - [address] AT [facility]  [address] [facility] [address]  Phone: [phone] Fax: [phone]  The doctors have asked that we provide their patients with the following 2 reminders -- prescription refills can take up to 72 hours, and a friendly reminder that in the future you can use your [facility] account to request refills: #yes      2  Requesting an RX refill or new RX.  Is this a refill or new RX: refill  RX name and strength (atorvastatin (LIPITOR) 40 MG tablet  Is this a 30 day or 90 day RX: 90  Pharmacy name and phone # (   [facility] #[id number] - [place], [place] - [address] AT [facility]  [address] [facility] [address]  Phone: [phone] Fax: [phone]  The doctors have asked that we provide their patients with the following 2 reminders -- prescription refills can take up to 72 hours, and a friendly reminder that in the future you can use your [facility] account to request refills: #yes    3  Requesting an RX refill or new RX.  Is this a refill or new RX: refill  RX name and strength (amitriptyline (ELAVIL) 10 MG tablet  Is this a 30 day or 90 day RX: 90  Pharmacy name and phone # (   [facility] #[id number] - [place], [place] - [address] [facility]  [address]  [facility] [address]  Phone: [phone] Fax: [phone]  The doctors have asked that we provide their patients with the following 2 reminders -- prescription refills can take up to 72 hours, and a friendly reminder that in the future you can use your [facility] account to request refills: #yes

## 2022-03-19 RX ORDER — ATORVASTATIN CALCIUM 40 MG/1
40 TABLET, FILM COATED ORAL DAILY
Qty: 90 TABLET | Refills: 0 | Status: SHIPPED | OUTPATIENT
Start: 2022-03-19 | End: 2022-07-20 | Stop reason: SDUPTHER

## 2022-03-19 RX ORDER — METFORMIN HYDROCHLORIDE 500 MG/1
500 TABLET ORAL 2 TIMES DAILY
Qty: 180 TABLET | Refills: 0 | Status: SHIPPED | OUTPATIENT
Start: 2022-03-19 | End: 2022-07-20 | Stop reason: SDUPTHER

## 2022-03-19 RX ORDER — AMITRIPTYLINE HYDROCHLORIDE 10 MG/1
10 TABLET, FILM COATED ORAL EVERY MORNING
Qty: 30 TABLET | Refills: 1 | Status: SHIPPED | OUTPATIENT
Start: 2022-03-19 | End: 2022-07-20 | Stop reason: SDUPTHER

## 2022-03-31 ENCOUNTER — OFFICE VISIT (OUTPATIENT)
Dept: OPTOMETRY | Facility: CLINIC | Age: 87
End: 2022-03-31
Payer: MEDICARE

## 2022-03-31 DIAGNOSIS — H52.13 MYOPIA WITH ASTIGMATISM, BILATERAL: ICD-10-CM

## 2022-03-31 DIAGNOSIS — Z96.1 PSEUDOPHAKIA OF BOTH EYES: ICD-10-CM

## 2022-03-31 DIAGNOSIS — E11.9 TYPE 2 DIABETES MELLITUS WITHOUT OPHTHALMIC MANIFESTATIONS: ICD-10-CM

## 2022-03-31 DIAGNOSIS — H04.123 DRY EYE SYNDROME, BILATERAL: Primary | ICD-10-CM

## 2022-03-31 DIAGNOSIS — H52.203 MYOPIA WITH ASTIGMATISM, BILATERAL: ICD-10-CM

## 2022-03-31 DIAGNOSIS — H35.363 DRUSEN OF MACULA OF BOTH EYES: ICD-10-CM

## 2022-03-31 DIAGNOSIS — E11.49 TYPE II DIABETES MELLITUS WITH NEUROLOGICAL MANIFESTATIONS: ICD-10-CM

## 2022-03-31 PROCEDURE — 92015 PR REFRACTION: ICD-10-PCS | Mod: S$GLB,,, | Performed by: OPTOMETRIST

## 2022-03-31 PROCEDURE — 92134 CPTRZ OPH DX IMG PST SGM RTA: CPT | Mod: S$GLB,,, | Performed by: OPTOMETRIST

## 2022-03-31 PROCEDURE — 92014 PR EYE EXAM, EST PATIENT,COMPREHESV: ICD-10-PCS | Mod: S$GLB,,, | Performed by: OPTOMETRIST

## 2022-03-31 PROCEDURE — 92014 COMPRE OPH EXAM EST PT 1/>: CPT | Mod: S$GLB,,, | Performed by: OPTOMETRIST

## 2022-03-31 PROCEDURE — 99999 PR PBB SHADOW E&M-EST. PATIENT-LVL I: ICD-10-PCS | Mod: PBBFAC,,, | Performed by: OPTOMETRIST

## 2022-03-31 PROCEDURE — 92015 DETERMINE REFRACTIVE STATE: CPT | Mod: S$GLB,,, | Performed by: OPTOMETRIST

## 2022-03-31 PROCEDURE — 92134 POSTERIOR SEGMENT OCT RETINA (OCULAR COHERENCE TOMOGRAPHY)-BOTH EYES: ICD-10-PCS | Mod: S$GLB,,, | Performed by: OPTOMETRIST

## 2022-03-31 PROCEDURE — 99999 PR PBB SHADOW E&M-EST. PATIENT-LVL I: CPT | Mod: PBBFAC,,, | Performed by: OPTOMETRIST

## 2022-03-31 RX ORDER — CYCLOSPORINE 0.5 MG/ML
1 EMULSION OPHTHALMIC 2 TIMES DAILY
Qty: 180 EACH | Refills: 3 | Status: SHIPPED | OUTPATIENT
Start: 2022-03-31 | End: 2023-09-13

## 2022-03-31 RX ORDER — FLUOROMETHOLONE 1 MG/ML
1 SUSPENSION/ DROPS OPHTHALMIC 4 TIMES DAILY
Qty: 5 ML | Refills: 0 | Status: SHIPPED | OUTPATIENT
Start: 2022-03-31 | End: 2022-04-10

## 2022-03-31 NOTE — PROGRESS NOTES
HPI     Last eye exam was 12/11/20 with Dr. Hope.   Patient states OU are extremely dry and drops aren't helping. No vision   complaints.  Patient denies diplopia, headaches, flashes/floaters, and pain.    Systane BID OU    Hemoglobin A1C       Date                     Value               Ref Range             Status                01/03/2022               7.1 (H)             4.0 - 5.6 %           Final                  Last edited by Liz Isbell MA on 3/31/2022 10:21 AM. (History)            Assessment /Plan     For exam results, see Encounter Report.    Dry eye syndrome, bilateral  Pt currently using systane QID without relief  -     fluorometholone 0.1% (FML) 0.1 % DrpS; Place 1 drop into both eyes 4 (four) times daily. for 10 days  4/3/2/1 taper then start restasis  -     cycloSPORINE (RESTASIS) 0.05 % ophthalmic emulsion; Place 1 drop into both eyes 2 (two) times daily.  Dispense: 180 each; Refill: 3    Drusen of macula of both eyes  -     Posterior Segment OCT Retina-mild RPE irregularities   Start AREDS    Type II diabetes mellitus with neurological manifestations  Type 2 diabetes mellitus without ophthalmic manifestations   No retinopathy, monitor yearly    Pseudophakia of both eyes   S/p YAG OS Jayy in 2020 Stable, monitor   S/P Phaco IOL OS 9/8/12    S/p phaco IOL OD 12/3/12     Myopia with astigmatism, bilateral   Rx specs    RTC 1 year, sooner PRN

## 2022-07-20 DIAGNOSIS — M81.0 OSTEOPOROSIS WITHOUT CURRENT PATHOLOGICAL FRACTURE, UNSPECIFIED OSTEOPOROSIS TYPE: ICD-10-CM

## 2022-07-20 DIAGNOSIS — E11.49 TYPE II DIABETES MELLITUS WITH NEUROLOGICAL MANIFESTATIONS: ICD-10-CM

## 2022-07-20 DIAGNOSIS — E11.69 HYPERLIPIDEMIA ASSOCIATED WITH TYPE 2 DIABETES MELLITUS: ICD-10-CM

## 2022-07-20 DIAGNOSIS — M79.7 FIBROMYALGIA: ICD-10-CM

## 2022-07-20 DIAGNOSIS — E78.5 HYPERLIPIDEMIA ASSOCIATED WITH TYPE 2 DIABETES MELLITUS: ICD-10-CM

## 2022-07-20 RX ORDER — AMITRIPTYLINE HYDROCHLORIDE 10 MG/1
10 TABLET, FILM COATED ORAL EVERY MORNING
Qty: 30 TABLET | Refills: 1 | Status: SHIPPED | OUTPATIENT
Start: 2022-07-20 | End: 2022-11-02 | Stop reason: SDUPTHER

## 2022-07-20 RX ORDER — ALENDRONATE SODIUM 35 MG/1
35 TABLET ORAL
Qty: 4 TABLET | Refills: 11 | Status: SHIPPED | OUTPATIENT
Start: 2022-07-20

## 2022-07-20 RX ORDER — METFORMIN HYDROCHLORIDE 500 MG/1
500 TABLET ORAL 2 TIMES DAILY
Qty: 180 TABLET | Refills: 0 | Status: SHIPPED | OUTPATIENT
Start: 2022-07-20 | End: 2022-09-06 | Stop reason: SDUPTHER

## 2022-07-20 RX ORDER — OMEGA-3-ACID ETHYL ESTERS 1 G/1
1 CAPSULE, LIQUID FILLED ORAL 2 TIMES DAILY
Qty: 180 CAPSULE | Refills: 0 | Status: SHIPPED | OUTPATIENT
Start: 2022-07-20 | End: 2023-08-01 | Stop reason: SDUPTHER

## 2022-07-20 RX ORDER — DICLOFENAC SODIUM 10 MG/G
2 GEL TOPICAL 4 TIMES DAILY PRN
Qty: 20 G | Refills: 5 | Status: SHIPPED | OUTPATIENT
Start: 2022-07-20

## 2022-07-20 RX ORDER — ATORVASTATIN CALCIUM 40 MG/1
40 TABLET, FILM COATED ORAL DAILY
Qty: 90 TABLET | Refills: 0 | Status: SHIPPED | OUTPATIENT
Start: 2022-07-20 | End: 2022-09-06 | Stop reason: SDUPTHER

## 2022-07-20 NOTE — TELEPHONE ENCOUNTER
----- Message from Jihan Contreras sent at 7/20/2022 11:43 AM CDT -----  Contact: [phone]  Pt's son called to advise that he has been trying to refill these medications through the pharmacy and has been told that the medications were denied. Please Advise     Requesting an RX refill or new RX.  Is this a refill or new RX: refill   RX name and strength (copy/paste from chart):  alendronate (FOSAMAX) 35 MG tablet  Is this a 30 day or 90 day RX: 90 day   Pharmacy name and phone # (copy/paste from chart):    [facility] #[id number] [place] [address] [facility] & [address] [address]  Phone: [phone] Fax: [phone]  The doctors have asked that we provide their patients with the following 2 reminders -- prescription refills can take up to 72 hours, and a friendly reminder that in the future you can use your [facility] account to request refills: aware       Requesting an RX refill or new RX.  Is this a refill or new RX: refill   RX name and strength (copy/paste from chart): amitriptyline (ELAVIL) 10 MG tablet   Is this a 30 day or 90 day RX: 90 day   Pharmacy name and phone # (copy/paste from chart):    [facility] #[id number] [place] [address] [facility] & [address] [address]  Phone: [phone] Fax: [phone]  The doctors have asked that we provide their patients with the following 2 reminders -- prescription refills can take up to 72 hours, and a friendly reminder that in the future you can use your [facility] account to request refills: aware       Requesting an RX refill or new RX.  Is this a refill or new RX: refill   RX name and strength (copy/paste from chart):  atorvastatin (LIPITOR) 40 MG tablet  Is this a 30 day or 90 day RX: 90 day   Pharmacy name and phone # (copy/paste from chart):    [facility] #[id number] - [place], [place] - [address]  AT [facility]  [address] [facility]  [place] [address]  Phone: [phone] Fax: [phone]  The doctors have asked that we provide their patients with the following 2 reminders -- prescription refills can take up to 72 hours, and a friendly reminder that in the future you can use your [facility] account to request refills: aware       Requesting an RX refill or new RX.  Is this a refill or new RX: refill   RX name and strength (copy/paste from chart):  diclofenac sodium (VOLTAREN) 1 % Gel  Is this a 30 day or 90 day RX: 90 day   Pharmacy name and phone # (copy/paste from chart):    [facility] #[id number] [facility] [address] [facility] AT [facility] & [address] [address]  Phone: [phone] Fax: [phone]  The doctors have asked that we provide their patients with the following 2 reminders -- prescription refills can take up to 72 hours, and a friendly reminder that in the future you can use your [facility] account to request refills: aware       Requesting an RX refill or new RX.  Is this a refill or new RX: refill   RX name and strength (copy/paste from chart):  metFORMIN (GLUCOPHAGE) 500 MG tablet  Is this a 30 day or 90 day RX: 90 day   Pharmacy name and phone # (copy/paste from chart):    [facility] #[id number] [facility] [address] [facility] AT [facility] & [address] [address]  Phone: [phone] Fax: [phone]  The doctors have asked that we provide their patients with the following 2 reminders -- prescription refills can take up to 72 hours, and a friendly reminder that in the future you can use your [facility] account to request refills: aware       Requesting an RX refill or new RX.  Is this a refill or new RX: refill   RX name and strength (copy/paste from chart): omega-3 acid ethyl esters   Is this a 30 day or 90 day RX: 90 day   Pharmacy name and phone # (copy/paste from chart):    [facility] #[id number] - [place], [place] - [facility] [place]  [address] [address]  Phone: [phone] Fax: [phone]  The doctors have asked that we provide their patients with the following 2 reminders -- prescription refills can take up to 72 hours, and a friendly reminder that in the future you can use your [facility] account to request refills: aware

## 2022-07-20 NOTE — TELEPHONE ENCOUNTER
----- Message from Jihan Contreras sent at 7/20/2022 11:43 AM CDT -----  Contact: [phone]  Pt's son called to advise that he has been trying to refill these medications through the pharmacy and has been told that the medications were denied. Please Advise     Requesting an RX refill or new RX.  Is this a refill or new RX: refill   RX name and strength (copy/paste from chart):  alendronate (FOSAMAX) 35 MG tablet  Is this a 30 day or 90 day RX: 90 day   Pharmacy name and phone # (copy/paste from chart):    [id number] [place] [address] [facility] & [address] [address]  Phone: [phone] Fax: [phone]  The doctors have asked that we provide their patients with the following 2 reminders -- prescription refills can take up to 72 hours, and a friendly reminder that in the future you can use your [facility] account to request refills: aware       Requesting an RX refill or new RX.  Is this a refill or new RX: refill   RX name and strength (copy/paste from chart): amitriptyline (ELAVIL) 10 MG tablet   Is this a 30 day or 90 day RX: 90 day   Pharmacy name and phone # (copy/paste from chart):    [id number] [place] [address] [facility] & [address] [address]  Phone: [phone] Fax: [phone]  The doctors have asked that we provide their patients with the following 2 reminders -- prescription refills can take up to 72 hours, and a friendly reminder that in the future you can use your [facility] account to request refills: aware       Requesting an RX refill or new RX.  Is this a refill or new RX: refill   RX name and strength (copy/paste from chart):  atorvastatin (LIPITOR) 40 MG tablet  Is this a 30 day or 90 day RX: 90 day   Pharmacy name and phone # (copy/paste from chart):    [facility] #[id number] - [place], [place] - [address]  AT [facility] & [facility]  [address] [facility]  [place] [address]  Phone: [phone] Fax: [phone]  The doctors have asked that we provide their patients with the following 2 reminders -- prescription refills can take up to 72 hours, and a friendly reminder that in the future you can use your [facility] account to request refills: aware       Requesting an RX refill or new RX.  Is this a refill or new RX: refill   RX name and strength (copy/paste from chart):  diclofenac sodium (VOLTAREN) 1 % Gel  Is this a 30 day or 90 day RX: 90 day   Pharmacy name and phone # (copy/paste from chart):    [facility] #[id number] [facility] [address] [facility] AT [facility] & [address] [address]  Phone: [phone] Fax: [phone]  The doctors have asked that we provide their patients with the following 2 reminders -- prescription refills can take up to 72 hours, and a friendly reminder that in the future you can use your [facility] account to request refills: aware       Requesting an RX refill or new RX.  Is this a refill or new RX: refill   RX name and strength (copy/paste from chart):  metFORMIN (GLUCOPHAGE) 500 MG tablet  Is this a 30 day or 90 day RX: 90 day   Pharmacy name and phone # (copy/paste from chart):    [facility] #[id number] [facility] [address] [facility] AT [facility] & [address] [address]  Phone: [phone] Fax: [phone]  The doctors have asked that we provide their patients with the following 2 reminders -- prescription refills can take up to 72 hours, and a friendly reminder that in the future you can use your [facility] account to request refills: aware       Requesting an RX refill or new RX.  Is this a refill or new RX: refill   RX name and strength (copy/paste from chart): omega-3 acid ethyl esters   Is this a 30 day or 90 day RX: 90 day   Pharmacy name and phone # (copy/paste from chart):    [facility][id number] - [place], [place] - [facility] & [place]  [address] [address]  Phone: [phone] Fax: [phone]  The doctors have asked that we provide their patients with the following 2 reminders -- prescription refills can take up to 72 hours, and a friendly reminder that in the future you can use your [facility] account to request refills: aware

## 2022-11-02 ENCOUNTER — OFFICE VISIT (OUTPATIENT)
Dept: INTERNAL MEDICINE | Facility: CLINIC | Age: 87
End: 2022-11-02
Payer: MEDICARE

## 2022-11-02 ENCOUNTER — LAB VISIT (OUTPATIENT)
Dept: LAB | Facility: HOSPITAL | Age: 87
End: 2022-11-02
Attending: STUDENT IN AN ORGANIZED HEALTH CARE EDUCATION/TRAINING PROGRAM
Payer: MEDICARE

## 2022-11-02 VITALS
SYSTOLIC BLOOD PRESSURE: 140 MMHG | DIASTOLIC BLOOD PRESSURE: 60 MMHG | OXYGEN SATURATION: 97 % | HEART RATE: 76 BPM | HEIGHT: 62 IN | BODY MASS INDEX: 25.19 KG/M2 | WEIGHT: 136.88 LBS

## 2022-11-02 DIAGNOSIS — E11.49 TYPE II DIABETES MELLITUS WITH NEUROLOGICAL MANIFESTATIONS: ICD-10-CM

## 2022-11-02 DIAGNOSIS — I15.2 HYPERTENSION ASSOCIATED WITH DIABETES: ICD-10-CM

## 2022-11-02 DIAGNOSIS — N18.30 STAGE 3 CHRONIC KIDNEY DISEASE, UNSPECIFIED WHETHER STAGE 3A OR 3B CKD: ICD-10-CM

## 2022-11-02 DIAGNOSIS — E78.5 HYPERLIPIDEMIA ASSOCIATED WITH TYPE 2 DIABETES MELLITUS: ICD-10-CM

## 2022-11-02 DIAGNOSIS — E11.59 HYPERTENSION ASSOCIATED WITH DIABETES: ICD-10-CM

## 2022-11-02 DIAGNOSIS — M79.7 FIBROMYALGIA: Primary | ICD-10-CM

## 2022-11-02 DIAGNOSIS — E11.69 HYPERLIPIDEMIA ASSOCIATED WITH TYPE 2 DIABETES MELLITUS: ICD-10-CM

## 2022-11-02 DIAGNOSIS — Z86.010 HISTORY OF ADENOMATOUS POLYP OF COLON: ICD-10-CM

## 2022-11-02 LAB
ALBUMIN SERPL BCP-MCNC: 4 G/DL (ref 3.5–5.2)
ALP SERPL-CCNC: 55 U/L (ref 55–135)
ALT SERPL W/O P-5'-P-CCNC: 12 U/L (ref 10–44)
ANION GAP SERPL CALC-SCNC: 8 MMOL/L (ref 8–16)
AST SERPL-CCNC: 17 U/L (ref 10–40)
BASOPHILS # BLD AUTO: 0.01 K/UL (ref 0–0.2)
BASOPHILS NFR BLD: 0.1 % (ref 0–1.9)
BILIRUB SERPL-MCNC: 0.4 MG/DL (ref 0.1–1)
BUN SERPL-MCNC: 20 MG/DL (ref 8–23)
CALCIUM SERPL-MCNC: 10.5 MG/DL (ref 8.7–10.5)
CHLORIDE SERPL-SCNC: 105 MMOL/L (ref 95–110)
CO2 SERPL-SCNC: 24 MMOL/L (ref 23–29)
CREAT SERPL-MCNC: 0.9 MG/DL (ref 0.5–1.4)
DIFFERENTIAL METHOD: ABNORMAL
EOSINOPHIL # BLD AUTO: 0 K/UL (ref 0–0.5)
EOSINOPHIL NFR BLD: 0 % (ref 0–8)
ERYTHROCYTE [DISTWIDTH] IN BLOOD BY AUTOMATED COUNT: 13.8 % (ref 11.5–14.5)
EST. GFR  (NO RACE VARIABLE): >60 ML/MIN/1.73 M^2
ESTIMATED AVG GLUCOSE: 154 MG/DL (ref 68–131)
GLUCOSE SERPL-MCNC: 100 MG/DL (ref 70–110)
HBA1C MFR BLD: 7 % (ref 4–5.6)
HCT VFR BLD AUTO: 35.7 % (ref 37–48.5)
HGB BLD-MCNC: 11.3 G/DL (ref 12–16)
IMM GRANULOCYTES # BLD AUTO: 0.02 K/UL (ref 0–0.04)
IMM GRANULOCYTES NFR BLD AUTO: 0.3 % (ref 0–0.5)
LYMPHOCYTES # BLD AUTO: 3.4 K/UL (ref 1–4.8)
LYMPHOCYTES NFR BLD: 47.2 % (ref 18–48)
MCH RBC QN AUTO: 29.7 PG (ref 27–31)
MCHC RBC AUTO-ENTMCNC: 31.7 G/DL (ref 32–36)
MCV RBC AUTO: 94 FL (ref 82–98)
MONOCYTES # BLD AUTO: 0.7 K/UL (ref 0.3–1)
MONOCYTES NFR BLD: 9.2 % (ref 4–15)
NEUTROPHILS # BLD AUTO: 3.1 K/UL (ref 1.8–7.7)
NEUTROPHILS NFR BLD: 43.2 % (ref 38–73)
NRBC BLD-RTO: 0 /100 WBC
PLATELET # BLD AUTO: 181 K/UL (ref 150–450)
PMV BLD AUTO: 10.9 FL (ref 9.2–12.9)
POTASSIUM SERPL-SCNC: 4.7 MMOL/L (ref 3.5–5.1)
PROT SERPL-MCNC: 7.9 G/DL (ref 6–8.4)
RBC # BLD AUTO: 3.8 M/UL (ref 4–5.4)
SODIUM SERPL-SCNC: 137 MMOL/L (ref 136–145)
WBC # BLD AUTO: 7.21 K/UL (ref 3.9–12.7)

## 2022-11-02 PROCEDURE — 1101F PT FALLS ASSESS-DOCD LE1/YR: CPT | Mod: CPTII,S$GLB,, | Performed by: PHYSICIAN ASSISTANT

## 2022-11-02 PROCEDURE — 1159F PR MEDICATION LIST DOCUMENTED IN MEDICAL RECORD: ICD-10-PCS | Mod: CPTII,S$GLB,, | Performed by: PHYSICIAN ASSISTANT

## 2022-11-02 PROCEDURE — 85025 COMPLETE CBC W/AUTO DIFF WBC: CPT | Performed by: PHYSICIAN ASSISTANT

## 2022-11-02 PROCEDURE — 3288F FALL RISK ASSESSMENT DOCD: CPT | Mod: CPTII,S$GLB,, | Performed by: PHYSICIAN ASSISTANT

## 2022-11-02 PROCEDURE — 99999 PR PBB SHADOW E&M-EST. PATIENT-LVL IV: CPT | Mod: PBBFAC,,, | Performed by: PHYSICIAN ASSISTANT

## 2022-11-02 PROCEDURE — 80053 COMPREHEN METABOLIC PANEL: CPT | Performed by: PHYSICIAN ASSISTANT

## 2022-11-02 PROCEDURE — 99499 RISK ADDL DX/OHS AUDIT: ICD-10-PCS | Mod: HCNC,S$GLB,, | Performed by: PHYSICIAN ASSISTANT

## 2022-11-02 PROCEDURE — 1101F PR PT FALLS ASSESS DOC 0-1 FALLS W/OUT INJ PAST YR: ICD-10-PCS | Mod: CPTII,S$GLB,, | Performed by: PHYSICIAN ASSISTANT

## 2022-11-02 PROCEDURE — 36415 COLL VENOUS BLD VENIPUNCTURE: CPT | Performed by: PHYSICIAN ASSISTANT

## 2022-11-02 PROCEDURE — 1125F PR PAIN SEVERITY QUANTIFIED, PAIN PRESENT: ICD-10-PCS | Mod: CPTII,S$GLB,, | Performed by: PHYSICIAN ASSISTANT

## 2022-11-02 PROCEDURE — 99214 OFFICE O/P EST MOD 30 MIN: CPT | Mod: S$GLB,,, | Performed by: PHYSICIAN ASSISTANT

## 2022-11-02 PROCEDURE — 3288F PR FALLS RISK ASSESSMENT DOCUMENTED: ICD-10-PCS | Mod: CPTII,S$GLB,, | Performed by: PHYSICIAN ASSISTANT

## 2022-11-02 PROCEDURE — 1159F MED LIST DOCD IN RCRD: CPT | Mod: CPTII,S$GLB,, | Performed by: PHYSICIAN ASSISTANT

## 2022-11-02 PROCEDURE — 99214 PR OFFICE/OUTPT VISIT, EST, LEVL IV, 30-39 MIN: ICD-10-PCS | Mod: S$GLB,,, | Performed by: PHYSICIAN ASSISTANT

## 2022-11-02 PROCEDURE — 1160F PR REVIEW ALL MEDS BY PRESCRIBER/CLIN PHARMACIST DOCUMENTED: ICD-10-PCS | Mod: CPTII,S$GLB,, | Performed by: PHYSICIAN ASSISTANT

## 2022-11-02 PROCEDURE — 99499 UNLISTED E&M SERVICE: CPT | Mod: HCNC,S$GLB,, | Performed by: PHYSICIAN ASSISTANT

## 2022-11-02 PROCEDURE — 83036 HEMOGLOBIN GLYCOSYLATED A1C: CPT | Performed by: PHYSICIAN ASSISTANT

## 2022-11-02 PROCEDURE — 1125F AMNT PAIN NOTED PAIN PRSNT: CPT | Mod: CPTII,S$GLB,, | Performed by: PHYSICIAN ASSISTANT

## 2022-11-02 PROCEDURE — 99999 PR PBB SHADOW E&M-EST. PATIENT-LVL IV: ICD-10-PCS | Mod: PBBFAC,,, | Performed by: PHYSICIAN ASSISTANT

## 2022-11-02 PROCEDURE — 1160F RVW MEDS BY RX/DR IN RCRD: CPT | Mod: CPTII,S$GLB,, | Performed by: PHYSICIAN ASSISTANT

## 2022-11-02 RX ORDER — NAPROXEN 500 MG/1
500 TABLET ORAL 2 TIMES DAILY PRN
Qty: 60 TABLET | Refills: 0 | Status: SHIPPED | OUTPATIENT
Start: 2022-11-02 | End: 2022-11-28

## 2022-11-02 RX ORDER — LOSARTAN POTASSIUM 50 MG/1
50 TABLET ORAL DAILY
Qty: 90 TABLET | Refills: 3 | Status: SHIPPED | OUTPATIENT
Start: 2022-11-02 | End: 2023-01-31

## 2022-11-02 RX ORDER — SERTRALINE HYDROCHLORIDE 25 MG/1
25 TABLET, FILM COATED ORAL DAILY
COMMUNITY
End: 2022-11-02 | Stop reason: SDUPTHER

## 2022-11-02 RX ORDER — LOSARTAN POTASSIUM 50 MG/1
50 TABLET ORAL DAILY
COMMUNITY
End: 2022-11-02 | Stop reason: SDUPTHER

## 2022-11-02 RX ORDER — SERTRALINE HYDROCHLORIDE 25 MG/1
25 TABLET, FILM COATED ORAL DAILY
Qty: 90 TABLET | Refills: 3 | Status: SHIPPED | OUTPATIENT
Start: 2022-11-02 | End: 2023-01-31 | Stop reason: SDUPTHER

## 2022-11-02 RX ORDER — AMITRIPTYLINE HYDROCHLORIDE 10 MG/1
10 TABLET, FILM COATED ORAL EVERY MORNING
Qty: 30 TABLET | Refills: 1 | Status: SHIPPED | OUTPATIENT
Start: 2022-11-02 | End: 2023-01-31

## 2022-11-02 RX ORDER — NAPROXEN 500 MG/1
500 TABLET ORAL 2 TIMES DAILY WITH MEALS
COMMUNITY
End: 2022-11-02 | Stop reason: SDUPTHER

## 2022-11-02 NOTE — PROGRESS NOTES
Subjective:       Patient ID: Sera Boone is a 86 y.o. female.        Chief Complaint: Hip Pain and Leg Pain    Sera Boone is an established patient of Rosemary Doherty MD here today for follow up visit.     services used for this visit.    Pleasant [place] speaking woman, here today with her daughter.    She has been having pain from her fibromyalgia but has been out of her medications.  Requests refill.  Pain previously controlled on prior regimen.      She has also been out of losartan and BP is mildly elevated at 140/60.    Due for lab work but not fasting.      She reports she recently had covid vaccine.  Needs flu vaccine.         Review of Systems   Constitutional:  Negative for chills, diaphoresis, fatigue and fever.   HENT:  Negative for congestion and sore throat.    Eyes:  Negative for visual disturbance.   Respiratory:  Negative for cough, chest tightness and shortness of breath.    Cardiovascular:  Negative for chest pain, palpitations and leg swelling.   Gastrointestinal:  Negative for abdominal pain, blood in stool, constipation, diarrhea, nausea and vomiting.   Genitourinary:  Negative for dysuria, frequency, hematuria and urgency.   Musculoskeletal:  Negative for arthralgias and back pain.   Skin:  Negative for rash.   Neurological:  Negative for dizziness, syncope, weakness and headaches.   Psychiatric/Behavioral:  Negative for dysphoric mood and sleep disturbance. The patient is not nervous/anxious.      Objective:      Physical Exam  Vitals and nursing note reviewed.   Constitutional:       Appearance: Normal appearance. She is well-developed.   HENT:      Head: Normocephalic.      Right Ear: External ear normal.      Left Ear: External ear normal.   Eyes:      Pupils: Pupils are equal, round, and reactive to light.   Cardiovascular:      Rate and Rhythm: Normal rate and regular rhythm.      Heart sounds: Normal heart sounds. No murmur heard.    No friction rub. No gallop.    Pulmonary:      Effort: Pulmonary effort is normal. No respiratory distress.      Breath sounds: Normal breath sounds.   Abdominal:      Palpations: Abdomen is soft.      Tenderness: There is no abdominal tenderness.   Skin:     General: Skin is warm and dry.   Neurological:      Mental Status: She is alert.       Assessment:       1. Hyperlipidemia associated with type 2 diabetes mellitus    2. Fibromyalgia    3. Hypertension associated with diabetes    4. Stage 3 chronic kidney disease, unspecified whether stage 3a or 3b CKD    5. Type II diabetes mellitus with neurological manifestations    6. History of adenomatous polyp of colon 1/16 no need for further follow up per Dr Guerra          Plan:       Sera was seen today for hip pain and leg pain.    Diagnoses and all orders for this visit:    Hyperlipidemia associated with type 2 diabetes mellitus - not fasting so will defer labs to when she sees PCP, come in fasting    Fibromyalgia  -     amitriptyline (ELAVIL) 10 MG tablet; Take 1 tablet (10 mg total) by mouth every morning.  -     naproxen (NAPROSYN) 500 MG tablet; Take 1 tablet (500 mg total) by mouth 2 (two) times daily as needed (with meals).    Hypertension associated with diabetes  -     losartan (COZAAR) 50 MG tablet; Take 1 tablet (50 mg total) by mouth once daily.  -     CBC Auto Differential; Future  -     Comprehensive Metabolic Panel; Future    Stage 3 chronic kidney disease, unspecified whether stage 3a or 3b CKD    Type II diabetes mellitus with neurological manifestations  -     Hemoglobin A1C; Future    History of adenomatous polyp of colon 1/16 no need for further follow up per Dr Guerra    Other orders  -     sertraline (ZOLOFT) 25 MG tablet; Take 1 tablet (25 mg total) by mouth once daily.    Refill medications as above  Labs today  Flu shot today  Establish with PCP    Pt has been given instructions populated from [employer] database and has verbalized understanding of the after visit summary  "and information contained wherein.    Follow up with a primary care provider. May go to ER for acute shortness of breath, lightheadedness, fever, or any other emergent complaints or changes in condition.    "This note will be shared with the patient"    Future Appointments   Date Time Provider Department Center   11/2/2022 12:00 PM LAB, APPOINTMENT [facility]   1/31/2023  3:00 PM Beena Hill MD [facility]                   "

## 2023-01-31 ENCOUNTER — OFFICE VISIT (OUTPATIENT)
Dept: INTERNAL MEDICINE | Facility: CLINIC | Age: 88
End: 2023-01-31
Payer: MEDICARE

## 2023-01-31 ENCOUNTER — LAB VISIT (OUTPATIENT)
Dept: LAB | Facility: HOSPITAL | Age: 88
End: 2023-01-31
Payer: MEDICARE

## 2023-01-31 VITALS
HEART RATE: 77 BPM | HEIGHT: 63 IN | OXYGEN SATURATION: 98 % | SYSTOLIC BLOOD PRESSURE: 135 MMHG | BODY MASS INDEX: 24.57 KG/M2 | WEIGHT: 138.69 LBS | DIASTOLIC BLOOD PRESSURE: 66 MMHG

## 2023-01-31 DIAGNOSIS — D64.9 ANEMIA, UNSPECIFIED TYPE: ICD-10-CM

## 2023-01-31 DIAGNOSIS — E78.5 HYPERLIPIDEMIA ASSOCIATED WITH TYPE 2 DIABETES MELLITUS: ICD-10-CM

## 2023-01-31 DIAGNOSIS — Z76.89 ENCOUNTER TO ESTABLISH CARE: Primary | ICD-10-CM

## 2023-01-31 DIAGNOSIS — E11.49 TYPE II DIABETES MELLITUS WITH NEUROLOGICAL MANIFESTATIONS: ICD-10-CM

## 2023-01-31 DIAGNOSIS — Z12.31 ENCOUNTER FOR SCREENING MAMMOGRAM FOR MALIGNANT NEOPLASM OF BREAST: ICD-10-CM

## 2023-01-31 DIAGNOSIS — N25.81 SECONDARY HYPERPARATHYROIDISM OF RENAL ORIGIN: ICD-10-CM

## 2023-01-31 DIAGNOSIS — N18.30 STAGE 3 CHRONIC KIDNEY DISEASE, UNSPECIFIED WHETHER STAGE 3A OR 3B CKD: ICD-10-CM

## 2023-01-31 DIAGNOSIS — H61.23 BILATERAL IMPACTED CERUMEN: ICD-10-CM

## 2023-01-31 DIAGNOSIS — Z85.3 HISTORY OF BREAST CANCER: ICD-10-CM

## 2023-01-31 DIAGNOSIS — I10 ESSENTIAL HYPERTENSION: ICD-10-CM

## 2023-01-31 DIAGNOSIS — M54.50 LUMBAR BACK PAIN: ICD-10-CM

## 2023-01-31 DIAGNOSIS — E11.69 HYPERLIPIDEMIA ASSOCIATED WITH TYPE 2 DIABETES MELLITUS: ICD-10-CM

## 2023-01-31 DIAGNOSIS — M79.7 FIBROMYALGIA: ICD-10-CM

## 2023-01-31 LAB
CHOLEST SERPL-MCNC: 240 MG/DL (ref 120–199)
CHOLEST/HDLC SERPL: 3.5 {RATIO} (ref 2–5)
ESTIMATED AVG GLUCOSE: 146 MG/DL (ref 68–131)
HBA1C MFR BLD: 6.7 % (ref 4–5.6)
HDLC SERPL-MCNC: 69 MG/DL (ref 40–75)
HDLC SERPL: 28.8 % (ref 20–50)
LDLC SERPL CALC-MCNC: 155.2 MG/DL (ref 63–159)
NONHDLC SERPL-MCNC: 171 MG/DL
TRIGL SERPL-MCNC: 79 MG/DL (ref 30–150)

## 2023-01-31 PROCEDURE — 84466 ASSAY OF TRANSFERRIN: CPT | Mod: HCNC | Performed by: INTERNAL MEDICINE

## 2023-01-31 PROCEDURE — 3072F LOW RISK FOR RETINOPATHY: CPT | Mod: HCNC,CPTII,S$GLB, | Performed by: INTERNAL MEDICINE

## 2023-01-31 PROCEDURE — 83036 HEMOGLOBIN GLYCOSYLATED A1C: CPT | Mod: HCNC | Performed by: INTERNAL MEDICINE

## 2023-01-31 PROCEDURE — 99999 PR PBB SHADOW E&M-EST. PATIENT-LVL IV: CPT | Mod: PBBFAC,HCNC,, | Performed by: INTERNAL MEDICINE

## 2023-01-31 PROCEDURE — 82728 ASSAY OF FERRITIN: CPT | Mod: HCNC | Performed by: INTERNAL MEDICINE

## 2023-01-31 PROCEDURE — 81003 URINALYSIS AUTO W/O SCOPE: CPT | Mod: HCNC | Performed by: INTERNAL MEDICINE

## 2023-01-31 PROCEDURE — 1101F PR PT FALLS ASSESS DOC 0-1 FALLS W/OUT INJ PAST YR: ICD-10-PCS | Mod: HCNC,CPTII,S$GLB, | Performed by: INTERNAL MEDICINE

## 2023-01-31 PROCEDURE — 36415 COLL VENOUS BLD VENIPUNCTURE: CPT | Mod: HCNC | Performed by: INTERNAL MEDICINE

## 2023-01-31 PROCEDURE — 99214 PR OFFICE/OUTPT VISIT, EST, LEVL IV, 30-39 MIN: ICD-10-PCS | Mod: HCNC,S$GLB,, | Performed by: INTERNAL MEDICINE

## 2023-01-31 PROCEDURE — 1101F PT FALLS ASSESS-DOCD LE1/YR: CPT | Mod: HCNC,CPTII,S$GLB, | Performed by: INTERNAL MEDICINE

## 2023-01-31 PROCEDURE — 1125F AMNT PAIN NOTED PAIN PRSNT: CPT | Mod: HCNC,CPTII,S$GLB, | Performed by: INTERNAL MEDICINE

## 2023-01-31 PROCEDURE — 85025 COMPLETE CBC W/AUTO DIFF WBC: CPT | Mod: HCNC | Performed by: INTERNAL MEDICINE

## 2023-01-31 PROCEDURE — 99214 OFFICE O/P EST MOD 30 MIN: CPT | Mod: HCNC,S$GLB,, | Performed by: INTERNAL MEDICINE

## 2023-01-31 PROCEDURE — 1125F PR PAIN SEVERITY QUANTIFIED, PAIN PRESENT: ICD-10-PCS | Mod: HCNC,CPTII,S$GLB, | Performed by: INTERNAL MEDICINE

## 2023-01-31 PROCEDURE — 99999 PR PBB SHADOW E&M-EST. PATIENT-LVL IV: ICD-10-PCS | Mod: PBBFAC,HCNC,, | Performed by: INTERNAL MEDICINE

## 2023-01-31 PROCEDURE — 3288F FALL RISK ASSESSMENT DOCD: CPT | Mod: HCNC,CPTII,S$GLB, | Performed by: INTERNAL MEDICINE

## 2023-01-31 PROCEDURE — 80061 LIPID PANEL: CPT | Mod: HCNC | Performed by: INTERNAL MEDICINE

## 2023-01-31 PROCEDURE — 3288F PR FALLS RISK ASSESSMENT DOCUMENTED: ICD-10-PCS | Mod: HCNC,CPTII,S$GLB, | Performed by: INTERNAL MEDICINE

## 2023-01-31 PROCEDURE — 82570 ASSAY OF URINE CREATININE: CPT | Mod: HCNC | Performed by: INTERNAL MEDICINE

## 2023-01-31 PROCEDURE — 3072F PR LOW RISK FOR RETINOPATHY: ICD-10-PCS | Mod: HCNC,CPTII,S$GLB, | Performed by: INTERNAL MEDICINE

## 2023-01-31 RX ORDER — ATORVASTATIN CALCIUM 20 MG/1
20 TABLET, FILM COATED ORAL DAILY
Qty: 90 TABLET | Refills: 3 | Status: SHIPPED | OUTPATIENT
Start: 2023-01-31 | End: 2023-08-01 | Stop reason: SDUPTHER

## 2023-01-31 RX ORDER — METFORMIN HYDROCHLORIDE 500 MG/1
500 TABLET ORAL 2 TIMES DAILY
Qty: 180 TABLET | Refills: 3 | Status: SHIPPED | OUTPATIENT
Start: 2023-01-31

## 2023-01-31 RX ORDER — SERTRALINE HYDROCHLORIDE 25 MG/1
25 TABLET, FILM COATED ORAL DAILY
Qty: 90 TABLET | Refills: 3 | Status: SHIPPED | OUTPATIENT
Start: 2023-01-31 | End: 2024-02-20

## 2023-01-31 RX ORDER — GABAPENTIN 100 MG/1
100 CAPSULE ORAL NIGHTLY
Qty: 30 CAPSULE | Refills: 11 | Status: SHIPPED | OUTPATIENT
Start: 2023-01-31 | End: 2024-02-03

## 2023-01-31 NOTE — PROGRESS NOTES
Subjective:       Patient ID: Sera Boone is a 87 y.o. female.    Chief Complaint: Establish Care    HPI    Mrs. Boone is an 86 yo female who presents to establish [facility].     She is mostly concerned today about ear fullness. She has had frequent ear lavage in the past     PMHx:  DMII on metformin   fibromyalgia  Osteoporosis on alendronate   HLD: on statin     Review of Systems   Constitutional:  Negative for activity change, appetite change and chills.   HENT:  Negative for ear pain, sinus pressure/congestion and sneezing.    Respiratory:  Negative for cough and shortness of breath.    Cardiovascular:  Negative for chest pain, palpitations and leg swelling.   Gastrointestinal:  Negative for abdominal distention, abdominal pain, constipation, diarrhea, nausea and vomiting.   Genitourinary:  Negative for dysuria and hematuria.   Musculoskeletal:  Negative for arthralgias, back pain and myalgias.   Neurological:  Negative for dizziness and headaches.   Psychiatric/Behavioral:  Negative for agitation. The patient is not nervous/anxious.          Past Medical History:   Diagnosis Date    Anemia 10/19/2020    Arthritis     Breast cancer 2001    Diabetes mellitus     History of breast cancer 8/21/2013    Hyperlipidemia     Hypertension     Nuclear sclerotic cataract of right eye 10/16/2012    Osteoporosis, unspecified: see DEXA 8/15- 2/13/2017    Osteoporosis, unspecified: see DEXA 8/15- 2/13/2017    Pain of both shoulder joints 2/14/2017    Rheumatoid factor positive 2/14/2017    Stroke 4/6/2019    Tubular adenoma of colon 10/28/2013    Type II or unspecified type diabetes mellitus with neurological manifestations, not stated as uncontrolled(250.60)     Vitamin D deficiency disease 2/17/2014     Past Surgical History:   Procedure Laterality Date    BELPHAROPTOSIS REPAIR  03/15/13    bilateral-dr. coleman md    BREAST BIOPSY      BREAST LUMPECTOMY      BREAST SURGERY Left 2001    lumpectomy    CATARACT  EXTRACTION      both eyes -     CHOLECYSTECTOMY      Done in [place] in the 1980's    COLONOSCOPY N/A 1/19/2016    Procedure: COLONOSCOPY;  Surgeon: BLANCA Guerra MD;  [facility] ([address] [place] [address]);  Service: Endoscopy;  Laterality: N/A;    COLONOSCOPY W/ POLYPECTOMY        Patient Active Problem List   Diagnosis    Hyperlipidemia associated with type 2 diabetes mellitus    Type II diabetes mellitus with neurological manifestations    Ptosis of eyebrow    History of breast cancer    Vitamin D deficiency disease    Hypertension associated with diabetes    History of adenomatous polyp of colon 1/16 no need for further follow up per Dr Guerra    Diabetic polyneuropathy associated with type 2 diabetes mellitus    Fatty liver: see ultrasound 1/17    Rheumatoid factor positive    Stage 3 chronic kidney disease    Spondylosis of cervical region without myelopathy or radiculopathy    Primary osteoarthritis involving multiple joints    Osteopenia/osteoporosis: see DEXA 4/19 tx recommended    DDD (degenerative disc disease), cervical    Bradycardia    CVA (cerebral vascular accident)    Secondary hyperparathyroidism    Episode of transient neurologic symptoms    Anemia    Fibromyalgia    Osteopenia    Adhesive capsulitis of left shoulder    Lipoma of back        Objective:      Physical Exam  Constitutional:       Appearance: Normal appearance.   HENT:      Head: Normocephalic.      Right Ear: Tympanic membrane normal. There is impacted cerumen.      Left Ear: Tympanic membrane normal.      Nose: Nose normal.      Mouth/Throat:      Mouth: Mucous membranes are dry.   Cardiovascular:      Rate and Rhythm: Normal rate and regular rhythm.      Pulses: Normal pulses.      Heart sounds: Normal heart sounds.   Pulmonary:      Effort: Pulmonary effort is normal.      Breath sounds: Normal breath sounds.   Abdominal:      General: Abdomen is flat. Bowel sounds are normal.      Palpations: Abdomen is soft.    Musculoskeletal:         General: Normal range of motion.      Cervical back: Normal range of motion and neck supple.   Skin:     General: Skin is warm and dry.   Neurological:      General: No focal deficit present.      Mental Status: She is alert and oriented to person, place, and time.   Psychiatric:         Mood and Affect: Mood normal.       Assessment:       Problem List Items Addressed This Visit          Cardiac/Vascular    Hyperlipidemia associated with type 2 diabetes mellitus    Relevant Medications    metFORMIN (GLUCOPHAGE) 500 MG tablet    Other Relevant Orders    Lipid panel       Renal/    Stage 3 chronic kidney disease       Oncology    History of breast cancer    Relevant Orders    Mammo Digital Screening Bilat    Anemia    Relevant Orders    CBC W/ AUTO DIFFERENTIAL    Iron and TIBC    FERRITIN       Endocrine    Type II diabetes mellitus with neurological manifestations    Relevant Medications    metFORMIN (GLUCOPHAGE) 500 MG tablet    Other Relevant Orders    MICROALBUMIN / CREATININE RATIO URINE    Urinalysis, Reflex to Urine Culture Urine, Clean Catch    HEMOGLOBIN A1C       Orthopedic    Fibromyalgia     Other Visit Diagnoses       Encounter to establish care    -  Primary    Secondary hyperparathyroidism of renal origin        Essential hypertension        Encounter for screening mammogram for malignant neoplasm of breast        Relevant Orders    Mammo Digital Screening Bilat    Lumbar back pain        Relevant Orders    Ambulatory referral/consult to Physical/Occupational Therapy    Bilateral impacted cerumen        Relevant Orders    Ambulatory referral/consult to ENT            Plan:         Sera was seen today for establish care.    Diagnoses and all orders for this visit:    Encounter to establish care    Type II diabetes mellitus with neurological manifestations  -     metFORMIN (GLUCOPHAGE) 500 MG tablet; Take 1 tablet (500 mg total) by mouth 2 (two) times a day.  -      MICROALBUMIN / CREATININE RATIO URINE  -     Urinalysis, Reflex to Urine Culture Urine, Clean Catch  -     HEMOGLOBIN A1C; Future    Secondary hyperparathyroidism of renal origin    Stage 3 chronic kidney disease, unspecified whether stage 3a or 3b CKD  Check labs today    Fibromyalgia  Start gabapentin     History of breast cancer  -     Mammo Digital Screening Bilat; Future    Hyperlipidemia associated with type 2 diabetes mellitus  -     Lipid panel; Future    Anemia, unspecified type  -     CBC W/ AUTO DIFFERENTIAL; Future  -     Iron and TIBC; Future  -     FERRITIN; Future    Encounter for screening mammogram for malignant neoplasm of breast  -     Mammo Digital Screening Bilat; Future    Lumbar back pain  -     Ambulatory referral/consult to Physical/Occupational Therapy; Future        Start gabapentin     Bilateral impacted cerumen  -     Ambulatory referral/consult to ENT; Future                   Beena Hill MD   Internal Medicine   Primary Care

## 2023-02-01 LAB
ALBUMIN/CREAT UR: 20 UG/MG (ref 0–30)
BASOPHILS # BLD AUTO: 0.01 K/UL (ref 0–0.2)
BASOPHILS NFR BLD: 0.1 % (ref 0–1.9)
BILIRUB UR QL STRIP: NEGATIVE
CLARITY UR REFRACT.AUTO: CLEAR
COLOR UR AUTO: YELLOW
CREAT UR-MCNC: 50 MG/DL (ref 15–325)
DIFFERENTIAL METHOD: ABNORMAL
EOSINOPHIL # BLD AUTO: 0 K/UL (ref 0–0.5)
EOSINOPHIL NFR BLD: 0.1 % (ref 0–8)
ERYTHROCYTE [DISTWIDTH] IN BLOOD BY AUTOMATED COUNT: 14.2 % (ref 11.5–14.5)
FERRITIN SERPL-MCNC: 17 NG/ML (ref 20–300)
GLUCOSE UR QL STRIP: ABNORMAL
HCT VFR BLD AUTO: 35.7 % (ref 37–48.5)
HGB BLD-MCNC: 11 G/DL (ref 12–16)
HGB UR QL STRIP: NEGATIVE
IMM GRANULOCYTES # BLD AUTO: 0.04 K/UL (ref 0–0.04)
IMM GRANULOCYTES NFR BLD AUTO: 0.5 % (ref 0–0.5)
IRON SERPL-MCNC: 42 UG/DL (ref 30–160)
KETONES UR QL STRIP: NEGATIVE
LEUKOCYTE ESTERASE UR QL STRIP: NEGATIVE
LYMPHOCYTES # BLD AUTO: 3.6 K/UL (ref 1–4.8)
LYMPHOCYTES NFR BLD: 45 % (ref 18–48)
MCH RBC QN AUTO: 30.3 PG (ref 27–31)
MCHC RBC AUTO-ENTMCNC: 30.8 G/DL (ref 32–36)
MCV RBC AUTO: 98 FL (ref 82–98)
MICROALBUMIN UR DL<=1MG/L-MCNC: 10 UG/ML
MONOCYTES # BLD AUTO: 0.9 K/UL (ref 0.3–1)
MONOCYTES NFR BLD: 10.9 % (ref 4–15)
NEUTROPHILS # BLD AUTO: 3.5 K/UL (ref 1.8–7.7)
NEUTROPHILS NFR BLD: 43.4 % (ref 38–73)
NITRITE UR QL STRIP: NEGATIVE
NRBC BLD-RTO: 0 /100 WBC
PH UR STRIP: 5 [PH] (ref 5–8)
PLATELET # BLD AUTO: 217 K/UL (ref 150–450)
PMV BLD AUTO: 10.4 FL (ref 9.2–12.9)
PROT UR QL STRIP: NEGATIVE
RBC # BLD AUTO: 3.63 M/UL (ref 4–5.4)
SATURATED IRON: 9 % (ref 20–50)
SP GR UR STRIP: 1.01 (ref 1–1.03)
TOTAL IRON BINDING CAPACITY: 459 UG/DL (ref 250–450)
TRANSFERRIN SERPL-MCNC: 310 MG/DL (ref 200–375)
URN SPEC COLLECT METH UR: ABNORMAL
WBC # BLD AUTO: 8.09 K/UL (ref 3.9–12.7)

## 2023-02-09 DIAGNOSIS — Z00.00 ENCOUNTER FOR MEDICARE ANNUAL WELLNESS EXAM: ICD-10-CM

## 2023-02-28 ENCOUNTER — CLINICAL SUPPORT (OUTPATIENT)
Dept: REHABILITATION | Facility: HOSPITAL | Age: 88
End: 2023-02-28
Payer: MEDICARE

## 2023-02-28 DIAGNOSIS — M54.50 LUMBAR BACK PAIN: ICD-10-CM

## 2023-02-28 PROCEDURE — 97161 PT EVAL LOW COMPLEX 20 MIN: CPT | Mod: HCNC,PN

## 2023-02-28 PROCEDURE — 97110 THERAPEUTIC EXERCISES: CPT | Mod: HCNC,PN

## 2023-02-28 NOTE — PROGRESS NOTES
[facility]  Physical Therapy Initial Evaluation    Name: Sera Boone  Clinic Number: [id number]    Therapy Diagnosis:   Encounter Diagnosis   Name Primary?    Lumbar back pain      Physician: Beena Hill MD    Physician Orders: PT Eval and Treat   Medical Diagnosis from Referral: M54.50 (ICD-10-CM) - Lumbar back pain  Evaluation Date: 2/28/2023  Authorization Period Expiration: 1/31/2024  Plan of Care Expiration: 4/28/2023 or 8v post eval (2x4)  Visit # (total) / Visits authorized: 1/ eval (POC 0 / 8)  NO FOTO  Progress Note Due: 3/28/2023    Time In: 250  Time Out: 345  Total Billable Time: 55 minutes    Precautions: Arthritis; Fibro; Breast Ca history; DM; HTN; Stroke history; Osteoporosis  Insurance: Payor: [employer] MEDICARE / Plan: [employer] (SPECIAL NEEDS PLAN) / Product Type: Medicare Advantage /     Subjective   Date of onset: for years  History of current condition - Sera reports: gradual onset of pain; no known incidents. Diagnosed with arthritis and fibromyalgia. Does not feel pain in her back, but starts in her left hip and goes down side of left leg all the way to foot. Worse at the end of her day but does not affect her sleep. No surgical history but does have osteoporosis (contraindication for lumbar traction).      Medical History:   Past Medical History:   Diagnosis Date    Anemia 10/19/2020    Arthritis     Breast cancer 2001    Diabetes mellitus     History of breast cancer 8/21/2013    Hyperlipidemia     Hypertension     Nuclear sclerotic cataract of right eye 10/16/2012    Osteoporosis, unspecified: see DEXA 8/15- 2/13/2017    Osteoporosis, unspecified: see DEXA 8/15- 2/13/2017    Pain of both shoulder joints 2/14/2017    Rheumatoid factor positive 2/14/2017    Stroke 4/6/2019    Tubular adenoma of colon 10/28/2013    Type II or unspecified type diabetes mellitus with neurological manifestations, not stated as uncontrolled(250.60)     Vitamin D  deficiency disease 2/17/2014       Surgical History:   Sera Boone  has a past surgical history that includes Cataract extraction; Blepharoptosis repair (03/15/13); Breast surgery (Left, 2001); Breast lumpectomy; Cholecystectomy; Colonoscopy w/ polypectomy; Colonoscopy (N/A, 1/19/2016); and Breast biopsy.    Medications:   Sera has a current medication list which includes the following prescription(s): alendronate, atorvastatin, blood sugar diagnostic, blood-glucose meter, cholecalciferol (vitamin d3), cyclosporine, diclofenac sodium, gabapentin, icosapent ethyl, lidocaine, metformin, micro thin lancets, naproxen, omega-3 acid ethyl esters, sertraline, and trueplus lancets.    Allergies:   Review of patient's allergies indicates:   Allergen Reactions    No known drug allergies         Imaging, 2021 X-ray:  FINDINGS:  Lumbar spine complete five views: There is a mild levoconvex curvature of the lower L-spine mild dextroconvex curvature of the TL junction.  No segmentation or rib anomaly seen.  No pars defects are seen.  There is mild anterolisthesis of L4 on L5.  There is mild retrolisthesis of L2 on L3.  There is moderate DJD at lumbar levels, mild DJD of lower thoracic levels.    Prior Therapy: yes  Social History: lives with brother; 2-story, up and down stairs  Current Smoker: no  Recent Weight loss: no  Bowel or Bladder Issues: none  Falls in last 6 months: none  Cancer Hx: Breast cancer hx  Occupation: retired  Prior Level of Function: independent; no AD  Current Level of Function: 80%; difficult/pain with after chores with overdoing    Pain:  Current 0/10, worst 8/10, best 0/10   Location: Left posterior hip into lateral thigh and lower leg into foot  Description: sharp; denies n/t  Aggravating Factors: Chores, bending forward  Easing Factors: topical gel; rest; heating pad; her own daily exercises    Pts goals: to reduce pain    Objective     Posture / IC / ASIS / PSIS:  WNL lower legs; decreased  lordosis; mod kyphosis and FHP    Gillet test: left PSIS hypomobile  Palpation: TTP left lumbosacral and glute regions    Gait Without AD   Analysis WNL; wide stance; decreased pelvic rotaiton     Lumbar AROM: ROM-   Response to repeated movements   Flexion 13 cm from floor - no pain   Extension (15 norm) 5 degrees    Right side bending  (20 norm) 7 degrees   Left side bending  (20 norm) 7 degrees   Rotation Observation      Sensation: NT    L/E MMT: 5/5 Left Right   Hip Flexion 5/5. 5/5.   Hip Extension 4+/5. 4+/5.   Hip Abduction 4+/5. 4+/5.   Hip Adduction 5/5. 5/5.   Hip IR 5/5. 5/5.   Hip ER 4+/5. 4+/5.   Knee Flexion 4+/5. 4+/5.   Knee Extension 4/5. 4/5.   Ankle DF 5/5. 5/5.   Ankle INV 5/5. 5/5.   Ankle EV 4+/5. 4+/5.     Hip Range of Motion:    Left  Limited external rotation Right  Limited external rotation     Core strength MMT: 2/5  LLD (Leg length discrepancy) / Supine to Sit: Left long to short = ant innominate rotation    Flexibility Left Right   Hamstrings 90/90 (-20 norm) -40 degrees -40 degrees     Flexibility: mild / moderate / severe restriction grading  Quadriceps: NT  Illiopsoas: NT  Paraspinals: severe    TREATMENT   Treatment Time In: 330  Treatment Time Out: 345  Total Treatment time separate from Evaluation: 15 minutes    Sera received therapeutic exercises to develop flexibility and symmetrical innominates for 15 minutes including:    REQUIRES ; Daughter reports she will be coming with her    STAND:  Stand LEFT Quadratus Lumborum stretch, 5x, 10sec  Lumbar extension, hands on wall, 10x, 5sec    SEATED:  MET for Left anterior innominate rotation: Left Hip Extension / Right Hip flexion, 5x, 8 sec (see HEP)  Piriformis stretch, 3x, 15sec Bilateral     TABLE:  Bilateral sciatic nerve glide, 2x10, towel if needed      Add NEXT: NO traction due to osteoporosis  Manual tx left glute and ITB  Prone exercises? Not able to get to this in evaluation  Lumbar rotations  Supine Short  Arc Quad  Sidelying clamshells (no resist)  Ab brace  Bridge (prone glute squeeze if too painful)  Seated thoracic extension stretch, hands behind head  FUTURE:  Standing T/s Rotations  Home Exercises and Patient Education Provided    Education provided:   - daily HEP    Written Home Exercises Provided: yes.  Exercises were reviewed and Sera was able to demonstrate them prior to the end of the session.  Sera demonstrated good  understanding of the education provided.     See EMR under Patient Instructions for exercises provided 2/28/2023.    Assessment   Sera is a 87 y.o. female referred to outpatient Physical Therapy with a medical diagnosis of lumbar back pain. Pt presents with pain in her left glute, lateral thigh, radiating into her foot. Decreased Quadratus Lumborum, piriformis, paraspinal flexibility, decreased lumbar Range of Motion, decreased core, hip and Bilateral Lower Extremity strength, and posture deficits.    Pt prognosis is Excellent.   Pt will benefit from skilled outpatient Physical Therapy to address the deficits stated above and in the chart below, provide pt/family education, and to maximize pt's level of independence.     Plan of care discussed with patient: Yes  Pt's spiritual, cultural and educational needs considered and patient is agreeable to the plan of care and goals as stated below:     Anticipated Barriers for therapy: none    Medical Necessity is demonstrated by the following  History  Co-morbidities and personal factors that may impact the plan of care Co-morbidities:   Arthritis; Fibro; Breast Ca history; DM; HTN; Stroke history; Osteoporosis; high BMI    Personal Factors:   lifestyle     moderate   Examination  Body Structures and Functions, activity limitations and participation restrictions that may impact the plan of care Body Regions:   back  lower extremities  trunk    Body Systems:    ROM  strength  motor control  motor learning  blood pressure  posture    Participation  Restrictions:   Chores, end of day    Activity limitations:   Learning and applying knowledge  no deficits    General Tasks and Commands  no deficits    Communication  Needs     Mobility  lifting and carrying objects  walking    Self care  no deficits    Domestic Life  shopping  cooking  doing house work (cleaning house, washing dishes, laundry)  assisting others    Interactions/Relationships  no deficits    Life Areas  no deficits    Community and Social Life  community life  recreation and leisure         low   Clinical Presentation stable and uncomplicated low   Decision Making/ Complexity Score: low     Goals:  Short Term GOALS: 3 weeks  1. Patient is independent with HEP.   2. Patient demonstrates independence with core activation and postural awareness while sitting and standing.   3. Patient will reduce pn to 2/10 while performing daily activities.  4. Patient will reduce LLE radiculopathy frequency and intensity.     Long Term GOALS: 6 weeks.   1. Patient demonstrates increased l/s SB ROM to 20 degrees to improve tolerance to reaching activities.   2. Patient demonstrates increased core, hip and BLE strength by 1/2 grade or greater to improve tolerance to home chores.  3. Patient demonstrates independence with body mechanics while performing chores.  4. Patient demonstrates understanding and comprehension of pacing her chores throughout day to decrease overall pain.    Plan   Plan of care Certification: 2/28/2023 to 4/28/2023.    Outpatient Physical Therapy 2 times weekly for 4 weeks to include the following interventions: Electrical Stimulation ,, Manual Therapy, Moist Heat/ Ice, Neuromuscular Re-ed, Orthotic Management and Training, Patient Education, Self Care, Therapeutic Activities, and Therapeutic Exercise.     Sharon Guerrero, PT

## 2023-03-01 NOTE — PATIENT INSTRUCTIONS
HEP pictures and handout for the following:      STAND:  Stand LEFT Quadratus Lumborum stretch, 5x, 10sec  Lumbar extension, hands on wall, 10x, 5sec    SEATED:  MET for Left anterior innominate rotation: Left Hip Extension / Right Hip flexion, 5x, 8 sec   Piriformis stretch, 3x, 15sec Bilateral     TABLE:  Bilateral sciatic nerve glide, 2x10, towel if needed

## 2023-03-01 NOTE — PLAN OF CARE
[facility]  Physical Therapy Initial Evaluation    Name: Sera Boone  Clinic Number: [id number]    Therapy Diagnosis:   Encounter Diagnosis   Name Primary?    Lumbar back pain      Physician: Beena Hill MD    Physician Orders: PT Eval and Treat   Medical Diagnosis from Referral: M54.50 (ICD-10-CM) - Lumbar back pain  Evaluation Date: 2/28/2023  Authorization Period Expiration: 1/31/2024  Plan of Care Expiration: 4/28/2023 or 8v post eval (2x4)  Visit # (total) / Visits authorized: 1/ eval (POC 0 / 8)  NO FOTO  Progress Note Due: 3/28/2023    Time In: 250  Time Out: 345  Total Billable Time: 55 minutes    Precautions: Arthritis; Fibro; Breast Ca history; DM; HTN; Stroke history; Osteoporosis  Insurance: Payor: [employer] MEDICARE / Plan: [employer] (SPECIAL NEEDS PLAN) / Product Type: Medicare Advantage /     Subjective   Date of onset: for years  History of current condition - Sera reports: gradual onset of pain; no known incidents. Diagnosed with arthritis and fibromyalgia. Does not feel pain in her back, but starts in her left hip and goes down side of left leg all the way to foot. Worse at the end of her day but does not affect her sleep. No surgical history but does have osteoporosis (contraindication for lumbar traction).      Medical History:   Past Medical History:   Diagnosis Date    Anemia 10/19/2020    Arthritis     Breast cancer 2001    Diabetes mellitus     History of breast cancer 8/21/2013    Hyperlipidemia     Hypertension     Nuclear sclerotic cataract of right eye 10/16/2012    Osteoporosis, unspecified: see DEXA 8/15- 2/13/2017    Osteoporosis, unspecified: see DEXA 8/15- 2/13/2017    Pain of both shoulder joints 2/14/2017    Rheumatoid factor positive 2/14/2017    Stroke 4/6/2019    Tubular adenoma of colon 10/28/2013    Type II or unspecified type diabetes mellitus with neurological manifestations, not stated as uncontrolled(250.60)     Vitamin D  deficiency disease 2/17/2014       Surgical History:   Sera Boone  has a past surgical history that includes Cataract extraction; Blepharoptosis repair (03/15/13); Breast surgery (Left, 2001); Breast lumpectomy; Cholecystectomy; Colonoscopy w/ polypectomy; Colonoscopy (N/A, 1/19/2016); and Breast biopsy.    Medications:   Sera has a current medication list which includes the following prescription(s): alendronate, atorvastatin, blood sugar diagnostic, blood-glucose meter, cholecalciferol (vitamin d3), cyclosporine, diclofenac sodium, gabapentin, icosapent ethyl, lidocaine, metformin, micro thin lancets, naproxen, omega-3 acid ethyl esters, sertraline, and trueplus lancets.    Allergies:   Review of patient's allergies indicates:   Allergen Reactions    No known drug allergies         Imaging, 2021 X-ray:  FINDINGS:  Lumbar spine complete five views: There is a mild levoconvex curvature of the lower L-spine mild dextroconvex curvature of the TL junction.  No segmentation or rib anomaly seen.  No pars defects are seen.  There is mild anterolisthesis of L4 on L5.  There is mild retrolisthesis of L2 on L3.  There is moderate DJD at lumbar levels, mild DJD of lower thoracic levels.    Prior Therapy: yes  Social History: lives with brother; 2-story, up and down stairs  Current Smoker: no  Recent Weight loss: no  Bowel or Bladder Issues: none  Falls in last 6 months: none  Cancer Hx: Breast cancer hx  Occupation: retired  Prior Level of Function: independent; no AD  Current Level of Function: 80%; difficult/pain with after chores with overdoing    Pain:  Current 0/10, worst 8/10, best 0/10   Location: Left posterior hip into lateral thigh and lower leg into foot  Description: sharp; denies n/t  Aggravating Factors: Chores, bending forward  Easing Factors: topical gel; rest; heating pad; her own daily exercises    Pts goals: to reduce pain    Objective     Posture / IC / ASIS / PSIS:  WNL lower legs; decreased  lordosis; mod kyphosis and FHP    Gillet test: left PSIS hypomobile  Palpation: TTP left lumbosacral and glute regions    Gait Without AD   Analysis WNL; wide stance; decreased pelvic rotaiton     Lumbar AROM: ROM-   Response to repeated movements   Flexion 13 cm from floor - no pain   Extension (15 norm) 5 degrees    Right side bending  (20 norm) 7 degrees   Left side bending  (20 norm) 7 degrees   Rotation Observation      Sensation: NT    L/E MMT: 5/5 Left Right   Hip Flexion 5/5. 5/5.   Hip Extension 4+/5. 4+/5.   Hip Abduction 4+/5. 4+/5.   Hip Adduction 5/5. 5/5.   Hip IR 5/5. 5/5.   Hip ER 4+/5. 4+/5.   Knee Flexion 4+/5. 4+/5.   Knee Extension 4/5. 4/5.   Ankle DF 5/5. 5/5.   Ankle INV 5/5. 5/5.   Ankle EV 4+/5. 4+/5.     Hip Range of Motion:    Left  Limited external rotation Right  Limited external rotation     Core strength MMT: 2/5  LLD (Leg length discrepancy) / Supine to Sit: Left long to short = ant innominate rotation    Flexibility Left Right   Hamstrings 90/90 (-20 norm) -40 degrees -40 degrees     Flexibility: mild / moderate / severe restriction grading  Quadriceps: NT  Illiopsoas: NT  Paraspinals: severe    TREATMENT   Treatment Time In: 330  Treatment Time Out: 345  Total Treatment time separate from Evaluation: 15 minutes    Sera received therapeutic exercises to develop flexibility and symmetrical innominates for 15 minutes including:    REQUIRES ; Daughter reports she will be coming with her    STAND:  Stand LEFT Quadratus Lumborum stretch, 5x, 10sec  Lumbar extension, hands on wall, 10x, 5sec    SEATED:  MET for Left anterior innominate rotation: Left Hip Extension / Right Hip flexion, 5x, 8 sec (see HEP)  Piriformis stretch, 3x, 15sec Bilateral     TABLE:  Bilateral sciatic nerve glide, 2x10, towel if needed      Add NEXT: NO traction due to osteoporosis  Manual tx left glute and ITB  Prone exercises? Not able to get to this in evaluation  Lumbar rotations  Supine Short  Arc Quad  Sidelying clamshells (no resist)  Ab brace  Bridge (prone glute squeeze if too painful)  Seated thoracic extension stretch, hands behind head  FUTURE:  Standing T/s Rotations  Home Exercises and Patient Education Provided    Education provided:   - daily HEP    Written Home Exercises Provided: yes.  Exercises were reviewed and Sera was able to demonstrate them prior to the end of the session.  Sera demonstrated good  understanding of the education provided.     See EMR under Patient Instructions for exercises provided 2/28/2023.    Assessment   Sera is a 87 y.o. female referred to outpatient Physical Therapy with a medical diagnosis of lumbar back pain. Pt presents with pain in her left glute, lateral thigh, radiating into her foot. Decreased Quadratus Lumborum, piriformis, paraspinal flexibility, decreased lumbar Range of Motion, decreased core, hip and Bilateral Lower Extremity strength, and posture deficits.    Pt prognosis is Excellent.   Pt will benefit from skilled outpatient Physical Therapy to address the deficits stated above and in the chart below, provide pt/family education, and to maximize pt's level of independence.     Plan of care discussed with patient: Yes  Pt's spiritual, cultural and educational needs considered and patient is agreeable to the plan of care and goals as stated below:     Anticipated Barriers for therapy: none    Medical Necessity is demonstrated by the following  History  Co-morbidities and personal factors that may impact the plan of care Co-morbidities:   Arthritis; Fibro; Breast Ca history; DM; HTN; Stroke history; Osteoporosis; high BMI    Personal Factors:   lifestyle     moderate   Examination  Body Structures and Functions, activity limitations and participation restrictions that may impact the plan of care Body Regions:   back  lower extremities  trunk    Body Systems:    ROM  strength  motor control  motor learning  blood pressure  posture    Participation  Restrictions:   Chores, end of day    Activity limitations:   Learning and applying knowledge  no deficits    General Tasks and Commands  no deficits    Communication  Needs     Mobility  lifting and carrying objects  walking    Self care  no deficits    Domestic Life  shopping  cooking  doing house work (cleaning house, washing dishes, laundry)  assisting others    Interactions/Relationships  no deficits    Life Areas  no deficits    Community and Social Life  community life  recreation and leisure         low   Clinical Presentation stable and uncomplicated low   Decision Making/ Complexity Score: low     Goals:  Short Term GOALS: 3 weeks  1. Patient is independent with HEP.   2. Patient demonstrates independence with core activation and postural awareness while sitting and standing.   3. Patient will reduce pn to 2/10 while performing daily activities.  4. Patient will reduce LLE radiculopathy frequency and intensity.     Long Term GOALS: 6 weeks.   1. Patient demonstrates increased l/s SB ROM to 20 degrees to improve tolerance to reaching activities.   2. Patient demonstrates increased core, hip and BLE strength by 1/2 grade or greater to improve tolerance to home chores.  3. Patient demonstrates independence with body mechanics while performing chores.  4. Patient demonstrates understanding and comprehension of pacing her chores throughout day to decrease overall pain.    Plan   Plan of care Certification: 2/28/2023 to 4/28/2023.    Outpatient Physical Therapy 2 times weekly for 4 weeks to include the following interventions: Electrical Stimulation ,, Manual Therapy, Moist Heat/ Ice, Neuromuscular Re-ed, Orthotic Management and Training, Patient Education, Self Care, Therapeutic Activities, and Therapeutic Exercise.     Sharon Guerrero, PT

## 2023-03-07 ENCOUNTER — DOCUMENTATION ONLY (OUTPATIENT)
Dept: REHABILITATION | Facility: HOSPITAL | Age: 88
End: 2023-03-07

## 2023-03-07 ENCOUNTER — CLINICAL SUPPORT (OUTPATIENT)
Dept: REHABILITATION | Facility: HOSPITAL | Age: 88
End: 2023-03-07
Payer: MEDICARE

## 2023-03-07 DIAGNOSIS — M54.50 LUMBAR BACK PAIN: Primary | ICD-10-CM

## 2023-03-07 PROCEDURE — 97110 THERAPEUTIC EXERCISES: CPT | Mod: HCNC,PN,CQ

## 2023-03-07 NOTE — PROGRESS NOTES
[facility] OUTPATIENT THERAPY AND WELLNESS   Physical Therapy Treatment Note     Name: Sera Boone  Clinic Number: [id number]    Therapy Diagnosis:   Encounter Diagnosis   Name Primary?    Lumbar back pain Yes     Physician: Beena Hill MD    Visit Date: 3/7/2023     Physician Orders: PT Eval and Treat   Medical Diagnosis from Referral: M54.50 (ICD-10-CM) - Lumbar back pain  Evaluation Date: 2/28/2023  Authorization Period Expiration: 1/31/2024  Plan of Care Expiration: 4/28/2023 or 8v post eval (2x4)  Visit # (total) / Visits authorized: 2/ eval + 12 (POC 1 / 8)  NO FOTO  Progress Note Due: 3/28/2023         Precautions:  Arthritis; Fibro; Breast Ca history; DM; HTN; Stroke history; Osteoporosis     Time In: 300p  Time Out: 344  Total Billable Time: 44 minutes      SUBJECTIVE     Pt reports: no complaints today.  She was compliant with home exercise program.  Response to previous treatment: first visit after eval  Functional change: no pain today    Pain: 0/10  Location: bilateral Low Back       OBJECTIVE     Objective Measures updated at progress report unless specified.     Treatment     REQUIRES ; Daughter reports she will be coming with her  therapeutic exercises to develop strength, flexibility, posture, and core stabilization for 44 minutes including:    STAND:  Stand LEFT Quadratus Lumborum stretch, 5x, 10sec  NOT PERFORMED   Lumbar extension, hands on wall, 2 x 10     SEATED:  LEFT Quadratus Laborum stretch over peanut 3 x 20 sec  MET for Left anterior innominate rotation: Left Hip Extension / Right Hip flexion, 5x, 8 sec (see HEP) supine today  Piriformis stretch, 3x, 20sec Bilateral   Seated thoracic extension stretch, hands behind head 2 x 10    TABLE:  Bilateral sciatic nerve glide, 2x10, towel if needed  Lower trunk rotation x 20 Bilateral  Bridges x 20  Hip abduction Red Theraband x 20  Hip adduction with ball x 20  Supine Short Arc Quad 1# x 20 Bilateral   Sidelying  brian (no resist)  SIJ mobilization, R hip extension, L hip flexion  Prone pressups while on elbows  Prone on elbows x 2 minutes    Add NEXT: NO traction due to osteoporosis  Manual tx left glute and ITB  Ab brace    FUTURE:  Standing T/s Rotations      Patient Education and Home Exercises     Home Exercises Provided and Patient Education Provided     Education provided:   - cont HOME EXERCISE PROGRAM     Written Home Exercises Provided: Patient instructed to cont prior HEP. Exercises were reviewed and Sera was able to demonstrate them prior to the end of the session.  Sera demonstrated good  understanding of the education provided. See EMR under Patient Instructions for exercises provided during therapy sessions    ASSESSMENT     Sera presents with decreased lumbar lordosis, decreased LE and hip flexibility.  Pain in Left Lower Extremity after seated trunk extension, so performed neural glide on L, pain dissipated.  R>L piriformis tightness.    Sera Is progressing well towards her goals.   Pt prognosis is Good.     Pt will continue to benefit from skilled outpatient physical therapy to address the deficits listed in the problem list box on initial evaluation, provide pt/family education and to maximize pt's level of independence in the home and community environment.     Pt's spiritual, cultural and educational needs considered and pt agreeable to plan of care and goals.     Anticipated barriers to physical therapy: none    Goals:  Short Term GOALS: 3 weeks  3/7/2023  ongoing  1. Patient is independent with HEP.   2. Patient demonstrates independence with core activation and postural awareness while sitting and standing.   3. Patient will reduce pn to 2/10 while performing daily activities.  4. Patient will reduce LLE radiculopathy frequency and intensity.     Long Term GOALS: 6 weeks. 3/7/2023  ongoing  1. Patient demonstrates increased l/s SB ROM to 20 degrees to improve tolerance to reaching activities.    2. Patient demonstrates increased core, hip and BLE strength by 1/2 grade or greater to improve tolerance to home chores.  3. Patient demonstrates independence with body mechanics while performing chores.  4. Patient demonstrates understanding and comprehension of pacing her chores throughout day to decrease overall pain.    PLAN     Cont per Plan of Care, posture education, trunk, core, and hip strengthening    Paula Jean, PTA

## 2023-03-10 ENCOUNTER — CLINICAL SUPPORT (OUTPATIENT)
Dept: REHABILITATION | Facility: HOSPITAL | Age: 88
End: 2023-03-10
Payer: MEDICARE

## 2023-03-10 DIAGNOSIS — M54.50 LUMBAR BACK PAIN: Primary | ICD-10-CM

## 2023-03-10 PROCEDURE — 97110 THERAPEUTIC EXERCISES: CPT | Mod: HCNC,PN,CQ

## 2023-03-10 NOTE — PROGRESS NOTES
[facility]   Physical Therapy Treatment Note     Name: Sera Boone  Clinic Number: [id number]    Therapy Diagnosis:   Encounter Diagnosis   Name Primary?    Lumbar back pain Yes     Physician: Beena Hill MD    Visit Date: 3/10/2023     Physician Orders: PT Eval and Treat   Medical Diagnosis from Referral: M54.50 (ICD-10-CM) - Lumbar back pain  Evaluation Date: 2/28/2023  Authorization Period Expiration: 1/31/2024  Plan of Care Expiration: 4/28/2023 or 8v post eval (2x4)  Visit # (total) / Visits authorized: 3/ eval + 12 (POC 2 / 8)  NO FOTO  Progress Note Due: 3/28/2023       Precautions:  Arthritis; Fibro; Breast Ca history; DM; HTN; Stroke history; Osteoporosis     Time In: 730  Time Out: 820  Total Billable Time: 50 minutes      SUBJECTIVE     Pt reports: no complaints today.  She was compliant with home exercise program.  Response to previous treatment: no issues   Functional change: decreased pain     Pain: 0/10  Location: bilateral Low Back       OBJECTIVE     Objective Measures updated at progress report unless specified.     Treatment     REQUIRES ; Daughter reports she will be coming with her  therapeutic exercises to develop strength, flexibility, posture, and core stabilization for 50 minutes including:    STAND:  Stand LEFT Quadratus Lumborum stretch, 5x, 10sec  NOT PERFORMED   Lumbar extension, hands on wall, 2 x 10  Standing T/s Rotations 20x Bilateral   Shoulder extension/ rows Yellow theraband 20x each      SEATED:  Upper Body Ergometer 3'/3'   LEFT Quadratus Laborum stretch over peanut 3 x 20 sec  MET for Left anterior innominate rotation: Left Hip Extension / Right Hip flexion, 5x, 8 sec (see HEP)- not today  Piriformis stretch, 3x, 30sec Bilateral   Seated thoracic extension stretch, hands behind head 2 x 10  Thoracic rotation 20x   Physioball roll outs 20x       TABLE:  Bilateral sciatic nerve glide, 2x10, towel if needed  Lower trunk  rotation x 20 Bilateral  Bridges Red Theraband x 20   Hip abduction Red Theraband x 20  Hip adduction with ball x 20  Supine Short Arc Quad 1# x 20 Bilateral   Sidelying clamshells (no resist) 20x Bilateral   SIJ mobilization, R hip extension, L hip flexion-- not today   Prone on elbows x 2 minutes      Add NEXT: NO traction due to osteoporosis  Manual tx left glute and ITB  Ab brace  Prone pressups while on elbows     FUTURE:      Patient Education and Home Exercises     Home Exercises Provided and Patient Education Provided     Education provided:   - cont HOME EXERCISE PROGRAM, postural awareness     Written Home Exercises Provided: Patient instructed to cont prior HEP. Exercises were reviewed and Sera was able to demonstrate them prior to the end of the session.  Sera demonstrated good  understanding of the education provided. See EMR under Patient Instructions for exercises provided during therapy sessions    ASSESSMENT     Patient tolerated session well with focus on flexibility of thoracic and lumbar region. Noted tightness and limited mobility. No increase in pain reported throughout session. Minor tactile cueing needed for upright posture during standing exercises. Continue to progress as needed.     Sera Is progressing well towards her goals.   Pt prognosis is Good.     Pt will continue to benefit from skilled outpatient physical therapy to address the deficits listed in the problem list box on initial evaluation, provide pt/family education and to maximize pt's level of independence in the home and community environment.     Pt's spiritual, cultural and educational needs considered and pt agreeable to plan of care and goals.     Anticipated barriers to physical therapy: none    Goals:  Short Term GOALS: 3 weeks  3/10/2023  ongoing  1. Patient is independent with HEP.   2. Patient demonstrates independence with core activation and postural awareness while sitting and standing.   3. Patient will reduce  pn to 2/10 while performing daily activities.  4. Patient will reduce LLE radiculopathy frequency and intensity.     Long Term GOALS: 6 weeks. 3/10/2023  ongoing  1. Patient demonstrates increased l/s SB ROM to 20 degrees to improve tolerance to reaching activities.   2. Patient demonstrates increased core, hip and BLE strength by 1/2 grade or greater to improve tolerance to home chores.  3. Patient demonstrates independence with body mechanics while performing chores.  4. Patient demonstrates understanding and comprehension of pacing her chores throughout day to decrease overall pain.    PLAN     Cont per Plan of Care, posture education, trunk, core, and hip strengthening    Karla Sharma, PTA

## 2023-03-14 ENCOUNTER — CLINICAL SUPPORT (OUTPATIENT)
Dept: REHABILITATION | Facility: HOSPITAL | Age: 88
End: 2023-03-14
Payer: MEDICARE

## 2023-03-14 DIAGNOSIS — M54.50 LUMBAR BACK PAIN: Primary | ICD-10-CM

## 2023-03-14 PROCEDURE — 97110 THERAPEUTIC EXERCISES: CPT | Mod: HCNC,PN

## 2023-03-14 NOTE — PROGRESS NOTES
[facility]   Physical Therapy Treatment Note     Name: Sera Boone  Clinic Number: [id number]    Therapy Diagnosis:   Encounter Diagnosis   Name Primary?    Lumbar back pain Yes     Physician: Beena Hill MD    Visit Date: 3/14/2023     Physician Orders: PT Eval and Treat   Medical Diagnosis from Referral: M54.50 (ICD-10-CM) - Lumbar back pain  Evaluation Date: 2/28/2023  Authorization Period Expiration: 1/31/2024  Plan of Care Expiration: 4/28/2023 or 8v post eval (2x4)  Visit # (total) / Visits authorized: 3/ eval + 12 (POC 3 / 8)  NO FOTO  Progress Note Due: 3/28/2023       Precautions:  Arthritis; Fibro; Breast Ca history; DM; HTN; Stroke history; Osteoporosis     Time In: 300  Time Out: 350  Total Billable Time: 50 minutes      SUBJECTIVE     Pt reports: Back feels good no pain  She was compliant with home exercise program.  Response to previous treatment: no issues   Functional change: decreased pain     Pain: 0/10  Location: bilateral Low Back       OBJECTIVE     Objective Measures updated at progress report unless specified.     Treatment     REQUIRES ; Daughter reports she will be coming with her  therapeutic exercises to develop strength, flexibility, posture, and core stabilization for 50 minutes including:    STAND:  Stand LEFT Quadratus Lumborum stretch, 5x, 10sec  NOT PERFORMED   Lumbar extension, hands on wall, 2 x 10  Standing T/s Rotations 20x Bilateral   Shoulder extension/ rows Yellow theraband 20x each      SEATED:  Upper Body Ergometer 3'/3'   LEFT Quadratus Laborum stretch over peanut 3 x 20 sec  MET for Left anterior innominate rotation: Left Hip Extension / Right Hip flexion, 5x, 8 sec (see HEP)- not today  Piriformis stretch, 3x, 30sec Bilateral   Seated thoracic extension stretch, hands behind head 2 x 10  Thoracic rotation 20x   Physioball roll outs 20x       TABLE:  Bilateral sciatic nerve glide, 2x10, towel if needed  Lower  trunk rotation x 20 Bilateral  Bridges Red Theraband x 20   Hip abduction Red Theraband x 20  Hip adduction with ball x 20  Supine Short Arc Quad 1# x 20 Bilateral   Sidelying clamshells (no resist) 20x Bilateral   SIJ mobilization, R hip extension, L hip flexion-- not today   Prone on elbows x 2 minutes      Add NEXT: NO traction due to osteoporosis  Manual tx left glute and ITB  Ab brace  Prone pressups while on elbows     FUTURE:      Patient Education and Home Exercises     Home Exercises Provided and Patient Education Provided     Education provided:   - cont HOME EXERCISE PROGRAM, postural awareness     Written Home Exercises Provided: Patient instructed to cont prior HEP. Exercises were reviewed and Sera was able to demonstrate them prior to the end of the session.  Sera demonstrated good  understanding of the education provided. See EMR under Patient Instructions for exercises provided during therapy sessions    ASSESSMENT     Patient tolerated session well with focus on flexibility of thoracic and lumbar region. Noted tightness and limited mobility. No increase in pain reported throughout session. Minor tactile cueing needed for upright posture during standing exercises. Continue to progress as needed.     Sera Is progressing well towards her goals.   Pt prognosis is Good.     Pt will continue to benefit from skilled outpatient physical therapy to address the deficits listed in the problem list box on initial evaluation, provide pt/family education and to maximize pt's level of independence in the home and community environment.     Pt's spiritual, cultural and educational needs considered and pt agreeable to plan of care and goals.     Anticipated barriers to physical therapy: none    Goals:  Short Term GOALS: 3 weeks  3/14/2023  ongoing  1. Patient is independent with HEP.   2. Patient demonstrates independence with core activation and postural awareness while sitting and standing.   3. Patient will  reduce pn to 2/10 while performing daily activities.  4. Patient will reduce LLE radiculopathy frequency and intensity.     Long Term GOALS: 6 weeks. 3/14/2023  ongoing  1. Patient demonstrates increased l/s SB ROM to 20 degrees to improve tolerance to reaching activities.   2. Patient demonstrates increased core, hip and BLE strength by 1/2 grade or greater to improve tolerance to home chores.  3. Patient demonstrates independence with body mechanics while performing chores.  4. Patient demonstrates understanding and comprehension of pacing her chores throughout day to decrease overall pain.    PLAN     Cont per Plan of Care, posture education, trunk, core, and hip strengthening    Gordon Marcelino, PT

## 2023-03-20 ENCOUNTER — CLINICAL SUPPORT (OUTPATIENT)
Dept: REHABILITATION | Facility: HOSPITAL | Age: 88
End: 2023-03-20
Payer: MEDICARE

## 2023-03-20 DIAGNOSIS — M54.50 LUMBAR BACK PAIN: Primary | ICD-10-CM

## 2023-03-20 PROCEDURE — 97112 NEUROMUSCULAR REEDUCATION: CPT | Mod: HCNC,PN

## 2023-03-20 NOTE — PROGRESS NOTES
[facility]   Physical Therapy Treatment Note     Name: Sera Boone  Clinic Number: [id number]    Therapy Diagnosis:   Encounter Diagnosis   Name Primary?    Lumbar back pain Yes     Physician: Beena Hill MD    Visit Date: 3/20/2023     Physician Orders: PT Eval and Treat   Medical Diagnosis from Referral: M54.50 (ICD-10-CM) - Lumbar back pain  Evaluation Date: 2/28/2023  Authorization Period Expiration: 1/31/2024  Plan of Care Expiration: 4/28/2023 or 8v post eval (2x4)  Visit # (total) / Visits authorized: 3/ eval + 12 (POC 4 / 8)  NO FOTO  Progress Note Due: 3/28/2023       Precautions:  Arthritis; Fibro; Breast Ca history; DM; HTN; Stroke history; Osteoporosis     Time In: 300  Time Out: 350  Total Billable Time: 50 minutes      SUBJECTIVE     Pt reports: Back feels good; still no pain. Discussed taking a break from therapy but leaving her POC open in case she has a re-occurrence of pain.    She was compliant with home exercise program.  Response to previous treatment: no issues   Functional change: decreased pain     Pain: 0/10  Location: bilateral Low Back       OBJECTIVE     Objective Measures updated at progress report unless specified.     Treatment     REQUIRES ; Daughter reports she will be coming with her    therapeutic exercises to develop strength, and flexibility, for 20 minutes including:  NMRE utilized to activate appropriate mm to improve core and lumbar stabilization and lumbopelvic stability: 30 minutes     STAND:  Stand LEFT Quadratus Lumborum stretch, 5x, 10sec  NOT PERFORMED     Lumbar extension, hands on wall, 2 x 10  Standing T/s Rotations 20x Bilateral   Shoulder extension / rows Yellow theraband 20x each      SEATED:  Upper Body Ergometer 3'/3' - NOT PERFORMED    MET for Left anterior innominate rotation: Left Hip Extension / Right Hip flexion, 5x, 8 sec (see HEP)- NOT PERFORMED   Piriformis stretch, 3x, 30sec Bilateral   Seated  thoracic extension stretch, hands behind head 2 x 10  Physioball roll outs: for / alternate sides 10x     TABLE:  Bilateral sciatic nerve glide, 2x10, towel if needed  Lower trunk rotation x 20 Bilateral  Bridges Red Theraband x 20   Hip abduction Red Theraband x 30  Hip adduction with ball x 20  Supine Short Arc Quad 1# x 20 Bilateral   Sidelying clamshells (no resist) 20x Bilateral   SIJ mobilization, R hip extension, L hip flexion-- not today   Prone on elbows x 2 minutes  LEFT Quadratus Laborum stretch over peanut 3 x 20 sec      Add NEXT: NO traction due to osteoporosis  Manual tx left glute and ITB  Ab brace  Prone pressups while on elbows   FUTURE:      Patient Education and Home Exercises     Home Exercises Provided and Patient Education Provided     Education provided:   - cont HOME EXERCISE PROGRAM, postural awareness     Written Home Exercises Provided: Patient instructed to cont prior HEP. Exercises were reviewed and Sera was able to demonstrate them prior to the end of the session.  Sera demonstrated good  understanding of the education provided. See EMR under Patient Instructions for exercises provided during therapy sessions    ASSESSMENT     Patient demonstrates no pain for a couple of weeks. Daughter reports she continues to be very active and has not had any problems. Discussed adding more HEP and pt working independently. Return if any problems arise.Minor tactile cueing needed for upright posture during standing exercises. Continue to progress as needed.     Sera Is progressing well towards her goals.   Pt prognosis is Good.     Pt will continue to benefit from skilled outpatient physical therapy to address the deficits listed in the problem list box on initial evaluation, provide pt/family education and to maximize pt's level of independence in the home and community environment.     Pt's spiritual, cultural and educational needs considered and pt agreeable to plan of care and goals.      Anticipated barriers to physical therapy: none    Goals:  Short Term GOALS: 3 weeks  3/20/2023  ongoing  1. Patient is independent with HEP.   2. Patient demonstrates independence with core activation and postural awareness while sitting and standing.   3. Patient will reduce pn to 2/10 while performing daily activities.  4. Patient will reduce LLE radiculopathy frequency and intensity.     Long Term GOALS: 6 weeks. 3/20/2023  ongoing  1. Patient demonstrates increased l/s SB ROM to 20 degrees to improve tolerance to reaching activities.   2. Patient demonstrates increased core, hip and BLE strength by 1/2 grade or greater to improve tolerance to home chores.  3. Patient demonstrates independence with body mechanics while performing chores.  4. Patient demonstrates understanding and comprehension of pacing her chores throughout day to decrease overall pain.    PLAN     Cont per Plan of Care, posture education, trunk, core, and hip strengthening    Sharon Guerrero, PT

## 2023-03-21 ENCOUNTER — HOSPITAL ENCOUNTER (OUTPATIENT)
Dept: RADIOLOGY | Facility: HOSPITAL | Age: 88
Discharge: HOME | End: 2023-03-21
Attending: INTERNAL MEDICINE
Payer: MEDICARE

## 2023-03-21 DIAGNOSIS — Z85.3 HISTORY OF BREAST CANCER: ICD-10-CM

## 2023-03-21 DIAGNOSIS — Z12.31 ENCOUNTER FOR SCREENING MAMMOGRAM FOR MALIGNANT NEOPLASM OF BREAST: ICD-10-CM

## 2023-03-21 PROCEDURE — 77063 BREAST TOMOSYNTHESIS BI: CPT | Mod: 26,HCNC,, | Performed by: RADIOLOGY

## 2023-03-21 PROCEDURE — 77067 SCR MAMMO BI INCL CAD: CPT | Mod: 26,HCNC,, | Performed by: RADIOLOGY

## 2023-03-21 PROCEDURE — 77067 SCR MAMMO BI INCL CAD: CPT | Mod: TC,HCNC

## 2023-03-21 PROCEDURE — 77067 MAMMO DIGITAL SCREENING BILAT WITH TOMO: ICD-10-PCS | Mod: 26,HCNC,, | Performed by: RADIOLOGY

## 2023-03-21 PROCEDURE — 77063 MAMMO DIGITAL SCREENING BILAT WITH TOMO: ICD-10-PCS | Mod: 26,HCNC,, | Performed by: RADIOLOGY

## 2023-03-28 ENCOUNTER — PATIENT MESSAGE (OUTPATIENT)
Dept: INTERNAL MEDICINE | Facility: CLINIC | Age: 88
End: 2023-03-28
Payer: MEDICARE

## 2023-04-19 ENCOUNTER — CLINICAL SUPPORT (OUTPATIENT)
Dept: AUDIOLOGY | Facility: CLINIC | Age: 88
End: 2023-04-19
Payer: MEDICARE

## 2023-04-19 ENCOUNTER — OFFICE VISIT (OUTPATIENT)
Dept: OTOLARYNGOLOGY | Facility: CLINIC | Age: 88
End: 2023-04-19
Payer: MEDICARE

## 2023-04-19 VITALS — SYSTOLIC BLOOD PRESSURE: 146 MMHG | HEART RATE: 62 BPM | DIASTOLIC BLOOD PRESSURE: 66 MMHG

## 2023-04-19 DIAGNOSIS — H61.23 BILATERAL IMPACTED CERUMEN: ICD-10-CM

## 2023-04-19 DIAGNOSIS — H93.11 TINNITUS OF RIGHT EAR: Primary | ICD-10-CM

## 2023-04-19 DIAGNOSIS — H93.8X3 EAR FULLNESS, BILATERAL: ICD-10-CM

## 2023-04-19 DIAGNOSIS — H91.91 SUBJECTIVE HEARING CHANGE OF RIGHT EAR: ICD-10-CM

## 2023-04-19 DIAGNOSIS — H93.11 TINNITUS, RIGHT EAR: ICD-10-CM

## 2023-04-19 DIAGNOSIS — H90.3 SENSORINEURAL HEARING LOSS (SNHL) OF BOTH EARS: Primary | ICD-10-CM

## 2023-04-19 PROCEDURE — 92567 TYMPANOMETRY: CPT | Mod: HCNC,S$GLB,, | Performed by: AUDIOLOGIST

## 2023-04-19 PROCEDURE — 1101F PT FALLS ASSESS-DOCD LE1/YR: CPT | Mod: HCNC,CPTII,S$GLB, | Performed by: OTOLARYNGOLOGY

## 2023-04-19 PROCEDURE — 1160F PR REVIEW ALL MEDS BY PRESCRIBER/CLIN PHARMACIST DOCUMENTED: ICD-10-PCS | Mod: HCNC,CPTII,S$GLB, | Performed by: OTOLARYNGOLOGY

## 2023-04-19 PROCEDURE — 3072F LOW RISK FOR RETINOPATHY: CPT | Mod: HCNC,CPTII,S$GLB, | Performed by: OTOLARYNGOLOGY

## 2023-04-19 PROCEDURE — 99203 PR OFFICE/OUTPT VISIT, NEW, LEVL III, 30-44 MIN: ICD-10-PCS | Mod: HCNC,25,S$GLB, | Performed by: OTOLARYNGOLOGY

## 2023-04-19 PROCEDURE — 1126F PR PAIN SEVERITY QUANTIFIED, NO PAIN PRESENT: ICD-10-PCS | Mod: HCNC,CPTII,S$GLB, | Performed by: OTOLARYNGOLOGY

## 2023-04-19 PROCEDURE — 99203 OFFICE O/P NEW LOW 30 MIN: CPT | Mod: HCNC,25,S$GLB, | Performed by: OTOLARYNGOLOGY

## 2023-04-19 PROCEDURE — 3288F FALL RISK ASSESSMENT DOCD: CPT | Mod: HCNC,CPTII,S$GLB, | Performed by: OTOLARYNGOLOGY

## 2023-04-19 PROCEDURE — 69210 PR REMOVAL IMPACTED CERUMEN REQUIRING INSTRUMENTATION, UNILATERAL: ICD-10-PCS | Mod: HCNC,S$GLB,, | Performed by: OTOLARYNGOLOGY

## 2023-04-19 PROCEDURE — 92567 PR TYMPA2METRY: ICD-10-PCS | Mod: HCNC,S$GLB,, | Performed by: AUDIOLOGIST

## 2023-04-19 PROCEDURE — 99999 PR PBB SHADOW E&M-EST. PATIENT-LVL IV: CPT | Mod: PBBFAC,HCNC,, | Performed by: OTOLARYNGOLOGY

## 2023-04-19 PROCEDURE — 1126F AMNT PAIN NOTED NONE PRSNT: CPT | Mod: HCNC,CPTII,S$GLB, | Performed by: OTOLARYNGOLOGY

## 2023-04-19 PROCEDURE — 1159F MED LIST DOCD IN RCRD: CPT | Mod: HCNC,CPTII,S$GLB, | Performed by: OTOLARYNGOLOGY

## 2023-04-19 PROCEDURE — 3288F PR FALLS RISK ASSESSMENT DOCUMENTED: ICD-10-PCS | Mod: HCNC,CPTII,S$GLB, | Performed by: OTOLARYNGOLOGY

## 2023-04-19 PROCEDURE — 1101F PR PT FALLS ASSESS DOC 0-1 FALLS W/OUT INJ PAST YR: ICD-10-PCS | Mod: HCNC,CPTII,S$GLB, | Performed by: OTOLARYNGOLOGY

## 2023-04-19 PROCEDURE — 3072F PR LOW RISK FOR RETINOPATHY: ICD-10-PCS | Mod: HCNC,CPTII,S$GLB, | Performed by: OTOLARYNGOLOGY

## 2023-04-19 PROCEDURE — 1159F PR MEDICATION LIST DOCUMENTED IN MEDICAL RECORD: ICD-10-PCS | Mod: HCNC,CPTII,S$GLB, | Performed by: OTOLARYNGOLOGY

## 2023-04-19 PROCEDURE — 99999 PR PBB SHADOW E&M-EST. PATIENT-LVL IV: ICD-10-PCS | Mod: PBBFAC,HCNC,, | Performed by: OTOLARYNGOLOGY

## 2023-04-19 PROCEDURE — 92557 PR COMPREHENSIVE HEARING TEST: ICD-10-PCS | Mod: HCNC,S$GLB,, | Performed by: AUDIOLOGIST

## 2023-04-19 PROCEDURE — 1160F RVW MEDS BY RX/DR IN RCRD: CPT | Mod: HCNC,CPTII,S$GLB, | Performed by: OTOLARYNGOLOGY

## 2023-04-19 PROCEDURE — 92557 COMPREHENSIVE HEARING TEST: CPT | Mod: HCNC,S$GLB,, | Performed by: AUDIOLOGIST

## 2023-04-19 PROCEDURE — 69210 REMOVE IMPACTED EAR WAX UNI: CPT | Mod: HCNC,S$GLB,, | Performed by: OTOLARYNGOLOGY

## 2023-04-19 NOTE — PATIENT INSTRUCTIONS
Avoid q-tips. Follow up annually for ear cleaning.    Based on hearing levels and discrimination, hearing aids were recommended. She can schedule with audiology ([phone]) for discussion of hearing aid options.  In the least, I recommend another audiogram and audiologic evaluation in one year. The patient knows to call the clinic for problems ([phone]) and expectations discussed.

## 2023-04-19 NOTE — PROGRESS NOTES
87 y.o. female who was referred to me by Dr. Beena Hill in consultation   for cerumen impaction. Patient speaks Togolese and an in person  was used. Her daughter was also present. Patient has felt ear fullness and feels deaf in right ear for about 3-4 months. Noticing a buzzing sound in the right ear. No drainage or pain. No ear surgery. Had ears cleaned by Dr. Lee in 2018.  She does not have hearing aids.    PMHx, PSHx, Meds, Allergies, SocHx, FamHx reviewed in EPIC    ROS:  Gen: no f/c  ENT: as above    PE: BP (!) 146/66   Pulse 62    Gen: female, well nourished, well developed, NAD, cooperative, good historian  Ears:  EAC B occluded with layers of cerumen R>L (cerumen cleared - see below)  & TM translucent with normal bony landmarks bilaterally  Face:  no TTP, no erythema or flushing  Respiratory: Breathing comfortably without retractions  Skin: facial skin intact without visible lesions or flushing  Neuro:  facial movement symmetric, speech fluid, gait stable, tongue protrudes midline  Psych: alert & oriented x 3, reasonable, normal affect      Procedure: Removal of cerumen impaction using microsurgical instrumentation.  After explaining the procedure and obtaining verbal assent, the patient was positioned in chair and both external auditory canal visualized with magnification. The obstructing cerumen was removed with microsurgical instrumentation (curette, forcep and suction) to reveal patent external auditory canals. There were more layers of skin/cerumen on right than left. Both ears cleared. The patient tolerated this procedure well without complication.    After ears cleaned - reduce subj hearing to finger rub in right ear. Tuning fork 512 Hz - Reich midline, Rinne AC> BC B        Mild sloping down to moderate-severe SNHL bilaterally.    Impression:   1. Tinnitus of right ear      buzzing sound      2. Bilateral impacted cerumen  Ambulatory referral/consult to ENT      3. Ear  fullness, bilateral        4. Subjective hearing change of right ear            Discussion and Plan:    Avoid q-tips. Follow up annually for ear cleaning.    Based on hearing levels and discrimination, hearing aids were recommended. She can schedule with audiology ([phone]) for discussion of hearing aid options.  In the least, I recommend another audiogram and audiologic evaluation in one year. The patient knows to call the clinic for problems ([phone]) and expectations discussed.      Parts or all of this note were created by voice recognition software; typographical errors in translating may be present.

## 2023-04-20 NOTE — PROGRESS NOTES
Sera Boone, a 87 y.o. female, was seen today in the clinic for an audiologic evaluation.  Patient's main complaint was difficulty hearing.  Mrs. Boone reported a muffled sensation and tinnitus that started over three months ago in the right ear. Patient denied dizziness.     Tympanometry revealed Type A in the right ear and Type A in the left ear. Audiogram results revealed mild sloping to severe sensorineural hearing loss bilaterally. Speech reception thresholds were noted at 30 dB in the right ear and 30 dB in the left ear.  Speech discrimination scores were 100% in the right ear and 100% in the left ear. Speech audiometry was conducted with [place] word lists.     Bilateral hearing aids were recommended. Patient was encouraged to contact their health insurance provider and inquire about hearing aid benefits.    Recommendations:  Otologic evaluation  Annual audiogram  Hearing protection when in noise  Hearing aid consultation

## 2023-04-28 ENCOUNTER — HOSPITAL ENCOUNTER (EMERGENCY)
Facility: HOSPITAL | Age: 88
Discharge: HOME | End: 2023-04-28
Attending: EMERGENCY MEDICINE
Payer: MEDICARE

## 2023-04-28 VITALS
RESPIRATION RATE: 17 BRPM | DIASTOLIC BLOOD PRESSURE: 82 MMHG | OXYGEN SATURATION: 97 % | WEIGHT: 135 LBS | SYSTOLIC BLOOD PRESSURE: 153 MMHG | HEART RATE: 68 BPM | HEIGHT: 63 IN | TEMPERATURE: 99 F | BODY MASS INDEX: 23.92 KG/M2

## 2023-04-28 DIAGNOSIS — S99.921A INJURY OF TOE ON RIGHT FOOT, INITIAL ENCOUNTER: Primary | ICD-10-CM

## 2023-04-28 PROCEDURE — 25000003 PHARM REV CODE 250: Performed by: EMERGENCY MEDICINE

## 2023-04-28 PROCEDURE — 99283 EMERGENCY DEPT VISIT LOW MDM: CPT

## 2023-04-28 RX ORDER — CEPHALEXIN 500 MG/1
500 CAPSULE ORAL 4 TIMES DAILY
Qty: 40 CAPSULE | Refills: 0 | Status: SHIPPED | OUTPATIENT
Start: 2023-04-28 | End: 2023-05-08

## 2023-04-28 RX ORDER — MUPIROCIN 20 MG/G
OINTMENT TOPICAL 3 TIMES DAILY
Qty: 22 G | Refills: 0 | Status: SHIPPED | OUTPATIENT
Start: 2023-04-28

## 2023-04-28 RX ORDER — MUPIROCIN 20 MG/G
OINTMENT TOPICAL
Status: COMPLETED | OUTPATIENT
Start: 2023-04-28 | End: 2023-04-28

## 2023-04-28 RX ADMIN — MUPIROCIN: 20 OINTMENT TOPICAL at 09:04

## 2023-04-29 NOTE — DISCHARGE INSTRUCTIONS
Place antibiotic ointment 3 times daily.  Antibiotics by mouth as directed.  Return if any signs of infection.  Return for redness drainage swelling pain.

## 2023-04-29 NOTE — ED PROVIDER NOTES
Encounter Date: 4/28/2023       History     Chief Complaint   Patient presents with    Toe Injury     Pt clipped toenail about three days ago and cut her skin. She is diabetic and it is still bleeding.      Chief complaint is bleeding from the nail of the right great toe.  She had a fungus that she noticed and she trimmed her nail and bled for the last several days but is not bleeding at the present time.  She is a diabetic as well.  Her sugar is under control per the daughter.  The patient is 87 years old awake alert cooperative no distress.  No complaints otherwise.      Review of patient's allergies indicates:   Allergen Reactions    No known drug allergies      Past Medical History:   Diagnosis Date    Anemia 10/19/2020    Arthritis     Breast cancer 2001    Diabetes mellitus     History of breast cancer 8/21/2013    Hyperlipidemia     Hypertension     Lumbar back pain 3/7/2023    Nuclear sclerotic cataract of right eye 10/16/2012    Osteoporosis, unspecified: see DEXA 8/15- 2/13/2017    Osteoporosis, unspecified: see DEXA 8/15- 2/13/2017    Pain of both shoulder joints 2/14/2017    Rheumatoid factor positive 2/14/2017    Stroke 4/6/2019    Tubular adenoma of colon 10/28/2013    Type II or unspecified type diabetes mellitus with neurological manifestations, not stated as uncontrolled(250.60)     Vitamin D deficiency disease 2/17/2014     Past Surgical History:   Procedure Laterality Date    BELPHAROPTOSIS REPAIR  03/15/13    bilateral-dr. coleman md    BREAST BIOPSY      BREAST LUMPECTOMY      BREAST SURGERY Left 2001    lumpectomy    CATARACT EXTRACTION      both eyes -     CHOLECYSTECTOMY      Done in [place] in the 1980's    COLONOSCOPY N/A 1/19/2016    Procedure: COLONOSCOPY;  Surgeon: BLANCA Guerra MD;  Location: [address];  Service: Endoscopy;  Laterality: N/A;    COLONOSCOPY W/ POLYPECTOMY       Family History   Problem Relation Age of Onset    Breast cancer Sister 48         55 second, bilateral    Cancer Sister         Breast    Liver cancer Mother     Early death Mother     Cancer Mother         liver    Liver cancer Father     Cancer Father     Liver cancer Brother     Cancer Brother         ? liver    No Known Problems Daughter     No Known Problems Son     Liver cancer Brother     Cancer Brother         ? liver    No Known Problems Daughter     No Known Problems Maternal Aunt     No Known Problems Maternal Uncle     No Known Problems Paternal Aunt     No Known Problems Paternal Uncle     No Known Problems Maternal Grandmother     No Known Problems Maternal Grandfather     No Known Problems Paternal Grandmother     No Known Problems Paternal Grandfather     Glaucoma Neg Hx     Amblyopia Neg Hx     Blindness Neg Hx     Cataracts Neg Hx     Diabetes Neg Hx     Hypertension Neg Hx     Macular degeneration Neg Hx     Retinal detachment Neg Hx     Strabismus Neg Hx     Stroke Neg Hx     Thyroid disease Neg Hx     Cervical cancer Neg Hx     Vaginal cancer Neg Hx     Endometrial cancer Neg Hx      Social History     Tobacco Use    Smoking status: Never    Smokeless tobacco: Never   Substance Use Topics    Alcohol use: No    Drug use: No     Review of Systems   Constitutional:  Negative for chills and fever.   HENT:  Negative for ear pain, rhinorrhea and sore throat.    Eyes:  Negative for pain and visual disturbance.   Respiratory:  Negative for cough and shortness of breath.    Cardiovascular:  Negative for chest pain and palpitations.   Gastrointestinal:  Negative for abdominal pain, constipation, diarrhea, nausea and vomiting.   Genitourinary:  Negative for dysuria, frequency, hematuria and urgency.   Musculoskeletal:  Negative for back pain, joint swelling and myalgias.   Skin:  Negative for rash.        Bleeding right great toe per the patient   Neurological:  Negative for dizziness, seizures, weakness and headaches.   Psychiatric/Behavioral:  Negative for dysphoric mood. The patient  is not nervous/anxious.      Physical Exam     Initial Vitals [04/28/23 1945]   BP Pulse Resp Temp SpO2   (!) 181/79 66 18 98.6 °F (37 °C) 96 %      MAP       --         Physical Exam    Nursing note and vitals reviewed.  Constitutional: She appears well-developed and well-nourished.   HENT:   Head: Normocephalic and atraumatic.   Nose: Nose normal.   Eyes: Conjunctivae, EOM and lids are normal. Pupils are equal, round, and reactive to light.   Neck: Trachea normal. Neck supple. No thyroid mass and no thyromegaly present.   Normal range of motion.  Cardiovascular:  Normal rate, regular rhythm and normal heart sounds.           Pulmonary/Chest: Effort normal and breath sounds normal.   Abdominal: Abdomen is soft. There is no abdominal tenderness.   Musculoskeletal:         General: Normal range of motion.      Cervical back: Normal range of motion and neck supple.     Neurological: She is alert and oriented to person, place, and time. She has normal strength. No cranial nerve deficit or sensory deficit.   Skin: Skin is warm and dry.   Please refer to picture.  Patient has no bleeding from the toe at the present time.  No obvious signs of infection either.  Full range of motion neurovascular function intact   Psychiatric: She has a normal mood and affect. Her speech is normal and behavior is normal. Judgment and thought content normal.       ED Course   Procedures  Labs Reviewed - No data to display       Imaging Results    None          Medications   mupirocin 2 % ointment ( Topical (Top) Given 4/28/23 2145)     Medical Decision Making:   ED Management:  Patient has bleeding from the right great toe.  Differential diagnosis includes bleeding secondary to blood thinners.  Also may be due to a deep wound.  In this particular case the patient has no bleeding at the present time.  She may have signs on her great toenail of a fungal infection and will be referred to the podiatrist.  Full range of motion of the toe no  signs of infection the present time patient will be discharged on Bactroban and Keflex.                        Clinical Impression:   Final diagnoses:  [S99.921A] Injury of toe on right foot, initial encounter (Primary)        ED Disposition Condition    Discharge Stable          ED Prescriptions       Medication Sig Dispense Start Date End Date Auth. Provider    mupirocin (BACTROBAN) 2 % ointment Apply topically 3 (three) times daily. 22 g 4/28/2023 -- Cari Mac MD    cephALEXin (KEFLEX) 500 MG capsule Take 1 capsule (500 mg total) by mouth 4 (four) times daily. for 10 days 40 capsule 4/28/2023 5/8/2023 Cari Mac MD          Follow-up Information       Follow up With Specialties Details Why Contact Info    Beena Hill MD Internal Medicine Schedule an appointment as soon as possible for a visit in 2 days  [address]  [place] [address]  [phone]      Aleksey Mcghee DPM Podiatry, Surgery, Wound Care Schedule an appointment as soon as possible for a visit in 2 days  [address] [facility] [address]  [facility] [address]  [phone]               Cari Mac MD  04/30/23 0135

## 2023-05-01 ENCOUNTER — OFFICE VISIT (OUTPATIENT)
Dept: INTERNAL MEDICINE | Facility: CLINIC | Age: 88
End: 2023-05-01
Payer: MEDICARE

## 2023-05-01 VITALS
HEART RATE: 60 BPM | OXYGEN SATURATION: 98 % | DIASTOLIC BLOOD PRESSURE: 69 MMHG | HEIGHT: 63 IN | BODY MASS INDEX: 24.34 KG/M2 | SYSTOLIC BLOOD PRESSURE: 166 MMHG | WEIGHT: 137.38 LBS

## 2023-05-01 DIAGNOSIS — N18.30 STAGE 3 CHRONIC KIDNEY DISEASE, UNSPECIFIED WHETHER STAGE 3A OR 3B CKD: ICD-10-CM

## 2023-05-01 DIAGNOSIS — E11.49 TYPE II DIABETES MELLITUS WITH NEUROLOGICAL MANIFESTATIONS: ICD-10-CM

## 2023-05-01 DIAGNOSIS — B35.1 ONYCHOMYCOSIS DUE TO DERMATOPHYTE: Primary | ICD-10-CM

## 2023-05-01 DIAGNOSIS — I10 ESSENTIAL HYPERTENSION: ICD-10-CM

## 2023-05-01 PROCEDURE — 99214 OFFICE O/P EST MOD 30 MIN: CPT | Mod: HCNC,S$GLB,, | Performed by: INTERNAL MEDICINE

## 2023-05-01 PROCEDURE — 3288F PR FALLS RISK ASSESSMENT DOCUMENTED: ICD-10-PCS | Mod: HCNC,CPTII,S$GLB, | Performed by: INTERNAL MEDICINE

## 2023-05-01 PROCEDURE — 1126F PR PAIN SEVERITY QUANTIFIED, NO PAIN PRESENT: ICD-10-PCS | Mod: HCNC,CPTII,S$GLB, | Performed by: INTERNAL MEDICINE

## 2023-05-01 PROCEDURE — 3288F FALL RISK ASSESSMENT DOCD: CPT | Mod: HCNC,CPTII,S$GLB, | Performed by: INTERNAL MEDICINE

## 2023-05-01 PROCEDURE — 1126F AMNT PAIN NOTED NONE PRSNT: CPT | Mod: HCNC,CPTII,S$GLB, | Performed by: INTERNAL MEDICINE

## 2023-05-01 PROCEDURE — 99214 PR OFFICE/OUTPT VISIT, EST, LEVL IV, 30-39 MIN: ICD-10-PCS | Mod: HCNC,S$GLB,, | Performed by: INTERNAL MEDICINE

## 2023-05-01 PROCEDURE — 1101F PT FALLS ASSESS-DOCD LE1/YR: CPT | Mod: HCNC,CPTII,S$GLB, | Performed by: INTERNAL MEDICINE

## 2023-05-01 PROCEDURE — 99999 PR PBB SHADOW E&M-EST. PATIENT-LVL III: CPT | Mod: PBBFAC,HCNC,, | Performed by: INTERNAL MEDICINE

## 2023-05-01 PROCEDURE — 1101F PR PT FALLS ASSESS DOC 0-1 FALLS W/OUT INJ PAST YR: ICD-10-PCS | Mod: HCNC,CPTII,S$GLB, | Performed by: INTERNAL MEDICINE

## 2023-05-01 PROCEDURE — 99999 PR PBB SHADOW E&M-EST. PATIENT-LVL III: ICD-10-PCS | Mod: PBBFAC,HCNC,, | Performed by: INTERNAL MEDICINE

## 2023-05-01 PROCEDURE — 3072F PR LOW RISK FOR RETINOPATHY: ICD-10-PCS | Mod: HCNC,CPTII,S$GLB, | Performed by: INTERNAL MEDICINE

## 2023-05-01 PROCEDURE — 3072F LOW RISK FOR RETINOPATHY: CPT | Mod: HCNC,CPTII,S$GLB, | Performed by: INTERNAL MEDICINE

## 2023-05-01 RX ORDER — LISINOPRIL 10 MG/1
10 TABLET ORAL DAILY
Qty: 30 TABLET | Refills: 11 | Status: SHIPPED | OUTPATIENT
Start: 2023-05-01 | End: 2023-08-01

## 2023-05-01 NOTE — PROGRESS NOTES
Subjective:       Patient ID: Sera Boone is a 87 y.o. female.    Chief Complaint: Establish Care    HPI    Presents for follow up.     Went to ED of 4/28 for toe nail injury/ infection. Started on cephalexin.     Saw ENT recently for cerumen impaction and was told she needed hearing aids.      BP is elevated today and has been elevated during previous visits. Does not check BP at home.     PMHx:  DMII on metformin last A1C was 6.7 in January 2023   Fibromyalgia started gabapentin during last visit which is helping. Also started PT which has been helping.   Osteoporosis on alendronate   HLD: on statin   Mild iron deficiency anemia seen on labs       Review of Systems   Constitutional:  Negative for activity change, appetite change and chills.   HENT:  Negative for ear pain, sinus pressure/congestion and sneezing.    Respiratory:  Negative for cough and shortness of breath.    Cardiovascular:  Negative for chest pain, palpitations and leg swelling.   Gastrointestinal:  Negative for abdominal distention, abdominal pain, constipation, diarrhea, nausea and vomiting.   Genitourinary:  Negative for dysuria and hematuria.   Musculoskeletal:  Negative for arthralgias, back pain and myalgias.   Neurological:  Negative for dizziness and headaches.   Psychiatric/Behavioral:  Negative for agitation. The patient is not nervous/anxious.          Past Medical History:   Diagnosis Date    Anemia 10/19/2020    Arthritis     Breast cancer 2001    Diabetes mellitus     History of breast cancer 8/21/2013    Hyperlipidemia     Hypertension     Lumbar back pain 3/7/2023    Nuclear sclerotic cataract of right eye 10/16/2012    Osteoporosis, unspecified: see DEXA 8/15- 2/13/2017    Osteoporosis, unspecified: see DEXA 8/15- 2/13/2017    Pain of both shoulder joints 2/14/2017    Rheumatoid factor positive 2/14/2017    Stroke 4/6/2019    Tubular adenoma of colon 10/28/2013    Type II or unspecified type diabetes mellitus with  neurological manifestations, not stated as uncontrolled(250.60)     Vitamin D deficiency disease 2/17/2014     Past Surgical History:   Procedure Laterality Date    BELPHAROPTOSIS REPAIR  03/15/13    bilateral-dr. coleman md    BREAST BIOPSY      BREAST LUMPECTOMY      BREAST SURGERY Left 2001    lumpectomy    CATARACT EXTRACTION      both eyes -     CHOLECYSTECTOMY      Done in [place] in the 1980's    COLONOSCOPY N/A 1/19/2016    Procedure: COLONOSCOPY;  Surgeon: BLANCA Guerra MD;  Location: [address]);  Service: Endoscopy;  Laterality: N/A;    COLONOSCOPY W/ POLYPECTOMY        Patient Active Problem List   Diagnosis    Hyperlipidemia associated with type 2 diabetes mellitus    Type II diabetes mellitus with neurological manifestations    Ptosis of eyebrow    History of breast cancer    Vitamin D deficiency disease    Hypertension associated with diabetes    History of adenomatous polyp of colon 1/16 no need for further follow up per Dr Guerra    Diabetic polyneuropathy associated with type 2 diabetes mellitus    Fatty liver: see ultrasound 1/17    Rheumatoid factor positive    Stage 3 chronic kidney disease    Spondylosis of cervical region without myelopathy or radiculopathy    Primary osteoarthritis involving multiple joints    Osteopenia/osteoporosis: see DEXA 4/19 tx recommended    DDD (degenerative disc disease), cervical    Bradycardia    CVA (cerebral vascular accident)    Secondary hyperparathyroidism    Episode of transient neurologic symptoms    Anemia    Fibromyalgia    Osteopenia    Adhesive capsulitis of left shoulder    Lipoma of back    Lumbar back pain        Objective:      Physical Exam  Constitutional:       Appearance: Normal appearance.   HENT:      Head: Normocephalic.      Right Ear: Tympanic membrane normal. There is no impacted cerumen.      Left Ear: Tympanic membrane normal.      Nose: Nose normal.   Cardiovascular:      Rate and Rhythm: Normal rate and regular  rhythm.      Pulses: Normal pulses.      Heart sounds: Normal heart sounds.   Pulmonary:      Effort: Pulmonary effort is normal.      Breath sounds: Normal breath sounds.   Abdominal:      General: Abdomen is flat. Bowel sounds are normal.      Palpations: Abdomen is soft.   Musculoskeletal:         General: Normal range of motion.      Cervical back: Normal range of motion and neck supple.   Skin:     General: Skin is warm and dry.   Neurological:      General: No focal deficit present.      Mental Status: She is alert and oriented to person, place, and time.   Psychiatric:         Mood and Affect: Mood normal.       Assessment:       Problem List Items Addressed This Visit          Renal/    Stage 3 chronic kidney disease       Endocrine    Type II diabetes mellitus with neurological manifestations     Other Visit Diagnoses       Onychomycosis due to dermatophyte    -  Primary    Relevant Orders    Ambulatory referral/consult to Podiatry    Essential hypertension        Relevant Orders    BASIC METABOLIC PANEL            Plan:         Sera was seen today for [facility] care.    Diagnoses and all orders for this visit:    Onychomycosis due to dermatophyte  -     Ambulatory referral/consult to Podiatry; Future    Essential hypertension  Elevated today and last previous visits.   Start lisinopril 10 mg daily.   Nurses visit in 2 week for BP check.   BMP in 2 weeks during nurses visit   Follow up with me in 3 months     Type II diabetes mellitus with neurological manifestations  Well controlled on metformin     Stage 3 chronic kidney disease, unspecified whether stage 3a or 3b CKD  Repeat BMP in 2 weeks. Improved on last labs        Follow up in 3 months           Beena Hill MD   Internal Medicine   Primary Care

## 2023-05-17 ENCOUNTER — LAB VISIT (OUTPATIENT)
Dept: LAB | Facility: HOSPITAL | Age: 88
End: 2023-05-17
Payer: MEDICARE

## 2023-05-17 ENCOUNTER — CLINICAL SUPPORT (OUTPATIENT)
Dept: INTERNAL MEDICINE | Facility: CLINIC | Age: 88
End: 2023-05-17
Payer: MEDICARE

## 2023-05-17 VITALS — SYSTOLIC BLOOD PRESSURE: 138 MMHG | HEART RATE: 57 BPM | DIASTOLIC BLOOD PRESSURE: 70 MMHG | OXYGEN SATURATION: 97 %

## 2023-05-17 DIAGNOSIS — I10 ESSENTIAL HYPERTENSION: ICD-10-CM

## 2023-05-17 LAB
ANION GAP SERPL CALC-SCNC: 8 MMOL/L (ref 8–16)
BUN SERPL-MCNC: 19 MG/DL (ref 8–23)
CALCIUM SERPL-MCNC: 10.6 MG/DL (ref 8.7–10.5)
CHLORIDE SERPL-SCNC: 105 MMOL/L (ref 95–110)
CO2 SERPL-SCNC: 26 MMOL/L (ref 23–29)
CREAT SERPL-MCNC: 1 MG/DL (ref 0.5–1.4)
EST. GFR  (NO RACE VARIABLE): 54.5 ML/MIN/1.73 M^2
GLUCOSE SERPL-MCNC: 126 MG/DL (ref 70–110)
POTASSIUM SERPL-SCNC: 4.5 MMOL/L (ref 3.5–5.1)
SODIUM SERPL-SCNC: 139 MMOL/L (ref 136–145)

## 2023-05-17 PROCEDURE — 36415 COLL VENOUS BLD VENIPUNCTURE: CPT | Performed by: INTERNAL MEDICINE

## 2023-05-17 PROCEDURE — 80048 BASIC METABOLIC PNL TOTAL CA: CPT | Performed by: INTERNAL MEDICINE

## 2023-05-17 PROCEDURE — 99999 PR PBB SHADOW E&M-EST. PATIENT-LVL I: CPT | Mod: PBBFAC,,,

## 2023-05-17 PROCEDURE — 99999 PR PBB SHADOW E&M-EST. PATIENT-LVL I: ICD-10-PCS | Mod: PBBFAC,,,

## 2023-05-17 NOTE — PROGRESS NOTES
Pt here for BP nurse visit. Accompanied by daughter, current /70 P: 57 O2sat: 97%. Informed Dr. Hill who states she should monitor for now and let us know if BP elevates. Pt and daughter expressed understanding and walked out to lobby

## 2023-05-18 ENCOUNTER — PATIENT MESSAGE (OUTPATIENT)
Dept: INTERNAL MEDICINE | Facility: CLINIC | Age: 88
End: 2023-05-18
Payer: MEDICARE

## 2023-05-18 ENCOUNTER — TELEPHONE (OUTPATIENT)
Dept: INTERNAL MEDICINE | Facility: CLINIC | Age: 88
End: 2023-05-18
Payer: MEDICARE

## 2023-05-18 DIAGNOSIS — I10 ESSENTIAL HYPERTENSION: Primary | ICD-10-CM

## 2023-05-18 NOTE — TELEPHONE ENCOUNTER
----- Message from Beena Hill MD sent at 5/18/2023  4:46 PM CDT -----  Please help them schedule labs in 3 weeks.

## 2023-06-05 ENCOUNTER — PATIENT MESSAGE (OUTPATIENT)
Dept: PODIATRY | Facility: CLINIC | Age: 88
End: 2023-06-05
Payer: MEDICARE

## 2023-06-20 ENCOUNTER — OFFICE VISIT (OUTPATIENT)
Dept: PODIATRY | Facility: CLINIC | Age: 88
End: 2023-06-20
Payer: MEDICARE

## 2023-06-20 ENCOUNTER — LAB VISIT (OUTPATIENT)
Dept: LAB | Facility: HOSPITAL | Age: 88
End: 2023-06-20
Payer: MEDICARE

## 2023-06-20 VITALS
DIASTOLIC BLOOD PRESSURE: 64 MMHG | HEART RATE: 59 BPM | BODY MASS INDEX: 24.45 KG/M2 | WEIGHT: 138 LBS | SYSTOLIC BLOOD PRESSURE: 131 MMHG

## 2023-06-20 DIAGNOSIS — B35.1 ONYCHOMYCOSIS DUE TO DERMATOPHYTE: ICD-10-CM

## 2023-06-20 DIAGNOSIS — E11.49 TYPE II DIABETES MELLITUS WITH NEUROLOGICAL MANIFESTATIONS: Primary | ICD-10-CM

## 2023-06-20 DIAGNOSIS — I10 ESSENTIAL HYPERTENSION: ICD-10-CM

## 2023-06-20 LAB
ANION GAP SERPL CALC-SCNC: 8 MMOL/L (ref 8–16)
BUN SERPL-MCNC: 17 MG/DL (ref 8–23)
CALCIUM SERPL-MCNC: 9.9 MG/DL (ref 8.7–10.5)
CHLORIDE SERPL-SCNC: 106 MMOL/L (ref 95–110)
CO2 SERPL-SCNC: 25 MMOL/L (ref 23–29)
CREAT SERPL-MCNC: 1.1 MG/DL (ref 0.5–1.4)
EST. GFR  (NO RACE VARIABLE): 48.6 ML/MIN/1.73 M^2
GLUCOSE SERPL-MCNC: 126 MG/DL (ref 70–110)
POTASSIUM SERPL-SCNC: 4.8 MMOL/L (ref 3.5–5.1)
SODIUM SERPL-SCNC: 139 MMOL/L (ref 136–145)

## 2023-06-20 PROCEDURE — 36415 COLL VENOUS BLD VENIPUNCTURE: CPT | Performed by: INTERNAL MEDICINE

## 2023-06-20 PROCEDURE — 1126F AMNT PAIN NOTED NONE PRSNT: CPT | Mod: CPTII,S$GLB,, | Performed by: PODIATRIST

## 2023-06-20 PROCEDURE — 99203 OFFICE O/P NEW LOW 30 MIN: CPT | Mod: S$GLB,,, | Performed by: PODIATRIST

## 2023-06-20 PROCEDURE — 3072F PR LOW RISK FOR RETINOPATHY: ICD-10-PCS | Mod: CPTII,S$GLB,, | Performed by: PODIATRIST

## 2023-06-20 PROCEDURE — 99999 PR PBB SHADOW E&M-EST. PATIENT-LVL III: CPT | Mod: PBBFAC,,, | Performed by: PODIATRIST

## 2023-06-20 PROCEDURE — 99999 PR PBB SHADOW E&M-EST. PATIENT-LVL III: ICD-10-PCS | Mod: PBBFAC,,, | Performed by: PODIATRIST

## 2023-06-20 PROCEDURE — 3072F LOW RISK FOR RETINOPATHY: CPT | Mod: CPTII,S$GLB,, | Performed by: PODIATRIST

## 2023-06-20 PROCEDURE — 1126F PR PAIN SEVERITY QUANTIFIED, NO PAIN PRESENT: ICD-10-PCS | Mod: CPTII,S$GLB,, | Performed by: PODIATRIST

## 2023-06-20 PROCEDURE — 80048 BASIC METABOLIC PNL TOTAL CA: CPT | Performed by: INTERNAL MEDICINE

## 2023-06-20 PROCEDURE — 99203 PR OFFICE/OUTPT VISIT, NEW, LEVL III, 30-44 MIN: ICD-10-PCS | Mod: S$GLB,,, | Performed by: PODIATRIST

## 2023-06-20 RX ORDER — LOSARTAN POTASSIUM 50 MG/1
TABLET ORAL
COMMUNITY
Start: 2023-05-05 | End: 2023-08-01

## 2023-06-20 NOTE — PROGRESS NOTES
Subjective:      Patient ID: Sera Boone is a 87 y.o. female.    Chief Complaint: Diabetic Foot Exam, Nail Care, and Chronic Kidney Disease    Sera is a 87 y.o. female who presents to the clinic upon referral from Dr. Hill  for evaluation and treatment of diabetic feet. Sera has a past medical history of Anemia (10/19/2020), Arthritis, Breast cancer (2001), Diabetes mellitus, History of breast cancer (8/21/2013), Hyperlipidemia, Hypertension, Lumbar back pain (3/7/2023), Nuclear sclerotic cataract of right eye (10/16/2012), Osteoporosis, unspecified: see DEXA 8/15- (2/13/2017), Osteoporosis, unspecified: see DEXA 8/15- (2/13/2017), Pain of both shoulder joints (2/14/2017), Rheumatoid factor positive (2/14/2017), Stroke (4/6/2019), Tubular adenoma of colon (10/28/2013), Type II or unspecified type diabetes mellitus with neurological manifestations, not stated as uncontrolled(250.60), and Vitamin D deficiency disease (2/17/2014). Patient relates no major problem with feet. Only complaints today consist of diabetic foot exam. Son is .   PCP: Beena Hill MD    Date Last Seen by PCP:   Chief Complaint   Patient presents with    Diabetic Foot Exam    Nail Care    Chronic Kidney Disease         Current shoe gear: Casual shoes    Hemoglobin A1C   Date Value Ref Range Status   01/31/2023 6.7 (H) 4.0 - 5.6 % Final     Comment:     ADA Screening Guidelines:  5.7-6.4%  Consistent with prediabetes  >or=6.5%  Consistent with diabetes    High levels of fetal hemoglobin interfere with the HbA1C  assay. Heterozygous hemoglobin variants (HbS, HgC, etc)do  not significantly interfere with this assay.   However, presence of multiple variants may affect accuracy.     11/02/2022 7.0 (H) 4.0 - 5.6 % Final     Comment:     ADA Screening Guidelines:  5.7-6.4%  Consistent with prediabetes  >or=6.5%  Consistent with diabetes    High levels of fetal hemoglobin interfere with the HbA1C  assay. Heterozygous hemoglobin  variants (HbS, HgC, etc)do  not significantly interfere with this assay.   However, presence of multiple variants may affect accuracy.     01/03/2022 7.1 (H) 4.0 - 5.6 % Final     Comment:     ADA Screening Guidelines:  5.7-6.4%  Consistent with prediabetes  >or=6.5%  Consistent with diabetes    High levels of fetal hemoglobin interfere with the HbA1C  assay. Heterozygous hemoglobin variants (HbS, HgC, etc)do  not significantly interfere with this assay.   However, presence of multiple variants may affect accuracy.           Review of Systems   Constitutional: Negative for chills, fever and malaise/fatigue.   HENT:  Negative for hearing loss.    Cardiovascular:  Negative for claudication.   Respiratory:  Negative for shortness of breath.    Skin:  Negative for flushing and rash.   Musculoskeletal:  Negative for joint pain and myalgias.   Gastrointestinal:  Negative for nausea and vomiting.   Neurological:  Positive for paresthesias. Negative for loss of balance, numbness and sensory change.   Psychiatric/Behavioral:  Negative for altered mental status.          Objective:      Physical Exam  Vitals reviewed. Exam conducted with a chaperone present.   Cardiovascular:      Pulses:           Dorsalis pedis pulses are 2+ on the right side and 2+ on the left side.        Posterior tibial pulses are 2+ on the right side and 2+ on the left side.      Comments: No edema noted to b/L LEs  Musculoskeletal:      Comments: Adequate joint ROM noted to all lower extremity muscle groups with no pain or crepitation noted. Muscle strength is 5/5 in all groups bilaterally.     Feet:      Right foot:      Protective Sensation: 5 sites tested.  5 sites sensed.      Toenail Condition: Right toenails are abnormally thick.      Left foot:      Protective Sensation: 5 sites tested.  5 sites sensed.      Toenail Condition: Left toenails are normal.   Skin:     General: Skin is warm.      Capillary Refill: Capillary refill takes 2 to 3  seconds.      Comments: Normal skin tugor noted.   No open lesion noted b/L  Skin temp is warm to warm from proximal to distal b/L.  Webspaces clean, dry, and intact   Neurological:      Mental Status: She is alert and oriented to person, place, and time.      Comments: Intact gross sensation noted to b/L LEs             Assessment:       Encounter Diagnoses   Name Primary?    Onychomycosis due to dermatophyte     Type II diabetes mellitus with neurological manifestations Yes         Plan:       Sera was seen today for diabetic foot exam, nail care and chronic kidney disease.    Diagnoses and all orders for this visit:    Type II diabetes mellitus with neurological manifestations    Onychomycosis due to dermatophyte  -     Ambulatory referral/consult to Podiatry      I counseled the patient on her conditions, their implications and medical management.    Discussed DM foot care:  Wear comfortable, proper fitting shoes. Wash feet daily. Dry well. After drying, apply moisturizer to feet (no lotion to webspaces). Inspect feet daily for skin breaks, blisters, swelling, or redness. Wear cotton socks (preferably white)  Change socks every day. Do NOT walk barefoot. Do NOT use heating pads or warm/hot water soaks      - Discussed importance of daily moisturizer to the feet such as Gold bonds diabetic foot cream     - Patient is low risk for developing lower extremity issues secondary to diabetes     - Advised the patient that fungus likes warm, dark, and moist environments such as bathrooms, gyms, and pools. Advised to spray shower, shower mat , and any shoes w/ Lysol periodically  RTC in 1 year or sooner if any new pedal problems should arise.      .

## 2023-06-21 ENCOUNTER — PATIENT MESSAGE (OUTPATIENT)
Dept: INTERNAL MEDICINE | Facility: CLINIC | Age: 88
End: 2023-06-21
Payer: MEDICARE

## 2023-06-23 ENCOUNTER — PES CALL (OUTPATIENT)
Dept: ADMINISTRATIVE | Facility: CLINIC | Age: 88
End: 2023-06-23
Payer: MEDICARE

## 2023-07-13 ENCOUNTER — TELEPHONE (OUTPATIENT)
Dept: OPTOMETRY | Facility: CLINIC | Age: 88
End: 2023-07-13
Payer: MEDICARE

## 2023-07-13 NOTE — TELEPHONE ENCOUNTER
----- Message from Flaco Talbert sent at 7/13/2023 12:31 PM CDT -----  Type:  Sooner Apoointment Request    Caller is requesting a sooner appointment.  Caller declined first available appointment listed below.  Caller will not accept being placed on the waitlist and is requesting a message be sent to doctor.  Name of Caller:pt  When is the first available appointment?9/27   Symptoms:glasses rx / eye dryness  Would the patient rather a call back or a response via [facility]? Call  Best Call Back Number:[phone]  Additional Information:

## 2023-08-01 ENCOUNTER — LAB VISIT (OUTPATIENT)
Dept: LAB | Facility: HOSPITAL | Age: 88
End: 2023-08-01
Payer: MEDICARE

## 2023-08-01 ENCOUNTER — OFFICE VISIT (OUTPATIENT)
Dept: INTERNAL MEDICINE | Facility: CLINIC | Age: 88
End: 2023-08-01
Payer: MEDICARE

## 2023-08-01 VITALS
OXYGEN SATURATION: 98 % | DIASTOLIC BLOOD PRESSURE: 70 MMHG | HEIGHT: 65 IN | SYSTOLIC BLOOD PRESSURE: 132 MMHG | HEART RATE: 53 BPM | BODY MASS INDEX: 22.73 KG/M2 | WEIGHT: 136.44 LBS

## 2023-08-01 DIAGNOSIS — N18.30 STAGE 3 CHRONIC KIDNEY DISEASE, UNSPECIFIED WHETHER STAGE 3A OR 3B CKD: ICD-10-CM

## 2023-08-01 DIAGNOSIS — D64.9 ANEMIA, UNSPECIFIED TYPE: ICD-10-CM

## 2023-08-01 DIAGNOSIS — D51.8 OTHER VITAMIN B12 DEFICIENCY ANEMIAS: ICD-10-CM

## 2023-08-01 DIAGNOSIS — R00.1 BRADYCARDIA: ICD-10-CM

## 2023-08-01 DIAGNOSIS — I10 ESSENTIAL HYPERTENSION: Primary | ICD-10-CM

## 2023-08-01 DIAGNOSIS — H61.23 BILATERAL IMPACTED CERUMEN: ICD-10-CM

## 2023-08-01 LAB
BASOPHILS # BLD AUTO: 0.01 K/UL (ref 0–0.2)
BASOPHILS NFR BLD: 0.1 % (ref 0–1.9)
DIFFERENTIAL METHOD: ABNORMAL
EOSINOPHIL # BLD AUTO: 0 K/UL (ref 0–0.5)
EOSINOPHIL NFR BLD: 0.1 % (ref 0–8)
ERYTHROCYTE [DISTWIDTH] IN BLOOD BY AUTOMATED COUNT: 12.8 % (ref 11.5–14.5)
HCT VFR BLD AUTO: 36.7 % (ref 37–48.5)
HGB BLD-MCNC: 11.6 G/DL (ref 12–16)
IMM GRANULOCYTES # BLD AUTO: 0.03 K/UL (ref 0–0.04)
IMM GRANULOCYTES NFR BLD AUTO: 0.4 % (ref 0–0.5)
LYMPHOCYTES # BLD AUTO: 3.1 K/UL (ref 1–4.8)
LYMPHOCYTES NFR BLD: 44.4 % (ref 18–48)
MCH RBC QN AUTO: 29.7 PG (ref 27–31)
MCHC RBC AUTO-ENTMCNC: 31.6 G/DL (ref 32–36)
MCV RBC AUTO: 94 FL (ref 82–98)
MONOCYTES # BLD AUTO: 0.7 K/UL (ref 0.3–1)
MONOCYTES NFR BLD: 10.3 % (ref 4–15)
NEUTROPHILS # BLD AUTO: 3.2 K/UL (ref 1.8–7.7)
NEUTROPHILS NFR BLD: 44.7 % (ref 38–73)
NRBC BLD-RTO: 0 /100 WBC
PLATELET # BLD AUTO: 166 K/UL (ref 150–450)
PMV BLD AUTO: 10.7 FL (ref 9.2–12.9)
RBC # BLD AUTO: 3.9 M/UL (ref 4–5.4)
WBC # BLD AUTO: 7.08 K/UL (ref 3.9–12.7)

## 2023-08-01 PROCEDURE — 99214 PR OFFICE/OUTPT VISIT, EST, LEVL IV, 30-39 MIN: ICD-10-PCS | Mod: HCNC,S$GLB,, | Performed by: INTERNAL MEDICINE

## 2023-08-01 PROCEDURE — 3288F PR FALLS RISK ASSESSMENT DOCUMENTED: ICD-10-PCS | Mod: HCNC,CPTII,S$GLB, | Performed by: INTERNAL MEDICINE

## 2023-08-01 PROCEDURE — 3072F PR LOW RISK FOR RETINOPATHY: ICD-10-PCS | Mod: HCNC,CPTII,S$GLB, | Performed by: INTERNAL MEDICINE

## 2023-08-01 PROCEDURE — 84466 ASSAY OF TRANSFERRIN: CPT | Mod: HCNC | Performed by: INTERNAL MEDICINE

## 2023-08-01 PROCEDURE — 1126F PR PAIN SEVERITY QUANTIFIED, NO PAIN PRESENT: ICD-10-PCS | Mod: HCNC,CPTII,S$GLB, | Performed by: INTERNAL MEDICINE

## 2023-08-01 PROCEDURE — 3072F LOW RISK FOR RETINOPATHY: CPT | Mod: HCNC,CPTII,S$GLB, | Performed by: INTERNAL MEDICINE

## 2023-08-01 PROCEDURE — 99999 PR PBB SHADOW E&M-EST. PATIENT-LVL III: CPT | Mod: PBBFAC,HCNC,, | Performed by: INTERNAL MEDICINE

## 2023-08-01 PROCEDURE — 1101F PR PT FALLS ASSESS DOC 0-1 FALLS W/OUT INJ PAST YR: ICD-10-PCS | Mod: HCNC,CPTII,S$GLB, | Performed by: INTERNAL MEDICINE

## 2023-08-01 PROCEDURE — 3288F FALL RISK ASSESSMENT DOCD: CPT | Mod: HCNC,CPTII,S$GLB, | Performed by: INTERNAL MEDICINE

## 2023-08-01 PROCEDURE — 82607 VITAMIN B-12: CPT | Mod: HCNC | Performed by: INTERNAL MEDICINE

## 2023-08-01 PROCEDURE — 1126F AMNT PAIN NOTED NONE PRSNT: CPT | Mod: HCNC,CPTII,S$GLB, | Performed by: INTERNAL MEDICINE

## 2023-08-01 PROCEDURE — 85025 COMPLETE CBC W/AUTO DIFF WBC: CPT | Mod: HCNC | Performed by: INTERNAL MEDICINE

## 2023-08-01 PROCEDURE — 99999 PR PBB SHADOW E&M-EST. PATIENT-LVL III: ICD-10-PCS | Mod: PBBFAC,HCNC,, | Performed by: INTERNAL MEDICINE

## 2023-08-01 PROCEDURE — 1101F PT FALLS ASSESS-DOCD LE1/YR: CPT | Mod: HCNC,CPTII,S$GLB, | Performed by: INTERNAL MEDICINE

## 2023-08-01 PROCEDURE — 99214 OFFICE O/P EST MOD 30 MIN: CPT | Mod: HCNC,S$GLB,, | Performed by: INTERNAL MEDICINE

## 2023-08-01 PROCEDURE — 80069 RENAL FUNCTION PANEL: CPT | Mod: HCNC | Performed by: INTERNAL MEDICINE

## 2023-08-01 PROCEDURE — 36415 COLL VENOUS BLD VENIPUNCTURE: CPT | Mod: HCNC | Performed by: INTERNAL MEDICINE

## 2023-08-01 RX ORDER — OMEGA-3-ACID ETHYL ESTERS 1 G/1
1 CAPSULE, LIQUID FILLED ORAL 2 TIMES DAILY
Qty: 180 CAPSULE | Refills: 0 | Status: SHIPPED | OUTPATIENT
Start: 2023-08-01 | End: 2023-11-15

## 2023-08-01 RX ORDER — ATORVASTATIN CALCIUM 20 MG/1
20 TABLET, FILM COATED ORAL DAILY
Qty: 90 TABLET | Refills: 3 | Status: SHIPPED | OUTPATIENT
Start: 2023-08-01 | End: 2024-07-31

## 2023-08-02 ENCOUNTER — PATIENT MESSAGE (OUTPATIENT)
Dept: INTERNAL MEDICINE | Facility: CLINIC | Age: 88
End: 2023-08-02
Payer: MEDICARE

## 2023-08-02 LAB
ALBUMIN SERPL BCP-MCNC: 4 G/DL (ref 3.5–5.2)
ANION GAP SERPL CALC-SCNC: 10 MMOL/L (ref 8–16)
BUN SERPL-MCNC: 21 MG/DL (ref 8–23)
CALCIUM SERPL-MCNC: 9.9 MG/DL (ref 8.7–10.5)
CHLORIDE SERPL-SCNC: 107 MMOL/L (ref 95–110)
CO2 SERPL-SCNC: 24 MMOL/L (ref 23–29)
CREAT SERPL-MCNC: 0.8 MG/DL (ref 0.5–1.4)
EST. GFR  (NO RACE VARIABLE): >60 ML/MIN/1.73 M^2
GLUCOSE SERPL-MCNC: 99 MG/DL (ref 70–110)
IRON SERPL-MCNC: 97 UG/DL (ref 30–160)
PHOSPHATE SERPL-MCNC: 2.8 MG/DL (ref 2.7–4.5)
POTASSIUM SERPL-SCNC: 4.4 MMOL/L (ref 3.5–5.1)
SATURATED IRON: 24 % (ref 20–50)
SODIUM SERPL-SCNC: 141 MMOL/L (ref 136–145)
TOTAL IRON BINDING CAPACITY: 403 UG/DL (ref 250–450)
TRANSFERRIN SERPL-MCNC: 272 MG/DL (ref 200–375)
VIT B12 SERPL-MCNC: 531 PG/ML (ref 210–950)

## 2023-08-02 NOTE — PROGRESS NOTES
Subjective:       Patient ID: Sera Boone is a 87 y.o. female.    Chief Complaint: Follow-up    Presents for follow up. Here with her son.     During previous visit her BP had been elevated. We started lisinopril  and checked renal function which had declined. We then  stopped medications and repeat renal function today and it has normalized. BP has remained normal off all BP meds.     States she is feeling vertigo symptoms again. Had cerumen impaction removal with ENT recently and did feel better.     Has also noted her pulse has been low in the low 50s     PMHx:  DMII on metformin last A1C was 6.7 in January 2023     Fibromyalgia started gabapentin during last visit which is helping. Also started PT which has been helping.     Osteoporosis on alendronate     HLD: on statin     Mild iron deficiency anemia has been stable on lab s         Follow-up  Associated symptoms include vertigo. Pertinent negatives include no abdominal pain, arthralgias, chest pain, chills, coughing, headaches, myalgias, nausea or vomiting.     Review of Systems   Constitutional:  Negative for activity change, appetite change and chills.   HENT:  Negative for ear pain, sinus pressure/congestion and sneezing.    Respiratory:  Negative for cough and shortness of breath.    Cardiovascular:  Negative for chest pain, palpitations and leg swelling.   Gastrointestinal:  Negative for abdominal distention, abdominal pain, constipation, diarrhea, nausea and vomiting.   Genitourinary:  Negative for dysuria and hematuria.   Musculoskeletal:  Negative for arthralgias, back pain and myalgias.   Neurological:  Positive for vertigo. Negative for headaches.   Psychiatric/Behavioral:  Negative for agitation. The patient is not nervous/anxious.            Past Medical History:   Diagnosis Date    Anemia 10/19/2020    Arthritis     Breast cancer 2001    Diabetes mellitus     History of breast cancer 8/21/2013    Hyperlipidemia     Hypertension     Lumbar  back pain 3/7/2023    Nuclear sclerotic cataract of right eye 10/16/2012    Osteoporosis, unspecified: see DEXA 8/15- 2/13/2017    Osteoporosis, unspecified: see DEXA 8/15- 2/13/2017    Pain of both shoulder joints 2/14/2017    Rheumatoid factor positive 2/14/2017    Stroke 4/6/2019    Tubular adenoma of colon 10/28/2013    Type II or unspecified type diabetes mellitus with neurological manifestations, not stated as uncontrolled(250.60)     Vitamin D deficiency disease 2/17/2014     Past Surgical History:   Procedure Laterality Date    BELPHAROPTOSIS REPAIR  03/15/13    bilateral-dr. coleman md    BREAST BIOPSY      BREAST LUMPECTOMY      BREAST SURGERY Left 2001    lumpectomy    CATARACT EXTRACTION      both eyes -     CHOLECYSTECTOMY      Done in [place] in the 1980's    COLONOSCOPY N/A 1/19/2016    Procedure: COLONOSCOPY;  Surgeon: BLANCA Guerra MD;  Location: [address];  Service: Endoscopy;  Laterality: N/A;    COLONOSCOPY W/ POLYPECTOMY        Patient Active Problem List   Diagnosis    Hyperlipidemia associated with type 2 diabetes mellitus    Type II diabetes mellitus with neurological manifestations    Ptosis of eyebrow    History of breast cancer    Vitamin D deficiency disease    Hypertension associated with diabetes    History of adenomatous polyp of colon 1/16 no need for further follow up per Dr Guerra    Diabetic polyneuropathy associated with type 2 diabetes mellitus    Fatty liver: see ultrasound 1/17    Rheumatoid factor positive    Stage 3 chronic kidney disease    Spondylosis of cervical region without myelopathy or radiculopathy    Primary osteoarthritis involving multiple joints    Osteopenia/osteoporosis: see DEXA 4/19 tx recommended    DDD (degenerative disc disease), cervical    Bradycardia    CVA (cerebral vascular accident)    Secondary hyperparathyroidism    Episode of transient neurologic symptoms    Anemia    Fibromyalgia    Osteopenia    Adhesive capsulitis of  left shoulder    Lipoma of back    Lumbar back pain        Objective:      Physical Exam  Constitutional:       Appearance: Normal appearance.   HENT:      Head: Normocephalic.      Right Ear: Tympanic membrane normal. There is no impacted cerumen.      Left Ear: Tympanic membrane normal.      Nose: Nose normal.   Cardiovascular:      Rate and Rhythm: Normal rate and regular rhythm.      Pulses: Normal pulses.      Heart sounds: Normal heart sounds.   Pulmonary:      Effort: Pulmonary effort is normal.      Breath sounds: Normal breath sounds.   Abdominal:      General: Abdomen is flat. Bowel sounds are normal.      Palpations: Abdomen is soft.   Musculoskeletal:         General: Normal range of motion.      Cervical back: Normal range of motion and neck supple.   Skin:     General: Skin is warm and dry.   Neurological:      General: No focal deficit present.      Mental Status: She is alert and oriented to person, place, and time.   Psychiatric:         Mood and Affect: Mood normal.         Assessment:       Problem List Items Addressed This Visit          Cardiac/Vascular    Bradycardia    Relevant Orders    EKG 12-lead       Renal/    Stage 3 chronic kidney disease    Relevant Orders    RENAL FUNCTION PANEL (Completed)       Oncology    Anemia    Relevant Orders    CBC W/ AUTO DIFFERENTIAL (Completed)    IRON AND TIBC (Completed)    VITAMIN B12 (Completed)     Other Visit Diagnoses       Essential hypertension    -  Primary    Other vitamin B12 deficiency anemias        Relevant Orders    VITAMIN B12 (Completed)    Bilateral impacted cerumen        Relevant Orders    Ambulatory referral/consult to ENT            Plan:         Sera was seen today for follow-up.    Diagnoses and all orders for this visit:    Essential hypertension  Now off all medications and well controlled.     Stage 3 chronic kidney disease, unspecified whether stage 3a or 3b CKD  Labs checked today and improved since stopping lisinopril      Bradycardia  -     EKG 12-lead; Future  HR in low 50s. Sounds like sinus on exam.   Check EKG.     Anemia, unspecified type  Stable on labs today     Other vitamin B12 deficiency anemias  -     VITAMIN B12; Future    Bilateral impacted cerumen  -     Ambulatory referral/consult to ENT; Future  Likely cause of vertigo    Other orders  -     atorvastatin (LIPITOR) 20 MG tablet; Take 1 tablet (20 mg total) by mouth once daily.  -     omega-3 acid ethyl esters (LOVAZA) 1 gram capsule; Take 1 capsule (1 g total) by mouth 2 (two) times daily.                 Beena Hill MD   Internal Medicine   Primary Care

## 2023-08-10 ENCOUNTER — TELEPHONE (OUTPATIENT)
Dept: OTOLARYNGOLOGY | Facility: CLINIC | Age: 88
End: 2023-08-10
Payer: MEDICARE

## 2023-08-10 NOTE — TELEPHONE ENCOUNTER
----- Message from Sonia Becerra LPN sent at 8/10/2023 10:32 AM CDT -----  Please call to reschedule

## 2023-08-11 ENCOUNTER — HOSPITAL ENCOUNTER (OUTPATIENT)
Dept: CARDIOLOGY | Facility: CLINIC | Age: 88
Discharge: HOME | End: 2023-08-11
Payer: MEDICARE

## 2023-08-11 DIAGNOSIS — R00.1 BRADYCARDIA: ICD-10-CM

## 2023-08-11 PROCEDURE — 93005 ELECTROCARDIOGRAM TRACING: CPT | Mod: HCNC,S$GLB,, | Performed by: INTERNAL MEDICINE

## 2023-08-11 PROCEDURE — 93010 ELECTROCARDIOGRAM REPORT: CPT | Mod: HCNC,S$GLB,, | Performed by: INTERNAL MEDICINE

## 2023-08-11 PROCEDURE — 93010 EKG 12-LEAD: ICD-10-PCS | Mod: HCNC,S$GLB,, | Performed by: INTERNAL MEDICINE

## 2023-08-11 PROCEDURE — 93005 EKG 12-LEAD: ICD-10-PCS | Mod: HCNC,S$GLB,, | Performed by: INTERNAL MEDICINE

## 2023-09-13 ENCOUNTER — OFFICE VISIT (OUTPATIENT)
Dept: OTOLARYNGOLOGY | Facility: CLINIC | Age: 88
End: 2023-09-13
Payer: MEDICARE

## 2023-09-13 VITALS
RESPIRATION RATE: 18 BRPM | WEIGHT: 134.69 LBS | HEART RATE: 63 BPM | DIASTOLIC BLOOD PRESSURE: 76 MMHG | BODY MASS INDEX: 22.44 KG/M2 | HEIGHT: 65 IN | SYSTOLIC BLOOD PRESSURE: 155 MMHG

## 2023-09-13 DIAGNOSIS — H81.11 BENIGN PAROXYSMAL POSITIONAL VERTIGO OF RIGHT EAR: ICD-10-CM

## 2023-09-13 DIAGNOSIS — H90.3 SENSORINEURAL HEARING LOSS (SNHL) OF BOTH EARS: ICD-10-CM

## 2023-09-13 DIAGNOSIS — H61.893 DRY BOTH EAR CANALS: ICD-10-CM

## 2023-09-13 DIAGNOSIS — H61.23 BILATERAL IMPACTED CERUMEN: Primary | ICD-10-CM

## 2023-09-13 PROCEDURE — 99215 OFFICE O/P EST HI 40 MIN: CPT | Mod: 25,S$GLB,, | Performed by: PHYSICIAN ASSISTANT

## 2023-09-13 PROCEDURE — 95992 PR CANALITH REPOSITIONING PROCEDURE, PER DAY: ICD-10-PCS | Mod: S$GLB,,, | Performed by: PHYSICIAN ASSISTANT

## 2023-09-13 PROCEDURE — 1160F RVW MEDS BY RX/DR IN RCRD: CPT | Mod: CPTII,S$GLB,, | Performed by: PHYSICIAN ASSISTANT

## 2023-09-13 PROCEDURE — 99999 PR PBB SHADOW E&M-EST. PATIENT-LVL IV: CPT | Mod: PBBFAC,,, | Performed by: PHYSICIAN ASSISTANT

## 2023-09-13 PROCEDURE — 1159F MED LIST DOCD IN RCRD: CPT | Mod: CPTII,S$GLB,, | Performed by: PHYSICIAN ASSISTANT

## 2023-09-13 PROCEDURE — 95992 CANALITH REPOSITIONING PROC: CPT | Mod: S$GLB,,, | Performed by: PHYSICIAN ASSISTANT

## 2023-09-13 PROCEDURE — 3288F FALL RISK ASSESSMENT DOCD: CPT | Mod: CPTII,S$GLB,, | Performed by: PHYSICIAN ASSISTANT

## 2023-09-13 PROCEDURE — 1101F PT FALLS ASSESS-DOCD LE1/YR: CPT | Mod: CPTII,S$GLB,, | Performed by: PHYSICIAN ASSISTANT

## 2023-09-13 PROCEDURE — 69210 REMOVE IMPACTED EAR WAX UNI: CPT | Mod: S$GLB,,, | Performed by: PHYSICIAN ASSISTANT

## 2023-09-13 PROCEDURE — 1101F PR PT FALLS ASSESS DOC 0-1 FALLS W/OUT INJ PAST YR: ICD-10-PCS | Mod: CPTII,S$GLB,, | Performed by: PHYSICIAN ASSISTANT

## 2023-09-13 PROCEDURE — 99215 PR OFFICE/OUTPT VISIT, EST, LEVL V, 40-54 MIN: ICD-10-PCS | Mod: 25,S$GLB,, | Performed by: PHYSICIAN ASSISTANT

## 2023-09-13 PROCEDURE — 69210 EAR CERUMEN REMOVAL: ICD-10-PCS | Mod: S$GLB,,, | Performed by: PHYSICIAN ASSISTANT

## 2023-09-13 PROCEDURE — 1159F PR MEDICATION LIST DOCUMENTED IN MEDICAL RECORD: ICD-10-PCS | Mod: CPTII,S$GLB,, | Performed by: PHYSICIAN ASSISTANT

## 2023-09-13 PROCEDURE — 1160F PR REVIEW ALL MEDS BY PRESCRIBER/CLIN PHARMACIST DOCUMENTED: ICD-10-PCS | Mod: CPTII,S$GLB,, | Performed by: PHYSICIAN ASSISTANT

## 2023-09-13 PROCEDURE — 3072F PR LOW RISK FOR RETINOPATHY: ICD-10-PCS | Mod: CPTII,S$GLB,, | Performed by: PHYSICIAN ASSISTANT

## 2023-09-13 PROCEDURE — 3288F PR FALLS RISK ASSESSMENT DOCUMENTED: ICD-10-PCS | Mod: CPTII,S$GLB,, | Performed by: PHYSICIAN ASSISTANT

## 2023-09-13 PROCEDURE — 3072F LOW RISK FOR RETINOPATHY: CPT | Mod: CPTII,S$GLB,, | Performed by: PHYSICIAN ASSISTANT

## 2023-09-13 PROCEDURE — 99999 PR PBB SHADOW E&M-EST. PATIENT-LVL IV: ICD-10-PCS | Mod: PBBFAC,,, | Performed by: PHYSICIAN ASSISTANT

## 2023-09-13 NOTE — PROGRESS NOTES
[facility]    Subjective:      Patient: Sera Boone Patient PCP: Beena Hill MD         :  1935     Sex:  female      MRN:  [id number]          Date of Visit: 2023      Chief Complaint: Cerumen Impaction    Patient ID: Sera Boone is a 87 y.o. female who presents in office today for bilateral cerumen impaction. Pt speaks Tu[place]uan and has family member as an . Pt has history of bilateral SNHL and does not wear hearing aids at this time. She has had issues with tinnitus in the past and has been seen by ENT. She had audiogram 2023 showing bilateral mild sloping to severe SNHL. Pt had type A tymps bilaterally and 100% speech discrimination scores bilaterally. Pt states that she has been having issues with positional vertigo with head movements for around 1 month     Past Medical History  She has a past medical history of Anemia, Arthritis, Breast cancer, Diabetes mellitus, History of breast cancer, Hyperlipidemia, Hypertension, Lumbar back pain, Nuclear sclerotic cataract of right eye, Osteoporosis, unspecified: see DEXA 8/15-, Osteoporosis, unspecified: see DEXA 8/15-, Pain of both shoulder joints, Rheumatoid factor positive, Stroke, Tubular adenoma of colon, Type II or unspecified type diabetes mellitus with neurological manifestations, not stated as uncontrolled(250.60), and Vitamin D deficiency disease.    Family History  Her family history includes Breast cancer (age of onset: 48) in her sister; Cancer in her brother, brother, father, mother, and sister; Early death in her mother; Liver cancer in her brother, brother, father, and mother; No Known Problems in her daughter, daughter, maternal aunt, maternal grandfather, maternal grandmother, maternal uncle, paternal aunt, paternal grandfather, paternal grandmother, paternal uncle, and son.    Past Surgical History:   Procedure Laterality Date    BELPHAROPTOSIS REPAIR  03/15/13    bilateral-dr. coleman md     "BREAST BIOPSY      BREAST LUMPECTOMY      BREAST SURGERY Left 2001    lumpectomy    CATARACT EXTRACTION      both eyes -     CHOLECYSTECTOMY      Done in [place] in the 1980's    COLONOSCOPY N/A 1/19/2016    Procedure: COLONOSCOPY;  Surgeon: BLANCA Guerra MD;  Location: [address];  Service: Endoscopy;  Laterality: N/A;    COLONOSCOPY W/ POLYPECTOMY       Social History     Tobacco Use    Smoking status: Never    Smokeless tobacco: Never   Substance and Sexual Activity    Alcohol use: No    Drug use: No    Sexual activity: Never     Birth control/protection: Post-menopausal     Medications  She has a current medication list which includes the following prescription(s): alendronate, atorvastatin, blood sugar diagnostic, blood-glucose meter, cholecalciferol (vitamin d3), cyclosporine, diclofenac sodium, gabapentin, icosapent ethyl, lidocaine, metformin, micro thin lancets, mupirocin, naproxen, omega-3 acid ethyl esters, sertraline, and trueplus lancets.    Review of patient's allergies indicates:   Allergen Reactions    No known drug allergies      All medications, allergies, and past history have been reviewed.    Objective:      Vitals:      6/20/2023     9:07 AM 8/1/2023     3:14 PM 9/13/2023     8:57 AM   Vitals - 1 value per visit   SYSTOLIC 131 132 155   DIASTOLIC 64 70 76   Pulse 59 53 63   Resp   18   SPO2  98 %    Weight (lb) 138.01 136.47 134.7   Weight (kg) 62.6 61.9 61.1   Height  5' 5" (1.651 m) 5' 5" (1.651 m)   BMI (Calculated)  22.7 22.4   Pain Score Zero Zero        Body surface area is 1.67 meters squared.    Physical Exam  Constitutional:       General: She is not in acute distress.     Appearance: Normal appearance. She is not ill-appearing.   HENT:      Head: Normocephalic and atraumatic.      Right Ear: Tympanic membrane, ear canal and external ear normal. There is impacted cerumen.      Left Ear: Tympanic membrane, ear canal and external ear normal. There is impacted " cerumen.      Ears:      Comments: Dry ear canals bilaterally.      Nose: Nose normal.      Mouth/Throat:      Lips: Pink. No lesions.      Mouth: Mucous membranes are moist. No oral lesions.      Tongue: No lesions.      Palate: No lesions.      Pharynx: Oropharynx is clear. Uvula midline. No pharyngeal swelling, oropharyngeal exudate, posterior oropharyngeal erythema or uvula swelling.   Eyes:      General:         Right eye: No discharge.         Left eye: No discharge.      Extraocular Movements: Extraocular movements intact.      Conjunctiva/sclera: Conjunctivae normal.   Pulmonary:      Effort: Pulmonary effort is normal.   Neurological:      General: No focal deficit present.      Mental Status: She is alert and oriented to person, place, and time. Mental status is at baseline.   Psychiatric:         Mood and Affect: Mood normal.         Behavior: Behavior normal.         Thought Content: Thought content normal.         Judgment: Judgment normal.       Ear Cerumen Removal     Date/Time: 2023 9:15 AM     Performed by: Morris Santa PA-C  Authorized by: Morris Santa PA-C    Consent Done?:  Yes (Verbal)     Local anesthetic:  None  Location details:  Both ears  Procedure type comment:  Irrigation and suction  Cerumen  Removal Results:  Cerumen completely removed  Patient tolerance:  Patient tolerated the procedure well with no immediate complications    Patient: Sera Boone [id number], :1935  Procedure date:2023  Patient's medications, allergies, past medical, surgical, social and family histories were reviewed and updated as appropriate.  Chief Complaint:  Right posterior semicircular canal BPPV    HPI:  Sera is a 87 y.o. female with benign paroxysmal positional vertigo (BPPV) refractory to medical therapy. Hallpike-Carolann maneuver demonstrates nystagmus of delayed onset that fatigues, rotary, clockwise, associated with vertigo.    Procedure: Risks, benefits, and  alternatives of the procedure were discussed with the patient, and the patient consented to the Epley maneuver or canalith repositioning procedure (CRP).      With the patient sitting upright on the examination table, the head was lowered to the supine position and the neck rotated 45 degrees to the right side; once nystagmus fatigued, the body and head were slowly rotated to the opposite side 90 degrees, and then after 20-30 seconds the rotation continued another 90 degrees (they rolled onto their shoulder and were looking downwards at a 45 degree angle). After the nystagmus resolved, the patient was gently allowed to sit up with neck flexion.    There were no complications and the patient tolerated it well.  Post-procedure instructions were given.    Morris Santa performed the entire procedure.      Labs:  WBC   Date Value Ref Range Status   08/01/2023 7.08 3.90 - 12.70 K/uL Final     Platelets   Date Value Ref Range Status   08/01/2023 166 150 - 450 K/uL Final     Creatinine   Date Value Ref Range Status   08/01/2023 0.8 0.5 - 1.4 mg/dL Final     TSH   Date Value Ref Range Status   08/22/2019 0.908 0.400 - 4.000 uIU/mL Final     Glucose   Date Value Ref Range Status   08/01/2023 99 70 - 110 mg/dL Final     Hemoglobin A1C   Date Value Ref Range Status   01/31/2023 6.7 (H) 4.0 - 5.6 % Final     Comment:     ADA Screening Guidelines:  5.7-6.4%  Consistent with prediabetes  >or=6.5%  Consistent with diabetes    High levels of fetal hemoglobin interfere with the HbA1C  assay. Heterozygous hemoglobin variants (HbS, HgC, etc)do  not significantly interfere with this assay.   However, presence of multiple variants may affect accuracy.         Audiogram Summary:      All lab results and imaging results have been reviewed.    Assessment:        ICD-10-CM ICD-9-CM   1. Bilateral impacted cerumen  H61.23 380.4   2. Dry both ear canals  H61.893 380.89   3. Sensorineural hearing loss (SNHL) of both ears  H90.3 389.18    4. Benign paroxysmal positional vertigo of right ear  H81.11 386.11              Plan:      Bilateral impacted cerumen  -     Ear Cerumen Removal performed today in office via irrigation and suction.     Dry both ear canals  -Apply 3-4 drops of baby oil or mineral oil to both ear canals for around 30 seconds to 1 minute at night and then pat dry excess oil with kleenex. This will help prevent dry skin in ear canals.     Sensorineural hearing loss (SNHL) of both ears  -Pt will be due for her updated audiogram on or after 04/19/2024. Our office will place reminder letter for annual audiogram to be sent to pt.     Benign paroxysmal positional vertigo of right ear  -Right posterior semicircular canal BPPV treated with right sided epley x 2. Pt is to follow post-epley instructions for 2 days and start at home epley as advised in AVS starting 2 days from now.      Follow up in 2 weeks to ensure that right posterior semicircular canal BPPV has resolved.

## 2023-09-13 NOTE — PROCEDURES
Ear Cerumen Removal    Date/Time: 9/13/2023 9:15 AM    Performed by: Morris Santa PA-C  Authorized by: Morris Santa PA-C    Consent Done?:  Yes (Verbal)    Local anesthetic:  None  Location details:  Both ears  Procedure type comment:  Irrigation and suction  Cerumen  Removal Results:  Cerumen completely removed  Patient tolerance:  Patient tolerated the procedure well with no immediate complications

## 2023-09-13 NOTE — PATIENT INSTRUCTIONS
"Apply 3-4 drops of baby oil or mineral oil to both ear canals for around 30 seconds to 1 minute at night and then pat dry excess oil with kleenex. This will help prevent dry skin in ear canals.     Patient Instruction After Office Treatments (Epley or Semont maneuvers)     It takes time for the debris to settle in the correct location (think of a snow globe). Avoid driving yourself home.        Sleep semi-recumbent for the next 48 hours.  Sleep at a 45 degree angle (eg. Chair).  Stay vertical during the day.  Do not got to the dentist or hairdresser.       No exercise for a week.    For 48 hours, avoid up and down head movements (this includes nodding) and avoid bending over or bending down. If you drop something and are unable to squat down to reach it, then have someone pick it up for you or leave it there. You may turn your head left and right slowly, but avoid sudden head movements left or right for 48 hours.  Use a couple of pillows when you sleep for the next 48 hours.  Avoid sleeping on the "bad" side.    Wait at least 2 days before doing the Demarco-Daroff exercise or home epley maneuver unless otherwise instructed by your physician. If you are symptomatic, you may do the exercise 3 times to try to alleviate symptoms. If you are corrected today in office and are no longer having vertigo with head movements, then do the exercise once 2 times daily for 1 week prophylactically. Complete the exercises 3 times before bed that way if you are dizzy symptoms can resolve while sleeping. Repeat every night for a week.    At one week post-treatment, put yourself in the position that usually makes you dizzy under conditions in which you can't fall or hurt yourself. Let your doctor know how you did.                "

## 2023-09-27 ENCOUNTER — OFFICE VISIT (OUTPATIENT)
Dept: OPTOMETRY | Facility: CLINIC | Age: 88
End: 2023-09-27
Payer: COMMERCIAL

## 2023-09-27 DIAGNOSIS — H18.513 FUCHS' CORNEAL DYSTROPHY OF BOTH EYES: ICD-10-CM

## 2023-09-27 DIAGNOSIS — Z96.1 PSEUDOPHAKIA: ICD-10-CM

## 2023-09-27 DIAGNOSIS — H35.3131 EARLY DRY STAGE NONEXUDATIVE AGE-RELATED MACULAR DEGENERATION OF BOTH EYES: ICD-10-CM

## 2023-09-27 DIAGNOSIS — E11.9 DIABETES MELLITUS TYPE 2 WITHOUT RETINOPATHY: ICD-10-CM

## 2023-09-27 DIAGNOSIS — Z01.00 EXAMINATION OF EYES AND VISION: Primary | ICD-10-CM

## 2023-09-27 DIAGNOSIS — H04.123 DRY EYE SYNDROME, BILATERAL: ICD-10-CM

## 2023-09-27 DIAGNOSIS — H52.7 REFRACTIVE ERROR: ICD-10-CM

## 2023-09-27 PROCEDURE — 99999 PR PBB SHADOW E&M-EST. PATIENT-LVL III: CPT | Mod: PBBFAC,,, | Performed by: OPTOMETRIST

## 2023-09-27 PROCEDURE — 92014 COMPRE OPH EXAM EST PT 1/>: CPT | Mod: S$GLB,,, | Performed by: OPTOMETRIST

## 2023-09-27 PROCEDURE — 92015 PR REFRACTION: ICD-10-PCS | Mod: S$GLB,,, | Performed by: OPTOMETRIST

## 2023-09-27 PROCEDURE — 92014 PR EYE EXAM, EST PATIENT,COMPREHESV: ICD-10-PCS | Mod: S$GLB,,, | Performed by: OPTOMETRIST

## 2023-09-27 PROCEDURE — 92015 DETERMINE REFRACTIVE STATE: CPT | Mod: S$GLB,,, | Performed by: OPTOMETRIST

## 2023-09-27 PROCEDURE — 99999 PR PBB SHADOW E&M-EST. PATIENT-LVL III: ICD-10-PCS | Mod: PBBFAC,,, | Performed by: OPTOMETRIST

## 2023-09-27 NOTE — PROGRESS NOTES
HPI     Annual Exam     Additional comments: LDE: 03/31/2022           Comments    88 YO female presents today for an annual eye exam. Patient is with son   who is interpreting visit. Patient states that she was prescribed drops in   the past but they did not sit well with her so switched to OTC. Notes that   she is still having discomfort in her eyes, but it is manageable with   drops.     Systane OU TID     S/P CE w/IOL OU     Hemoglobin A1C       Date                     Value               Ref Range             Status                01/31/2023               6.7 (H)             4.0 - 5.6 %           Final                  11/02/2022               7.0 (H)             4.0 - 5.6 %           Final                  01/03/2022               7.1 (H)             4.0 - 5.6 %           Final                      Last edited by Shirin Ontiveros on 9/27/2023  8:00 AM.            Assessment /Plan     For exam results, see Encounter Report.    Examination of eyes and vision    Diabetes mellitus type 2 without retinopathy    Pseudophakia    Refractive error    Dry eye syndrome, bilateral    Early dry stage nonexudative age-related macular degeneration of both eyes    Fuchs' corneal dystrophy of both eyes      1. Examination of eyes and vision    2. Diabetes mellitus type 2 without retinopathy  Discussed possible ocular affects of uncontrolled blood sugar with patient. Recommended continued strong blood sugar control and continued care with PCP. Monitor yearly.     3. Pseudophakia  S/p cataract extraction, no PCO    4. Refractive error  Happy with uncorrected vision and otc readers  Dispensed spec rx prn    5. Dry eye syndrome, bilateral  Discussed ocular affects of dry eyes.   Well controlled with otc ATs  Continue OTC Systane artificial tears 3-4 times a day in both eyes.   RTC if symptoms not alleviated by continued use of artificial tears.     6. Early dry stage nonexudative age-related macular degeneration of both eyes  Mild  druse and RPE mottling   Start otc AREDS 2 vitamins as directed  Observe yearly    7. Fuchs' corneal dystrophy of both eyes  Mild -- ptt asymptomatic  Observe yearly

## 2023-09-28 ENCOUNTER — OFFICE VISIT (OUTPATIENT)
Dept: OTOLARYNGOLOGY | Facility: CLINIC | Age: 88
End: 2023-09-28
Payer: MEDICARE

## 2023-09-28 VITALS
WEIGHT: 134.06 LBS | DIASTOLIC BLOOD PRESSURE: 66 MMHG | SYSTOLIC BLOOD PRESSURE: 134 MMHG | HEART RATE: 54 BPM | BODY MASS INDEX: 22.31 KG/M2

## 2023-09-28 DIAGNOSIS — H81.11 BENIGN PAROXYSMAL POSITIONAL VERTIGO OF RIGHT EAR: ICD-10-CM

## 2023-09-28 DIAGNOSIS — H90.3 SENSORINEURAL HEARING LOSS (SNHL), BILATERAL: Primary | ICD-10-CM

## 2023-09-28 PROCEDURE — 3288F PR FALLS RISK ASSESSMENT DOCUMENTED: ICD-10-PCS | Mod: CPTII,S$GLB,, | Performed by: PHYSICIAN ASSISTANT

## 2023-09-28 PROCEDURE — 99999 PR PBB SHADOW E&M-EST. PATIENT-LVL III: CPT | Mod: PBBFAC,,, | Performed by: PHYSICIAN ASSISTANT

## 2023-09-28 PROCEDURE — 99213 PR OFFICE/OUTPT VISIT, EST, LEVL III, 20-29 MIN: ICD-10-PCS | Mod: S$GLB,,, | Performed by: PHYSICIAN ASSISTANT

## 2023-09-28 PROCEDURE — 3288F FALL RISK ASSESSMENT DOCD: CPT | Mod: CPTII,S$GLB,, | Performed by: PHYSICIAN ASSISTANT

## 2023-09-28 PROCEDURE — 1101F PT FALLS ASSESS-DOCD LE1/YR: CPT | Mod: CPTII,S$GLB,, | Performed by: PHYSICIAN ASSISTANT

## 2023-09-28 PROCEDURE — 1159F PR MEDICATION LIST DOCUMENTED IN MEDICAL RECORD: ICD-10-PCS | Mod: CPTII,S$GLB,, | Performed by: PHYSICIAN ASSISTANT

## 2023-09-28 PROCEDURE — 1160F PR REVIEW ALL MEDS BY PRESCRIBER/CLIN PHARMACIST DOCUMENTED: ICD-10-PCS | Mod: CPTII,S$GLB,, | Performed by: PHYSICIAN ASSISTANT

## 2023-09-28 PROCEDURE — 1160F RVW MEDS BY RX/DR IN RCRD: CPT | Mod: CPTII,S$GLB,, | Performed by: PHYSICIAN ASSISTANT

## 2023-09-28 PROCEDURE — 1159F MED LIST DOCD IN RCRD: CPT | Mod: CPTII,S$GLB,, | Performed by: PHYSICIAN ASSISTANT

## 2023-09-28 PROCEDURE — 99213 OFFICE O/P EST LOW 20 MIN: CPT | Mod: S$GLB,,, | Performed by: PHYSICIAN ASSISTANT

## 2023-09-28 PROCEDURE — 1101F PR PT FALLS ASSESS DOC 0-1 FALLS W/OUT INJ PAST YR: ICD-10-PCS | Mod: CPTII,S$GLB,, | Performed by: PHYSICIAN ASSISTANT

## 2023-09-28 PROCEDURE — 99999 PR PBB SHADOW E&M-EST. PATIENT-LVL III: ICD-10-PCS | Mod: PBBFAC,,, | Performed by: PHYSICIAN ASSISTANT

## 2023-09-28 NOTE — PROGRESS NOTES
[facility]    Subjective:      Patient: Sera Boone Patient PCP: Beena Hill MD         :  1935     Sex:  female      MRN:  [id number]          Date of Visit: 2023      Chief Complaint: Right posterior semicircular canal BPPV    Interval History 2023: Pt is doing better today. No dizziness. She has been doing well since undergoing right sided epley maneuver. She has no new complaints in office.     Patient ID: Sera Boone is a 87 y.o. female who presents in office today for bilateral cerumen impaction. Pt speaks [place] and has family member as an . Pt has history of bilateral SNHL and does not wear hearing aids at this time. She has had issues with tinnitus in the past and has been seen by ENT. She had audiogram 2023 showing bilateral mild sloping to severe SNHL. Pt had type A tymps bilaterally and 100% speech discrimination scores bilaterally. Pt states that she has been having issues with positional vertigo with head movements for around 1 month     Past Medical History  She has a past medical history of Anemia, Arthritis, Breast cancer, Diabetes mellitus, History of breast cancer, Hyperlipidemia, Hypertension, Lumbar back pain, Nuclear sclerotic cataract of right eye, Osteoporosis, unspecified: see DEXA 8/15-, Osteoporosis, unspecified: see DEXA 8/15-, Pain of both shoulder joints, Rheumatoid factor positive, Stroke, Tubular adenoma of colon, Type II or unspecified type diabetes mellitus with neurological manifestations, not stated as uncontrolled(250.60), and Vitamin D deficiency disease.    Family History  Her family history includes Breast cancer (age of onset: 48) in her sister; Cancer in her brother, brother, father, mother, and sister; Early death in her mother; Liver cancer in her brother, brother, father, and mother; No Known Problems in her daughter, daughter, maternal aunt, maternal grandfather, maternal grandmother, maternal uncle, paternal  "aunt, paternal grandfather, paternal grandmother, paternal uncle, and son.    Past Surgical History:   Procedure Laterality Date    BELPHAROPTOSIS REPAIR  03/15/13    bilateral-dr. coleman md    BREAST BIOPSY      BREAST LUMPECTOMY      BREAST SURGERY Left 2001    lumpectomy    CATARACT EXTRACTION      both eyes -     CHOLECYSTECTOMY      Done in [place] in the 1980's    COLONOSCOPY N/A 1/19/2016    Procedure: COLONOSCOPY;  Surgeon: BLANCA Guerra MD;  Location: [address];  Service: Endoscopy;  Laterality: N/A;    COLONOSCOPY W/ POLYPECTOMY       Social History     Tobacco Use    Smoking status: Never    Smokeless tobacco: Never   Substance and Sexual Activity    Alcohol use: No    Drug use: No    Sexual activity: Never     Birth control/protection: Post-menopausal     Medications  She has a current medication list which includes the following prescription(s): alendronate, atorvastatin, blood sugar diagnostic, blood-glucose meter, cholecalciferol (vitamin d3), diclofenac sodium, gabapentin, icosapent ethyl, lidocaine, metformin, micro thin lancets, mupirocin, naproxen, omega-3 acid ethyl esters, sertraline, trueplus lancets, and cyclosporine.    Review of patient's allergies indicates:   Allergen Reactions    No known drug allergies      All medications, allergies, and past history have been reviewed.    Objective:      Vitals:      9/13/2023     8:57 AM 9/27/2023     7:59 AM 9/28/2023     8:17 AM   Vitals - 1 value per visit   SYSTOLIC 155  134   DIASTOLIC 76  66   Pulse 63  54   Resp 18     Weight (lb) 134.7  134.04   Weight (kg) 61.1  60.8   Height 5' 5" (1.651 m)     BMI (Calculated) 22.4     Pain Score  Zero        Body surface area is 1.67 meters squared.    Physical Exam  Constitutional:       General: She is not in acute distress.     Appearance: Normal appearance. She is not ill-appearing.   HENT:      Head: Normocephalic and atraumatic.      Right Ear: Tympanic membrane, ear canal " and external ear normal.      Left Ear: Tympanic membrane, ear canal and external ear normal.      Nose: Nose normal.      Mouth/Throat:      Lips: Pink. No lesions.      Mouth: Mucous membranes are moist. No oral lesions.      Tongue: No lesions.      Palate: No lesions.      Pharynx: Oropharynx is clear. Uvula midline. No pharyngeal swelling, oropharyngeal exudate, posterior oropharyngeal erythema or uvula swelling.   Eyes:      General:         Right eye: No discharge.         Left eye: No discharge.      Extraocular Movements: Extraocular movements intact.      Conjunctiva/sclera: Conjunctivae normal.   Pulmonary:      Effort: Pulmonary effort is normal.   Neurological:      General: No focal deficit present.      Mental Status: She is alert and oriented to person, place, and time. Mental status is at baseline.   Psychiatric:         Mood and Affect: Mood normal.         Behavior: Behavior normal.         Thought Content: Thought content normal.         Judgment: Judgment normal.       Carolann-Hallpike: Negative for BPPV bilaterally.      Labs:  WBC   Date Value Ref Range Status   08/01/2023 7.08 3.90 - 12.70 K/uL Final     Platelets   Date Value Ref Range Status   08/01/2023 166 150 - 450 K/uL Final     Creatinine   Date Value Ref Range Status   08/01/2023 0.8 0.5 - 1.4 mg/dL Final     TSH   Date Value Ref Range Status   08/22/2019 0.908 0.400 - 4.000 uIU/mL Final     Glucose   Date Value Ref Range Status   08/01/2023 99 70 - 110 mg/dL Final     Hemoglobin A1C   Date Value Ref Range Status   01/31/2023 6.7 (H) 4.0 - 5.6 % Final     Comment:     ADA Screening Guidelines:  5.7-6.4%  Consistent with prediabetes  >or=6.5%  Consistent with diabetes    High levels of fetal hemoglobin interfere with the HbA1C  assay. Heterozygous hemoglobin variants (HbS, HgC, etc)do  not significantly interfere with this assay.   However, presence of multiple variants may affect accuracy.         Audiogram Summary:      All lab results  and imaging results have been reviewed.    Assessment:        ICD-10-CM ICD-9-CM   1. Sensorineural hearing loss (SNHL), bilateral  H90.3 389.18   2. Benign paroxysmal positional vertigo of right ear  H81.11 386.11     Plan:      Right BPPV resolved s/p right sided epley maneuver. Pt is to proceed with annual audiograms to monitor bilateral SNHL. She may come in as needed prior to annual audiograms for ENT issues/concerns.

## 2023-10-03 ENCOUNTER — TELEPHONE (OUTPATIENT)
Dept: INTERNAL MEDICINE | Facility: CLINIC | Age: 88
End: 2023-10-03
Payer: MEDICARE

## 2023-10-03 NOTE — TELEPHONE ENCOUNTER
Spoke to pt's son Bienvenido and scheduled f/u appt for 12/06/23.    ----- Message from Beena Hill MD sent at 10/3/2023  2:25 PM CDT -----  Can you let her know this EKG looks over all fine. She should follow up with me in 1-2 months.     Thanks

## 2023-10-26 NOTE — TELEPHONE ENCOUNTER
Refill Routing Note   Medication(s) are not appropriate for processing by [facility] for the following reason(s):      New or recently adjusted medication    [facility] action(s):  Defer Care Due:  Labs due            Appointments  past 12m or future 3m with PCP    Date Provider   Last Visit   8/1/2023 Beena Hill MD   Next Visit   12/6/2023 Beena Hill MD   ED visits in past 90 days: 0        Note composed:11:48 AM 10/26/2023

## 2023-10-26 NOTE — TELEPHONE ENCOUNTER
Care Due:                  Date            Visit Type   Department     Provider  --------------------------------------------------------------------------------                                EP -                              PRIMARY      [facility] INTERNAL  Last Visit: 08-      CARE ([facility])   KENDRICK Hill                              [facility]                              PRIMARY      [facility] INTERNAL  Next Visit: 12-      CARE ([facility])   [facility]       Beena Hill                                                            Last  Test          Frequency    Reason                     Performed    Due Date  --------------------------------------------------------------------------------    CMP.........  12 months..  atorvastatin, omega-3....  11-   10-    HBA1C.......  6 months...  metFORMIN................  02- 07-    [facility] Embedded Care Due Messages. Reference number: [id number].   10/26/2023 11:38:52 AM CDT

## 2023-11-01 ENCOUNTER — TELEPHONE (OUTPATIENT)
Dept: INTERNAL MEDICINE | Facility: CLINIC | Age: 88
End: 2023-11-01
Payer: MEDICARE

## 2023-11-15 RX ORDER — OMEGA-3-ACID ETHYL ESTERS 1 G/1
1 CAPSULE, LIQUID FILLED ORAL 2 TIMES DAILY
Qty: 180 CAPSULE | Refills: 0 | Status: SHIPPED | OUTPATIENT
Start: 2023-11-15 | End: 2024-02-15

## 2023-12-06 ENCOUNTER — OFFICE VISIT (OUTPATIENT)
Dept: INTERNAL MEDICINE | Facility: CLINIC | Age: 88
End: 2023-12-06
Payer: MEDICARE

## 2023-12-06 ENCOUNTER — HOSPITAL ENCOUNTER (OUTPATIENT)
Dept: RADIOLOGY | Facility: HOSPITAL | Age: 88
Discharge: HOME | End: 2023-12-06
Attending: INTERNAL MEDICINE
Payer: MEDICARE

## 2023-12-06 ENCOUNTER — PATIENT MESSAGE (OUTPATIENT)
Dept: INTERNAL MEDICINE | Facility: CLINIC | Age: 88
End: 2023-12-06

## 2023-12-06 VITALS
SYSTOLIC BLOOD PRESSURE: 128 MMHG | HEART RATE: 82 BPM | DIASTOLIC BLOOD PRESSURE: 68 MMHG | WEIGHT: 132.5 LBS | HEIGHT: 62 IN | TEMPERATURE: 99 F | BODY MASS INDEX: 24.38 KG/M2 | OXYGEN SATURATION: 97 %

## 2023-12-06 DIAGNOSIS — E11.49 TYPE II DIABETES MELLITUS WITH NEUROLOGICAL MANIFESTATIONS: ICD-10-CM

## 2023-12-06 DIAGNOSIS — R05.9 COUGH, UNSPECIFIED TYPE: ICD-10-CM

## 2023-12-06 DIAGNOSIS — R05.9 COUGH, UNSPECIFIED TYPE: Primary | ICD-10-CM

## 2023-12-06 DIAGNOSIS — N18.30 STAGE 3 CHRONIC KIDNEY DISEASE, UNSPECIFIED WHETHER STAGE 3A OR 3B CKD: ICD-10-CM

## 2023-12-06 DIAGNOSIS — I10 ESSENTIAL HYPERTENSION: ICD-10-CM

## 2023-12-06 PROCEDURE — 71046 X-RAY EXAM CHEST 2 VIEWS: CPT | Mod: TC

## 2023-12-06 PROCEDURE — 71046 XR CHEST PA AND LATERAL: ICD-10-PCS | Mod: 26,,, | Performed by: STUDENT IN AN ORGANIZED HEALTH CARE EDUCATION/TRAINING PROGRAM

## 2023-12-06 PROCEDURE — 1126F AMNT PAIN NOTED NONE PRSNT: CPT | Mod: CPTII,S$GLB,, | Performed by: INTERNAL MEDICINE

## 2023-12-06 PROCEDURE — 99999 PR PBB SHADOW E&M-EST. PATIENT-LVL III: CPT | Mod: PBBFAC,,, | Performed by: INTERNAL MEDICINE

## 2023-12-06 PROCEDURE — 3072F LOW RISK FOR RETINOPATHY: CPT | Mod: CPTII,S$GLB,, | Performed by: INTERNAL MEDICINE

## 2023-12-06 PROCEDURE — 99214 OFFICE O/P EST MOD 30 MIN: CPT | Mod: S$GLB,,, | Performed by: INTERNAL MEDICINE

## 2023-12-06 PROCEDURE — 71046 X-RAY EXAM CHEST 2 VIEWS: CPT | Mod: 26,,, | Performed by: STUDENT IN AN ORGANIZED HEALTH CARE EDUCATION/TRAINING PROGRAM

## 2023-12-06 RX ORDER — BENZONATATE 100 MG/1
100 CAPSULE ORAL 3 TIMES DAILY PRN
Qty: 30 CAPSULE | Refills: 0 | Status: SHIPPED | OUTPATIENT
Start: 2023-12-06 | End: 2023-12-16

## 2023-12-06 RX ORDER — AMOXICILLIN AND CLAVULANATE POTASSIUM 875; 125 MG/1; MG/1
1 TABLET, FILM COATED ORAL 2 TIMES DAILY
Qty: 14 TABLET | Refills: 0 | Status: SHIPPED | OUTPATIENT
Start: 2023-12-06 | End: 2023-12-13

## 2023-12-06 NOTE — PROGRESS NOTES
Subjective:       Patient ID: Sera Boone is a 88 y.o. female.    Chief Complaint: Follow-up and Cough    Has been having worsening cough over the last two weeks. Productive of green sputum. Cough is keeping her up at night. Denies fevers or shortness of breath. Feeling tired related to not being able to sleep due to cough.     PMHx:  DMII on metformin last A1C was 6.7 in January 2023      Fibromyalgia started gabapentin during last visit which is helping. Also started PT which has been helping.      Osteoporosis on alendronate      HLD: on statin      Mild iron deficiency anemia has been stable on lab s        Hx or Vertigo relate cerumen impaction.     Hypertension: now doing well off medications.     Follow-up  Associated symptoms include coughing. Pertinent negatives include no abdominal pain, arthralgias, chest pain, chills, headaches, myalgias, nausea or vomiting.   Cough  Pertinent negatives include no chest pain, chills, ear pain, headaches, myalgias or shortness of breath.     Review of Systems   Constitutional:  Negative for activity change, appetite change and chills.   HENT:  Negative for ear pain, sinus pressure/congestion and sneezing.    Respiratory:  Positive for cough. Negative for shortness of breath.    Cardiovascular:  Negative for chest pain, palpitations and leg swelling.   Gastrointestinal:  Negative for abdominal distention, abdominal pain, constipation, diarrhea, nausea and vomiting.   Genitourinary:  Negative for dysuria and hematuria.   Musculoskeletal:  Negative for arthralgias, back pain and myalgias.   Neurological:  Negative for headaches.   Psychiatric/Behavioral:  Negative for agitation. The patient is not nervous/anxious.            Past Medical History:   Diagnosis Date    Anemia 10/19/2020    Arthritis     Breast cancer 2001    Diabetes mellitus     History of breast cancer 8/21/2013    Hyperlipidemia     Hypertension     Lumbar back pain 3/7/2023    Nuclear sclerotic  cataract of right eye 10/16/2012    Osteoporosis, unspecified: see DEXA 8/15- 2/13/2017    Osteoporosis, unspecified: see DEXA 8/15- 2/13/2017    Pain of both shoulder joints 2/14/2017    Rheumatoid factor positive 2/14/2017    Stroke 4/6/2019    Tubular adenoma of colon 10/28/2013    Type II or unspecified type diabetes mellitus with neurological manifestations, not stated as uncontrolled(250.60)     Vitamin D deficiency disease 2/17/2014     Past Surgical History:   Procedure Laterality Date    BELPHAROPTOSIS REPAIR  03/15/13    bilateral-dr. coleman md    BREAST BIOPSY      BREAST LUMPECTOMY      BREAST SURGERY Left 2001    lumpectomy    CATARACT EXTRACTION      both eyes -     CHOLECYSTECTOMY      Done in [place] in the 1980's    COLONOSCOPY N/A 1/19/2016    Procedure: COLONOSCOPY;  Surgeon: BLANCA Guerra MD;  Location: [address]);  Service: Endoscopy;  Laterality: N/A;    COLONOSCOPY W/ POLYPECTOMY        Patient Active Problem List   Diagnosis    Hyperlipidemia associated with type 2 diabetes mellitus    Type II diabetes mellitus with neurological manifestations    Ptosis of eyebrow    History of breast cancer    Vitamin D deficiency disease    Hypertension associated with diabetes    History of adenomatous polyp of colon 1/16 no need for further follow up per Dr Guerra    Diabetic polyneuropathy associated with type 2 diabetes mellitus    Fatty liver: see ultrasound 1/17    Rheumatoid factor positive    Stage 3 chronic kidney disease    Spondylosis of cervical region without myelopathy or radiculopathy    Primary osteoarthritis involving multiple joints    Osteopenia/osteoporosis: see DEXA 4/19 tx recommended    DDD (degenerative disc disease), cervical    Bradycardia    CVA (cerebral vascular accident)    Secondary hyperparathyroidism    Episode of transient neurologic symptoms    Anemia    Fibromyalgia    Osteopenia    Adhesive capsulitis of left shoulder    Lipoma of back     Lumbar back pain        Objective:      Physical Exam  Constitutional:       Appearance: Normal appearance.   Cardiovascular:      Rate and Rhythm: Normal rate and regular rhythm.      Pulses: Normal pulses.      Heart sounds: Normal heart sounds.   Pulmonary:      Effort: Pulmonary effort is normal.      Breath sounds: Normal breath sounds.   Abdominal:      General: Abdomen is flat. Bowel sounds are normal.      Palpations: Abdomen is soft.   Musculoskeletal:         General: Normal range of motion.      Cervical back: Normal range of motion and neck supple.   Skin:     General: Skin is warm and dry.   Neurological:      General: No focal deficit present.      Mental Status: She is alert and oriented to person, place, and time.   Psychiatric:         Mood and Affect: Mood normal.         Assessment:       Problem List Items Addressed This Visit          Renal/    Stage 3 chronic kidney disease       Endocrine    Type II diabetes mellitus with neurological manifestations    Relevant Orders    Lipid Panel (Completed)    Hemoglobin A1C (Completed)     Other Visit Diagnoses       Cough, unspecified type    -  Primary    Relevant Orders    X-Ray Chest PA And Lateral (Completed)    Essential hypertension        Relevant Orders    Comprehensive Metabolic Panel (Completed)    CBC Auto Differential (Completed)    TSH (Completed)            Plan:         Sera was seen today for follow-up and cough.    Diagnoses and all orders for this visit:    Cough, unspecified type  -     X-Ray Chest PA And Lateral; Future  -     benzonatate (TESSALON) 100 MG capsule; Take 1 capsule (100 mg total) by mouth 3 (three) times daily as needed for Cough.  -     amoxicillin-clavulanate 875-125mg (AUGMENTIN) 875-125 mg per tablet; Take 1 tablet by mouth 2 (two) times daily. for 7 days    Essential hypertension  Doing well off medications. Continue to monitor.     Stage 3 chronic kidney disease, unspecified whether stage 3a or 3b CKD  Check  labs today     Type II diabetes mellitus with neurological manifestations  Continue metformin.   Check A1C           Beena Hill MD   Internal Medicine   Primary Care

## 2023-12-07 ENCOUNTER — TELEPHONE (OUTPATIENT)
Dept: INTERNAL MEDICINE | Facility: CLINIC | Age: 88
End: 2023-12-07
Payer: MEDICARE

## 2023-12-07 ENCOUNTER — PATIENT MESSAGE (OUTPATIENT)
Dept: INTERNAL MEDICINE | Facility: CLINIC | Age: 88
End: 2023-12-07
Payer: MEDICARE

## 2023-12-07 DIAGNOSIS — I10 ESSENTIAL HYPERTENSION: Primary | ICD-10-CM

## 2023-12-07 NOTE — TELEPHONE ENCOUNTER
----- Message from Beena Hill MD sent at 12/7/2023  3:14 PM CST -----  Please help her schedule these labs to do in 4 weeks. Thanks

## 2024-01-03 ENCOUNTER — LAB VISIT (OUTPATIENT)
Dept: LAB | Facility: HOSPITAL | Age: 89
End: 2024-01-03
Payer: MEDICARE

## 2024-01-03 DIAGNOSIS — I10 ESSENTIAL HYPERTENSION: ICD-10-CM

## 2024-01-03 LAB
ALBUMIN SERPL BCP-MCNC: 3.9 G/DL (ref 3.5–5.2)
ALP SERPL-CCNC: 62 U/L (ref 55–135)
ALT SERPL W/O P-5'-P-CCNC: 11 U/L (ref 10–44)
ANION GAP SERPL CALC-SCNC: 9 MMOL/L (ref 8–16)
AST SERPL-CCNC: 17 U/L (ref 10–40)
BILIRUB SERPL-MCNC: 0.5 MG/DL (ref 0.1–1)
BUN SERPL-MCNC: 19 MG/DL (ref 8–23)
CALCIUM SERPL-MCNC: 10.3 MG/DL (ref 8.7–10.5)
CHLORIDE SERPL-SCNC: 105 MMOL/L (ref 95–110)
CO2 SERPL-SCNC: 26 MMOL/L (ref 23–29)
CREAT SERPL-MCNC: 1.1 MG/DL (ref 0.5–1.4)
EST. GFR  (NO RACE VARIABLE): 48.3 ML/MIN/1.73 M^2
GLUCOSE SERPL-MCNC: 133 MG/DL (ref 70–110)
POTASSIUM SERPL-SCNC: 4.6 MMOL/L (ref 3.5–5.1)
PROT SERPL-MCNC: 8.3 G/DL (ref 6–8.4)
SODIUM SERPL-SCNC: 140 MMOL/L (ref 136–145)

## 2024-01-03 PROCEDURE — 36415 COLL VENOUS BLD VENIPUNCTURE: CPT | Performed by: INTERNAL MEDICINE

## 2024-01-03 PROCEDURE — 80053 COMPREHEN METABOLIC PANEL: CPT | Performed by: INTERNAL MEDICINE

## 2024-01-18 PROBLEM — N25.81 SECONDARY HYPERPARATHYROIDISM: Status: RESOLVED | Noted: 2019-04-17 | Resolved: 2024-01-18

## 2024-01-28 RX ORDER — LISINOPRIL 10 MG/1
10 TABLET ORAL
Qty: 30 TABLET | Refills: 11 | OUTPATIENT
Start: 2024-01-28

## 2024-01-28 NOTE — TELEPHONE ENCOUNTER
No care due was identified.  [facility] Embedded Care Due Messages. Reference number: [id number].   1/27/2024 11:44:09 PM CST

## 2024-01-29 NOTE — TELEPHONE ENCOUNTER
Refill Decision Note   Sera Boone  is requesting a refill authorization.  Brief Assessment and Rationale for Refill:  Quick Discontinue     Medication Therapy Plan:         Comments:     Note composed:7:32 PM 01/28/2024

## 2024-02-02 NOTE — TELEPHONE ENCOUNTER
No care due was identified.  [facility] Embedded Care Due Messages. Reference number: [id number].   2/02/2024 12:51:20 PM CST

## 2024-02-02 NOTE — TELEPHONE ENCOUNTER
Refill Routing Note   Medication(s) are not appropriate for processing by [facility] for the following reason(s):        Outside of protocol: non-delegated    [facility] action(s):  Route      Medication Therapy Plan:         Appointments  past 12m or future 3m with PCP    Date Provider   Last Visit   12/6/2023 Beena Hill MD   Next Visit   Visit date not found Beena Hill MD   ED visits in past 90 days: 0        Note composed:2:50 PM 02/02/2024

## 2024-02-03 RX ORDER — GABAPENTIN 100 MG/1
100 CAPSULE ORAL
Qty: 30 CAPSULE | Refills: 11 | Status: SHIPPED | OUTPATIENT
Start: 2024-02-03 | End: 2024-04-04 | Stop reason: SDUPTHER

## 2024-02-14 NOTE — TELEPHONE ENCOUNTER
No care due was identified.  [facility] Embedded Care Due Messages. Reference number: [id number].   2/14/2024 1:44:50 PM CST

## 2024-02-15 RX ORDER — OMEGA-3-ACID ETHYL ESTERS 1 G/1
1 CAPSULE, LIQUID FILLED ORAL 2 TIMES DAILY
Qty: 180 CAPSULE | Refills: 3 | Status: SHIPPED | OUTPATIENT
Start: 2024-02-15

## 2024-02-15 NOTE — TELEPHONE ENCOUNTER
Refill Decision Note   Sera Boone  is requesting a refill authorization.  Brief Assessment and Rationale for Refill:  Approve     Medication Therapy Plan:         Comments:     Note composed:5:16 PM 02/15/2024

## 2024-02-20 RX ORDER — SERTRALINE HYDROCHLORIDE 25 MG/1
25 TABLET, FILM COATED ORAL
Qty: 90 TABLET | Refills: 3 | Status: SHIPPED | OUTPATIENT
Start: 2024-02-20

## 2024-02-20 NOTE — TELEPHONE ENCOUNTER
Refill Routing Note   Medication(s) are not appropriate for processing by [facility] for the following reason(s):        Drug-disease interaction    [facility] action(s):  Defer        Medication Therapy Plan: Drug-Disease: sertraline and Fatty liver    Pharmacist review requested: Yes     Appointments  past 12m or future 3m with PCP    Date Provider   Last Visit   12/6/2023 Beena Hill MD   Next Visit   Visit date not found Beena Hill MD   ED visits in past 90 days: 0        Note composed:5:23 PM 02/20/2024

## 2024-02-20 NOTE — TELEPHONE ENCOUNTER
No care due was identified.  [facility] Embedded Care Due Messages. Reference number: [id number].   2/20/2024 2:24:37 PM CST

## 2024-02-21 NOTE — TELEPHONE ENCOUNTER
Refill Decision Note   Sera Boone  is requesting a refill authorization.  Brief Assessment and Rationale for Refill:  Approve     Medication Therapy Plan:         Pharmacist review requested: Yes   Extended chart review required: Yes   Comments:     Note composed:6:43 PM 02/20/2024

## 2024-04-04 ENCOUNTER — LAB VISIT (OUTPATIENT)
Dept: LAB | Facility: HOSPITAL | Age: 89
End: 2024-04-04
Payer: MEDICARE

## 2024-04-04 ENCOUNTER — OFFICE VISIT (OUTPATIENT)
Dept: INTERNAL MEDICINE | Facility: CLINIC | Age: 89
End: 2024-04-04
Payer: MEDICARE

## 2024-04-04 VITALS
HEART RATE: 69 BPM | BODY MASS INDEX: 25.4 KG/M2 | SYSTOLIC BLOOD PRESSURE: 136 MMHG | HEIGHT: 62 IN | DIASTOLIC BLOOD PRESSURE: 86 MMHG | OXYGEN SATURATION: 99 % | WEIGHT: 138 LBS

## 2024-04-04 DIAGNOSIS — M79.7 FIBROMYALGIA: ICD-10-CM

## 2024-04-04 DIAGNOSIS — N18.30 STAGE 3 CHRONIC KIDNEY DISEASE, UNSPECIFIED WHETHER STAGE 3A OR 3B CKD: ICD-10-CM

## 2024-04-04 DIAGNOSIS — E11.49 TYPE II DIABETES MELLITUS WITH NEUROLOGICAL MANIFESTATIONS: Primary | ICD-10-CM

## 2024-04-04 DIAGNOSIS — I10 ESSENTIAL HYPERTENSION: ICD-10-CM

## 2024-04-04 DIAGNOSIS — E11.49 TYPE II DIABETES MELLITUS WITH NEUROLOGICAL MANIFESTATIONS: ICD-10-CM

## 2024-04-04 LAB
ALBUMIN SERPL BCP-MCNC: 3.8 G/DL (ref 3.5–5.2)
ALP SERPL-CCNC: 52 U/L (ref 55–135)
ALT SERPL W/O P-5'-P-CCNC: 10 U/L (ref 10–44)
ANION GAP SERPL CALC-SCNC: 7 MMOL/L (ref 8–16)
AST SERPL-CCNC: 17 U/L (ref 10–40)
BASOPHILS # BLD AUTO: 0.02 K/UL (ref 0–0.2)
BASOPHILS NFR BLD: 0.3 % (ref 0–1.9)
BILIRUB SERPL-MCNC: 0.3 MG/DL (ref 0.1–1)
BUN SERPL-MCNC: 23 MG/DL (ref 8–23)
CALCIUM SERPL-MCNC: 9.9 MG/DL (ref 8.7–10.5)
CHLORIDE SERPL-SCNC: 106 MMOL/L (ref 95–110)
CO2 SERPL-SCNC: 26 MMOL/L (ref 23–29)
CREAT SERPL-MCNC: 1.5 MG/DL (ref 0.5–1.4)
DIFFERENTIAL METHOD BLD: ABNORMAL
EOSINOPHIL # BLD AUTO: 0 K/UL (ref 0–0.5)
EOSINOPHIL NFR BLD: 0 % (ref 0–8)
ERYTHROCYTE [DISTWIDTH] IN BLOOD BY AUTOMATED COUNT: 13 % (ref 11.5–14.5)
EST. GFR  (NO RACE VARIABLE): 33.3 ML/MIN/1.73 M^2
ESTIMATED AVG GLUCOSE: 166 MG/DL (ref 68–131)
GLUCOSE SERPL-MCNC: 140 MG/DL (ref 70–110)
HBA1C MFR BLD: 7.4 % (ref 4–5.6)
HCT VFR BLD AUTO: 36.8 % (ref 37–48.5)
HGB BLD-MCNC: 11.6 G/DL (ref 12–16)
IMM GRANULOCYTES # BLD AUTO: 0.04 K/UL (ref 0–0.04)
IMM GRANULOCYTES NFR BLD AUTO: 0.5 % (ref 0–0.5)
LYMPHOCYTES # BLD AUTO: 3.1 K/UL (ref 1–4.8)
LYMPHOCYTES NFR BLD: 39 % (ref 18–48)
MCH RBC QN AUTO: 29.9 PG (ref 27–31)
MCHC RBC AUTO-ENTMCNC: 31.5 G/DL (ref 32–36)
MCV RBC AUTO: 95 FL (ref 82–98)
MONOCYTES # BLD AUTO: 0.8 K/UL (ref 0.3–1)
MONOCYTES NFR BLD: 10 % (ref 4–15)
NEUTROPHILS # BLD AUTO: 4 K/UL (ref 1.8–7.7)
NEUTROPHILS NFR BLD: 50.2 % (ref 38–73)
NRBC BLD-RTO: 0 /100 WBC
PLATELET # BLD AUTO: 157 K/UL (ref 150–450)
PMV BLD AUTO: 11 FL (ref 9.2–12.9)
POTASSIUM SERPL-SCNC: 4.4 MMOL/L (ref 3.5–5.1)
PROT SERPL-MCNC: 8.1 G/DL (ref 6–8.4)
RBC # BLD AUTO: 3.88 M/UL (ref 4–5.4)
SODIUM SERPL-SCNC: 139 MMOL/L (ref 136–145)
WBC # BLD AUTO: 7.93 K/UL (ref 3.9–12.7)

## 2024-04-04 PROCEDURE — 99999 PR PBB SHADOW E&M-EST. PATIENT-LVL III: CPT | Mod: PBBFAC,,, | Performed by: INTERNAL MEDICINE

## 2024-04-04 PROCEDURE — 80053 COMPREHEN METABOLIC PANEL: CPT | Performed by: INTERNAL MEDICINE

## 2024-04-04 PROCEDURE — 3288F FALL RISK ASSESSMENT DOCD: CPT | Mod: CPTII,S$GLB,, | Performed by: INTERNAL MEDICINE

## 2024-04-04 PROCEDURE — 3072F LOW RISK FOR RETINOPATHY: CPT | Mod: CPTII,S$GLB,, | Performed by: INTERNAL MEDICINE

## 2024-04-04 PROCEDURE — 1126F AMNT PAIN NOTED NONE PRSNT: CPT | Mod: CPTII,S$GLB,, | Performed by: INTERNAL MEDICINE

## 2024-04-04 PROCEDURE — 83036 HEMOGLOBIN GLYCOSYLATED A1C: CPT | Performed by: INTERNAL MEDICINE

## 2024-04-04 PROCEDURE — 36415 COLL VENOUS BLD VENIPUNCTURE: CPT | Performed by: INTERNAL MEDICINE

## 2024-04-04 PROCEDURE — 99214 OFFICE O/P EST MOD 30 MIN: CPT | Mod: S$GLB,,, | Performed by: INTERNAL MEDICINE

## 2024-04-04 PROCEDURE — 1101F PT FALLS ASSESS-DOCD LE1/YR: CPT | Mod: CPTII,S$GLB,, | Performed by: INTERNAL MEDICINE

## 2024-04-04 PROCEDURE — 85025 COMPLETE CBC W/AUTO DIFF WBC: CPT | Performed by: INTERNAL MEDICINE

## 2024-04-04 RX ORDER — LANCETS 33 GAUGE
EACH MISCELLANEOUS
Qty: 100 EACH | Refills: 1 | Status: SHIPPED | OUTPATIENT
Start: 2024-04-04

## 2024-04-04 RX ORDER — LIDOCAINE 50 MG/G
1 PATCH TOPICAL DAILY PRN
Qty: 15 PATCH | Refills: 5 | Status: SHIPPED | OUTPATIENT
Start: 2024-04-04

## 2024-04-04 RX ORDER — INSULIN PUMP SYRINGE, 3 ML
EACH MISCELLANEOUS
Qty: 1 EACH | Refills: 0 | OUTPATIENT
Start: 2024-04-04 | End: 2028-09-15

## 2024-04-04 RX ORDER — GABAPENTIN 100 MG/1
100 CAPSULE ORAL 2 TIMES DAILY
Qty: 180 CAPSULE | Refills: 3 | Status: SHIPPED | OUTPATIENT
Start: 2024-04-04 | End: 2025-03-30

## 2024-04-04 RX ORDER — DICLOFENAC SODIUM 10 MG/G
2 GEL TOPICAL 4 TIMES DAILY PRN
Qty: 20 G | Refills: 6 | Status: SHIPPED | OUTPATIENT
Start: 2024-04-04

## 2024-04-04 RX ORDER — LISINOPRIL 10 MG/1
10 TABLET ORAL DAILY
COMMUNITY
Start: 2024-01-22 | End: 2024-04-05

## 2024-04-04 NOTE — PROGRESS NOTES
Subjective:       Patient ID: Sera Boone is a 88 y.o. female.    Chief Complaint: Back Pain    Has been having back pain. Gabapentin is helping.     Last labs showed slightly worsening renal function     PMHx:  DMII on metformin last A1C was 6.7 in January 2023      Fibromyalgia started gabapentin during last visit which is helping. Also started PT which has been helping.      Osteoporosis on alendronate      HLD: on statin      Mild iron deficiency anemia has been stable on lab s        Hx or Vertigo relate cerumen impaction.     Hypertension: on lisinopril     Back Pain  Pertinent negatives include no abdominal pain, chest pain, dysuria or headaches.   Follow-up  Pertinent negatives include no abdominal pain, arthralgias, chest pain, chills, coughing, headaches, myalgias, nausea or vomiting.   Cough  Pertinent negatives include no chest pain, chills, ear pain, headaches, myalgias or shortness of breath.     Review of Systems   Constitutional:  Negative for activity change, appetite change and chills.   HENT:  Negative for ear pain, sinus pressure/congestion and sneezing.    Respiratory:  Negative for cough and shortness of breath.    Cardiovascular:  Negative for chest pain, palpitations and leg swelling.   Gastrointestinal:  Negative for abdominal distention, abdominal pain, constipation, diarrhea, nausea and vomiting.   Genitourinary:  Negative for dysuria and hematuria.   Musculoskeletal:  Positive for back pain. Negative for arthralgias and myalgias.   Neurological:  Negative for dizziness and headaches.   Psychiatric/Behavioral:  Negative for agitation. The patient is not nervous/anxious.            Past Medical History:   Diagnosis Date    Anemia 10/19/2020    Arthritis     Breast cancer 2001    Diabetes mellitus     History of breast cancer 8/21/2013    Hyperlipidemia     Hypertension     Lumbar back pain 3/7/2023    Nuclear sclerotic cataract of right eye 10/16/2012    Osteoporosis, unspecified:  see DEXA 8/15- 2/13/2017    Osteoporosis, unspecified: see DEXA 8/15- 2/13/2017    Pain of both shoulder joints 2/14/2017    Rheumatoid factor positive 2/14/2017    Stroke 4/6/2019    Tubular adenoma of colon 10/28/2013    Type II or unspecified type diabetes mellitus with neurological manifestations, not stated as uncontrolled(250.60)     Vitamin D deficiency disease 2/17/2014     Past Surgical History:   Procedure Laterality Date    BELPHAROPTOSIS REPAIR  03/15/13    bilateral-dr. coleman md    BREAST BIOPSY      BREAST LUMPECTOMY      BREAST SURGERY Left 2001    lumpectomy    CATARACT EXTRACTION      both eyes -     CHOLECYSTECTOMY      Done in [place] in the 1980's    COLONOSCOPY N/A 1/19/2016    Procedure: COLONOSCOPY;  Surgeon: BLANCA Guerra MD;  Location: [address];  Service: Endoscopy;  Laterality: N/A;    COLONOSCOPY W/ POLYPECTOMY        Patient Active Problem List   Diagnosis    Hyperlipidemia associated with type 2 diabetes mellitus    Type II diabetes mellitus with neurological manifestations    Ptosis of eyebrow    History of breast cancer    Vitamin D deficiency disease    Hypertension associated with diabetes    History of adenomatous polyp of colon 1/16 no need for further follow up per Dr Guerra    Diabetic polyneuropathy associated with type 2 diabetes mellitus    Fatty liver: see ultrasound 1/17    Rheumatoid factor positive    Stage 3 chronic kidney disease    Spondylosis of cervical region without myelopathy or radiculopathy    Primary osteoarthritis involving multiple joints    Osteopenia/osteoporosis: see DEXA 4/19 tx recommended    DDD (degenerative disc disease), cervical    Bradycardia    CVA (cerebral vascular accident)    Episode of transient neurologic symptoms    Anemia    Fibromyalgia    Osteopenia    Adhesive capsulitis of left shoulder    Lipoma of back    Lumbar back pain        Objective:      Physical Exam  Constitutional:       Appearance: Normal  appearance.   Cardiovascular:      Rate and Rhythm: Normal rate and regular rhythm.      Pulses: Normal pulses.      Heart sounds: Normal heart sounds.   Pulmonary:      Effort: Pulmonary effort is normal.      Breath sounds: Normal breath sounds.   Abdominal:      General: Abdomen is flat. Bowel sounds are normal.      Palpations: Abdomen is soft.   Musculoskeletal:         General: Normal range of motion.      Cervical back: Normal range of motion and neck supple.   Skin:     General: Skin is warm and dry.   Neurological:      General: No focal deficit present.      Mental Status: She is alert and oriented to person, place, and time.   Psychiatric:         Mood and Affect: Mood normal.         Assessment:       Problem List Items Addressed This Visit          Renal/    Stage 3 chronic kidney disease    Relevant Orders    Comprehensive Metabolic Panel (Completed)    CBC Auto Differential (Completed)       Endocrine    Type II diabetes mellitus with neurological manifestations - Primary    Relevant Orders    Hemoglobin A1C (Completed)       Orthopedic    Fibromyalgia    Relevant Medications    diclofenac sodium (VOLTAREN) 1 % Gel    LIDOcaine (LIDODERM) 5 %     Other Visit Diagnoses       Essential hypertension                Plan:               Type II diabetes mellitus with neurological manifestations  -     Hemoglobin A1C; Future; Expected date: 04/04/2024  Has been stable on current meds.   D/c metformin today due to AIXA and monitor BG closely at home    Fibromyalgia  -     diclofenac sodium (VOLTAREN) 1 % Gel; Apply 2 g topically 4 (four) times daily as needed (pain).  Dispense: 20 g; Refill: 6  -     LIDOcaine (LIDODERM) 5 %; Place 1 patch onto the skin daily as needed (pain). Remove & Discard patch within 24 hours or as directed by MD  Dispense: 15 patch; Refill: 5    Essential hypertension  Well controlled.   D/c lisinopril today     Stage 3 chronic kidney disease, unspecified whether stage 3a or 3b  CKD  GFR worse on labs today.   Stop metformin and lisinopril.   Referral to nephrology. Repeat labs in 4 days.   Counseled to stay hydrated and avoids NSAIDS.        Beena Hill MD   Internal Medicine   Primary Care

## 2024-04-05 ENCOUNTER — TELEPHONE (OUTPATIENT)
Dept: INTERNAL MEDICINE | Facility: CLINIC | Age: 89
End: 2024-04-05
Payer: MEDICARE

## 2024-04-05 DIAGNOSIS — N18.30 STAGE 3 CHRONIC KIDNEY DISEASE, UNSPECIFIED WHETHER STAGE 3A OR 3B CKD: Primary | ICD-10-CM

## 2024-04-05 NOTE — TELEPHONE ENCOUNTER
----- Message from Beena Hill MD sent at 4/5/2024  2:44 PM CDT -----  Please help her schedule this lab for next week. Thanks

## 2024-04-12 ENCOUNTER — TELEPHONE (OUTPATIENT)
Dept: INTERNAL MEDICINE | Facility: CLINIC | Age: 89
End: 2024-04-12
Payer: MEDICARE

## 2024-04-12 ENCOUNTER — LAB VISIT (OUTPATIENT)
Dept: LAB | Facility: HOSPITAL | Age: 89
End: 2024-04-12
Attending: INTERNAL MEDICINE
Payer: MEDICARE

## 2024-04-12 DIAGNOSIS — N18.30 STAGE 3 CHRONIC KIDNEY DISEASE, UNSPECIFIED WHETHER STAGE 3A OR 3B CKD: ICD-10-CM

## 2024-04-12 LAB
ALBUMIN SERPL BCP-MCNC: 4.2 G/DL (ref 3.5–5.2)
ALP SERPL-CCNC: 44 U/L (ref 55–135)
ALT SERPL W/O P-5'-P-CCNC: 12 U/L (ref 10–44)
ANION GAP SERPL CALC-SCNC: 8 MMOL/L (ref 8–16)
AST SERPL-CCNC: 17 U/L (ref 10–40)
BILIRUB SERPL-MCNC: 0.6 MG/DL (ref 0.1–1)
BUN SERPL-MCNC: 21 MG/DL (ref 8–23)
CALCIUM SERPL-MCNC: 10.7 MG/DL (ref 8.7–10.5)
CHLORIDE SERPL-SCNC: 105 MMOL/L (ref 95–110)
CO2 SERPL-SCNC: 26 MMOL/L (ref 23–29)
CREAT SERPL-MCNC: 1.1 MG/DL (ref 0.5–1.4)
EST. GFR  (NO RACE VARIABLE): 48 ML/MIN/1.73 M^2
GLUCOSE SERPL-MCNC: 143 MG/DL (ref 70–110)
POTASSIUM SERPL-SCNC: 5.3 MMOL/L (ref 3.5–5.1)
PROT SERPL-MCNC: 8.7 G/DL (ref 6–8.4)
SODIUM SERPL-SCNC: 139 MMOL/L (ref 136–145)

## 2024-04-12 PROCEDURE — 80053 COMPREHEN METABOLIC PANEL: CPT | Performed by: INTERNAL MEDICINE

## 2024-04-12 PROCEDURE — 36415 COLL VENOUS BLD VENIPUNCTURE: CPT | Performed by: INTERNAL MEDICINE

## 2024-04-12 NOTE — TELEPHONE ENCOUNTER
----- Message from Beena Hill MD sent at 4/12/2024  3:30 PM CDT -----  Please help her schedule these labs for next Friday. Thanks

## 2024-04-19 ENCOUNTER — PATIENT MESSAGE (OUTPATIENT)
Dept: INTERNAL MEDICINE | Facility: CLINIC | Age: 89
End: 2024-04-19
Payer: MEDICARE

## 2024-04-19 ENCOUNTER — OFFICE VISIT (OUTPATIENT)
Dept: NEPHROLOGY | Facility: CLINIC | Age: 89
End: 2024-04-19
Payer: MEDICARE

## 2024-04-19 ENCOUNTER — LAB VISIT (OUTPATIENT)
Dept: LAB | Facility: HOSPITAL | Age: 89
End: 2024-04-19
Payer: MEDICARE

## 2024-04-19 VITALS
OXYGEN SATURATION: 94 % | DIASTOLIC BLOOD PRESSURE: 77 MMHG | HEART RATE: 63 BPM | HEIGHT: 62 IN | BODY MASS INDEX: 24.78 KG/M2 | WEIGHT: 134.69 LBS | SYSTOLIC BLOOD PRESSURE: 145 MMHG

## 2024-04-19 DIAGNOSIS — I12.9 TYPE 2 DM WITH CKD STAGE 3 AND HYPERTENSION: ICD-10-CM

## 2024-04-19 DIAGNOSIS — N25.81 SECONDARY HYPERPARATHYROIDISM OF RENAL ORIGIN: ICD-10-CM

## 2024-04-19 DIAGNOSIS — E83.52 HYPERCALCEMIA: ICD-10-CM

## 2024-04-19 DIAGNOSIS — E11.22 TYPE 2 DM WITH CKD STAGE 3 AND HYPERTENSION: ICD-10-CM

## 2024-04-19 DIAGNOSIS — R80.9 ALBUMINURIA: ICD-10-CM

## 2024-04-19 DIAGNOSIS — N18.31 CHRONIC KIDNEY DISEASE, STAGE 3A: ICD-10-CM

## 2024-04-19 DIAGNOSIS — N18.30 STAGE 3 CHRONIC KIDNEY DISEASE, UNSPECIFIED WHETHER STAGE 3A OR 3B CKD: Primary | ICD-10-CM

## 2024-04-19 DIAGNOSIS — N18.30 TYPE 2 DM WITH CKD STAGE 3 AND HYPERTENSION: ICD-10-CM

## 2024-04-19 DIAGNOSIS — N28.89 PELVICALIECTASIS: ICD-10-CM

## 2024-04-19 DIAGNOSIS — E87.5 HYPERKALEMIA: ICD-10-CM

## 2024-04-19 DIAGNOSIS — N18.30 STAGE 3 CHRONIC KIDNEY DISEASE, UNSPECIFIED WHETHER STAGE 3A OR 3B CKD: ICD-10-CM

## 2024-04-19 LAB
ALBUMIN SERPL BCP-MCNC: 3.9 G/DL (ref 3.5–5.2)
ANION GAP SERPL CALC-SCNC: 7 MMOL/L (ref 8–16)
BUN SERPL-MCNC: 23 MG/DL (ref 8–23)
CALCIUM SERPL-MCNC: 10.2 MG/DL (ref 8.7–10.5)
CHLORIDE SERPL-SCNC: 105 MMOL/L (ref 95–110)
CO2 SERPL-SCNC: 27 MMOL/L (ref 23–29)
CREAT SERPL-MCNC: 1 MG/DL (ref 0.5–1.4)
EST. GFR  (NO RACE VARIABLE): 54.2 ML/MIN/1.73 M^2
GLUCOSE SERPL-MCNC: 130 MG/DL (ref 70–110)
PHOSPHATE SERPL-MCNC: 3.3 MG/DL (ref 2.7–4.5)
POTASSIUM SERPL-SCNC: 4.9 MMOL/L (ref 3.5–5.1)
SODIUM SERPL-SCNC: 139 MMOL/L (ref 136–145)

## 2024-04-19 PROCEDURE — 3072F LOW RISK FOR RETINOPATHY: CPT | Mod: CPTII,S$GLB,, | Performed by: NURSE PRACTITIONER

## 2024-04-19 PROCEDURE — 1126F AMNT PAIN NOTED NONE PRSNT: CPT | Mod: CPTII,S$GLB,, | Performed by: NURSE PRACTITIONER

## 2024-04-19 PROCEDURE — 80069 RENAL FUNCTION PANEL: CPT | Performed by: INTERNAL MEDICINE

## 2024-04-19 PROCEDURE — 3288F FALL RISK ASSESSMENT DOCD: CPT | Mod: CPTII,S$GLB,, | Performed by: NURSE PRACTITIONER

## 2024-04-19 PROCEDURE — 99999 PR PBB SHADOW E&M-EST. PATIENT-LVL IV: CPT | Mod: PBBFAC,,, | Performed by: NURSE PRACTITIONER

## 2024-04-19 PROCEDURE — 99204 OFFICE O/P NEW MOD 45 MIN: CPT | Mod: S$GLB,,, | Performed by: NURSE PRACTITIONER

## 2024-04-19 PROCEDURE — 1101F PT FALLS ASSESS-DOCD LE1/YR: CPT | Mod: CPTII,S$GLB,, | Performed by: NURSE PRACTITIONER

## 2024-04-19 PROCEDURE — 1159F MED LIST DOCD IN RCRD: CPT | Mod: CPTII,S$GLB,, | Performed by: NURSE PRACTITIONER

## 2024-04-19 PROCEDURE — 36415 COLL VENOUS BLD VENIPUNCTURE: CPT | Performed by: INTERNAL MEDICINE

## 2024-04-19 NOTE — PROGRESS NOTES
Subjective:       Patient ID: Sera Boone is a 88 y.o.  female who presents for new evaluation of CKD 3.    HPI     Patient is new to me. Last seen by Dr. Bansal in 2019.  Prior pertinent chart reviewed since this is patient's first appointment with me. Patient presents with son as  per preference. Patient is Ukrainian-speaking only.     Patient presents for evaluation of CKD 3.  Baseline creatinine of 0.8-1.1.    Significant other medical problems include T2DM, HTN, HLD, fatty liver, osteopenia, DDD, OA, h/o breast cancer. She reports having T2DM since prior to [place] (2005). HTN since about 2010. Denies family history of kidney disease or history of kidney stones. Denies NSAID use.  Recent uptrend in sCr to 1.5. Metformin and lisinopril were held per primary physician. sCr returned to baseline of 1.1 on repeat labs, however, with hypercalcemia and hyperkalemia. Planning for repeat renal function panel today.       Significant family hx includes: No known kidney disease    Last renal US: 1/22/19 , reviewed.  FINDINGS:  Pancreas: The visualized portions of pancreas appear normal.  Aorta: No aneurysm.  Liver: 12 cm, normal in size. Diffuse increased echogenicity with hepato renal index of 1.6. no focal lesions.  Gallbladder: The gallbladder is surgically absent.  Biliary system: Common duct measures 7 mm along the liver edge with trumpeting to 10 mm in the immediate extrahepatic segment.  No dilated intrahepatic radicles.  Inferior vena cava: Normal in appearance.  Right kidney: 10.5 cm. Mild pelvocaliectasis which is chronic.  Left kidney: 10.6 cm. Mild pelvocaliectasis which is a chronic finding.  Spleen: 8 cm.  Normal in size with homogeneous echotexture.  Miscellaneous: No ascites.  IMPRESSION:   1. Diffuse increased echogenicity of the liver suggesting an infiltrative process such as steatosis.  2. Mild dilation of the common bile duct could relate to senescent change and/or  "reservoir effect post cholecystectomy.  Correlate with bilirubin level as the need for further assessment with ERCP/MRCP.       Review of Systems   Respiratory:  Negative for shortness of breath.    Cardiovascular:  Negative for chest pain and leg swelling.   Gastrointestinal:  Negative for diarrhea, nausea and vomiting.   Genitourinary:  Negative for difficulty urinating.   All other systems reviewed and are negative.      Objective:       Blood pressure (!) 145/77, pulse 63, height 5' 2" (1.575 m), weight 61.1 kg (134 lb 11.2 oz), SpO2 (!) 94%.  Physical Exam  Vitals reviewed.   Constitutional:       General: She is not in acute distress.     Appearance: Normal appearance.   HENT:      Head: Normocephalic and atraumatic.   Eyes:      General: No scleral icterus.  Cardiovascular:      Rate and Rhythm: Normal rate and regular rhythm.   Pulmonary:      Effort: Pulmonary effort is normal. No respiratory distress.      Breath sounds: No wheezing or rales.   Musculoskeletal:      Right lower leg: No edema.      Left lower leg: No edema.   Neurological:      Mental Status: She is alert.      Comments: Alert, speech clear   Psychiatric:         Mood and Affect: Mood normal.         Behavior: Behavior normal.           Lab Results   Component Value Date    CREATININE 1.1 04/12/2024     Prot/Creat Ratio, Urine   Date Value Ref Range Status   06/10/2019 0.15 0.00 - 0.20 Final     Lab Results   Component Value Date     04/12/2024    K 5.3 (H) 04/12/2024    CO2 26 04/12/2024     04/12/2024     Lab Results   Component Value Date    .0 (H) 11/23/2020    CALCIUM 10.7 (H) 04/12/2024    CAION 1.34 08/22/2019    PHOS 2.8 08/01/2023     Lab Results   Component Value Date    HGB 11.6 (L) 04/04/2024    WBC 7.93 04/04/2024    HCT 36.8 (L) 04/04/2024      Lab Results   Component Value Date    HGBA1C 7.4 (H) 04/04/2024     04/04/2024    BUN 21 04/12/2024     Lab Results   Component Value Date    LDLCALC 90.8 " 12/06/2023         Assessment:       1. Stage 3 chronic kidney disease, unspecified whether stage 3a or 3b CKD    2. Albuminuria    3. Secondary hyperparathyroidism of renal origin    4. Hypercalcemia    5. Type 2 DM with CKD stage 3 and hypertension    6. Hyperkalemia    7. Pelvicaliectasis        Plan:   CKD stage 3a c eGFR 48 mL/min -  - Baseline sCr 0.8-1.1. Recent uptrend to 1.5. Unclear etiology. Corrected to 1.1 on repeat labs with holding ACEi.  - CKD clinically related to longstanding HTN and T2DM + age-related changes  - Continue adequate hydration. Avoid NSAIDs.     UPCR Minimal albuminuria. No longer on ACEi. Monitor.    Acid-base WNL   Secondary hyperparathyroidism PTH elevated. Ca mildly increased. Phos okay. Monitor PTH and Vit D. Repeat calcium with labs today. Maintain hydration.    Anemia Hgb okay for CKD. Monitor CBC and iron stores.    DM Goal A1c < 7.0. Metformin on hold. Recommend renal dosing if/when restarted.    Lipid Management On statin   ESRD planning Defer     HTN - Above goal today. ACEi on hold. Overall has been stable. Defer to primary physician. May restart ACEi if potassium and renal function stabilize.    Hyperkalemia - Mildly elevated. ACEi on hold. Repeat labs today.     Pelvocaliectasis - chronic on US. Repeat renal US.     All questions patient had were answered.  Asked if further questions. None. F/u in clinic 6 months  with labs and urine prior to next visit or sooner if needed.  ER for emergency concerns.    Summary of Plan:  - Repeat renal US  - Repeat labs today per primary physician  - RTC 6 months with labs or sooner if needed

## 2024-05-04 ENCOUNTER — HOSPITAL ENCOUNTER (INPATIENT)
Facility: HOSPITAL | Age: 89
LOS: 2 days | Discharge: HOME | DRG: 689 | End: 2024-05-06
Attending: EMERGENCY MEDICINE | Admitting: INTERNAL MEDICINE
Payer: MEDICARE

## 2024-05-04 DIAGNOSIS — A41.9 SEPSIS DUE TO URINARY TRACT INFECTION: Primary | ICD-10-CM

## 2024-05-04 DIAGNOSIS — R06.02 SOB (SHORTNESS OF BREATH): ICD-10-CM

## 2024-05-04 DIAGNOSIS — R07.9 CHEST PAIN: ICD-10-CM

## 2024-05-04 DIAGNOSIS — N39.0 SEPSIS DUE TO URINARY TRACT INFECTION: Primary | ICD-10-CM

## 2024-05-04 DIAGNOSIS — J18.9 COMMUNITY ACQUIRED BILATERAL LOWER LOBE PNEUMONIA: ICD-10-CM

## 2024-05-04 PROBLEM — R05.9 COUGH: Status: ACTIVE | Noted: 2024-05-04

## 2024-05-04 PROBLEM — E11.9 TYPE 2 DIABETES MELLITUS, WITHOUT LONG-TERM CURRENT USE OF INSULIN: Status: ACTIVE | Noted: 2024-05-04

## 2024-05-04 LAB
ALBUMIN SERPL BCP-MCNC: 3.5 G/DL (ref 3.5–5.2)
ALP SERPL-CCNC: 64 U/L (ref 55–135)
ALT SERPL W/O P-5'-P-CCNC: 22 U/L (ref 10–44)
ANION GAP SERPL CALC-SCNC: 12 MMOL/L (ref 8–16)
AST SERPL-CCNC: 24 U/L (ref 10–40)
BACTERIA #/AREA URNS HPF: ABNORMAL /HPF
BASOPHILS # BLD AUTO: 0.01 K/UL (ref 0–0.2)
BASOPHILS NFR BLD: 0.1 % (ref 0–1.9)
BILIRUB SERPL-MCNC: 0.5 MG/DL (ref 0.1–1)
BILIRUB UR QL STRIP: NEGATIVE
BNP SERPL-MCNC: 20 PG/ML (ref 0–99)
BUN SERPL-MCNC: 24 MG/DL (ref 8–23)
CALCIUM SERPL-MCNC: 10.2 MG/DL (ref 8.7–10.5)
CHLORIDE SERPL-SCNC: 105 MMOL/L (ref 95–110)
CLARITY UR: ABNORMAL
CO2 SERPL-SCNC: 22 MMOL/L (ref 23–29)
COLOR UR: YELLOW
CREAT SERPL-MCNC: 1.2 MG/DL (ref 0.5–1.4)
DIFFERENTIAL METHOD BLD: ABNORMAL
EOSINOPHIL # BLD AUTO: 0 K/UL (ref 0–0.5)
EOSINOPHIL NFR BLD: 0.1 % (ref 0–8)
ERYTHROCYTE [DISTWIDTH] IN BLOOD BY AUTOMATED COUNT: 12.9 % (ref 11.5–14.5)
EST. GFR  (NO RACE VARIABLE): 44 ML/MIN/1.73 M^2
GLUCOSE SERPL-MCNC: 148 MG/DL (ref 70–110)
GLUCOSE UR QL STRIP: NEGATIVE
HCT VFR BLD AUTO: 34.1 % (ref 37–48.5)
HGB BLD-MCNC: 11.4 G/DL (ref 12–16)
HGB UR QL STRIP: ABNORMAL
HYALINE CASTS #/AREA URNS LPF: 2 /LPF
IMM GRANULOCYTES # BLD AUTO: 0.07 K/UL (ref 0–0.04)
IMM GRANULOCYTES NFR BLD AUTO: 0.7 % (ref 0–0.5)
INFLUENZA A, MOLECULAR: NEGATIVE
INFLUENZA B, MOLECULAR: NEGATIVE
KETONES UR QL STRIP: NEGATIVE
LACTATE SERPL-SCNC: 1.1 MMOL/L (ref 0.5–2.2)
LEUKOCYTE ESTERASE UR QL STRIP: ABNORMAL
LYMPHOCYTES # BLD AUTO: 2.5 K/UL (ref 1–4.8)
LYMPHOCYTES NFR BLD: 25.3 % (ref 18–48)
MCH RBC QN AUTO: 30.7 PG (ref 27–31)
MCHC RBC AUTO-ENTMCNC: 33.4 G/DL (ref 32–36)
MCV RBC AUTO: 92 FL (ref 82–98)
MICROSCOPIC COMMENT: ABNORMAL
MONOCYTES # BLD AUTO: 0.7 K/UL (ref 0.3–1)
MONOCYTES NFR BLD: 6.7 % (ref 4–15)
NEUTROPHILS # BLD AUTO: 6.6 K/UL (ref 1.8–7.7)
NEUTROPHILS NFR BLD: 67.1 % (ref 38–73)
NITRITE UR QL STRIP: POSITIVE
NON-SQ EPI CELLS #/AREA URNS HPF: 2 /HPF
NRBC BLD-RTO: 0 /100 WBC
PH UR STRIP: 6 [PH] (ref 5–8)
PLATELET # BLD AUTO: 170 K/UL (ref 150–450)
PMV BLD AUTO: 9.9 FL (ref 9.2–12.9)
POCT GLUCOSE: 135 MG/DL (ref 70–110)
POCT GLUCOSE: 147 MG/DL (ref 70–110)
POCT GLUCOSE: 196 MG/DL (ref 70–110)
POTASSIUM SERPL-SCNC: 4.3 MMOL/L (ref 3.5–5.1)
PROCALCITONIN SERPL IA-MCNC: 0.75 NG/ML (ref 0–0.5)
PROT SERPL-MCNC: 8.5 G/DL (ref 6–8.4)
PROT UR QL STRIP: ABNORMAL
RBC # BLD AUTO: 3.71 M/UL (ref 4–5.4)
RBC #/AREA URNS HPF: 12 /HPF (ref 0–4)
SARS-COV-2 RDRP RESP QL NAA+PROBE: NEGATIVE
SODIUM SERPL-SCNC: 139 MMOL/L (ref 136–145)
SP GR UR STRIP: 1.02 (ref 1–1.03)
SPECIMEN SOURCE: NORMAL
SQUAMOUS #/AREA URNS HPF: 5 /HPF
URN SPEC COLLECT METH UR: ABNORMAL
UROBILINOGEN UR STRIP-ACNC: NEGATIVE EU/DL
WBC # BLD AUTO: 9.81 K/UL (ref 3.9–12.7)
WBC #/AREA URNS HPF: >100 /HPF (ref 0–5)
WBC CLUMPS URNS QL MICRO: ABNORMAL

## 2024-05-04 PROCEDURE — 99285 EMERGENCY DEPT VISIT HI MDM: CPT | Mod: 25

## 2024-05-04 PROCEDURE — 94760 N-INVAS EAR/PLS OXIMETRY 1: CPT

## 2024-05-04 PROCEDURE — 82962 GLUCOSE BLOOD TEST: CPT

## 2024-05-04 PROCEDURE — 11000001 HC ACUTE MED/SURG PRIVATE ROOM

## 2024-05-04 PROCEDURE — 84145 PROCALCITONIN (PCT): CPT | Performed by: INTERNAL MEDICINE

## 2024-05-04 PROCEDURE — 96367 TX/PROPH/DG ADDL SEQ IV INF: CPT

## 2024-05-04 PROCEDURE — 87186 SC STD MICRODIL/AGAR DIL: CPT | Performed by: EMERGENCY MEDICINE

## 2024-05-04 PROCEDURE — 83605 ASSAY OF LACTIC ACID: CPT | Performed by: EMERGENCY MEDICINE

## 2024-05-04 PROCEDURE — 96361 HYDRATE IV INFUSION ADD-ON: CPT

## 2024-05-04 PROCEDURE — 25000003 PHARM REV CODE 250: Performed by: INTERNAL MEDICINE

## 2024-05-04 PROCEDURE — 36415 COLL VENOUS BLD VENIPUNCTURE: CPT | Performed by: INTERNAL MEDICINE

## 2024-05-04 PROCEDURE — 25000242 PHARM REV CODE 250 ALT 637 W/ HCPCS: Performed by: INTERNAL MEDICINE

## 2024-05-04 PROCEDURE — 94640 AIRWAY INHALATION TREATMENT: CPT

## 2024-05-04 PROCEDURE — 87502 INFLUENZA DNA AMP PROBE: CPT | Performed by: EMERGENCY MEDICINE

## 2024-05-04 PROCEDURE — 94761 N-INVAS EAR/PLS OXIMETRY MLT: CPT

## 2024-05-04 PROCEDURE — 85025 COMPLETE CBC W/AUTO DIFF WBC: CPT | Performed by: EMERGENCY MEDICINE

## 2024-05-04 PROCEDURE — 36415 COLL VENOUS BLD VENIPUNCTURE: CPT | Performed by: EMERGENCY MEDICINE

## 2024-05-04 PROCEDURE — 63600175 PHARM REV CODE 636 W HCPCS: Performed by: EMERGENCY MEDICINE

## 2024-05-04 PROCEDURE — U0002 COVID-19 LAB TEST NON-CDC: HCPCS | Performed by: EMERGENCY MEDICINE

## 2024-05-04 PROCEDURE — 25000242 PHARM REV CODE 250 ALT 637 W/ HCPCS: Performed by: EMERGENCY MEDICINE

## 2024-05-04 PROCEDURE — 87040 BLOOD CULTURE FOR BACTERIA: CPT | Mod: 59 | Performed by: EMERGENCY MEDICINE

## 2024-05-04 PROCEDURE — 80053 COMPREHEN METABOLIC PANEL: CPT | Performed by: EMERGENCY MEDICINE

## 2024-05-04 PROCEDURE — 81000 URINALYSIS NONAUTO W/SCOPE: CPT | Performed by: EMERGENCY MEDICINE

## 2024-05-04 PROCEDURE — 83880 ASSAY OF NATRIURETIC PEPTIDE: CPT | Performed by: EMERGENCY MEDICINE

## 2024-05-04 PROCEDURE — 63600175 PHARM REV CODE 636 W HCPCS: Performed by: INTERNAL MEDICINE

## 2024-05-04 PROCEDURE — 96365 THER/PROPH/DIAG IV INF INIT: CPT

## 2024-05-04 PROCEDURE — 87086 URINE CULTURE/COLONY COUNT: CPT | Performed by: EMERGENCY MEDICINE

## 2024-05-04 PROCEDURE — 25000003 PHARM REV CODE 250: Performed by: EMERGENCY MEDICINE

## 2024-05-04 RX ORDER — SODIUM,POTASSIUM PHOSPHATES 280-250MG
2 POWDER IN PACKET (EA) ORAL
Status: DISCONTINUED | OUTPATIENT
Start: 2024-05-04 | End: 2024-05-06 | Stop reason: HOSPADM

## 2024-05-04 RX ORDER — LANOLIN ALCOHOL/MO/W.PET/CERES
800 CREAM (GRAM) TOPICAL
Status: DISCONTINUED | OUTPATIENT
Start: 2024-05-04 | End: 2024-05-06 | Stop reason: HOSPADM

## 2024-05-04 RX ORDER — GABAPENTIN 100 MG/1
100 CAPSULE ORAL 2 TIMES DAILY
Status: DISCONTINUED | OUTPATIENT
Start: 2024-05-04 | End: 2024-05-06 | Stop reason: HOSPADM

## 2024-05-04 RX ORDER — ONDANSETRON HYDROCHLORIDE 2 MG/ML
4 INJECTION, SOLUTION INTRAVENOUS EVERY 8 HOURS PRN
Status: DISCONTINUED | OUTPATIENT
Start: 2024-05-04 | End: 2024-05-06 | Stop reason: HOSPADM

## 2024-05-04 RX ORDER — GLUCAGON 1 MG
1 KIT INJECTION
Status: DISCONTINUED | OUTPATIENT
Start: 2024-05-04 | End: 2024-05-06 | Stop reason: HOSPADM

## 2024-05-04 RX ORDER — ENOXAPARIN SODIUM 100 MG/ML
30 INJECTION SUBCUTANEOUS EVERY 24 HOURS
Status: DISCONTINUED | OUTPATIENT
Start: 2024-05-04 | End: 2024-05-06 | Stop reason: HOSPADM

## 2024-05-04 RX ORDER — SODIUM CHLORIDE 0.9 % (FLUSH) 0.9 %
2 SYRINGE (ML) INJECTION EVERY 12 HOURS PRN
Status: DISCONTINUED | OUTPATIENT
Start: 2024-05-04 | End: 2024-05-06 | Stop reason: HOSPADM

## 2024-05-04 RX ORDER — ACETAMINOPHEN 325 MG/1
650 TABLET ORAL EVERY 8 HOURS PRN
Status: DISCONTINUED | OUTPATIENT
Start: 2024-05-04 | End: 2024-05-06 | Stop reason: HOSPADM

## 2024-05-04 RX ORDER — ATORVASTATIN CALCIUM 20 MG/1
20 TABLET, FILM COATED ORAL DAILY
Status: DISCONTINUED | OUTPATIENT
Start: 2024-05-05 | End: 2024-05-06 | Stop reason: HOSPADM

## 2024-05-04 RX ORDER — GUAIFENESIN AND DEXTROMETHORPHAN HYDROBROMIDE 10; 100 MG/5ML; MG/5ML
5 SYRUP ORAL EVERY 6 HOURS PRN
Status: DISCONTINUED | OUTPATIENT
Start: 2024-05-04 | End: 2024-05-06 | Stop reason: HOSPADM

## 2024-05-04 RX ORDER — IBUPROFEN 200 MG
24 TABLET ORAL
Status: DISCONTINUED | OUTPATIENT
Start: 2024-05-04 | End: 2024-05-06 | Stop reason: HOSPADM

## 2024-05-04 RX ORDER — IPRATROPIUM BROMIDE 0.5 MG/2.5ML
0.5 SOLUTION RESPIRATORY (INHALATION)
Status: COMPLETED | OUTPATIENT
Start: 2024-05-04 | End: 2024-05-04

## 2024-05-04 RX ORDER — SERTRALINE HYDROCHLORIDE 25 MG/1
25 TABLET, FILM COATED ORAL DAILY
Status: DISCONTINUED | OUTPATIENT
Start: 2024-05-05 | End: 2024-05-06 | Stop reason: HOSPADM

## 2024-05-04 RX ORDER — INSULIN ASPART 100 [IU]/ML
0-5 INJECTION, SOLUTION INTRAVENOUS; SUBCUTANEOUS
Status: DISCONTINUED | OUTPATIENT
Start: 2024-05-04 | End: 2024-05-06 | Stop reason: HOSPADM

## 2024-05-04 RX ORDER — IBUPROFEN 200 MG
16 TABLET ORAL
Status: DISCONTINUED | OUTPATIENT
Start: 2024-05-04 | End: 2024-05-06 | Stop reason: HOSPADM

## 2024-05-04 RX ORDER — ALBUTEROL SULFATE 2.5 MG/.5ML
5 SOLUTION RESPIRATORY (INHALATION)
Status: COMPLETED | OUTPATIENT
Start: 2024-05-04 | End: 2024-05-04

## 2024-05-04 RX ORDER — ALUMINUM HYDROXIDE, MAGNESIUM HYDROXIDE, AND SIMETHICONE 1200; 120; 1200 MG/30ML; MG/30ML; MG/30ML
30 SUSPENSION ORAL 4 TIMES DAILY PRN
Status: DISCONTINUED | OUTPATIENT
Start: 2024-05-04 | End: 2024-05-06 | Stop reason: HOSPADM

## 2024-05-04 RX ORDER — NALOXONE HCL 0.4 MG/ML
0.02 VIAL (ML) INJECTION
Status: DISCONTINUED | OUTPATIENT
Start: 2024-05-04 | End: 2024-05-06 | Stop reason: HOSPADM

## 2024-05-04 RX ORDER — IPRATROPIUM BROMIDE AND ALBUTEROL SULFATE 2.5; .5 MG/3ML; MG/3ML
3 SOLUTION RESPIRATORY (INHALATION)
Status: COMPLETED | OUTPATIENT
Start: 2024-05-04 | End: 2024-05-06

## 2024-05-04 RX ADMIN — ALBUTEROL SULFATE 5 MG: 2.5 SOLUTION RESPIRATORY (INHALATION) at 12:05

## 2024-05-04 RX ADMIN — GABAPENTIN 100 MG: 100 CAPSULE ORAL at 08:05

## 2024-05-04 RX ADMIN — IPRATROPIUM BROMIDE AND ALBUTEROL SULFATE 3 ML: 2.5; .5 SOLUTION RESPIRATORY (INHALATION) at 07:05

## 2024-05-04 RX ADMIN — ENOXAPARIN SODIUM 30 MG: 40 INJECTION SUBCUTANEOUS at 05:05

## 2024-05-04 RX ADMIN — IPRATROPIUM BROMIDE 0.5 MG: 0.5 SOLUTION RESPIRATORY (INHALATION) at 12:05

## 2024-05-04 RX ADMIN — AZITHROMYCIN MONOHYDRATE 500 MG: 500 INJECTION, POWDER, LYOPHILIZED, FOR SOLUTION INTRAVENOUS at 01:05

## 2024-05-04 RX ADMIN — CEFTRIAXONE SODIUM 1 G: 1 INJECTION, POWDER, FOR SOLUTION INTRAMUSCULAR; INTRAVENOUS at 12:05

## 2024-05-04 RX ADMIN — SODIUM CHLORIDE 1000 ML: 9 INJECTION, SOLUTION INTRAVENOUS at 02:05

## 2024-05-04 NOTE — SUBJECTIVE & OBJECTIVE
Past Medical History:   Diagnosis Date    Anemia 10/19/2020    Arthritis     Breast cancer 2001    Diabetes mellitus     History of breast cancer 8/21/2013    Hyperlipidemia     Hypertension     Lumbar back pain 3/7/2023    Nuclear sclerotic cataract of right eye 10/16/2012    Osteoporosis, unspecified: see DEXA 8/15- 2/13/2017    Osteoporosis, unspecified: see DEXA 8/15- 2/13/2017    Pain of both shoulder joints 2/14/2017    Rheumatoid factor positive 2/14/2017    Stroke 4/6/2019    Tubular adenoma of colon 10/28/2013    Type II or unspecified type diabetes mellitus with neurological manifestations, not stated as uncontrolled(250.60)     Vitamin D deficiency disease 2/17/2014       Past Surgical History:   Procedure Laterality Date    BELPHAROPTOSIS REPAIR  03/15/13    bilateral-dr. coleman md    BREAST BIOPSY      BREAST LUMPECTOMY      BREAST SURGERY Left 2001    lumpectomy    CATARACT EXTRACTION      both eyes -     CHOLECYSTECTOMY      Done in [place] in the 1980's    COLONOSCOPY N/A 1/19/2016    Procedure: COLONOSCOPY;  Surgeon: BLANCA Guerra MD;  Location: [address];  Service: Endoscopy;  Laterality: N/A;    COLONOSCOPY W/ POLYPECTOMY         Review of patient's allergies indicates:   Allergen Reactions    No known drug allergies        Current Facility-Administered Medications   Medication Dose Route Frequency Provider Last Rate Last Admin    acetaminophen tablet 650 mg  650 mg Oral Q8H PRN Victor M Gil MD        acetaminophen tablet 650 mg  650 mg Oral Q8H PRN Victor M Gil MD        aluminum-magnesium hydroxide-simethicone 200-200-20 mg/5 mL suspension 30 mL  30 mL Oral QID PRN Victor M Gil MD        azithromycin (ZITHROMAX) 500 mg in dextrose 5 % (D5W) 250 mL IVPB (Vial-Mate)  500 mg Intravenous ED 1 Time Francisco Montalvo  mL/hr at 05/04/24 1358 500 mg at 05/04/24 1358    [START ON 5/5/2024] azithromycin (ZITHROMAX) 500 mg in dextrose 5 % (D5W) 250 mL  IVPB (Vial-Mate)  500 mg Intravenous Q24H Victor M Gil MD        [START ON 5/5/2024] cefTRIAXone (Rocephin) 1 g in dextrose 5 % in water (D5W) 100 mL IVPB (MB+)  1 g Intravenous Q24H Victor M Gil MD        dextrose 10% bolus 125 mL 125 mL  12.5 g Intravenous PRN Victor M Gil MD        dextrose 10% bolus 250 mL 250 mL  25 g Intravenous PRN Victor M Gil MD        glucagon (human recombinant) injection 1 mg  1 mg Intramuscular PRN Victor M Gil MD        glucose chewable tablet 16 g  16 g Oral PRN Victor M Gil MD        glucose chewable tablet 24 g  24 g Oral PRN Victor M Gil MD        insulin aspart U-100 pen 0-5 Units  0-5 Units Subcutaneous QID (AC + HS) PRN Victor M Gil MD        magnesium oxide tablet 800 mg  800 mg Oral PRN Victor M Gil MD        magnesium oxide tablet 800 mg  800 mg Oral PRN Victor M Gil MD        naloxone 0.4 mg/mL injection 0.02 mg  0.02 mg Intravenous PRN Victor M Gil MD        ondansetron injection 4 mg  4 mg Intravenous Q8H PRN Victor M Gil MD        potassium bicarbonate disintegrating tablet 35 mEq  35 mEq Oral PRN Victor M Gil MD        potassium bicarbonate disintegrating tablet 50 mEq  50 mEq Oral PRN Victor M Gil MD        potassium bicarbonate disintegrating tablet 60 mEq  60 mEq Oral PRN Victor M Gil MD        potassium, sodium phosphates 280-160-250 mg packet 2 packet  2 packet Oral PRN Victor M Gil MD        potassium, sodium phosphates 280-160-250 mg packet 2 packet  2 packet Oral PRN Victor M Gil MD        potassium, sodium phosphates 280-160-250 mg packet 2 packet  2 packet Oral PRN Victor M Gil MD        sodium chloride 0.9% bolus 1,000 mL 1,000 mL  1,000 mL Intravenous ED 1 Time Francisco Montalvo  mL/hr at 05/04/24 1420 1,000 mL at 05/04/24 1420    sodium chloride 0.9% flush 2 mL  2 mL Intravenous Q12H PRN Victor M Gil MD         Current Outpatient  Medications   Medication Sig Dispense Refill    atorvastatin (LIPITOR) 20 MG tablet Take 1 tablet (20 mg total) by mouth once daily. 90 tablet 3    blood sugar diagnostic (TRUE METRIX GLUCOSE TEST STRIP) Strp Inject 100 strips into the skin 2 (two) times daily. 100 strip 3    blood-glucose meter kit Use as instructed  Dispense appropriate lancets and test strips 100 each 12 refills 1 each 0    cholecalciferol, vitamin D3, (VITAMIN D3) 25 mcg (1,000 unit) capsule Take 2 capsules (2,000 Units total) by mouth once daily. 60 capsule 12    cycloSPORINE (RESTASIS) 0.05 % ophthalmic emulsion Place 1 drop into both eyes 2 (two) times daily. (Patient not taking: Reported on 9/28/2023) 180 each 3    diclofenac sodium (VOLTAREN) 1 % Gel Apply 2 g topically 4 (four) times daily as needed (pain). 20 g 6    gabapentin (NEURONTIN) 100 MG capsule Take 1 capsule (100 mg total) by mouth 2 (two) times daily. 180 capsule 3    icosapent ethyL (VASCEPA) 1 gram Cap Take 2 capsules (2 g total) by mouth 2 (two) times daily with meals. 60 capsule 11    LIDOcaine (LIDODERM) 5 % Place 1 patch onto the skin daily as needed (pain). Remove & Discard patch within 24 hours or as directed by MD 15 patch 5    MICRO THIN LANCETS 33 gauge Misc CHECK BLOOD GLUCOSE DAILY. 100 each 0    mupirocin (BACTROBAN) 2 % ointment Apply topically 3 (three) times daily. 22 g 0    omega-3 acid ethyl esters (LOVAZA) 1 gram capsule Take 1 capsule (1 g total) by mouth 2 (two) times daily. 180 capsule 3    sertraline (ZOLOFT) 25 MG tablet TAKE 1 TABLET(25 MG) BY MOUTH EVERY DAY 90 tablet 3    TRUEPLUS LANCETS 33 gauge Misc CHECK BLOOD GLUCOSE DAILY. 100 each 1     Family History       Problem Relation (Age of Onset)    Breast cancer Sister (48)    Cancer Sister, Mother, Father, Brother, Brother    Early death Mother    Liver cancer Mother, Father, Brother, Brother    No Known Problems Daughter, Son, Daughter, Maternal Aunt, Maternal Uncle, Paternal Aunt, Paternal Uncle,  Maternal Grandmother, Maternal Grandfather, Paternal Grandmother, Paternal Grandfather          Tobacco Use    Smoking status: Never    Smokeless tobacco: Never   Substance and Sexual Activity    Alcohol use: No    Drug use: No    Sexual activity: Never     Birth control/protection: Post-menopausal     Review of Systems   Constitutional:  Positive for fever.   HENT:  Positive for congestion.    Respiratory:  Positive for cough and shortness of breath.    Cardiovascular:  Negative for chest pain.   Gastrointestinal:  Negative for abdominal pain, nausea and vomiting.   Genitourinary:  Positive for dysuria.   Musculoskeletal:  Negative for back pain.   Neurological:  Negative for syncope.   Psychiatric/Behavioral:  Negative for agitation.      Objective:     Vital Signs (Most Recent):  Temp: 99 °F (37.2 °C) (05/04/24 1435)  Pulse: 87 (05/04/24 1435)  Resp: 20 (05/04/24 1435)  BP: 139/65 (05/04/24 1435)  SpO2: 96 % (05/04/24 1435) Vital Signs (24h Range):  Temp:  [99 °F (37.2 °C)-100.5 °F (38.1 °C)] 99 °F (37.2 °C)  Pulse:  [82-95] 87  Resp:  [14-20] 20  SpO2:  [96 %-99 %] 96 %  BP: (139-151)/(65-75) 139/65     Weight: 60.8 kg (134 lb)  Body mass index is 26.17 kg/m².     Physical Exam  Constitutional:       General: She is not in acute distress.  HENT:      Head: Normocephalic.      Nose: Nose normal.   Cardiovascular:      Rate and Rhythm: Normal rate.   Pulmonary:      Effort: No respiratory distress.      Breath sounds: Wheezing present.   Abdominal:      Tenderness: There is no abdominal tenderness.   Musculoskeletal:         General: No swelling.   Skin:     General: Skin is warm.      Capillary Refill: Capillary refill takes less than 2 seconds.   Neurological:      Mental Status: She is alert and oriented to person, place, and time.   Psychiatric:         Behavior: Behavior normal.                Significant Labs: All pertinent labs within the past 24 hours have been reviewed.  CBC:   Recent Labs   Lab  05/04/24  1220   WBC 9.81   HGB 11.4*   HCT 34.1*        CMP:   Recent Labs   Lab 05/04/24  1220      K 4.3      CO2 22*   *   BUN 24*   CREATININE 1.2   CALCIUM 10.2   PROT 8.5*   ALBUMIN 3.5   BILITOT 0.5   ALKPHOS 64   AST 24   ALT 22   ANIONGAP 12     Lactic Acid:   Recent Labs   Lab 05/04/24  1220   LACTATE 1.1     Urine Studies:   Recent Labs   Lab 05/04/24  1240   COLORU Yellow   APPEARANCEUA Hazy*   PHUR 6.0   SPECGRAV 1.020   PROTEINUA 1+*   GLUCUA Negative   KETONESU Negative   BILIRUBINUA Negative   OCCULTUA 2+*   NITRITE Positive*   UROBILINOGEN Negative   LEUKOCYTESUR 3+*   RBCUA 12*   WBCUA >100*   BACTERIA Many*   SQUAMEPITHEL 5   HYALINECASTS 2*       Significant Imaging: I have reviewed all pertinent imaging results/findings within the past 24 hours.

## 2024-05-04 NOTE — ED PROVIDER NOTES
Encounter Date: 5/4/2024       History     Chief Complaint   Patient presents with    Cough     Chest congestion x 4 days     Dysuria     Patient is a 88-year-old female with a past medical history of prior stroke hypertension hyperlipidemia diabetes anemia arthritis who presents the emergency room for evaluation of a nonproductive cough that has been present for the past week.  Her son was sick with similar symptoms.  Now she is dysuria mild low back pain.  She denies any nausea vomiting or fevers.  She is wheezing.  She denies any history of heart failure COPD.  She has not a smoker.  She has no history kidney stones.      Review of patient's allergies indicates:   Allergen Reactions    No known drug allergies      Past Medical History:   Diagnosis Date    Anemia 10/19/2020    Arthritis     Breast cancer 2001    Diabetes mellitus     History of breast cancer 8/21/2013    Hyperlipidemia     Hypertension     Lumbar back pain 3/7/2023    Nuclear sclerotic cataract of right eye 10/16/2012    Osteoporosis, unspecified: see DEXA 8/15- 2/13/2017    Osteoporosis, unspecified: see DEXA 8/15- 2/13/2017    Pain of both shoulder joints 2/14/2017    Rheumatoid factor positive 2/14/2017    Stroke 4/6/2019    Tubular adenoma of colon 10/28/2013    Type II or unspecified type diabetes mellitus with neurological manifestations, not stated as uncontrolled(250.60)     Vitamin D deficiency disease 2/17/2014     Past Surgical History:   Procedure Laterality Date    BELPHAROPTOSIS REPAIR  03/15/13    bilateral-dr. coleman md    BREAST BIOPSY      BREAST LUMPECTOMY      BREAST SURGERY Left 2001    lumpectomy    CATARACT EXTRACTION      both eyes -     CHOLECYSTECTOMY      Done in [place] in the 1980's    COLONOSCOPY N/A 1/19/2016    Procedure: COLONOSCOPY;  Surgeon: BLANCA Guerra MD;  Location: [address];  Service: Endoscopy;  Laterality: N/A;    COLONOSCOPY W/ POLYPECTOMY       Family History   Problem  Relation Name Age of Onset    Breast cancer Sister  48        55 second, bilateral    Cancer Sister          Breast    Liver cancer Mother      Early death Mother      Cancer Mother          liver    Liver cancer Father      Cancer Father      Liver cancer Brother      Cancer Brother          ? liver    No Known Problems Daughter      No Known Problems Son      Liver cancer Brother      Cancer Brother          ? liver    No Known Problems Daughter      No Known Problems Maternal Aunt      No Known Problems Maternal Uncle      No Known Problems Paternal Aunt      No Known Problems Paternal Uncle      No Known Problems Maternal Grandmother      No Known Problems Maternal Grandfather      No Known Problems Paternal Grandmother      No Known Problems Paternal Grandfather      Glaucoma Neg Hx      Amblyopia Neg Hx      Blindness Neg Hx      Cataracts Neg Hx      Diabetes Neg Hx      Hypertension Neg Hx      Macular degeneration Neg Hx      Retinal detachment Neg Hx      Strabismus Neg Hx      Stroke Neg Hx      Thyroid disease Neg Hx      Cervical cancer Neg Hx      Vaginal cancer Neg Hx      Endometrial cancer Neg Hx       Social History     Tobacco Use    Smoking status: Never    Smokeless tobacco: Never   Substance Use Topics    Alcohol use: No    Drug use: No     Review of Systems   Constitutional:  Negative for appetite change, chills, fatigue and fever.   HENT:  Negative for ear pain, rhinorrhea and sore throat.    Eyes:  Negative for pain and visual disturbance.   Respiratory:  Positive for cough, shortness of breath and wheezing.    Cardiovascular:  Negative for chest pain.   Gastrointestinal:  Negative for abdominal pain, diarrhea, nausea and vomiting.   Genitourinary:  Positive for dysuria. Negative for difficulty urinating.   Musculoskeletal:  Positive for back pain. Negative for arthralgias.   Skin:  Negative for rash.   Neurological:  Negative for weakness, numbness and headaches.   All other systems  reviewed and are negative.      Physical Exam     Initial Vitals [05/04/24 1105]   BP Pulse Resp Temp SpO2   (!) 151/75 95 20 (!) 100.5 °F (38.1 °C) 99 %      MAP       --         Physical Exam    Nursing note and vitals reviewed.  Constitutional: She appears well-developed and well-nourished.  Non-toxic appearance. No distress.   HENT:   Head: Normocephalic and atraumatic.   Mouth/Throat: Oropharynx is clear and moist.   Eyes: EOM are normal. Pupils are equal, round, and reactive to light.   Neck: Neck supple.   Normal range of motion.  Cardiovascular:  Normal rate, regular rhythm, normal heart sounds and intact distal pulses.     Exam reveals no gallop and no friction rub.       No murmur heard.  Pulmonary/Chest: No respiratory distress. She has no decreased breath sounds. She has wheezes (bilateral inspiratory and expiratory). She has no rhonchi. She has no rales.   Abdominal: Abdomen is soft. Bowel sounds are normal. She exhibits no distension. There is no abdominal tenderness.   No significant pelvic tenderness or flank tenderness   Musculoskeletal:         General: No edema. Normal range of motion.      Cervical back: Normal range of motion and neck supple.     Neurological: She is alert and oriented to person, place, and time. She has normal strength. GCS score is 15. GCS eye subscore is 4. GCS verbal subscore is 5. GCS motor subscore is 6.   Skin: Skin is warm and dry.   Psychiatric: She has a normal mood and affect.         ED Course   Critical Care    Date/Time: 5/4/2024 11:02 AM    Performed by: Francisco Montalvo MD  Authorized by: Francisco Montalvo MD  Direct patient critical care time: 20 minutes  Additional history critical care time: 5 minutes  Ordering / reviewing critical care time: 5 minutes  Documentation critical care time: 5 minutes  Consulting other physicians critical care time: 10 minutes  Consult with family critical care time: 10 minutes  Total critical care time (exclusive of procedural  time) : 55 minutes  Critical care was necessary to treat or prevent imminent or life-threatening deterioration of the following conditions: sepsis.  Critical care was time spent personally by me on the following activities: discussions with consultants, evaluation of patient's response to treatment, examination of patient, obtaining history from patient or surrogate, ordering and performing treatments and interventions, ordering and review of laboratory studies and ordering and review of radiographic studies.        Labs Reviewed   CBC W/ AUTO DIFFERENTIAL - Abnormal; Notable for the following components:       Result Value    RBC 3.71 (*)     Hemoglobin 11.4 (*)     Hematocrit 34.1 (*)     Immature Granulocytes 0.7 (*)     Immature Grans (Abs) 0.07 (*)     All other components within normal limits   COMPREHENSIVE METABOLIC PANEL - Abnormal; Notable for the following components:    CO2 22 (*)     Glucose 148 (*)     BUN 24 (*)     Total Protein 8.5 (*)     eGFR 44 (*)     All other components within normal limits   URINALYSIS, REFLEX TO URINE CULTURE - Abnormal; Notable for the following components:    Appearance, UA Hazy (*)     Protein, UA 1+ (*)     Occult Blood UA 2+ (*)     Nitrite, UA Positive (*)     Leukocytes, UA 3+ (*)     All other components within normal limits    Narrative:     Specimen Source->Urine   URINALYSIS MICROSCOPIC - Abnormal; Notable for the following components:    RBC, UA 12 (*)     WBC, UA >100 (*)     WBC Clumps, UA Moderate (*)     Bacteria Many (*)     Non-Squam Epith 2 (*)     Hyaline Casts, UA 2 (*)     All other components within normal limits    Narrative:     Specimen Source->Urine   INFLUENZA A & B BY MOLECULAR   CULTURE, BLOOD   CULTURE, BLOOD   CULTURE, URINE   LACTIC ACID, PLASMA   SARS-COV-2 RNA AMPLIFICATION, QUAL   B-TYPE NATRIURETIC PEPTIDE    Narrative:     Collection has been rescheduled by DOMENICO at 05/04/2024 13:05   Reason: Add to previous lab          Imaging  Results              X-Ray Chest AP Portable (Final result)  Result time 05/04/24 12:19:37      Final result by Joey Gonsalez MD (05/04/24 12:19:37)                   Impression:      Enlarged cardiac silhouette, prominence of the central pulmonary vasculature, and mild lower lobe interstitial edema, right greater than left.  Cardiac decompensation/heart failure cannot be excluded.  Please correlate clinically.      Electronically signed by: Ermias Gonsalez MD  Date:    05/04/2024  Time:    12:19               Narrative:    EXAMINATION:  XR CHEST AP PORTABLE    CLINICAL HISTORY:  Sepsis;    TECHNIQUE:  A single portable upright AP chest radiograph was acquired.    COMPARISON:  Chest x-ray-12/06/2023    FINDINGS:  The cardiac silhouette is enlarged.  There is prominence of the central pulmonary vasculature, similar to the prior study.  There is lower lobe interstitial edema.  No definite airspace consolidation or pulmonary mass.  No significant volume of pleural fluid or pneumothorax.  There is degenerative change of the AC joints and left shoulder joint.  There is an S-type curvature of the thoracolumbar spine and multilevel degenerative change of the thoracolumbar spine.                                       Medications   azithromycin (ZITHROMAX) 500 mg in dextrose 5 % (D5W) 250 mL IVPB (Vial-Mate) (has no administration in time range)   sodium chloride 0.9% bolus 1,000 mL 1,000 mL (has no administration in time range)   cefTRIAXone (Rocephin) 1 g in dextrose 5 % in water (D5W) 100 mL IVPB (MB+) (0 g Intravenous Stopped 5/4/24 1312)   albuterol sulfate nebulizer solution 5 mg (5 mg Nebulization Given 5/4/24 1243)   ipratropium 0.02 % nebulizer solution 0.5 mg (0.5 mg Nebulization Given 5/4/24 1243)     Medical Decision Making  Patient has a cough with urinary tract infection I am concerned the patient may have pneumonia as well as a urinary tract infection and she meets criteria for sepsis.  Given her age  comorbidities I will likely observe her in the hospital.  I am waiting a BNP given that the radiologist read the chest x-ray as possible pulmonary edema.  I will re-evaluate the patient however I do not think she has CHF.  I will be judicious with fluids at this time however given that she has not hypotensive nor is she tachycardic and if she does have CHF I do not want to fluid overload her.  I have given her a dose of Rocephin and azithromycin.  Awaiting chemistry.  Given her low back pain there was concerns about pyelonephritis.  Her pain is not intermittent or colicky in nature to be suggestive of renal colic    Amount and/or Complexity of Data Reviewed  Labs: ordered.  Radiology: ordered.    Risk  Prescription drug management.      Additional MDM:   Sepsis:   This patient does have evidence of infective focus  My overall impression is sepsis.  Source: Urinary Tract  Antibiotics given- Antibiotics     Patient Encounter Information Not Found      Latest lactate reviewed- yes  Organ dysfunction indicated by Acute respiratory failure    Fluid challenge Contraindicated- Fluid bolus is contraindicated in this patient due to Congestive Heart Failure     Post- resuscitation assessment No - Post resuscitation assessment not needed       Will Not start Pressors- Levophed for MAP of 65  Source control achieved by: abx                ED Course as of 05/04/24 1348   Sat May 04, 2024   1307 Patient is negative for COVID in the flu.  Chemistry appears to be stable. [JS]   1346 I spoke to the hospitalist who agrees with the admission at 1:45 p.m..  Patient's BNP is normal.  I will give her a bolus of fluids.  I do not think this is heart failure.  I think she likely has a pneumonia.  However she does not need a 30 mL/kilos bolus of fluids given that she has a normal lactate in his not hypotensive.  Family would like to her to be admitted and given her risk factors and age I suspect this is appropriate for dose IV antibiotics  and nebulizers. [JS]      ED Course User Index  [JS] Francisco Montalvo MD                           Clinical Impression:  Final diagnoses:  [A41.9, N39.0] Sepsis due to urinary tract infection (Primary)  [J18.9] Community acquired bilateral lower lobe pneumonia          ED Disposition Condition    Observation Stable                Francisco Montalvo MD  05/04/24 2485

## 2024-05-04 NOTE — ASSESSMENT & PLAN NOTE
Likely related to pneumonia  Covid negative  Duoneb prn  Anti tussive   Will get echo to assess cardiac function

## 2024-05-04 NOTE — PROGRESS NOTES
Pharmacist Renal Dose Adjustment Note    Sera Boone is a 88 y.o. female being treated with the medication Lovenox    Patient Data:    Vital Signs (Most Recent):  Temp: 99 °F (37.2 °C) (05/04/24 1435)  Pulse: 87 (05/04/24 1435)  Resp: 20 (05/04/24 1435)  BP: 139/65 (05/04/24 1435)  SpO2: 96 % (05/04/24 1435) Vital Signs (72h Range):  Temp:  [99 °F (37.2 °C)-100.5 °F (38.1 °C)]   Pulse:  [82-95]   Resp:  [14-20]   BP: (139-151)/(65-75)   SpO2:  [96 %-99 %]      Recent Labs   Lab 05/04/24  1220   CREATININE 1.2     Serum creatinine: 1.2 mg/dL 05/04/24 1220  Estimated creatinine clearance: 26.4 mL/min    Lovenox 40 mg daily will be changed to Lovenox 30 mg daily  Due to Pt's CrCl < 30      Pharmacist's Name: Chasity Key  Pharmacist's Extension: [phone]

## 2024-05-04 NOTE — HPI
88 years old female with past medical history significant for hypertension, type 2 diabetes mellitus, hyperlipidemia, history of breast cancer, back pain who was brought in by patient's son due to cough, shortness of breath and burning urination.  Patient speaks Kinyarwanda and does not speak English, so history is mainly provided by patient's son at bedside.  Patient's son reported that patient has not been feeling good for last 1 week and has been having cough with some sputum production, shortness of breath which is not associated with exertion and burning urination.  Patient denied chest pain abdominal pain nausea vomiting or dizziness.  Patient was found to have fever of 100.5° F on presentation.  UA showed UTI.  Chest x-ray was read as ''Enlarged cardiac silhouette, prominence of the central pulmonary vasculature, and mild lower lobe interstitial edema, right greater than left.  Cardiac decompensation/heart failure cannot be excluded.  Please correlate clinically''.  Patient and family denied history of smoking or any past history of heart or lung disease.  Patient does not have history of heart failure, BNP was obtained by ER physician after CXR report which came 20 who then advised IV fluid.  Urine and blood cultures obtained, patient has been started on IV ceftriaxone and azithromycin.

## 2024-05-04 NOTE — NURSING
Nurses Note -- 4 Eyes      5/4/2024   5:29 PM      Skin assessed during: Admit      [x] No Altered Skin Integrity Present    []Prevention Measures Documented      [] Yes- Altered Skin Integrity Present or Discovered   [] LDA Added if Not in Epic (Describe Wound)   [] New Altered Skin Integrity was Present on Admit and Documented in LDA   [] Wound Image Taken    Wound Care Consulted? No    Attending Nurse:  Mihai Morley RN/Staff Member:   Aly

## 2024-05-04 NOTE — H&P
[facility] Medicine  History & Physical    Patient Name: Sera Boone  MRN: [id number]  Patient Class: IP- Inpatient  Admission Date: 5/4/2024  Attending Physician: Victor M Gil MD   Primary Care Provider: Beena Hill MD         Patient information was obtained from patient, patient's son and ER records.     Subjective:     Principal Problem:Cough    Chief Complaint:   Chief Complaint   Patient presents with    Cough     Chest congestion x 4 days     Dysuria        HPI: 88 years old female with past medical history significant for hypertension, type 2 diabetes mellitus, hyperlipidemia, history of breast cancer, back pain who was brought in by patient's son due to cough, shortness of breath and burning urination.  Patient speaks [place] and does not speak English, so history is mainly provided by patient's son at bedside.  Patient's son reported that patient has not been feeling good for last 1 week and has been having cough with some sputum production, shortness of breath which is not associated with exertion and burning urination.  Patient denied chest pain abdominal pain nausea vomiting or dizziness.  Patient was found to have fever of 100.5° F on presentation.  UA showed UTI.  Chest x-ray was read as ''Enlarged cardiac silhouette, prominence of the central pulmonary vasculature, and mild lower lobe interstitial edema, right greater than left.  Cardiac decompensation/heart failure cannot be excluded.  Please correlate clinically''.  Patient and family denied history of smoking or any past history of heart or lung disease.  Patient does not have history of heart failure, BNP was obtained by ER physician after CXR report which came 20 who then advised IV fluid.  Urine and blood cultures obtained, patient has been started on IV ceftriaxone and azithromycin.        Past Medical History:   Diagnosis Date    Anemia 10/19/2020    Arthritis     Breast cancer 2001     Diabetes mellitus     History of breast cancer 8/21/2013    Hyperlipidemia     Hypertension     Lumbar back pain 3/7/2023    Nuclear sclerotic cataract of right eye 10/16/2012    Osteoporosis, unspecified: see DEXA 8/15- 2/13/2017    Osteoporosis, unspecified: see DEXA 8/15- 2/13/2017    Pain of both shoulder joints 2/14/2017    Rheumatoid factor positive 2/14/2017    Stroke 4/6/2019    Tubular adenoma of colon 10/28/2013    Type II or unspecified type diabetes mellitus with neurological manifestations, not stated as uncontrolled(250.60)     Vitamin D deficiency disease 2/17/2014       Past Surgical History:   Procedure Laterality Date    BELPHAROPTOSIS REPAIR  03/15/13    bilateral-dr. coleman md    BREAST BIOPSY      BREAST LUMPECTOMY      BREAST SURGERY Left 2001    lumpectomy    CATARACT EXTRACTION      both eyes -     CHOLECYSTECTOMY      Done in [place] in the 1980's    COLONOSCOPY N/A 1/19/2016    Procedure: COLONOSCOPY;  Surgeon: BLANCA Guerra MD;  Location: [address]);  Service: Endoscopy;  Laterality: N/A;    COLONOSCOPY W/ POLYPECTOMY         Review of patient's allergies indicates:   Allergen Reactions    No known drug allergies        Current Facility-Administered Medications   Medication Dose Route Frequency Provider Last Rate Last Admin    acetaminophen tablet 650 mg  650 mg Oral Q8H PRN Victor M Gil MD        acetaminophen tablet 650 mg  650 mg Oral Q8H PRN Victor M Gil MD        aluminum-magnesium hydroxide-simethicone 200-200-20 mg/5 mL suspension 30 mL  30 mL Oral QID PRN Victor M Gil MD        azithromycin (ZITHROMAX) 500 mg in dextrose 5 % (D5W) 250 mL IVPB (Vial-Mate)  500 mg Intravenous ED 1 Time Francisco Montalvo  mL/hr at 05/04/24 1358 500 mg at 05/04/24 1358    [START ON 5/5/2024] azithromycin (ZITHROMAX) 500 mg in dextrose 5 % (D5W) 250 mL IVPB (Vial-Mate)  500 mg Intravenous Q24H Victor M Gil MD        [START ON 5/5/2024] cefTRIAXone  (Rocephin) 1 g in dextrose 5 % in water (D5W) 100 mL IVPB (MB+)  1 g Intravenous Q24H Victor M Gil MD        dextrose 10% bolus 125 mL 125 mL  12.5 g Intravenous PRN Victor M Gil MD        dextrose 10% bolus 250 mL 250 mL  25 g Intravenous PRN Victor M Gil MD        glucagon (human recombinant) injection 1 mg  1 mg Intramuscular PRN Victor M Gil MD        glucose chewable tablet 16 g  16 g Oral PRN Victor M Gil MD        glucose chewable tablet 24 g  24 g Oral PRN Victor M Gil MD        insulin aspart U-100 pen 0-5 Units  0-5 Units Subcutaneous QID (AC + HS) PRVictor M Jackson MD        magnesium oxide tablet 800 mg  800 mg Oral PRVictor M Jackson MD        magnesium oxide tablet 800 mg  800 mg Oral PRN Victor M Gil MD        naloxone 0.4 mg/mL injection 0.02 mg  0.02 mg Intravenous PRN Victor M Gil MD        ondansetron injection 4 mg  4 mg Intravenous Q8H PRN Victor M Gil MD        potassium bicarbonate disintegrating tablet 35 mEq  35 mEq Oral PRVictor M Jackson MD        potassium bicarbonate disintegrating tablet 50 mEq  50 mEq Oral PRN Victor M Gil MD        potassium bicarbonate disintegrating tablet 60 mEq  60 mEq Oral PRN Victor M Gil MD        potassium, sodium phosphates 280-160-250 mg packet 2 packet  2 packet Oral PRN Victor M Gil MD        potassium, sodium phosphates 280-160-250 mg packet 2 packet  2 packet Oral PRN Victor M Gil MD        potassium, sodium phosphates 280-160-250 mg packet 2 packet  2 packet Oral PRN Victor M Gil MD        sodium chloride 0.9% bolus 1,000 mL 1,000 mL  1,000 mL Intravenous ED 1 Time Francisco Montalvo  mL/hr at 05/04/24 1420 1,000 mL at 05/04/24 1420    sodium chloride 0.9% flush 2 mL  2 mL Intravenous Q12H PRN Victor M Gil MD         Current Outpatient Medications   Medication Sig Dispense Refill    atorvastatin (LIPITOR) 20 MG tablet Take 1 tablet (20 mg total)  by mouth once daily. 90 tablet 3    blood sugar diagnostic (TRUE METRIX GLUCOSE TEST STRIP) Strp Inject 100 strips into the skin 2 (two) times daily. 100 strip 3    blood-glucose meter kit Use as instructed  Dispense appropriate lancets and test strips 100 each 12 refills 1 each 0    cholecalciferol, vitamin D3, (VITAMIN D3) 25 mcg (1,000 unit) capsule Take 2 capsules (2,000 Units total) by mouth once daily. 60 capsule 12    cycloSPORINE (RESTASIS) 0.05 % ophthalmic emulsion Place 1 drop into both eyes 2 (two) times daily. (Patient not taking: Reported on 9/28/2023) 180 each 3    diclofenac sodium (VOLTAREN) 1 % Gel Apply 2 g topically 4 (four) times daily as needed (pain). 20 g 6    gabapentin (NEURONTIN) 100 MG capsule Take 1 capsule (100 mg total) by mouth 2 (two) times daily. 180 capsule 3    icosapent ethyL (VASCEPA) 1 gram Cap Take 2 capsules (2 g total) by mouth 2 (two) times daily with meals. 60 capsule 11    LIDOcaine (LIDODERM) 5 % Place 1 patch onto the skin daily as needed (pain). Remove & Discard patch within 24 hours or as directed by MD 15 patch 5    MICRO THIN LANCETS 33 gauge Misc CHECK BLOOD GLUCOSE DAILY. 100 each 0    mupirocin (BACTROBAN) 2 % ointment Apply topically 3 (three) times daily. 22 g 0    omega-3 acid ethyl esters (LOVAZA) 1 gram capsule Take 1 capsule (1 g total) by mouth 2 (two) times daily. 180 capsule 3    sertraline (ZOLOFT) 25 MG tablet TAKE 1 TABLET(25 MG) BY MOUTH EVERY DAY 90 tablet 3    TRUEPLUS LANCETS 33 gauge Misc CHECK BLOOD GLUCOSE DAILY. 100 each 1     Family History       Problem Relation (Age of Onset)    Breast cancer Sister (48)    Cancer Sister, Mother, Father, Brother, Brother    Early death Mother    Liver cancer Mother, Father, Brother, Brother    No Known Problems Daughter, Son, Daughter, Maternal Aunt, Maternal Uncle, Paternal Aunt, Paternal Uncle, Maternal Grandmother, Maternal Grandfather, Paternal Grandmother, Paternal Grandfather          Tobacco Use     Smoking status: Never    Smokeless tobacco: Never   Substance and Sexual Activity    Alcohol use: No    Drug use: No    Sexual activity: Never     Birth control/protection: Post-menopausal     Review of Systems   Constitutional:  Positive for fever.   HENT:  Positive for congestion.    Respiratory:  Positive for cough and shortness of breath.    Cardiovascular:  Negative for chest pain.   Gastrointestinal:  Negative for abdominal pain, nausea and vomiting.   Genitourinary:  Positive for dysuria.   Musculoskeletal:  Negative for back pain.   Neurological:  Negative for syncope.   Psychiatric/Behavioral:  Negative for agitation.      Objective:     Vital Signs (Most Recent):  Temp: 99 °F (37.2 °C) (05/04/24 1435)  Pulse: 87 (05/04/24 1435)  Resp: 20 (05/04/24 1435)  BP: 139/65 (05/04/24 1435)  SpO2: 96 % (05/04/24 1435) Vital Signs (24h Range):  Temp:  [99 °F (37.2 °C)-100.5 °F (38.1 °C)] 99 °F (37.2 °C)  Pulse:  [82-95] 87  Resp:  [14-20] 20  SpO2:  [96 %-99 %] 96 %  BP: (139-151)/(65-75) 139/65     Weight: 60.8 kg (134 lb)  Body mass index is 26.17 kg/m².     Physical Exam  Constitutional:       General: She is not in acute distress.  HENT:      Head: Normocephalic.      Nose: Nose normal.   Cardiovascular:      Rate and Rhythm: Normal rate.   Pulmonary:      Effort: No respiratory distress.      Breath sounds: Wheezing present.   Abdominal:      Tenderness: There is no abdominal tenderness.   Musculoskeletal:         General: No swelling.   Skin:     General: Skin is warm.      Capillary Refill: Capillary refill takes less than 2 seconds.   Neurological:      Mental Status: She is alert and oriented to person, place, and time.   Psychiatric:         Behavior: Behavior normal.                Significant Labs: All pertinent labs within the past 24 hours have been reviewed.  CBC:   Recent Labs   Lab 05/04/24  1220   WBC 9.81   HGB 11.4*   HCT 34.1*        CMP:   Recent Labs   Lab 05/04/24  1220      K 4.3       CO2 22*   *   BUN 24*   CREATININE 1.2   CALCIUM 10.2   PROT 8.5*   ALBUMIN 3.5   BILITOT 0.5   ALKPHOS 64   AST 24   ALT 22   ANIONGAP 12     Lactic Acid:   Recent Labs   Lab 05/04/24  1220   LACTATE 1.1     Urine Studies:   Recent Labs   Lab 05/04/24  1240   COLORU Yellow   APPEARANCEUA Hazy*   PHUR 6.0   SPECGRAV 1.020   PROTEINUA 1+*   GLUCUA Negative   KETONESU Negative   BILIRUBINUA Negative   OCCULTUA 2+*   NITRITE Positive*   UROBILINOGEN Negative   LEUKOCYTESUR 3+*   RBCUA 12*   WBCUA >100*   BACTERIA Many*   SQUAMEPITHEL 5   HYALINECASTS 2*       Significant Imaging: I have reviewed all pertinent imaging results/findings within the past 24 hours.  Assessment/Plan:     * Cough  Likely related to pneumonia  Covid negative  Duoneb prn  Anti tussive   Will get echo to assess cardiac function       Type 2 diabetes mellitus, without long-term current use of insulin  HbA1c 7.4% on 4/4/2024   Accu checks with [facility]   Hypoglycemia protocol     Pneumonia  Blood cultures obtained  Will get respiratory culture and procalcitonin  IV ceftriaxone and azithromycin   Anti tussive  Duoneb prn       UTI (urinary tract infection)  Urine culture  IV ceftriaxone       SOB (shortness of breath)  As above      Hypertension associated with diabetes  Patient is not on any antihypertensive as home med  Bp stable  Bp monitoring     Hyperlipidemia associated with type 2 diabetes mellitus  Continue home med statin        VTE Risk Mitigation (From admission, onward)           Ordered     enoxaparin injection 40 mg  Every 24 hours         05/04/24 1510     IP VTE HIGH RISK PATIENT  Once         05/04/24 1351     Place sequential compression device  Until discontinued         05/04/24 1351                               Pharmacist Renal Dose Adjustment Note    Sera Boone is a 88 y.o. female being treated with the medication Lovenox    Patient Data:    Vital Signs (Most Recent):  Temp: 99 °F (37.2 °C) (05/04/24  1435)  Pulse: 87 (05/04/24 1435)  Resp: 20 (05/04/24 1435)  BP: 139/65 (05/04/24 1435)  SpO2: 96 % (05/04/24 1435) Vital Signs (72h Range):  Temp:  [99 °F (37.2 °C)-100.5 °F (38.1 °C)]   Pulse:  [82-95]   Resp:  [14-20]   BP: (139-151)/(65-75)   SpO2:  [96 %-99 %]      Recent Labs   Lab 05/04/24  1220   CREATININE 1.2     Serum creatinine: 1.2 mg/dL 05/04/24 1220  Estimated creatinine clearance: 26.4 mL/min    Lovenox 40 mg daily will be changed to Lovenox 30 mg daily  Due to Pt's CrCl < 30      Pharmacist's Name: Chasity Key  Pharmacist's Extension: [phone]      Victor M Gil MD  Department of Hospital Medicine  [facility]/Surg

## 2024-05-04 NOTE — ASSESSMENT & PLAN NOTE
Blood cultures obtained  Will get respiratory culture and procalcitonin  IV ceftriaxone and azithromycin   Anti tussive  Duoneb prn

## 2024-05-05 LAB
ALBUMIN SERPL BCP-MCNC: 3.1 G/DL (ref 3.5–5.2)
ALP SERPL-CCNC: 57 U/L (ref 55–135)
ALT SERPL W/O P-5'-P-CCNC: 22 U/L (ref 10–44)
ANION GAP SERPL CALC-SCNC: 10 MMOL/L (ref 8–16)
AST SERPL-CCNC: 24 U/L (ref 10–40)
BASOPHILS # BLD AUTO: 0.02 K/UL (ref 0–0.2)
BASOPHILS NFR BLD: 0.2 % (ref 0–1.9)
BILIRUB SERPL-MCNC: 0.4 MG/DL (ref 0.1–1)
BUN SERPL-MCNC: 17 MG/DL (ref 8–23)
CALCIUM SERPL-MCNC: 9.9 MG/DL (ref 8.7–10.5)
CHLORIDE SERPL-SCNC: 106 MMOL/L (ref 95–110)
CO2 SERPL-SCNC: 22 MMOL/L (ref 23–29)
CREAT SERPL-MCNC: 1 MG/DL (ref 0.5–1.4)
DIFFERENTIAL METHOD BLD: ABNORMAL
EOSINOPHIL # BLD AUTO: 0 K/UL (ref 0–0.5)
EOSINOPHIL NFR BLD: 0 % (ref 0–8)
ERYTHROCYTE [DISTWIDTH] IN BLOOD BY AUTOMATED COUNT: 13.1 % (ref 11.5–14.5)
EST. GFR  (NO RACE VARIABLE): 54 ML/MIN/1.73 M^2
GLUCOSE SERPL-MCNC: 131 MG/DL (ref 70–110)
HCT VFR BLD AUTO: 33.7 % (ref 37–48.5)
HGB BLD-MCNC: 10.8 G/DL (ref 12–16)
IMM GRANULOCYTES # BLD AUTO: 0.09 K/UL (ref 0–0.04)
IMM GRANULOCYTES NFR BLD AUTO: 0.9 % (ref 0–0.5)
LYMPHOCYTES # BLD AUTO: 3.1 K/UL (ref 1–4.8)
LYMPHOCYTES NFR BLD: 31.8 % (ref 18–48)
MAGNESIUM SERPL-MCNC: 1.6 MG/DL (ref 1.6–2.6)
MCH RBC QN AUTO: 30.2 PG (ref 27–31)
MCHC RBC AUTO-ENTMCNC: 32 G/DL (ref 32–36)
MCV RBC AUTO: 94 FL (ref 82–98)
MONOCYTES # BLD AUTO: 1.1 K/UL (ref 0.3–1)
MONOCYTES NFR BLD: 11.1 % (ref 4–15)
NEUTROPHILS # BLD AUTO: 5.5 K/UL (ref 1.8–7.7)
NEUTROPHILS NFR BLD: 56 % (ref 38–73)
NRBC BLD-RTO: 0 /100 WBC
PLATELET # BLD AUTO: 166 K/UL (ref 150–450)
PMV BLD AUTO: 10 FL (ref 9.2–12.9)
POCT GLUCOSE: 164 MG/DL (ref 70–110)
POCT GLUCOSE: 255 MG/DL (ref 70–110)
POTASSIUM SERPL-SCNC: 4.2 MMOL/L (ref 3.5–5.1)
PROT SERPL-MCNC: 7.8 G/DL (ref 6–8.4)
RBC # BLD AUTO: 3.58 M/UL (ref 4–5.4)
SODIUM SERPL-SCNC: 138 MMOL/L (ref 136–145)
WBC # BLD AUTO: 9.74 K/UL (ref 3.9–12.7)

## 2024-05-05 PROCEDURE — 25000003 PHARM REV CODE 250: Performed by: INTERNAL MEDICINE

## 2024-05-05 PROCEDURE — 94640 AIRWAY INHALATION TREATMENT: CPT

## 2024-05-05 PROCEDURE — 85025 COMPLETE CBC W/AUTO DIFF WBC: CPT | Performed by: INTERNAL MEDICINE

## 2024-05-05 PROCEDURE — 11000001 HC ACUTE MED/SURG PRIVATE ROOM

## 2024-05-05 PROCEDURE — 87070 CULTURE OTHR SPECIMN AEROBIC: CPT | Performed by: INTERNAL MEDICINE

## 2024-05-05 PROCEDURE — 87205 SMEAR GRAM STAIN: CPT | Performed by: INTERNAL MEDICINE

## 2024-05-05 PROCEDURE — 36415 COLL VENOUS BLD VENIPUNCTURE: CPT | Performed by: INTERNAL MEDICINE

## 2024-05-05 PROCEDURE — 94761 N-INVAS EAR/PLS OXIMETRY MLT: CPT

## 2024-05-05 PROCEDURE — 25000242 PHARM REV CODE 250 ALT 637 W/ HCPCS: Performed by: INTERNAL MEDICINE

## 2024-05-05 PROCEDURE — 80053 COMPREHEN METABOLIC PANEL: CPT | Performed by: INTERNAL MEDICINE

## 2024-05-05 PROCEDURE — 63600175 PHARM REV CODE 636 W HCPCS: Performed by: INTERNAL MEDICINE

## 2024-05-05 PROCEDURE — 83735 ASSAY OF MAGNESIUM: CPT | Performed by: INTERNAL MEDICINE

## 2024-05-05 PROCEDURE — 99900035 HC TECH TIME PER 15 MIN (STAT)

## 2024-05-05 RX ORDER — MAGNESIUM SULFATE HEPTAHYDRATE 40 MG/ML
2 INJECTION, SOLUTION INTRAVENOUS ONCE
Status: COMPLETED | OUTPATIENT
Start: 2024-05-05 | End: 2024-05-05

## 2024-05-05 RX ADMIN — ATORVASTATIN CALCIUM 20 MG: 20 TABLET, FILM COATED ORAL at 09:05

## 2024-05-05 RX ADMIN — MAGNESIUM SULFATE HEPTAHYDRATE 2 G: 40 INJECTION, SOLUTION INTRAVENOUS at 03:05

## 2024-05-05 RX ADMIN — GABAPENTIN 100 MG: 100 CAPSULE ORAL at 08:05

## 2024-05-05 RX ADMIN — ENOXAPARIN SODIUM 30 MG: 40 INJECTION SUBCUTANEOUS at 06:05

## 2024-05-05 RX ADMIN — IPRATROPIUM BROMIDE AND ALBUTEROL SULFATE 3 ML: 2.5; .5 SOLUTION RESPIRATORY (INHALATION) at 01:05

## 2024-05-05 RX ADMIN — SERTRALINE HYDROCHLORIDE 25 MG: 25 TABLET ORAL at 09:05

## 2024-05-05 RX ADMIN — IPRATROPIUM BROMIDE AND ALBUTEROL SULFATE 3 ML: 2.5; .5 SOLUTION RESPIRATORY (INHALATION) at 07:05

## 2024-05-05 RX ADMIN — GABAPENTIN 100 MG: 100 CAPSULE ORAL at 09:05

## 2024-05-05 RX ADMIN — Medication 800 MG: at 05:05

## 2024-05-05 RX ADMIN — CEFTRIAXONE SODIUM 1 G: 1 INJECTION, POWDER, FOR SOLUTION INTRAMUSCULAR; INTRAVENOUS at 01:05

## 2024-05-05 RX ADMIN — INSULIN ASPART 1 UNITS: 100 INJECTION, SOLUTION INTRAVENOUS; SUBCUTANEOUS at 08:05

## 2024-05-05 RX ADMIN — AZITHROMYCIN MONOHYDRATE 500 MG: 500 INJECTION, POWDER, LYOPHILIZED, FOR SOLUTION INTRAVENOUS at 01:05

## 2024-05-05 NOTE — ASSESSMENT & PLAN NOTE
Blood culture negative so far  urine culture growing Gram-negative rods however culture finalization and ID/sensitivity pending.    On IV ceftriaxone and azithromycin.

## 2024-05-05 NOTE — SUBJECTIVE & OBJECTIVE
Past Medical History:   Diagnosis Date    Anemia 10/19/2020    Arthritis     Breast cancer 2001    Diabetes mellitus     History of breast cancer 8/21/2013    Hyperlipidemia     Hypertension     Lumbar back pain 3/7/2023    Nuclear sclerotic cataract of right eye 10/16/2012    Osteoporosis, unspecified: see DEXA 8/15- 2/13/2017    Osteoporosis, unspecified: see DEXA 8/15- 2/13/2017    Pain of both shoulder joints 2/14/2017    Pneumonia 5/4/2024    Rheumatoid factor positive 2/14/2017    Stroke 4/6/2019    Tubular adenoma of colon 10/28/2013    Type 2 diabetes mellitus, without long-term current use of insulin 5/4/2024    Type II or unspecified type diabetes mellitus with neurological manifestations, not stated as uncontrolled(250.60)     Vitamin D deficiency disease 2/17/2014       Past Surgical History:   Procedure Laterality Date    BELPHAROPTOSIS REPAIR  03/15/13    bilateral-dr. coleman md    BREAST BIOPSY      BREAST LUMPECTOMY      BREAST SURGERY Left 2001    lumpectomy    CATARACT EXTRACTION      both eyes -     CHOLECYSTECTOMY      Done in [place] in the 1980's    COLONOSCOPY N/A 1/19/2016    Procedure: COLONOSCOPY;  Surgeon: BLANCA Guerra MD;  Location: [address]);  Service: Endoscopy;  Laterality: N/A;    COLONOSCOPY W/ POLYPECTOMY         Review of patient's allergies indicates:   Allergen Reactions    No known drug allergies        Current Facility-Administered Medications   Medication Dose Route Frequency Provider Last Rate Last Admin    acetaminophen tablet 650 mg  650 mg Oral Q8H PRN Victor M Gil MD        acetaminophen tablet 650 mg  650 mg Oral Q8H PRN Victor M Gil MD        albuterol-ipratropium 2.5 mg-0.5 mg/3 mL nebulizer solution 3 mL  3 mL Nebulization Q6H WAKE Victor M Gil MD   3 mL at 05/05/24 1328    aluminum-magnesium hydroxide-simethicone 200-200-20 mg/5 mL suspension 30 mL  30 mL Oral QID PRN Victor M Gil MD        atorvastatin tablet 20  mg  20 mg Oral Daily Victor M Gil MD   20 mg at 05/05/24 0915    azithromycin (ZITHROMAX) 500 mg in dextrose 5 % (D5W) 250 mL IVPB (Vial-Mate)  500 mg Intravenous Q24H Victor M Gil MD   Stopped at 05/05/24 1439    cefTRIAXone (Rocephin) 1 g in dextrose 5 % in water (D5W) 100 mL IVPB (MB+)  1 g Intravenous Q24H Victor M Gil MD   Stopped at 05/05/24 1336    dextromethorphan-guaiFENesin  mg/5 ml liquid 5 mL  5 mL Oral Q6H PRN Victor M Gil MD        dextrose 10% bolus 125 mL 125 mL  12.5 g Intravenous PRN Victor M Gil MD        dextrose 10% bolus 250 mL 250 mL  25 g Intravenous PRN Victor M Gil MD        enoxaparin injection 30 mg  30 mg Subcutaneous Q24H (prophylaxis, 1700) Victor M Gil MD   30 mg at 05/04/24 1719    gabapentin capsule 100 mg  100 mg Oral BID Victor M Gil MD   100 mg at 05/05/24 0916    glucagon (human recombinant) injection 1 mg  1 mg Intramuscular PRN Victor M Gil MD        glucose chewable tablet 16 g  16 g Oral PRN Victor M Gil MD        glucose chewable tablet 24 g  24 g Oral PRN Victor M Gil MD        insulin aspart U-100 pen 0-5 Units  0-5 Units Subcutaneous QID (AC + HS) PRN Victor M Gil MD        magnesium oxide tablet 800 mg  800 mg Oral PRN Victor M Gil MD   800 mg at 05/05/24 0550    magnesium oxide tablet 800 mg  800 mg Oral PRN Victor M Gil MD        magnesium sulfate 2g in water 50mL IVPB (premix)  2 g Intravenous Once Victor M Gil MD 25 mL/hr at 05/05/24 1536 2 g at 05/05/24 1536    naloxone 0.4 mg/mL injection 0.02 mg  0.02 mg Intravenous PRN Victor M Gil MD        ondansetron injection 4 mg  4 mg Intravenous Q8H PRN Victor M Gil MD        potassium bicarbonate disintegrating tablet 35 mEq  35 mEq Oral PRN Victor M Gil MD        potassium bicarbonate disintegrating tablet 50 mEq  50 mEq Oral PRN Victor M Gil MD        potassium bicarbonate disintegrating tablet 60 mEq   60 mEq Oral PRN Victor M Gil MD        potassium, sodium phosphates 280-160-250 mg packet 2 packet  2 packet Oral PRN Victor M Gil MD        potassium, sodium phosphates 280-160-250 mg packet 2 packet  2 packet Oral PRN Victor M Gil MD        potassium, sodium phosphates 280-160-250 mg packet 2 packet  2 packet Oral PRN Victor M Gil MD        sertraline tablet 25 mg  25 mg Oral Daily Victor M Gil MD   25 mg at 05/05/24 0915    sodium chloride 0.9% flush 2 mL  2 mL Intravenous Q12H PRN Victor M Gil MD         Family History       Problem Relation (Age of Onset)    Breast cancer Sister (48)    Cancer Sister, Mother, Father, Brother, Brother    Early death Mother    Liver cancer Mother, Father, Brother, Brother    No Known Problems Daughter, Son, Daughter, Maternal Aunt, Maternal Uncle, Paternal Aunt, Paternal Uncle, Maternal Grandmother, Maternal Grandfather, Paternal Grandmother, Paternal Grandfather          Tobacco Use    Smoking status: Never    Smokeless tobacco: Never   Substance and Sexual Activity    Alcohol use: No    Drug use: No    Sexual activity: Never     Birth control/protection: Post-menopausal     Interval History:  Patient reported she feels bit better than yesterday however still has cough and shortness of breath.  Blood culture negative so far, respiratory culture pending, urine culture growing Gram-negative rods however culture finalization and ID/sensitivity pending.  On IV ceftriaxone and azithromycin.  Echo ordered but pending.  Will consult PT OT for mobilization.  No acute event overnight.  I also spoke to patient's daughter at bedside and updated her about patient's medical conditions, treatment plan and answered questions.      Review of Systems   Constitutional:  Positive for fever.   HENT:  Positive for congestion.    Respiratory:  Positive for cough and shortness of breath.    Cardiovascular:  Negative for chest pain.   Gastrointestinal:  Negative for  abdominal pain, nausea and vomiting.   Genitourinary:  Positive for dysuria.   Musculoskeletal:  Negative for back pain.   Neurological:  Negative for syncope.   Psychiatric/Behavioral:  Negative for agitation.      Objective:     Vital Signs (Most Recent):  Temp: 98 °F (36.7 °C) (05/05/24 1620)  Pulse: 73 (05/05/24 1620)  Resp: 18 (05/05/24 1620)  BP: 128/65 (05/05/24 1620)  SpO2: (!) 94 % (05/05/24 1620) Vital Signs (24h Range):  Temp:  [98 °F (36.7 °C)-98.6 °F (37 °C)] 98 °F (36.7 °C)  Pulse:  [63-92] 73  Resp:  [16-20] 18  SpO2:  [94 %-97 %] 94 %  BP: (115-141)/(56-68) 128/65     Weight: 60.5 kg (133 lb 6.1 oz)  Body mass index is 26.05 kg/m².     Physical Exam  Constitutional:       General: She is not in acute distress.  HENT:      Head: Normocephalic.      Nose: Nose normal.   Cardiovascular:      Rate and Rhythm: Normal rate.   Pulmonary:      Effort: No respiratory distress.      Breath sounds: Wheezing present.   Abdominal:      Tenderness: There is no abdominal tenderness.   Musculoskeletal:         General: No swelling.   Skin:     General: Skin is warm.      Capillary Refill: Capillary refill takes less than 2 seconds.   Neurological:      Mental Status: She is alert and oriented to person, place, and time.   Psychiatric:         Behavior: Behavior normal.                Significant Labs: All pertinent labs within the past 24 hours have been reviewed.  CBC:   Recent Labs   Lab 05/04/24  1220 05/05/24  0337   WBC 9.81 9.74   HGB 11.4* 10.8*   HCT 34.1* 33.7*    166     CMP:   Recent Labs   Lab 05/04/24  1220 05/05/24  0337    138   K 4.3 4.2    106   CO2 22* 22*   * 131*   BUN 24* 17   CREATININE 1.2 1.0   CALCIUM 10.2 9.9   PROT 8.5* 7.8   ALBUMIN 3.5 3.1*   BILITOT 0.5 0.4   ALKPHOS 64 57   AST 24 24   ALT 22 22   ANIONGAP 12 10     Lactic Acid:   Recent Labs   Lab 05/04/24  1220   LACTATE 1.1     Urine Studies:   Recent Labs   Lab 05/04/24  1240   COLORU Yellow   APPEARANCEUA  Hazy*   PHUR 6.0   SPECGRAV 1.020   PROTEINUA 1+*   GLUCUA Negative   KETONESU Negative   BILIRUBINUA Negative   OCCULTUA 2+*   NITRITE Positive*   UROBILINOGEN Negative   LEUKOCYTESUR 3+*   RBCUA 12*   WBCUA >100*   BACTERIA Many*   SQUAMEPITHEL 5   HYALINECASTS 2*       Significant Imaging: I have reviewed all pertinent imaging results/findings within the past 24 hours.

## 2024-05-05 NOTE — RESPIRATORY THERAPY
05/05/24 0707   Patient Assessment/Suction   Level of Consciousness (AVPU) alert   Respiratory Effort Normal;Unlabored   Expansion/Accessory Muscles/Retractions expansion symmetric;no retractions;no use of accessory muscles   All Lung Fields Breath Sounds Anterior:;Lateral:;wheezes, expiratory;coarse   Rhythm/Pattern, Respiratory depth regular;pattern regular;unlabored;no shortness of breath reported   Cough Frequency infrequent   Cough Type good;productive   $ Suction Charges Sputum Collection   Secretions Amount moderate   Secretions Color green;grey;brown   Secretions Characteristics thick   Sputum Collection sample obtained per cough   Sent to the lab? Yes   PRE-TX-O2   Device (Oxygen Therapy) room air   SpO2 95 %   Pulse Oximetry Type Intermittent   $ Pulse Oximetry - Multiple Charge Pulse Oximetry - Multiple   Pulse 69   Resp 16   Positioning HOB elevated 45 degrees   Aerosol Therapy   $ Aerosol Therapy Charges Aerosol Treatment   Respiratory Treatment Status (SVN) given   Treatment Route (SVN) mask;oxygen   Patient Position HOB elevated;semi-Orozco's   Post Treatment Assessment (SVN) increased aeration;breath sounds improved   Signs of Intolerance (SVN) none   Breath Sounds Post-Respiratory Treatment   Post-treatment Heart Rate (beats/min) 70   Post-treatment Resp Rate (breaths/min) 16   Tobacco Cessation Intervention   Do you use any type of tobacco product? No   Respiratory Evaluation   $ Care Plan Tech Time 15 min   Evaluation For New Orders   Admitting Diagnosis cough SOB UTI   Home Oxygen   Has Home Oxygen? No   Home Aerosol, MDI, DPI, and Other Treatments/Therapies   Home Respiratory Therapy Per Patient/Review of Chart No   Bronchodilator Care Plan   Bronchodilator Care Plan Aerosol   Aerosol Meds w/ frequency Albuterol - Ipratropium (DUO-NEB) 0.5mg-3mg(2.5ml) 3ml Nebulizer Solution2.5mg QID   Rationale Shortness of breath (increased WOB)

## 2024-05-05 NOTE — PROGRESS NOTES
[facility]  Progress Note    Patient Name: Sera Boone  MRN: [id number]  Patient Class: IP- Inpatient   Admission Date: 5/4/2024  Length of Stay: 1 days  Attending Physician: Victor M Gil MD  Primary Care Provider: Beena Hill MD        Subjective:     Principal Problem:Cough        HPI:  88 years old female with past medical history significant for hypertension, type 2 diabetes mellitus, hyperlipidemia, history of breast cancer, back pain who was brought in by patient's son due to cough, shortness of breath and burning urination.  Patient speaks Icelandic and does not speak English, so history is mainly provided by patient's son at bedside.  Patient's son reported that patient has not been feeling good for last 1 week and has been having cough with some sputum production, shortness of breath which is not associated with exertion and burning urination.  Patient denied chest pain abdominal pain nausea vomiting or dizziness.  Patient was found to have fever of 100.5° F on presentation.  UA showed UTI.  Chest x-ray was read as ''Enlarged cardiac silhouette, prominence of the central pulmonary vasculature, and mild lower lobe interstitial edema, right greater than left.  Cardiac decompensation/heart failure cannot be excluded.  Please correlate clinically''.  Patient and family denied history of smoking or any past history of heart or lung disease.  Patient does not have history of heart failure, BNP was obtained by ER physician after CXR report which came 20 who then advised IV fluid.  Urine and blood cultures obtained, patient has been started on IV ceftriaxone and azithromycin.        Overview/Hospital Course:  Patient presented with cough and shortness of breath.  Patient was found to have UTI and pneumonia.  Urine, respiratory and blood cultures sent.  Patient started on IV ceftriaxone and azithromycin.  Echo ordered to assess cardiac function.    Past  Medical History:   Diagnosis Date    Anemia 10/19/2020    Arthritis     Breast cancer 2001    Diabetes mellitus     History of breast cancer 8/21/2013    Hyperlipidemia     Hypertension     Lumbar back pain 3/7/2023    Nuclear sclerotic cataract of right eye 10/16/2012    Osteoporosis, unspecified: see DEXA 8/15- 2/13/2017    Osteoporosis, unspecified: see DEXA 8/15- 2/13/2017    Pain of both shoulder joints 2/14/2017    Pneumonia 5/4/2024    Rheumatoid factor positive 2/14/2017    Stroke 4/6/2019    Tubular adenoma of colon 10/28/2013    Type 2 diabetes mellitus, without long-term current use of insulin 5/4/2024    Type II or unspecified type diabetes mellitus with neurological manifestations, not stated as uncontrolled(250.60)     Vitamin D deficiency disease 2/17/2014       Past Surgical History:   Procedure Laterality Date    BELPHAROPTOSIS REPAIR  03/15/13    bilateral-dr. coleman md    BREAST BIOPSY      BREAST LUMPECTOMY      BREAST SURGERY Left 2001    lumpectomy    CATARACT EXTRACTION      both eyes -     CHOLECYSTECTOMY      Done in [place] in the 1980's    COLONOSCOPY N/A 1/19/2016    Procedure: COLONOSCOPY;  Surgeon: BLANCA Guerra MD;  Location: [address]);  Service: Endoscopy;  Laterality: N/A;    COLONOSCOPY W/ POLYPECTOMY         Review of patient's allergies indicates:   Allergen Reactions    No known drug allergies        Current Facility-Administered Medications   Medication Dose Route Frequency Provider Last Rate Last Admin    acetaminophen tablet 650 mg  650 mg Oral Q8H PRN Victor M Gil MD        acetaminophen tablet 650 mg  650 mg Oral Q8H PRN Victor M Gil MD        albuterol-ipratropium 2.5 mg-0.5 mg/3 mL nebulizer solution 3 mL  3 mL Nebulization Q6H WAKE Victor M Gil MD   3 mL at 05/05/24 1328    aluminum-magnesium hydroxide-simethicone 200-200-20 mg/5 mL suspension 30 mL  30 mL Oral QID PRN Victor M Gil MD        atorvastatin tablet 20 mg  20  mg Oral Daily Victor M Gil MD   20 mg at 05/05/24 0915    azithromycin (ZITHROMAX) 500 mg in dextrose 5 % (D5W) 250 mL IVPB (Vial-Mate)  500 mg Intravenous Q24H Victor M Gil MD   Stopped at 05/05/24 1439    cefTRIAXone (Rocephin) 1 g in dextrose 5 % in water (D5W) 100 mL IVPB (MB+)  1 g Intravenous Q24H Victor M Gil MD   Stopped at 05/05/24 1336    dextromethorphan-guaiFENesin  mg/5 ml liquid 5 mL  5 mL Oral Q6H PRN Victor M Gil MD        dextrose 10% bolus 125 mL 125 mL  12.5 g Intravenous PRN Victor M Gil MD        dextrose 10% bolus 250 mL 250 mL  25 g Intravenous PRN Victor M Gil MD        enoxaparin injection 30 mg  30 mg Subcutaneous Q24H (prophylaxis, 1700) Victor M Gil MD   30 mg at 05/04/24 1719    gabapentin capsule 100 mg  100 mg Oral BID Victor M Gil MD   100 mg at 05/05/24 0916    glucagon (human recombinant) injection 1 mg  1 mg Intramuscular PRN Victor M Gil MD        glucose chewable tablet 16 g  16 g Oral PRN Victor M Gil MD        glucose chewable tablet 24 g  24 g Oral PRN Victor M Gil MD        insulin aspart U-100 pen 0-5 Units  0-5 Units Subcutaneous QID (AC + HS) PRN Victor M Gil MD        magnesium oxide tablet 800 mg  800 mg Oral PRN Victor M Gil MD   800 mg at 05/05/24 0550    magnesium oxide tablet 800 mg  800 mg Oral PRN Victor M Gil MD        magnesium sulfate 2g in water 50mL IVPB (premix)  2 g Intravenous Once Victor M Gil MD 25 mL/hr at 05/05/24 1536 2 g at 05/05/24 1536    naloxone 0.4 mg/mL injection 0.02 mg  0.02 mg Intravenous PRN Victor M Gil MD        ondansetron injection 4 mg  4 mg Intravenous Q8H PRN Victor M Gil MD        potassium bicarbonate disintegrating tablet 35 mEq  35 mEq Oral PRN Victor M Gil MD        potassium bicarbonate disintegrating tablet 50 mEq  50 mEq Oral PRN Victor M Gil MD        potassium bicarbonate disintegrating tablet 60 mEq  60  mEq Oral PRN Victor M Gil MD        potassium, sodium phosphates 280-160-250 mg packet 2 packet  2 packet Oral PRN Victor M Gil MD        potassium, sodium phosphates 280-160-250 mg packet 2 packet  2 packet Oral PRN Victor M Gil MD        potassium, sodium phosphates 280-160-250 mg packet 2 packet  2 packet Oral PRN Victor M Gil MD        sertraline tablet 25 mg  25 mg Oral Daily Victor M Gil MD   25 mg at 05/05/24 0915    sodium chloride 0.9% flush 2 mL  2 mL Intravenous Q12H PRN Victor M Gil MD         Family History       Problem Relation (Age of Onset)    Breast cancer Sister (48)    Cancer Sister, Mother, Father, Brother, Brother    Early death Mother    Liver cancer Mother, Father, Brother, Brother    No Known Problems Daughter, Son, Daughter, Maternal Aunt, Maternal Uncle, Paternal Aunt, Paternal Uncle, Maternal Grandmother, Maternal Grandfather, Paternal Grandmother, Paternal Grandfather          Tobacco Use    Smoking status: Never    Smokeless tobacco: Never   Substance and Sexual Activity    Alcohol use: No    Drug use: No    Sexual activity: Never     Birth control/protection: Post-menopausal     Interval History:  Patient reported she feels bit better than yesterday however still has cough and shortness of breath.  Blood culture negative so far, respiratory culture pending, urine culture growing Gram-negative rods however culture finalization and ID/sensitivity pending.  On IV ceftriaxone and azithromycin.  Echo ordered but pending.  Will consult PT OT for mobilization.  No acute event overnight.  I also spoke to patient's daughter at bedside and updated her about patient's medical conditions, treatment plan and answered questions.      Review of Systems   Constitutional:  Positive for fever.   HENT:  Positive for congestion.    Respiratory:  Positive for cough and shortness of breath.    Cardiovascular:  Negative for chest pain.   Gastrointestinal:  Negative for  abdominal pain, nausea and vomiting.   Genitourinary:  Positive for dysuria.   Musculoskeletal:  Negative for back pain.   Neurological:  Negative for syncope.   Psychiatric/Behavioral:  Negative for agitation.      Objective:     Vital Signs (Most Recent):  Temp: 98 °F (36.7 °C) (05/05/24 1620)  Pulse: 73 (05/05/24 1620)  Resp: 18 (05/05/24 1620)  BP: 128/65 (05/05/24 1620)  SpO2: (!) 94 % (05/05/24 1620) Vital Signs (24h Range):  Temp:  [98 °F (36.7 °C)-98.6 °F (37 °C)] 98 °F (36.7 °C)  Pulse:  [63-92] 73  Resp:  [16-20] 18  SpO2:  [94 %-97 %] 94 %  BP: (115-141)/(56-68) 128/65     Weight: 60.5 kg (133 lb 6.1 oz)  Body mass index is 26.05 kg/m².     Physical Exam  Constitutional:       General: She is not in acute distress.  HENT:      Head: Normocephalic.      Nose: Nose normal.   Cardiovascular:      Rate and Rhythm: Normal rate.   Pulmonary:      Effort: No respiratory distress.      Breath sounds: Wheezing present.   Abdominal:      Tenderness: There is no abdominal tenderness.   Musculoskeletal:         General: No swelling.   Skin:     General: Skin is warm.      Capillary Refill: Capillary refill takes less than 2 seconds.   Neurological:      Mental Status: She is alert and oriented to person, place, and time.   Psychiatric:         Behavior: Behavior normal.                Significant Labs: All pertinent labs within the past 24 hours have been reviewed.  CBC:   Recent Labs   Lab 05/04/24  1220 05/05/24  0337   WBC 9.81 9.74   HGB 11.4* 10.8*   HCT 34.1* 33.7*    166     CMP:   Recent Labs   Lab 05/04/24  1220 05/05/24  0337    138   K 4.3 4.2    106   CO2 22* 22*   * 131*   BUN 24* 17   CREATININE 1.2 1.0   CALCIUM 10.2 9.9   PROT 8.5* 7.8   ALBUMIN 3.5 3.1*   BILITOT 0.5 0.4   ALKPHOS 64 57   AST 24 24   ALT 22 22   ANIONGAP 12 10     Lactic Acid:   Recent Labs   Lab 05/04/24  1220   LACTATE 1.1     Urine Studies:   Recent Labs   Lab 05/04/24  1240   COLORU Yellow   APPEARANCEUA  Hazy*   PHUR 6.0   SPECGRAV 1.020   PROTEINUA 1+*   GLUCUA Negative   KETONESU Negative   BILIRUBINUA Negative   OCCULTUA 2+*   NITRITE Positive*   UROBILINOGEN Negative   LEUKOCYTESUR 3+*   RBCUA 12*   WBCUA >100*   BACTERIA Many*   SQUAMEPITHEL 5   HYALINECASTS 2*       Significant Imaging: I have reviewed all pertinent imaging results/findings within the past 24 hours.    Assessment/Plan:      * Cough  Likely related to pneumonia  Covid negative  Duoneb prn  Anti tussive   Will get echo to assess cardiac function     Blood culture negative so far  respiratory culture pending  urine culture growing Gram-negative rods however culture finalization and ID/sensitivity pending.    On IV ceftriaxone and azithromycin.    Echo ordered but pending.      Type 2 diabetes mellitus, without long-term current use of insulin  HbA1c 7.4% on 4/4/2024   Accu checks with [facility]   Hypoglycemia protocol     Pneumonia  Blood cultures obtained  ordered respiratory culture and procalcitonin  IV ceftriaxone and azithromycin   Anti tussive  Duoneb prn     Blood culture negative so far  respiratory culture pending  urine culture growing Gram-negative rods however culture finalization and ID/sensitivity pending.    On IV ceftriaxone and azithromycin.        UTI (urinary tract infection)  Blood culture negative so far  urine culture growing Gram-negative rods however culture finalization and ID/sensitivity pending.    On IV ceftriaxone and azithromycin.        SOB (shortness of breath)  As above      Hypertension associated with diabetes  Patient is not on any antihypertensive as home med  Bp stable  Bp monitoring     Hyperlipidemia associated with type 2 diabetes mellitus  Continue home med statin        VTE Risk Mitigation (From admission, onward)           Ordered     enoxaparin injection 30 mg  Every 24 hours         05/04/24 1510     IP VTE HIGH RISK PATIENT  Once         05/04/24 1351     Place sequential compression device  Until  discontinued         05/04/24 1351                    Discharge Planning   NAYA:      Code Status: Full Code   Is the patient medically ready for discharge?:     Reason for patient still in hospital (select all that apply): Treatment  Discharge Plan A: Home with family                  Victor M Gil MD  Department of Hospital Medicine   [facility]/Surg

## 2024-05-05 NOTE — ASSESSMENT & PLAN NOTE
Blood cultures obtained  ordered respiratory culture and procalcitonin  IV ceftriaxone and azithromycin   Anti tussive  Duoneb prn     Blood culture negative so far  respiratory culture pending  urine culture growing Gram-negative rods however culture finalization and ID/sensitivity pending.    On IV ceftriaxone and azithromycin.

## 2024-05-05 NOTE — PLAN OF CARE
[facility] - Med/Surg  Initial Discharge Assessment       Primary Care Provider: Beena Hill MD    Admission Diagnosis: Sepsis due to urinary tract infection [A41.9, N39.0]    Admission Date: 5/4/2024  Expected Discharge Date:     Spoke to pt's son at bedside to complete dc assessment. Verified facesheet. Pt lives with son and son provides transport. PCP Liz. Pharmacy [facility] [address] DME glucometer. Denies hd/hh/coumadin. Pt without recent admit. Family to provide transport home. CM to follow for dc needs      Transition of Care Barriers: None    Payor: HU[employer] MANAGED MEDICARE / Plan: [employer] HMO PPO SPECIAL NEEDS / Product Type: Medicare Advantage /     Extended Emergency Contact Information  Primary Emergency Contact: Bienvenido Vargas  Address: [address]            [place] [address] [facility]  Home Phone: [phone]  Work Phone: [phone]  Mobile Phone: [phone]  Relation: Son  Preferred language: English   needed? No    Discharge Plan A: Home with family  Discharge Plan B: Home      [id number] - [place], [place]  [address]  [address] & [facility]  [address] [facility] [address]  Phone: [phone] Fax: [phone]      Initial Assessment (most recent)       Adult Discharge Assessment - 05/05/24 1332          Discharge Assessment    Assessment Type Discharge Planning Assessment     Confirmed/corrected address, phone number and insurance Yes     Confirmed Demographics Correct on Facesheet     Source of Information patient;family     People in Home child(nory), adult     Do you expect to return to your current living situation? Yes     Do you have help at home or someone to help you manage your care at home? Yes     Prior to hospitilization cognitive status: Unable to Assess     Current cognitive status: Alert/Oriented     Walking or Climbing Stairs Difficulty no     Dressing/Bathing Difficulty no     Equipment Currently Used  at Home glucometer     Readmission within 30 days? No     Patient currently being followed by outpatient case management? No     Do you currently have service(s) that help you manage your care at home? No     Do you take prescription medications? Yes     Do you have prescription coverage? Yes     Do you have any problems affording any of your prescribed medications? No     Is the patient taking medications as prescribed? yes     How do you get to doctors appointments? family or friend will provide     Are you on dialysis? No     Do you take coumadin? No     Discharge Plan A Home with family     Discharge Plan B Home     DME Needed Upon Discharge  none     Discharge Plan discussed with: Patient;Adult children     Transition of Care Barriers None

## 2024-05-05 NOTE — PLAN OF CARE
Plan of care reviewed with patient and family, verbalized understanding.  service device offered, daughter declined. IV antibiotics given. Magnesium replaced. Sat up in chair. Call light within reach.

## 2024-05-05 NOTE — HOSPITAL COURSE
Patient presented with cough and shortness of breath.  Patient was found to have UTI and pneumonia.  Urine, respiratory and blood cultures sent.  Patient started on IV ceftriaxone and azithromycin.  Echo ordered to assess cardiac function.  Urine culture grew E coli which was pansensitive, blood cultures remained negative, respiratory culture grew normal александр.  Patient's symptoms significantly improved during hospitalization and she denied any problem or concern on day of discharge.  Patient is advised to take cefuroxime and azithromycin x5 days to complete antibiotic course.  Echo done but official report is pending, I advised patient and her son to follow up with Cardiology as outpatient and discuss about echo result.  Patient appears to be euvolemic currently so diuretic is not advised, BNP was 20. Follow with PCP and cardiology in 1-2 weeks.  I also spoke to patient's son at bedside and updated him about patient's medical conditions, treatment plan and answered questions.

## 2024-05-05 NOTE — ASSESSMENT & PLAN NOTE
Likely related to pneumonia  Covid negative  Duoneb prn  Anti tussive   Will get echo to assess cardiac function     Blood culture negative so far  respiratory culture pending  urine culture growing Gram-negative rods however culture finalization and ID/sensitivity pending.    On IV ceftriaxone and azithromycin.    Echo ordered but pending.

## 2024-05-05 NOTE — CARE UPDATE
05/04/24 1932   Patient Assessment/Suction   Level of Consciousness (AVPU) alert   Respiratory Effort Normal;Unlabored   Expansion/Accessory Muscles/Retractions expansion symmetric   All Lung Fields Breath Sounds Anterior:;Posterior:   EMMA Breath Sounds wheezes, expiratory   LLL Breath Sounds diminished   RUL Breath Sounds clear   RML Breath Sounds diminished   RLL Breath Sounds diminished   Rhythm/Pattern, Respiratory pattern regular;unlabored   Cough Frequency infrequent   Cough Type productive   PRE-TX-O2   Device (Oxygen Therapy) room air   SpO2 97 %   Pulse Oximetry Type Intermittent   $ Pulse Oximetry - Multiple Charge Pulse Oximetry - Multiple   Pulse 72   Resp 18   Aerosol Therapy   $ Aerosol Therapy Charges Aerosol Treatment   Daily Review of Necessity (SVN) completed   Respiratory Treatment Status (SVN) given   Treatment Route (SVN) mask;oxygen   Patient Position HOB elevated   Post Treatment Assessment (SVN) increased aeration   Signs of Intolerance (SVN) none   Breath Sounds Post-Respiratory Treatment   Throughout All Fields Post-Treatment All Fields   Post-treatment Heart Rate (beats/min) 85   Post-treatment Resp Rate (breaths/min) 18

## 2024-05-06 VITALS
HEART RATE: 73 BPM | HEIGHT: 60 IN | WEIGHT: 133 LBS | RESPIRATION RATE: 20 BRPM | SYSTOLIC BLOOD PRESSURE: 173 MMHG | BODY MASS INDEX: 26.11 KG/M2 | DIASTOLIC BLOOD PRESSURE: 83 MMHG | TEMPERATURE: 98 F | OXYGEN SATURATION: 98 %

## 2024-05-06 LAB
ALBUMIN SERPL BCP-MCNC: 3.1 G/DL (ref 3.5–5.2)
ALP SERPL-CCNC: 63 U/L (ref 55–135)
ALT SERPL W/O P-5'-P-CCNC: 39 U/L (ref 10–44)
ANION GAP SERPL CALC-SCNC: 8 MMOL/L (ref 8–16)
AORTIC ROOT ANNULUS: 2.51 CM
AORTIC VALVE CUSP SEPERATION: 1.76 CM
ASCENDING AORTA: 2.67 CM
AST SERPL-CCNC: 41 U/L (ref 10–40)
AV INDEX (PROSTH): 0.62
AV MEAN GRADIENT: 6 MMHG
AV PEAK GRADIENT: 10 MMHG
AV REGURGITATION PRESSURE HALF TIME: 795.01 MS
AV VALVE AREA BY VELOCITY RATIO: 2.27 CM²
AV VALVE AREA: 1.87 CM²
AV VELOCITY RATIO: 0.75
BACTERIA UR CULT: ABNORMAL
BASOPHILS # BLD AUTO: 0.01 K/UL (ref 0–0.2)
BASOPHILS NFR BLD: 0.1 % (ref 0–1.9)
BILIRUB SERPL-MCNC: 0.3 MG/DL (ref 0.1–1)
BSA FOR ECHO PROCEDURE: 1.6 M2
BUN SERPL-MCNC: 19 MG/DL (ref 8–23)
CALCIUM SERPL-MCNC: 9.7 MG/DL (ref 8.7–10.5)
CHLORIDE SERPL-SCNC: 106 MMOL/L (ref 95–110)
CO2 SERPL-SCNC: 24 MMOL/L (ref 23–29)
CREAT SERPL-MCNC: 1.1 MG/DL (ref 0.5–1.4)
CV ECHO LV RWT: 0.42 CM
DIFFERENTIAL METHOD BLD: ABNORMAL
DOP CALC AO PEAK VEL: 1.62 M/S
DOP CALC AO VTI: 34.5 CM
DOP CALC LVOT AREA: 3 CM2
DOP CALC LVOT DIAMETER: 1.96 CM
DOP CALC LVOT PEAK VEL: 1.22 M/S
DOP CALC LVOT STROKE VOLUME: 64.54 CM3
DOP CALC MV VTI: 28.2 CM
DOP CALCLVOT PEAK VEL VTI: 21.4 CM
E WAVE DECELERATION TIME: 253.25 MSEC
E/A RATIO: 0.6
E/E' RATIO: 8.29 M/S
ECHO LV POSTERIOR WALL: 0.78 CM (ref 0.6–1.1)
EOSINOPHIL # BLD AUTO: 0 K/UL (ref 0–0.5)
EOSINOPHIL NFR BLD: 0.1 % (ref 0–8)
ERYTHROCYTE [DISTWIDTH] IN BLOOD BY AUTOMATED COUNT: 13 % (ref 11.5–14.5)
EST. GFR  (NO RACE VARIABLE): 48 ML/MIN/1.73 M^2
FRACTIONAL SHORTENING: 39 % (ref 28–44)
GLUCOSE SERPL-MCNC: 145 MG/DL (ref 70–110)
HCT VFR BLD AUTO: 31.4 % (ref 37–48.5)
HGB BLD-MCNC: 10 G/DL (ref 12–16)
IMM GRANULOCYTES # BLD AUTO: 0.12 K/UL (ref 0–0.04)
IMM GRANULOCYTES NFR BLD AUTO: 1.4 % (ref 0–0.5)
INTERVENTRICULAR SEPTUM: 0.8 CM (ref 0.6–1.1)
IVRT: 137.01 MSEC
LA MAJOR: 4.64 CM
LEFT ATRIUM VOLUME INDEX MOD: 16.5 ML/M2
LEFT ATRIUM VOLUME MOD: 25.97 CM3
LEFT INTERNAL DIMENSION IN SYSTOLE: 2.28 CM (ref 2.1–4)
LEFT VENTRICLE DIASTOLIC VOLUME INDEX: 37.97 ML/M2
LEFT VENTRICLE DIASTOLIC VOLUME: 59.61 ML
LEFT VENTRICLE MASS INDEX: 52 G/M2
LEFT VENTRICLE SYSTOLIC VOLUME INDEX: 11.3 ML/M2
LEFT VENTRICLE SYSTOLIC VOLUME: 17.74 ML
LEFT VENTRICULAR INTERNAL DIMENSION IN DIASTOLE: 3.74 CM (ref 3.5–6)
LEFT VENTRICULAR MASS: 82.35 G
LV LATERAL E/E' RATIO: 7.25 M/S
LV SEPTAL E/E' RATIO: 9.67 M/S
LVOT MG: 3.26 MMHG
LVOT MV: 0.84 CM/S
LYMPHOCYTES # BLD AUTO: 2.7 K/UL (ref 1–4.8)
LYMPHOCYTES NFR BLD: 31.5 % (ref 18–48)
MAGNESIUM SERPL-MCNC: 2.2 MG/DL (ref 1.6–2.6)
MCH RBC QN AUTO: 29.6 PG (ref 27–31)
MCHC RBC AUTO-ENTMCNC: 31.8 G/DL (ref 32–36)
MCV RBC AUTO: 93 FL (ref 82–98)
MONOCYTES # BLD AUTO: 0.8 K/UL (ref 0.3–1)
MONOCYTES NFR BLD: 9 % (ref 4–15)
MV MEAN GRADIENT: 2 MMHG
MV PEAK A VEL: 0.97 M/S
MV PEAK E VEL: 0.58 M/S
MV PEAK GRADIENT: 7 MMHG
MV STENOSIS PRESSURE HALF TIME: 67.97 MS
MV VALVE AREA BY CONTINUITY EQUATION: 2.29 CM2
MV VALVE AREA P 1/2 METHOD: 3.24 CM2
NEUTROPHILS # BLD AUTO: 4.9 K/UL (ref 1.8–7.7)
NEUTROPHILS NFR BLD: 57.9 % (ref 38–73)
NRBC BLD-RTO: 0 /100 WBC
OHS CV RV/LV RATIO: 0.83 CM
OHS LV EJECTION FRACTION SIMPSONS BIPLANE MOD: 64 %
PISA AR MAX VEL: 5 M/S
PISA MRMAX VEL: 3.92 M/S
PISA TR MAX VEL: 2.64 M/S
PLATELET # BLD AUTO: 186 K/UL (ref 150–450)
PMV BLD AUTO: 10.1 FL (ref 9.2–12.9)
POCT GLUCOSE: 132 MG/DL (ref 70–110)
POCT GLUCOSE: 137 MG/DL (ref 70–110)
POCT GLUCOSE: 149 MG/DL (ref 70–110)
POTASSIUM SERPL-SCNC: 4.9 MMOL/L (ref 3.5–5.1)
PROT SERPL-MCNC: 7.9 G/DL (ref 6–8.4)
PV MV: 0.9 M/S
PV PEAK GRADIENT: 8 MMHG
PV PEAK VELOCITY: 1.41 M/S
RA MAJOR: 3.86 CM
RA PRESSURE ESTIMATED: 3 MMHG
RBC # BLD AUTO: 3.38 M/UL (ref 4–5.4)
RIGHT VENTRICULAR END-DIASTOLIC DIMENSION: 3.12 CM
RIGHT VENTRICULAR LENGTH IN DIASTOLE (APICAL 4-CHAMBER VIEW): 4.44 CM
RV TB RVSP: 6 MMHG
RV TISSUE DOPPLER FREE WALL SYSTOLIC VELOCITY 1 (APICAL 4 CHAMBER VIEW): 19.66 CM/S
SODIUM SERPL-SCNC: 138 MMOL/L (ref 136–145)
STJ: 2.35 CM
TDI LATERAL: 0.08 M/S
TDI SEPTAL: 0.06 M/S
TDI: 0.07 M/S
TR MAX PG: 28 MMHG
TRICUSPID ANNULAR PLANE SYSTOLIC EXCURSION: 2.84 CM
TV REST PULMONARY ARTERY PRESSURE: 31 MMHG
WBC # BLD AUTO: 8.45 K/UL (ref 3.9–12.7)
Z-SCORE OF LEFT VENTRICULAR DIMENSION IN END DIASTOLE: -1.88
Z-SCORE OF LEFT VENTRICULAR DIMENSION IN END SYSTOLE: -1.62

## 2024-05-06 PROCEDURE — 25000003 PHARM REV CODE 250: Performed by: INTERNAL MEDICINE

## 2024-05-06 PROCEDURE — 97116 GAIT TRAINING THERAPY: CPT

## 2024-05-06 PROCEDURE — 97165 OT EVAL LOW COMPLEX 30 MIN: CPT

## 2024-05-06 PROCEDURE — 94761 N-INVAS EAR/PLS OXIMETRY MLT: CPT

## 2024-05-06 PROCEDURE — 80053 COMPREHEN METABOLIC PANEL: CPT | Performed by: INTERNAL MEDICINE

## 2024-05-06 PROCEDURE — 36415 COLL VENOUS BLD VENIPUNCTURE: CPT | Performed by: INTERNAL MEDICINE

## 2024-05-06 PROCEDURE — 63600175 PHARM REV CODE 636 W HCPCS: Performed by: INTERNAL MEDICINE

## 2024-05-06 PROCEDURE — 97161 PT EVAL LOW COMPLEX 20 MIN: CPT

## 2024-05-06 PROCEDURE — 85025 COMPLETE CBC W/AUTO DIFF WBC: CPT | Performed by: INTERNAL MEDICINE

## 2024-05-06 PROCEDURE — 25000242 PHARM REV CODE 250 ALT 637 W/ HCPCS: Performed by: INTERNAL MEDICINE

## 2024-05-06 PROCEDURE — 83735 ASSAY OF MAGNESIUM: CPT | Performed by: INTERNAL MEDICINE

## 2024-05-06 PROCEDURE — 94640 AIRWAY INHALATION TREATMENT: CPT

## 2024-05-06 RX ORDER — CEFUROXIME AXETIL 500 MG/1
500 TABLET ORAL 2 TIMES DAILY
Qty: 10 TABLET | Refills: 0 | Status: SHIPPED | OUTPATIENT
Start: 2024-05-06 | End: 2024-05-11

## 2024-05-06 RX ORDER — AZITHROMYCIN 500 MG/1
500 TABLET, FILM COATED ORAL DAILY
Qty: 5 TABLET | Refills: 0 | Status: SHIPPED | OUTPATIENT
Start: 2024-05-06 | End: 2024-05-11

## 2024-05-06 RX ADMIN — GABAPENTIN 100 MG: 100 CAPSULE ORAL at 09:05

## 2024-05-06 RX ADMIN — CEFTRIAXONE SODIUM 1 G: 1 INJECTION, POWDER, FOR SOLUTION INTRAMUSCULAR; INTRAVENOUS at 12:05

## 2024-05-06 RX ADMIN — SERTRALINE HYDROCHLORIDE 25 MG: 25 TABLET ORAL at 09:05

## 2024-05-06 RX ADMIN — ATORVASTATIN CALCIUM 20 MG: 20 TABLET, FILM COATED ORAL at 09:05

## 2024-05-06 RX ADMIN — AZITHROMYCIN MONOHYDRATE 500 MG: 500 INJECTION, POWDER, LYOPHILIZED, FOR SOLUTION INTRAVENOUS at 03:05

## 2024-05-06 RX ADMIN — IPRATROPIUM BROMIDE AND ALBUTEROL SULFATE 3 ML: 2.5; .5 SOLUTION RESPIRATORY (INHALATION) at 07:05

## 2024-05-06 NOTE — PT/OT/SLP EVAL
Occupational Therapy   Evaluation and Discharge Note    Name: Sera Boone  MRN: [id number]  Admitting Diagnosis: Cough  Recent Surgery: * No surgery found *      Recommendations:     Discharge Recommendations: No Therapy Indicated  Discharge Equipment Recommendations: none  Barriers to discharge:  None    Assessment:     Sera Boone is a 88 y.o. female with a medical diagnosis of Cough. At this time, patient is functioning at their prior level of function and does not require further acute OT services.     Plan:     During this hospitalization, patient does not require further acute OT services.  Please re-consult if situation changes.    Plan of Care Reviewed with: patient, son    Subjective     Chief Complaint: none  Patient/Family Comments/goals: I feel good. Pt's son served as  per her choice.     Occupational Profile:  Living Environment: pt lives with her son in a 2SH with no steps to enter and with bedroom and bathroom on second floor; 1 flight of steps to second floor with LHR ascending; walk n shower with shower chair.   Previous level of function: Indep -Mod I ADLS, ambulated Indep without a device; does not drive; son does the driving; pt Indep meal prep and household chores.   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  Patient found up in chair with telemetry upon OT entry to room.    General Precautions: Standard, fall, diabetic  Orthopedic Precautions: N/A  Braces: N/A  Respiratory Status: Room air     Occupational Performance:  Functional Mobility/Transfers:  Patient completed Sit <> Stand Transfer with independence  with  no assistive device   Patient completed Toilet Transfer Step Transfer  technique with independence with  no AD  Functional Mobility: ambulated Indep to bathroom sink and back to BS chair, no LOB, good balance.    Activities of Daily Living:  Feeding:  independence .  Grooming: independence standing at sink simulated 2/2 al;ready completed prior to OT  Upper Body Dressing: independence .  Lower Body Dressing: independence socks seated BS chair  Toileting: independence simulated 2/2 did not need to use    Cognitive/Visual Perceptual:  Cognitive/Psychosocial Skills:     -       Oriented to: Person, Place, Time, and Situation   -       Follows Commands/attention:Follows two-step commands  -       Communication: clear/fluent and Surinamese speaking; son interpreting  -       Memory: No Deficits noted  -       Safety awareness/insight to disability: intact   -       Mood/Affect/Coping skills/emotional control: Appropriate to situation  Visual/Perceptual:      -Intact      Physical Exam:  Balance:    -       sitting and standing; good  Postural examination/scapula alignment:    -       No postural abnormalities identified  Dominant hand:    -       right  Upper Extremity Range of Motion:     -       Right Upper Extremity: WFL  -       Left Upper Extremity: WFL except int rotation 2/2 AC in shld and hx breat ca on L side  Upper Extremity Strength:    -       Right Upper Extremity: WFL  -       Left Upper Extremity: WFL   Strength:    -       Right Upper Extremity: WFL  -       Left Upper Extremity: WFL    AMPAC 6 Click ADL:  AMPAC Total Score: 24    Treatment & Education:  Purpose of OT and DC OT no needs. Pt and son in agreement.     Patient left up in chair with all lines intact, call button in reach, and son present    GOALS:   Multidisciplinary Problems       Occupational Therapy Goals       Not on file                    History:     Past Medical History:   Diagnosis Date    Anemia 10/19/2020    Arthritis     Breast cancer 2001    Diabetes mellitus     History of breast cancer 8/21/2013     Hyperlipidemia     Hypertension     Lumbar back pain 3/7/2023    Nuclear sclerotic cataract of right eye 10/16/2012    Osteoporosis, unspecified: see DEXA 8/15- 2/13/2017    Osteoporosis, unspecified: see DEXA 8/15- 2/13/2017    Pain of both shoulder joints 2/14/2017    Pneumonia 5/4/2024    Rheumatoid factor positive 2/14/2017    Stroke 4/6/2019    Tubular adenoma of colon 10/28/2013    Type 2 diabetes mellitus, without long-term current use of insulin 5/4/2024    Type II or unspecified type diabetes mellitus with neurological manifestations, not stated as uncontrolled(250.60)     Vitamin D deficiency disease 2/17/2014         Past Surgical History:   Procedure Laterality Date    BELPHAROPTOSIS REPAIR  03/15/13    bilateral-dr. coleman md    BREAST BIOPSY      BREAST LUMPECTOMY      BREAST SURGERY Left 2001    lumpectomy    CATARACT EXTRACTION      both eyes -     CHOLECYSTECTOMY      Done in [place] in the 1980's    COLONOSCOPY N/A 1/19/2016    Procedure: COLONOSCOPY;  Surgeon: BLANCA Guerra MD;  Location: [address];  Service: Endoscopy;  Laterality: N/A;    COLONOSCOPY W/ POLYPECTOMY         Time Tracking:     OT Date of Treatment: 05/06/24  OT Start Time: 1125  OT Stop Time: 1131  OT Total Time (min): 6 min    Billable Minutes:Evaluation 6  Total Time 6    5/6/2024

## 2024-05-06 NOTE — PLAN OF CARE
Hospital follow up scheduled  Pt is clear for dc from          05/06/24 1512   Final Note   Assessment Type Final Discharge Note   Anticipated Discharge Disposition Home   Hospital Resources/Appts/Education Provided Appointments scheduled and added to AVS

## 2024-05-06 NOTE — PLAN OF CARE
Discharged pt per MD order. Given d/c instruction and education with . AVS printed in [place], pts preferred language, with family at bedside. Pt transferred off unit with NAD noted. Belongings with family.    Problem: Adult Inpatient Plan of Care  Goal: Plan of Care Review  Outcome: Met  Goal: Patient-Specific Goal (Individualized)  Outcome: Met  Goal: Absence of Hospital-Acquired Illness or Injury  Outcome: Met  Goal: Optimal Comfort and Wellbeing  Outcome: Met  Goal: Readiness for Transition of Care  Outcome: Met     Problem: Diabetes Comorbidity  Goal: Blood Glucose Level Within Targeted Range  Outcome: Met     Problem: Pneumonia  Goal: Fluid Balance  Outcome: Met  Goal: Resolution of Infection Signs and Symptoms  Outcome: Met  Goal: Effective Oxygenation and Ventilation  Outcome: Met     Problem: Fall Injury Risk  Goal: Absence of Fall and Fall-Related Injury  Outcome: Met     Problem: Skin Injury Risk Increased  Goal: Skin Health and Integrity  Outcome: Met

## 2024-05-06 NOTE — PLAN OF CARE
Problem: Physical Therapy  Goal: Physical Therapy Goal  Description: Goals to be met by: 2024     Patient will increase functional independence with mobility by performin. Supine to sit with [place]  2. Sit to stand transfer with [place]  3. Bed to chair transfer with [place] using No Assistive Device  4. Gait  x over 500 feet with Supervision using No Assistive Device.   5. Lower extremity exercise program x20 reps   Outcome: Progressing   PT eval and treat. Gait 250ft x2 with CGA. Home with family

## 2024-05-06 NOTE — DISCHARGE SUMMARY
[facility]  [facility]charge Summary      Patient Name: Sera Boone  MRN: [id number]  NII: [id number]  Patient Class: IP- Inpatient  Admission Date: 5/4/2024  Hospital Length of Stay: 2 days  Discharge Date and Time:  05/06/2024 4:22 PM  Attending Physician: Victor M Gil MD   Discharging Provider: Victor M Gil MD  Primary Care Provider: Beena Hill MD    Primary Care Team: Networked reference to record PCT     HPI:   88 years old female with past medical history significant for hypertension, type 2 diabetes mellitus, hyperlipidemia, history of breast cancer, back pain who was brought in by patient's son due to cough, shortness of breath and burning urination.  Patient speaks Mauritian and does not speak English, so history is mainly provided by patient's son at bedside.  Patient's son reported that patient has not been feeling good for last 1 week and has been having cough with some sputum production, shortness of breath which is not associated with exertion and burning urination.  Patient denied chest pain abdominal pain nausea vomiting or dizziness.  Patient was found to have fever of 100.5° F on presentation.  UA showed UTI.  Chest x-ray was read as ''Enlarged cardiac silhouette, prominence of the central pulmonary vasculature, and mild lower lobe interstitial edema, right greater than left.  Cardiac decompensation/heart failure cannot be excluded.  Please correlate clinically''.  Patient and family denied history of smoking or any past history of heart or lung disease.  Patient does not have history of heart failure, BNP was obtained by ER physician after CXR report which came 20 who then advised IV fluid.  Urine and blood cultures obtained, patient has been started on IV ceftriaxone and azithromycin.        * No surgery found *      Hospital Course:   Patient presented with cough and shortness of breath.  Patient was found to have UTI and pneumonia.   Urine, respiratory and blood cultures sent.  Patient started on IV ceftriaxone and azithromycin.  Echo ordered to assess cardiac function.  Urine culture grew E coli which was pansensitive, blood cultures remained negative, respiratory culture grew normal александр.  Patient's symptoms significantly improved during hospitalization and she denied any problem or concern on day of discharge.  Patient is advised to take cefuroxime and azithromycin x5 days to complete antibiotic course. Echo showed LV estimated ejection fraction of 60 - 65% and normal diastolic function. Patient appears to be euvolemic currently so diuretic is not advised, BNP was 20. Follow with PCP and cardiology in 1-2 weeks.  I also spoke to patient's son at bedside and updated him about patient's medical conditions, treatment plan and answered questions.     Goals of Care Treatment Preferences:  Code Status: Full Code      Consults:     No new Assessment & Plan notes have been filed under this hospital service since the last note was generated.  Service: Hospital Medicine    Final Active Diagnoses:    Diagnosis Date Noted POA    PRINCIPAL PROBLEM:  Cough [R05.9] 05/04/2024 Yes    SOB (shortness of breath) [R06.02] 05/04/2024 Yes    UTI (urinary tract infection) [N39.0] 05/04/2024 Yes    Pneumonia [J18.9] 05/04/2024 Yes    Type 2 diabetes mellitus, without long-term current use of insulin [E11.9] 05/04/2024 Yes    Hypertension associated with diabetes [E11.59, I15.2] 05/15/2015 Yes    Hyperlipidemia associated with type 2 diabetes mellitus [E11.69, E78.5]  Yes      Problems Resolved During this Admission:       Discharged Condition: stable    Disposition: Home or Self Care    Follow Up:   Follow-up Information       Beena Hill MD Follow up.    Specialty: Internal Medicine  Why: 5/16 @ 11am  Contact information:  [address]  [facility] [address]  [phone]                           Patient Instructions:      Ambulatory  referral/consult to Cardiology   Standing Status: Future   Referral Priority: Routine Referral Type: Consultation   Referral Reason: Specialty Services Required   Requested Specialty: Cardiology   Number of Visits Requested: 1       Significant Diagnostic Studies: Labs: All labs within the past 24 hours have been reviewed    Pending Diagnostic Studies:       Procedure Component Value Units Date/Time    Echo [[id number]] Resulted: 05/06/24 1525    Order Status: Sent Lab Status: In process Updated: 05/06/24 1525     Romo's Biplane MOD Ejection Fraction 64 %      LVOT stroke volume 64.54 cm3      LVIDd 3.74 cm      LV Systolic Volume 17.74 mL      LV Systolic Volume Index 11.3 mL/m2      LVIDs 2.28 cm      LV Diastolic Volume 59.61 mL      LV Diastolic Volume Index 37.97 mL/m2      IVS 0.80 cm      LVOT diameter 1.96 cm      LVOT area 3.0 cm2      FS 39 %      Left Ventricle Relative Wall Thickness 0.42 cm      Posterior Wall 0.78 cm      LV mass 82.35 g      LV Mass Index 52 g/m2      MV Peak E Miguel Ángel 0.58 m/s      TDI LATERAL 0.08 m/s      TDI SEPTAL 0.06 m/s      E/E' ratio 8.29 m/s      MV Peak A Miguel Ángel 0.97 m/s      TR Max Miguel Ángel 2.64 m/s      E/A ratio 0.60     IVRT 137.01 msec      E wave deceleration time 253.25 msec      LV SEPTAL E/E' RATIO 9.67 m/s      LV LATERAL E/E' RATIO 7.25 m/s      LVOT peak miguel ángel 1.22 m/s      Left Ventricular Outflow Tract Mean Velocity 0.84 cm/s      Left Ventricular Outflow Tract Mean Gradient 3.26 mmHg      RVDD 3.12 cm      Right ventricular length in diastole (apical 4-chamber view) 4.44 cm      RV S' 19.66 cm/s      TAPSE 2.84 cm      RV/LV Ratio 0.83 cm      Left Atrium Major Axis 4.64 cm      LA volume (mod) 25.97 cm3      LA Volume Index (Mod) 16.5 mL/m2      RA Major Axis 3.86 cm      AV regurgitation pressure 1/2 time [id number] ms      AR Max Miguel Ángel 5.00 m/s      AV mean gradient 6 mmHg      AV peak gradient 10 mmHg      Ao peak miguel ángel 1.62 m/s      Ao VTI 34.50 cm      LVOT  peak VTI 21.40 cm      AV valve area 1.87 cm²      AV Velocity Ratio 0.75     AV index (prosthetic) 0.62     YOSEF by Velocity Ratio 2.27 cm²      Mr max patrick 3.92 m/s      MV mean gradient 2 mmHg      MV peak gradient 7 mmHg      MV stenosis pressure 1/2 time 67.97 ms      MV valve area p 1/2 method 3.24 cm2      MV valve area by continuity eq 2.29 cm2      MV VTI 28.2 cm      Triscuspid Valve Regurgitation Peak Gradient 28 mmHg      PV PEAK VELOCITY 1.41 m/s      PV peak gradient 8 mmHg      Pulmonary Valve Mean Velocity 0.90 m/s      Ao root annulus 2.51 cm      STJ 2.35 cm      Ascending aorta 2.67 cm      Mean e' 0.07 m/s      ZLVIDS -1.62     ZLVIDD -1.88     AORTIC VALVE CUSP SEPERATION 1.76 cm      BSA 1.6 m2            Medications:  Reconciled Home Medications:      Medication List        START taking these medications      azithromycin 500 MG tablet  Commonly known as: ZITHROMAX  Take 1 tablet (500 mg total) by mouth once daily. for 5 days     cefUROXime 500 MG tablet  Commonly known as: CEFTIN  Take 1 tablet (500 mg total) by mouth 2 (two) times daily. for 5 days            CONTINUE taking these medications      atorvastatin 20 MG tablet  Commonly known as: LIPITOR  Take 1 tablet (20 mg total) by mouth once daily.     blood sugar diagnostic Strp  Commonly known as: TRUE METRIX GLUCOSE TEST STRIP  Inject 100 strips into the skin 2 (two) times daily.     blood-glucose meter kit  Use as instructed  Dispense appropriate lancets and test strips 100 each 12 refills     cholecalciferol (vitamin D3) 25 mcg (1,000 unit) capsule  Commonly known as: VITAMIN D3  Take 2 capsules (2,000 Units total) by mouth once daily.     cycloSPORINE 0.05 % ophthalmic emulsion  Commonly known as: RESTASIS  Place 1 drop into both eyes 2 (two) times daily.     diclofenac sodium 1 % Gel  Commonly known as: VOLTAREN  Apply 2 g topically 4 (four) times daily as needed (pain).     gabapentin 100 MG capsule  Commonly known as: NEURONTIN  Take  1 capsule (100 mg total) by mouth 2 (two) times daily.     icosapent ethyL 1 gram Cap  Commonly known as: VASCEPA  Take 2 capsules (2 g total) by mouth 2 (two) times daily with meals.     LIDOcaine 5 %  Commonly known as: LIDODERM  Place 1 patch onto the skin daily as needed (pain). Remove & Discard patch within 24 hours or as directed by MD     * MICRO THIN LANCETS 33 gauge Misc  Generic drug: lancets  CHECK BLOOD GLUCOSE DAILY.     * TRUEPLUS LANCETS 33 gauge Misc  Generic drug: lancets  CHECK BLOOD GLUCOSE DAILY.     mupirocin 2 % ointment  Commonly known as: BACTROBAN  Apply topically 3 (three) times daily.     omega-3 acid ethyl esters 1 gram capsule  Commonly known as: LOVAZA  Take 1 capsule (1 g total) by mouth 2 (two) times daily.     sertraline 25 MG tablet  Commonly known as: ZOLOFT  TAKE 1 TABLET(25 MG) BY MOUTH EVERY DAY           * This list has 2 medication(s) that are the same as other medications prescribed for you. Read the directions carefully, and ask your doctor or other care provider to review them with you.                  Indwelling Lines/Drains at time of discharge:   Lines/Drains/Airways       None                   Time spent on the discharge of patient: 33 minutes         Victor M Gil MD  Department of Hospital Medicine  [facility]/Surg

## 2024-05-06 NOTE — DISCHARGE INSTRUCTIONS
Take cefuroxime and azithromycin x5 days then stop.  Follow with PCP and cardiology in 1-2 weeks and discuss about echo result.

## 2024-05-06 NOTE — PT/OT/SLP EVAL
"Physical Therapy Evaluation    Patient Name:  Sera Boone   MRN:  [id number]    Recommendations:     Discharge Recommendations: No Therapy Indicated   Discharge Equipment Recommendations: none   Barriers to discharge: None    Assessment:     Sera Boone is a 88 y.o. female admitted with a medical diagnosis of Cough.  She presents with the following impairments/functional limitations: weakness, impaired endurance, impaired cardiopulmonary response to activity .    Pt seen supine in bed, alert and agreeable to PT. Pt requiring CGA mobility and ambulated at hallways 250ft x2 with no assistive device. OOB chair.  Pt was previously indep and home with son and family. Pt active and does housework..    Rehab Prognosis: Good; patient would benefit from acute skilled PT services to address these deficits and reach maximum level of function.    Recent Surgery: * No surgery found *      Plan:     During this hospitalization, patient to be seen 6 x/week to address the identified rehab impairments via gait training, therapeutic activities, therapeutic exercises and progress toward the following goals:    Plan of Care Expires:  05/30/24    Subjective   Son stated pt is non stop at home like an "energizer battery"  Stated that she gets up to bathroom and walk within room  Chief Complaint: none  Patient/Family Comments/goals: get home  Pain/Comfort:  Pain Rating 1: 0/10    Patients cultural, spiritual, Adventism conflicts given the current situation:      Living Environment:  Home with family  Prior to admission, patients level of function was indep.  Equipment used at home: none.  DME owned (not currently used): none.  Upon discharge, patient will have assistance from family.    Objective:     Communicated with nurse Sandhu prior to session.  Patient found HOB elevated with telemetry  upon PT entry to room.    General Precautions: Standard, fall  Orthopedic Precautions:N/A   Braces: N/A  Respiratory Status: Room " air    Exams:  Postural Exam:  Patient presented with the following abnormalities:    -       Rounded shoulders  -       Forward head  -       BMI 26.05  RLE ROM: WFL  RLE Strength: WFL  LLE ROM: WFL  LLE Strength: WFL    Functional Mobility:  Bed Mobility:     Rolling Right: modified independence  Scooting: contact guard assistance  Supine to Sit: contact guard assistance  Transfers:     Sit to Stand:  contact guard assistance with no AD  Bed to Chair: contact guard assistance with  no AD  using  Stand Pivot  Gait: 250ft x2 with HHA      AM-PAC 6 CLICK MOBILITY  Total Score:19       Treatment & Education:  Patient was educated on the importance of OOB activity and functional mobility to negate negative effects of prolonged bed rest during hospitalization, safe transfers and ambulation, and D/C planning   OOB chair post PT with all needs within reach    Patient left up in chair with all lines intact, call button in reach, and son present.    GOALS:   Multidisciplinary Problems       Physical Therapy Goals          Problem: Physical Therapy    Goal Priority Disciplines Outcome Goal Variances Interventions   Physical Therapy Goal     PT, PT/OT Progressing     Description: Goals to be met by: 2024     Patient will increase functional independence with mobility by performin. Supine to sit with [place]  2. Sit to stand transfer with [place]  3. Bed to chair transfer with [place] using No Assistive Device  4. Gait  x over 500 feet with [place] using No Assistive Device.   5. Lower extremity exercise program x20 reps                        History:     Past Medical History:   Diagnosis Date    Anemia 10/19/2020    Arthritis     Breast cancer 2001    Diabetes mellitus     History of breast cancer 2013    Hyperlipidemia     Hypertension     Lumbar back pain 3/7/2023    Nuclear sclerotic cataract of right eye 10/16/2012    Osteoporosis, unspecified: see DEXA 8/15- 2017     Osteoporosis, unspecified: see DEXA 8/15- 2/13/2017    Pain of both shoulder joints 2/14/2017    Pneumonia 5/4/2024    Rheumatoid factor positive 2/14/2017    Stroke 4/6/2019    Tubular adenoma of colon 10/28/2013    Type 2 diabetes mellitus, without long-term current use of insulin 5/4/2024    Type II or unspecified type diabetes mellitus with neurological manifestations, not stated as uncontrolled(250.60)     Vitamin D deficiency disease 2/17/2014       Past Surgical History:   Procedure Laterality Date    BELPHAROPTOSIS REPAIR  03/15/13    bilateral-dr. coleman md    BREAST BIOPSY      BREAST LUMPECTOMY      BREAST SURGERY Left 2001    lumpectomy    CATARACT EXTRACTION      both eyes -     CHOLECYSTECTOMY      Done in [place] in the 1980's    COLONOSCOPY N/A 1/19/2016    Procedure: COLONOSCOPY;  Surgeon: BLANCA Guerra MD;  Location: [address];  Service: Endoscopy;  Laterality: N/A;    COLONOSCOPY W/ POLYPECTOMY         Time Tracking:     PT Received On: 05/06/24  PT Start Time: 1025     PT Stop Time: 1042  PT Total Time (min): 17 min     Billable Minutes: Evaluation 8 and Gait Training 9      05/06/2024

## 2024-05-06 NOTE — PLAN OF CARE
05/05/24 1951   Patient Assessment/Suction   Level of Consciousness (AVPU) alert   Respiratory Effort Normal;Unlabored   Expansion/Accessory Muscles/Retractions expansion symmetric   All Lung Fields Breath Sounds crackles   Rhythm/Pattern, Respiratory pattern regular   Cough Frequency infrequent   Cough Type nonproductive   PRE-TX-O2   Device (Oxygen Therapy) room air   SpO2 95 %   Pulse Oximetry Type Intermittent   Pulse 79   Resp 18   Aerosol Therapy   $ Aerosol Therapy Charges Aerosol Treatment   Respiratory Treatment Status (SVN) given   Treatment Route (SVN) mask   Patient Position semi-Orozco's   Post Treatment Assessment (SVN) breath sounds unchanged   Signs of Intolerance (SVN) none   Breath Sounds Post-Respiratory Treatment   Post-treatment Heart Rate (beats/min) 81   Post-treatment Resp Rate (breaths/min) 18

## 2024-05-07 LAB
BACTERIA SPEC AEROBE CULT: NORMAL
GRAM STN SPEC: NORMAL

## 2024-05-09 LAB
BACTERIA BLD CULT: NORMAL
BACTERIA BLD CULT: NORMAL

## 2024-05-20 ENCOUNTER — OFFICE VISIT (OUTPATIENT)
Dept: INTERNAL MEDICINE | Facility: CLINIC | Age: 89
End: 2024-05-20
Payer: MEDICARE

## 2024-05-20 VITALS
WEIGHT: 134.69 LBS | OXYGEN SATURATION: 96 % | DIASTOLIC BLOOD PRESSURE: 62 MMHG | HEIGHT: 60 IN | SYSTOLIC BLOOD PRESSURE: 129 MMHG | BODY MASS INDEX: 26.44 KG/M2 | HEART RATE: 76 BPM

## 2024-05-20 DIAGNOSIS — I10 ESSENTIAL HYPERTENSION: ICD-10-CM

## 2024-05-20 DIAGNOSIS — R05.8 POST-VIRAL COUGH SYNDROME: Primary | ICD-10-CM

## 2024-05-20 DIAGNOSIS — E11.49 TYPE II DIABETES MELLITUS WITH NEUROLOGICAL MANIFESTATIONS: ICD-10-CM

## 2024-05-20 DIAGNOSIS — N18.30 STAGE 3 CHRONIC KIDNEY DISEASE, UNSPECIFIED WHETHER STAGE 3A OR 3B CKD: ICD-10-CM

## 2024-05-20 PROCEDURE — 99214 OFFICE O/P EST MOD 30 MIN: CPT | Mod: S$GLB,,, | Performed by: INTERNAL MEDICINE

## 2024-05-20 PROCEDURE — 3288F FALL RISK ASSESSMENT DOCD: CPT | Mod: CPTII,S$GLB,, | Performed by: INTERNAL MEDICINE

## 2024-05-20 PROCEDURE — 1101F PT FALLS ASSESS-DOCD LE1/YR: CPT | Mod: CPTII,S$GLB,, | Performed by: INTERNAL MEDICINE

## 2024-05-20 PROCEDURE — 3072F LOW RISK FOR RETINOPATHY: CPT | Mod: CPTII,S$GLB,, | Performed by: INTERNAL MEDICINE

## 2024-05-20 PROCEDURE — 99999 PR PBB SHADOW E&M-EST. PATIENT-LVL III: CPT | Mod: PBBFAC,,, | Performed by: INTERNAL MEDICINE

## 2024-05-20 PROCEDURE — 1111F DSCHRG MED/CURRENT MED MERGE: CPT | Mod: CPTII,S$GLB,, | Performed by: INTERNAL MEDICINE

## 2024-05-20 PROCEDURE — 1126F AMNT PAIN NOTED NONE PRSNT: CPT | Mod: CPTII,S$GLB,, | Performed by: INTERNAL MEDICINE

## 2024-05-20 RX ORDER — BENZONATATE 100 MG/1
100 CAPSULE ORAL 3 TIMES DAILY PRN
Qty: 30 CAPSULE | Refills: 0 | Status: SHIPPED | OUTPATIENT
Start: 2024-05-20 | End: 2024-05-30

## 2024-05-20 NOTE — PROGRESS NOTES
"Subjective:       Patient ID: Sera Boone is a 88 y.o. female.    Chief Complaint: hospital f/u    HPI  Transitional Care Note    Presents for hospital discharge follow up after [place] [facility] [place] after presenting to ED with dysuria and cough/SOB.     Per discharge summary:  "Patient presented with cough and shortness of breath.  Patient was found to have UTI and pneumonia.  Urine, respiratory and blood cultures sent.  Patient started on IV ceftriaxone and azithromycin.  Echo ordered to assess cardiac function.  Urine culture grew E coli which was pansensitive, blood cultures remained negative, respiratory culture grew normal александр.  Patient's symptoms significantly improved during hospitalization and she denied any problem or concern on day of discharge.  Patient is advised to take cefuroxime and azithromycin x5 days to complete antibiotic course. Echo showed LV estimated ejection fraction of 60 - 65% and normal diastolic function. Patient appears to be euvolemic currently so diuretic is not advised, BNP was 20. Follow with PCP and cardiology in 1-2 weeks.  I also spoke to patient's son at bedside and updated him about patient's medical conditions, treatment plan and answered questions.     Family and/or Caretaker present at visit?  Yes.  Diagnostic tests reviewed/disposition: No diagnosic tests pending after this hospitalization.  Disease/illness education: yes  Home health/community services discussion/referrals: Patient does not have home health established from hospital visit.  They do not need home health.  If needed, we will set up home health for the patient.   Establishment or re-establishment of referral orders for community resources: No other necessary community resources.   Discussion with other health care providers: No discussion with other health care providers necessary.     Today she states she is feeling much better.  She does have a residual cough but this is also " improving.  Her urinary symptoms have resolved.  She has completed her courses of azithromycin and Ceftin.    While she was admitted the hospital but unless recommended she follow up with Cardiology due to shortness of breath.  This visit has not yet been scheduled.    She has remained off all blood pressure medication and blood pressure continues to be well controlled.  We stopped lisinopril a few months ago due to worsening creatinine.    She has also been off of metformin since April due to worsening AIXA.    PMHx:  DMII on metformin last A1C was 6.7 in January 2023      Fibromyalgia started gabapentin during last visit which is helping. Also started PT which has been helping.      Osteoporosis was previously on alendronate      HLD: on statin      Mild iron deficiency anemia has been stable on lab s        Hx or Vertigo relate cerumen impaction.           Review of Systems   Constitutional:  Negative for activity change, appetite change and chills.   HENT:  Negative for ear pain, sinus pressure/congestion and sneezing.    Respiratory:  Negative for cough and shortness of breath.    Cardiovascular:  Negative for chest pain, palpitations and leg swelling.   Gastrointestinal:  Negative for abdominal distention, abdominal pain, constipation, diarrhea, nausea and vomiting.   Genitourinary:  Negative for dysuria and hematuria.   Musculoskeletal:  Negative for arthralgias, back pain and myalgias.   Neurological:  Negative for dizziness and headaches.   Psychiatric/Behavioral:  Negative for agitation. The patient is not nervous/anxious.          Objective:      Physical Exam  Constitutional:       Appearance: Normal appearance.   HENT:      Head: Normocephalic.   Cardiovascular:      Rate and Rhythm: Normal rate and regular rhythm.      Pulses: Normal pulses.      Heart sounds: Normal heart sounds.   Pulmonary:      Effort: Pulmonary effort is normal.      Breath sounds: Normal breath sounds.   Abdominal:      General:  Abdomen is flat. Bowel sounds are normal.      Palpations: Abdomen is soft.   Musculoskeletal:         General: Normal range of motion.      Cervical back: Normal range of motion and neck supple.   Skin:     General: Skin is warm and dry.   Neurological:      General: No focal deficit present.      Mental Status: She is alert and oriented to person, place, and time.   Psychiatric:         Mood and Affect: Mood normal.         Assessment:       Problem List Items Addressed This Visit          Endocrine    Type II diabetes mellitus with neurological manifestations     Other Visit Diagnoses       Post-viral cough syndrome    -  Primary    Stage 3 chronic kidney disease, unspecified whether stage 3a or 3b CKD        Relevant Orders    COMPREHENSIVE METABOLIC PANEL    IRON AND TIBC    Essential hypertension        Relevant Orders    COMPREHENSIVE METABOLIC PANEL    CBC W/ AUTO DIFFERENTIAL    TSH            Plan:       Sera was seen today for hospital f/u.    Diagnoses and all orders for this visit:    Post-PNA cough syndrome  -     benzonatate (TESSALON) 100 MG capsule; Take 1 capsule (100 mg total) by mouth 3 (three) times daily as needed.  Improving.     Stage 3 chronic kidney disease, unspecified whether stage 3a or 3b CKD  -     COMPREHENSIVE METABOLIC PANEL; Future  -     IRON AND TIBC; Future  Follow up with nephrology and kidney ultrasound.     Type II diabetes mellitus with neurological manifestations  Check labs in July since being off metformin.     Essential hypertension  Well controlled since stopping lisinopril.     Beena Hill MD   Primary care.

## 2024-06-10 ENCOUNTER — PATIENT MESSAGE (OUTPATIENT)
Dept: INTERNAL MEDICINE | Facility: CLINIC | Age: 89
End: 2024-06-10
Payer: MEDICARE

## 2024-07-06 NOTE — TELEPHONE ENCOUNTER
No care due was identified.  [facility] Embedded Care Due Messages. Reference number: [id number].   7/06/2024 6:31:49 PM CDT

## 2024-07-08 RX ORDER — LANCETS 33 GAUGE
EACH MISCELLANEOUS
Qty: 100 EACH | Refills: 3 | Status: SHIPPED | OUTPATIENT
Start: 2024-07-08

## 2024-07-08 NOTE — TELEPHONE ENCOUNTER
Refill Decision Note   Sera Boone  is requesting a refill authorization.  Brief Assessment and Rationale for Refill:  Approve     Medication Therapy Plan:         Comments:     Note composed:2:27 AM 07/08/2024

## 2024-07-09 ENCOUNTER — LAB VISIT (OUTPATIENT)
Dept: LAB | Facility: HOSPITAL | Age: 89
End: 2024-07-09
Attending: INTERNAL MEDICINE
Payer: MEDICARE

## 2024-07-09 DIAGNOSIS — I10 ESSENTIAL HYPERTENSION: ICD-10-CM

## 2024-07-09 DIAGNOSIS — E11.49 TYPE II DIABETES MELLITUS WITH NEUROLOGICAL MANIFESTATIONS: ICD-10-CM

## 2024-07-09 DIAGNOSIS — N18.30 STAGE 3 CHRONIC KIDNEY DISEASE, UNSPECIFIED WHETHER STAGE 3A OR 3B CKD: ICD-10-CM

## 2024-07-09 LAB
ALBUMIN SERPL BCP-MCNC: 3.8 G/DL (ref 3.5–5.2)
ALP SERPL-CCNC: 44 U/L (ref 55–135)
ALT SERPL W/O P-5'-P-CCNC: 10 U/L (ref 10–44)
ANION GAP SERPL CALC-SCNC: 8 MMOL/L (ref 8–16)
AST SERPL-CCNC: 15 U/L (ref 10–40)
BASOPHILS # BLD AUTO: 0.01 K/UL (ref 0–0.2)
BASOPHILS NFR BLD: 0.2 % (ref 0–1.9)
BILIRUB SERPL-MCNC: 0.6 MG/DL (ref 0.1–1)
BUN SERPL-MCNC: 27 MG/DL (ref 8–23)
CALCIUM SERPL-MCNC: 10.2 MG/DL (ref 8.7–10.5)
CHLORIDE SERPL-SCNC: 106 MMOL/L (ref 95–110)
CO2 SERPL-SCNC: 23 MMOL/L (ref 23–29)
CREAT SERPL-MCNC: 0.9 MG/DL (ref 0.5–1.4)
DIFFERENTIAL METHOD BLD: ABNORMAL
EOSINOPHIL # BLD AUTO: 0 K/UL (ref 0–0.5)
EOSINOPHIL NFR BLD: 0 % (ref 0–8)
ERYTHROCYTE [DISTWIDTH] IN BLOOD BY AUTOMATED COUNT: 13.2 % (ref 11.5–14.5)
EST. GFR  (NO RACE VARIABLE): >60 ML/MIN/1.73 M^2
ESTIMATED AVG GLUCOSE: 151 MG/DL (ref 68–131)
GLUCOSE SERPL-MCNC: 136 MG/DL (ref 70–110)
HBA1C MFR BLD: 6.9 % (ref 4–5.6)
HCT VFR BLD AUTO: 35.6 % (ref 37–48.5)
HGB BLD-MCNC: 11.4 G/DL (ref 12–16)
IMM GRANULOCYTES # BLD AUTO: 0.04 K/UL (ref 0–0.04)
IMM GRANULOCYTES NFR BLD AUTO: 0.6 % (ref 0–0.5)
IRON SERPL-MCNC: 73 UG/DL (ref 30–160)
LYMPHOCYTES # BLD AUTO: 2.5 K/UL (ref 1–4.8)
LYMPHOCYTES NFR BLD: 39.4 % (ref 18–48)
MCH RBC QN AUTO: 29.8 PG (ref 27–31)
MCHC RBC AUTO-ENTMCNC: 32 G/DL (ref 32–36)
MCV RBC AUTO: 93 FL (ref 82–98)
MONOCYTES # BLD AUTO: 0.6 K/UL (ref 0.3–1)
MONOCYTES NFR BLD: 9.7 % (ref 4–15)
NEUTROPHILS # BLD AUTO: 3.1 K/UL (ref 1.8–7.7)
NEUTROPHILS NFR BLD: 50.1 % (ref 38–73)
NRBC BLD-RTO: 0 /100 WBC
PLATELET # BLD AUTO: 152 K/UL (ref 150–450)
PMV BLD AUTO: 11.2 FL (ref 9.2–12.9)
POTASSIUM SERPL-SCNC: 4.4 MMOL/L (ref 3.5–5.1)
PROT SERPL-MCNC: 8.2 G/DL (ref 6–8.4)
RBC # BLD AUTO: 3.83 M/UL (ref 4–5.4)
SATURATED IRON: 19 % (ref 20–50)
SODIUM SERPL-SCNC: 137 MMOL/L (ref 136–145)
TOTAL IRON BINDING CAPACITY: 392 UG/DL (ref 250–450)
TRANSFERRIN SERPL-MCNC: 265 MG/DL (ref 200–375)
TSH SERPL DL<=0.005 MIU/L-ACNC: 1.56 UIU/ML (ref 0.4–4)
WBC # BLD AUTO: 6.27 K/UL (ref 3.9–12.7)

## 2024-07-09 PROCEDURE — 84443 ASSAY THYROID STIM HORMONE: CPT | Performed by: INTERNAL MEDICINE

## 2024-07-09 PROCEDURE — 80053 COMPREHEN METABOLIC PANEL: CPT | Performed by: INTERNAL MEDICINE

## 2024-07-09 PROCEDURE — 83036 HEMOGLOBIN GLYCOSYLATED A1C: CPT | Performed by: INTERNAL MEDICINE

## 2024-07-09 PROCEDURE — 85025 COMPLETE CBC W/AUTO DIFF WBC: CPT | Performed by: INTERNAL MEDICINE

## 2024-07-09 PROCEDURE — 36415 COLL VENOUS BLD VENIPUNCTURE: CPT | Mod: PO | Performed by: INTERNAL MEDICINE

## 2024-07-09 PROCEDURE — 83540 ASSAY OF IRON: CPT | Performed by: INTERNAL MEDICINE

## 2024-07-18 ENCOUNTER — HOSPITAL ENCOUNTER (OUTPATIENT)
Dept: RADIOLOGY | Facility: HOSPITAL | Age: 89
Discharge: HOME | End: 2024-07-18
Attending: NURSE PRACTITIONER
Payer: MEDICARE

## 2024-07-18 ENCOUNTER — OFFICE VISIT (OUTPATIENT)
Dept: INTERNAL MEDICINE | Facility: CLINIC | Age: 89
End: 2024-07-18
Payer: MEDICARE

## 2024-07-18 VITALS
HEIGHT: 60 IN | SYSTOLIC BLOOD PRESSURE: 130 MMHG | DIASTOLIC BLOOD PRESSURE: 61 MMHG | HEART RATE: 78 BPM | WEIGHT: 134.69 LBS | BODY MASS INDEX: 26.44 KG/M2 | OXYGEN SATURATION: 98 %

## 2024-07-18 DIAGNOSIS — F32.5 MAJOR DEPRESSION IN REMISSION: ICD-10-CM

## 2024-07-18 DIAGNOSIS — M79.7 FIBROMYALGIA: ICD-10-CM

## 2024-07-18 DIAGNOSIS — E11.49 TYPE II DIABETES MELLITUS WITH NEUROLOGICAL MANIFESTATIONS: ICD-10-CM

## 2024-07-18 DIAGNOSIS — Z12.31 SCREENING MAMMOGRAM FOR BREAST CANCER: ICD-10-CM

## 2024-07-18 DIAGNOSIS — N18.30 STAGE 3 CHRONIC KIDNEY DISEASE, UNSPECIFIED WHETHER STAGE 3A OR 3B CKD: ICD-10-CM

## 2024-07-18 DIAGNOSIS — Z78.0 ASYMPTOMATIC MENOPAUSAL STATE: ICD-10-CM

## 2024-07-18 DIAGNOSIS — I10 ESSENTIAL HYPERTENSION: Primary | ICD-10-CM

## 2024-07-18 PROCEDURE — 3288F FALL RISK ASSESSMENT DOCD: CPT | Mod: CPTII,S$GLB,, | Performed by: INTERNAL MEDICINE

## 2024-07-18 PROCEDURE — 76770 US EXAM ABDO BACK WALL COMP: CPT | Mod: TC

## 2024-07-18 PROCEDURE — 3072F LOW RISK FOR RETINOPATHY: CPT | Mod: CPTII,S$GLB,, | Performed by: INTERNAL MEDICINE

## 2024-07-18 PROCEDURE — 99999 PR PBB SHADOW E&M-EST. PATIENT-LVL IV: CPT | Mod: PBBFAC,,, | Performed by: INTERNAL MEDICINE

## 2024-07-18 PROCEDURE — 99214 OFFICE O/P EST MOD 30 MIN: CPT | Mod: S$GLB,,, | Performed by: INTERNAL MEDICINE

## 2024-07-18 PROCEDURE — 1126F AMNT PAIN NOTED NONE PRSNT: CPT | Mod: CPTII,S$GLB,, | Performed by: INTERNAL MEDICINE

## 2024-07-18 PROCEDURE — 76770 US EXAM ABDO BACK WALL COMP: CPT | Mod: 26,,, | Performed by: RADIOLOGY

## 2024-07-18 PROCEDURE — 1101F PT FALLS ASSESS-DOCD LE1/YR: CPT | Mod: CPTII,S$GLB,, | Performed by: INTERNAL MEDICINE

## 2024-07-18 RX ORDER — SERTRALINE HYDROCHLORIDE 25 MG/1
25 TABLET, FILM COATED ORAL DAILY
Qty: 90 TABLET | Refills: 3 | Status: SHIPPED | OUTPATIENT
Start: 2024-07-18

## 2024-07-18 RX ORDER — ATORVASTATIN CALCIUM 20 MG/1
20 TABLET, FILM COATED ORAL DAILY
Qty: 90 TABLET | Refills: 3 | Status: SHIPPED | OUTPATIENT
Start: 2024-07-18 | End: 2025-07-18

## 2024-07-18 RX ORDER — GABAPENTIN 100 MG/1
100 CAPSULE ORAL 2 TIMES DAILY
Qty: 180 CAPSULE | Refills: 3 | Status: SHIPPED | OUTPATIENT
Start: 2024-07-18 | End: 2025-07-13

## 2024-07-18 NOTE — PROGRESS NOTES
Subjective:       Patient ID: Sera Boone is a 88 y.o. female.    Chief Complaint: Follow-up      She had a recent admission in May for sepsis due to UTI and pneumonia. She is now feeling much better.     Her blood pressure has remained stable since being off all blood pressure medications.    PMHx:  DMII:  She has been off metformin for the last few months and her A1C remained stable at 6.9.     Fibromyalgia started gabapentin during last visit which is helping. Also started PT which has been helping.      Osteoporosis was previously on alendronate      HLD: on statin      Mild iron deficiency anemia has been stable on lab s        Hx or Vertigo relate cerumen impaction.          Follow-up  Pertinent negatives include no abdominal pain, arthralgias, chest pain, chills, coughing, headaches, myalgias, nausea or vomiting.     Review of Systems   Constitutional:  Negative for activity change, appetite change and chills.   HENT:  Negative for ear pain, sinus pressure/congestion and sneezing.    Respiratory:  Negative for cough and shortness of breath.    Cardiovascular:  Negative for chest pain, palpitations and leg swelling.   Gastrointestinal:  Negative for abdominal distention, abdominal pain, constipation, diarrhea, nausea and vomiting.   Genitourinary:  Negative for dysuria and hematuria.   Musculoskeletal:  Negative for arthralgias, back pain and myalgias.   Neurological:  Negative for dizziness and headaches.   Psychiatric/Behavioral:  Negative for agitation. The patient is not nervous/anxious.            Past Medical History:   Diagnosis Date    Anemia 10/19/2020    Arthritis     Breast cancer 2001    Diabetes mellitus     History of breast cancer 8/21/2013    Hyperlipidemia     Hypertension     Lumbar back pain 3/7/2023    Nuclear sclerotic cataract of right eye 10/16/2012    Osteoporosis, unspecified: see DEXA 8/15- 2/13/2017    Osteoporosis, unspecified: see DEXA 8/15- 2/13/2017    Pain of both  shoulder joints 2/14/2017    Pneumonia 5/4/2024    Rheumatoid factor positive 2/14/2017    Stroke 4/6/2019    Tubular adenoma of colon 10/28/2013    Type 2 diabetes mellitus, without long-term current use of insulin 5/4/2024    Type II or unspecified type diabetes mellitus with neurological manifestations, not stated as uncontrolled(250.60)     Vitamin D deficiency disease 2/17/2014     Past Surgical History:   Procedure Laterality Date    BELPHAROPTOSIS REPAIR  03/15/13    bilateral-dr. coleman md    BREAST BIOPSY      BREAST LUMPECTOMY      BREAST SURGERY Left 2001    lumpectomy    CATARACT EXTRACTION      both eyes -     CHOLECYSTECTOMY      Done in [place] in the 1980's    COLONOSCOPY N/A 1/19/2016    Procedure: COLONOSCOPY;  Surgeon: BLANCA Guerra MD;  Location: [address]);  Service: Endoscopy;  Laterality: N/A;    COLONOSCOPY W/ POLYPECTOMY        Patient Active Problem List   Diagnosis    Hyperlipidemia associated with type 2 diabetes mellitus    Type II diabetes mellitus with neurological manifestations    Ptosis of eyebrow    History of breast cancer    Vitamin D deficiency disease    Hypertension associated with diabetes    History of adenomatous polyp of colon 1/16 no need for further follow up per Dr Guerra    Diabetic polyneuropathy associated with type 2 diabetes mellitus    Fatty liver: see ultrasound 1/17    Rheumatoid factor positive    Chronic kidney disease, stage 3a    Spondylosis of cervical region without myelopathy or radiculopathy    Primary osteoarthritis involving multiple joints    Osteopenia/osteoporosis: see DEXA 4/19 tx recommended    DDD (degenerative disc disease), cervical    Bradycardia    CVA (cerebral vascular accident)    Episode of transient neurologic symptoms    Anemia    Fibromyalgia    Osteopenia    Adhesive capsulitis of left shoulder    Lipoma of back    Lumbar back pain    SOB (shortness of breath)    UTI (urinary tract infection)    Pneumonia     Type 2 diabetes mellitus, without long-term current use of insulin    Cough        Objective:      Physical Exam  Constitutional:       Appearance: Normal appearance.   HENT:      Head: Normocephalic.   Cardiovascular:      Rate and Rhythm: Normal rate and regular rhythm.      Pulses: Normal pulses.      Heart sounds: Normal heart sounds.   Pulmonary:      Effort: Pulmonary effort is normal.      Breath sounds: Normal breath sounds.   Abdominal:      General: Abdomen is flat. Bowel sounds are normal.      Palpations: Abdomen is soft.   Musculoskeletal:         General: Normal range of motion.      Cervical back: Normal range of motion and neck supple.   Skin:     General: Skin is warm and dry.   Neurological:      General: No focal deficit present.      Mental Status: She is alert and oriented to person, place, and time.   Psychiatric:         Mood and Affect: Mood normal.         Assessment:       Problem List Items Addressed This Visit          Endocrine    Type II diabetes mellitus with neurological manifestations    Relevant Medications    atorvastatin (LIPITOR) 20 MG tablet    Other Relevant Orders    Lipid Panel    Hemoglobin A1C       Orthopedic    Fibromyalgia    Relevant Medications    gabapentin (NEURONTIN) 100 MG capsule     Other Visit Diagnoses       Essential hypertension    -  Primary    Relevant Orders    TSH    Stage 3 chronic kidney disease, unspecified whether stage 3a or 3b CKD        Relevant Orders    Comprehensive Metabolic Panel    CBC Auto Differential    Microalbumin/creatinine urine ratio    Screening mammogram for breast cancer        Relevant Orders    Mammo Digital Screening Bilat    Asymptomatic menopausal state        Relevant Orders    DXA Bone Density Axial Skeleton 1 or more sites    Major depression in remission        Relevant Medications    sertraline (ZOLOFT) 25 MG tablet            Plan:         Sera was seen today for follow-up.    Diagnoses and all orders for this  visit:    Essential hypertension  -     TSH; Future    Stage 3 chronic kidney disease, unspecified whether stage 3a or 3b CKD  -     Comprehensive Metabolic Panel; Future  -     CBC Auto Differential; Future  -     Microalbumin/creatinine urine ratio    Type II diabetes mellitus with neurological manifestations  -     Lipid Panel; Future  -     Hemoglobin A1C; Future  -     atorvastatin (LIPITOR) 20 MG tablet; Take 1 tablet (20 mg total) by mouth once daily.    Screening mammogram for breast cancer  -     Mammo Digital Screening Bilat; Future    Asymptomatic menopausal state  -     DXA Bone Density Axial Skeleton 1 or more sites; Future    Fibromyalgia  -     gabapentin (NEURONTIN) 100 MG capsule; Take 1 capsule (100 mg total) by mouth 2 (two) times daily.    Major depression in remission  -     sertraline (ZOLOFT) 25 MG tablet; Take 1 tablet (25 mg total) by mouth once daily.       She is doing well off medications for blood pressure and diabetes.  We will repeat labs in a few months.  Medications refilled for her fibromyalgia.          Beena Hill MD   Internal Medicine   Primary Care

## 2024-08-05 PROBLEM — J18.9 PNEUMONIA: Status: RESOLVED | Noted: 2024-05-04 | Resolved: 2024-08-05

## 2024-08-05 PROBLEM — N39.0 UTI (URINARY TRACT INFECTION): Status: RESOLVED | Noted: 2024-05-04 | Resolved: 2024-08-05

## 2024-11-04 ENCOUNTER — PATIENT MESSAGE (OUTPATIENT)
Dept: INTERNAL MEDICINE | Facility: CLINIC | Age: 89
End: 2024-11-04
Payer: MEDICAID

## 2025-01-29 ENCOUNTER — HOSPITAL ENCOUNTER (OUTPATIENT)
Dept: RADIOLOGY | Facility: HOSPITAL | Age: OVER 89
Discharge: HOME | End: 2025-01-29
Attending: INTERNAL MEDICINE
Payer: MEDICARE

## 2025-01-29 DIAGNOSIS — Z12.31 SCREENING MAMMOGRAM FOR BREAST CANCER: ICD-10-CM

## 2025-01-29 PROCEDURE — 77067 SCR MAMMO BI INCL CAD: CPT | Mod: 26,,, | Performed by: RADIOLOGY

## 2025-01-29 PROCEDURE — 77063 BREAST TOMOSYNTHESIS BI: CPT | Mod: 26,,, | Performed by: RADIOLOGY

## 2025-01-29 PROCEDURE — 77067 SCR MAMMO BI INCL CAD: CPT | Mod: TC

## 2025-02-05 ENCOUNTER — HOSPITAL ENCOUNTER (OUTPATIENT)
Dept: RADIOLOGY | Facility: CLINIC | Age: OVER 89
Discharge: HOME | End: 2025-02-05
Attending: INTERNAL MEDICINE
Payer: MEDICARE

## 2025-02-05 ENCOUNTER — OFFICE VISIT (OUTPATIENT)
Dept: INTERNAL MEDICINE | Facility: CLINIC | Age: OVER 89
End: 2025-02-05
Payer: MEDICARE

## 2025-02-05 DIAGNOSIS — M79.7 FIBROMYALGIA: ICD-10-CM

## 2025-02-05 DIAGNOSIS — I10 ESSENTIAL HYPERTENSION: ICD-10-CM

## 2025-02-05 DIAGNOSIS — Z78.0 ASYMPTOMATIC MENOPAUSAL STATE: ICD-10-CM

## 2025-02-05 DIAGNOSIS — E78.5 HYPERLIPIDEMIA ASSOCIATED WITH TYPE 2 DIABETES MELLITUS: Primary | ICD-10-CM

## 2025-02-05 DIAGNOSIS — E11.69 HYPERLIPIDEMIA ASSOCIATED WITH TYPE 2 DIABETES MELLITUS: Primary | ICD-10-CM

## 2025-02-05 DIAGNOSIS — F32.5 MAJOR DEPRESSION IN REMISSION: ICD-10-CM

## 2025-02-05 DIAGNOSIS — Z23 NEED FOR VACCINATION: ICD-10-CM

## 2025-02-05 DIAGNOSIS — E11.49 TYPE II DIABETES MELLITUS WITH NEUROLOGICAL MANIFESTATIONS: ICD-10-CM

## 2025-02-05 DIAGNOSIS — N18.30 STAGE 3 CHRONIC KIDNEY DISEASE, UNSPECIFIED WHETHER STAGE 3A OR 3B CKD: ICD-10-CM

## 2025-02-05 PROBLEM — N18.31 CHRONIC KIDNEY DISEASE, STAGE 3A: Status: RESOLVED | Noted: 2017-03-16 | Resolved: 2025-02-05

## 2025-02-05 PROCEDURE — 99999 PR PBB SHADOW E&M-EST. PATIENT-LVL III: CPT | Mod: PBBFAC,,, | Performed by: INTERNAL MEDICINE

## 2025-02-05 PROCEDURE — 77080 DXA BONE DENSITY AXIAL: CPT | Mod: TC

## 2025-02-05 PROCEDURE — 77080 DXA BONE DENSITY AXIAL: CPT | Mod: 26,,, | Performed by: INTERNAL MEDICINE

## 2025-02-05 RX ORDER — SERTRALINE HYDROCHLORIDE 25 MG/1
25 TABLET, FILM COATED ORAL DAILY
Qty: 90 TABLET | Refills: 3 | Status: SHIPPED | OUTPATIENT
Start: 2025-02-05

## 2025-02-05 RX ORDER — ATORVASTATIN CALCIUM 20 MG/1
20 TABLET, FILM COATED ORAL DAILY
Qty: 90 TABLET | Refills: 3 | Status: SHIPPED | OUTPATIENT
Start: 2025-02-05 | End: 2026-02-05

## 2025-02-05 RX ORDER — GABAPENTIN 100 MG/1
100 CAPSULE ORAL 2 TIMES DAILY
Qty: 180 CAPSULE | Refills: 3 | Status: SHIPPED | OUTPATIENT
Start: 2025-02-05 | End: 2026-01-31

## 2025-02-05 NOTE — PROGRESS NOTES
Subjective:       Patient ID: Sera Boone is a 89 y.o. female.    Chief Complaint: Follow-up    BP slightly elevated today.   Has been normal at home.     PMHx:  DMII:  She has been off metformin for the last few months and her A1C remained stable at 6.9.     Fibromyalgia started gabapentin during last visit which is helping. Also started PT which has been helping.      Osteoporosis was previously on alendronate      HLD: on statin      Mild iron deficiency anemia has been stable on lab s        Hx or Vertigo relate cerumen impaction.          Follow-up  Pertinent negatives include no abdominal pain, arthralgias, chest pain, chills, coughing, headaches, myalgias, nausea or vomiting.     Review of Systems   Constitutional:  Negative for activity change, appetite change and chills.   HENT:  Negative for ear pain, sinus pressure/congestion and sneezing.    Respiratory:  Negative for cough and shortness of breath.    Cardiovascular:  Negative for chest pain, palpitations and leg swelling.   Gastrointestinal:  Negative for abdominal distention, abdominal pain, constipation, diarrhea, nausea and vomiting.   Genitourinary:  Negative for dysuria and hematuria.   Musculoskeletal:  Negative for arthralgias, back pain and myalgias.   Neurological:  Negative for dizziness and headaches.   Psychiatric/Behavioral:  Negative for agitation. The patient is not nervous/anxious.            Past Medical History:   Diagnosis Date    Anemia 10/19/2020    Arthritis     Breast cancer 2001    Diabetes mellitus     History of breast cancer 8/21/2013    Hyperlipidemia     Hypertension     Lumbar back pain 3/7/2023    Nuclear sclerotic cataract of right eye 10/16/2012    Osteoporosis, unspecified: see DEXA 8/15- 2/13/2017    Osteoporosis, unspecified: see DEXA 8/15- 2/13/2017    Pain of both shoulder joints 2/14/2017    Pneumonia 5/4/2024    Rheumatoid factor positive 2/14/2017    Stroke 4/6/2019    Tubular adenoma of colon 10/28/2013     Type 2 diabetes mellitus, without long-term current use of insulin 5/4/2024    Type II or unspecified type diabetes mellitus with neurological manifestations, not stated as uncontrolled(250.60)     Vitamin D deficiency disease 2/17/2014     Past Surgical History:   Procedure Laterality Date    BELPHAROPTOSIS REPAIR  03/15/13    bilateral-dr. coleman md    BREAST BIOPSY      BREAST LUMPECTOMY      BREAST SURGERY Left 2001    lumpectomy    CATARACT EXTRACTION      both eyes -     CHOLECYSTECTOMY      Done in [place] in the 1980's    COLONOSCOPY N/A 1/19/2016    Procedure: COLONOSCOPY;  Surgeon: BLANCA Guerra MD;  Location: [address]);  Service: Endoscopy;  Laterality: N/A;    COLONOSCOPY W/ POLYPECTOMY        Patient Active Problem List   Diagnosis    Hyperlipidemia associated with type 2 diabetes mellitus    Type II diabetes mellitus with neurological manifestations    Ptosis of eyebrow    History of breast cancer    Vitamin D deficiency disease    Hypertension associated with diabetes    History of adenomatous polyp of colon 1/16 no need for further follow up per Dr Guerra    Diabetic polyneuropathy associated with type 2 diabetes mellitus    Fatty liver: see ultrasound 1/17    Rheumatoid factor positive    Spondylosis of cervical region without myelopathy or radiculopathy    Primary osteoarthritis involving multiple joints    Osteopenia/osteoporosis: see DEXA 4/19 tx recommended    DDD (degenerative disc disease), cervical    Bradycardia    CVA (cerebral vascular accident)    Episode of transient neurologic symptoms    Anemia    Fibromyalgia    Osteopenia    Adhesive capsulitis of left shoulder    Lipoma of back    Lumbar back pain    SOB (shortness of breath)    Type 2 diabetes mellitus, without long-term current use of insulin    Cough        Objective:      Physical Exam  Constitutional:       Appearance: Normal appearance.   HENT:      Head: Normocephalic.   Cardiovascular:       Rate and Rhythm: Normal rate and regular rhythm.      Pulses: Normal pulses.      Heart sounds: Normal heart sounds.   Pulmonary:      Effort: Pulmonary effort is normal.      Breath sounds: Normal breath sounds.   Abdominal:      General: Abdomen is flat. Bowel sounds are normal.      Palpations: Abdomen is soft.   Musculoskeletal:         General: Normal range of motion.      Cervical back: Normal range of motion and neck supple.   Skin:     General: Skin is warm and dry.   Neurological:      General: No focal deficit present.      Mental Status: She is alert and oriented to person, place, and time.   Psychiatric:         Mood and Affect: Mood normal.       Assessment:       Problem List Items Addressed This Visit          Cardiac/Vascular    Hyperlipidemia associated with type 2 diabetes mellitus - Primary    Relevant Orders    Lipid Panel (Completed)       Endocrine    Type II diabetes mellitus with neurological manifestations    Relevant Medications    atorvastatin (LIPITOR) 20 MG tablet    Other Relevant Orders    Comprehensive Metabolic Panel (Completed)    CBC Auto Differential (Completed)    Hemoglobin A1C (Completed)    TSH (Completed)    Ambulatory referral/consult to Optometry       Orthopedic    Fibromyalgia    Relevant Medications    gabapentin (NEURONTIN) 100 MG capsule     Other Visit Diagnoses         Major depression in remission        Relevant Medications    sertraline (ZOLOFT) 25 MG tablet      Need for vaccination        Relevant Orders    Influenza - Trivalent (Adjuvanted) (Completed)      Essential hypertension          Stage 3 chronic kidney disease, unspecified whether stage 3a or 3b CKD                  Plan:         Sera was seen today for follow-up.    Diagnoses and all orders for this visit:    Hyperlipidemia associated with type 2 diabetes mellitus  -     Lipid Panel; Future  Continue statin. Check lipids     Fibromyalgia  -     gabapentin (NEURONTIN) 100 MG capsule; Take 1 capsule  (100 mg total) by mouth 2 (two) times daily.  Doing well on gabapentin.     Type II diabetes mellitus with neurological manifestations  -     Comprehensive Metabolic Panel; Future  -     CBC Auto Differential; Future  -     Hemoglobin A1C; Future  -     TSH; Future  -     Ambulatory referral/consult to Optometry; Future  -     atorvastatin (LIPITOR) 20 MG tablet; Take 1 tablet (20 mg total) by mouth once daily.  Has remained off DM medications. Diet controlled. Check A1C today     Major depression in remission  -     sertraline (ZOLOFT) 25 MG tablet; Take 1 tablet (25 mg total) by mouth once daily.  Well controlled on zoloft.     Need for vaccination  -     Influenza - Trivalent (Adjuvanted)    Essential hypertension  Has remained well controlled off medications.     Stage 3 chronic kidney disease, unspecified whether stage 3a or 3b CKD   Stable on recent labs.  We will repeat labs today.  Avoid NSAIDs.  Stay hydrated.                Beena Hill MD   Internal Medicine   Primary Care

## 2025-02-11 ENCOUNTER — PATIENT MESSAGE (OUTPATIENT)
Dept: INTERNAL MEDICINE | Facility: CLINIC | Age: OVER 89
End: 2025-02-11
Payer: MEDICARE

## 2025-02-11 DIAGNOSIS — E11.9 TYPE 2 DIABETES MELLITUS WITHOUT COMPLICATION, WITHOUT LONG-TERM CURRENT USE OF INSULIN: Primary | ICD-10-CM

## 2025-02-12 RX ORDER — INSULIN PUMP SYRINGE, 3 ML
EACH MISCELLANEOUS
Qty: 1 EACH | Refills: 0 | OUTPATIENT
Start: 2025-02-12 | End: 2026-02-12

## 2025-02-12 RX ORDER — LANCETS
EACH MISCELLANEOUS
Qty: 100 EACH | Refills: 3 | OUTPATIENT
Start: 2025-02-12

## 2025-02-12 NOTE — TELEPHONE ENCOUNTER
" LOV with Beena Hill MD , 2/5/2025  Pts son states pt asked about starting new medication for diabetes at her visit last week. Note states "She has been off metformin for the last few months and her A1C remained stable at 6.9. "  Pts A1C from 2/5/25 was 8.0.  Pt is also in need of testing supplies. Rx pended for review  "

## 2025-02-19 ENCOUNTER — RESULTS FOLLOW-UP (OUTPATIENT)
Dept: INTERNAL MEDICINE | Facility: CLINIC | Age: OVER 89
End: 2025-02-19

## 2025-02-26 ENCOUNTER — PATIENT MESSAGE (OUTPATIENT)
Dept: INTERNAL MEDICINE | Facility: CLINIC | Age: OVER 89
End: 2025-02-26
Payer: MEDICARE

## 2025-02-27 NOTE — TELEPHONE ENCOUNTER
"Pt sent a message on 2/26/25 following up on her message that was originally sent.   LOV with Beena Hill MD , 2/5/2025  Pts son states pt asked about starting new medication for diabetes at her visit last week. Note states "She has been off metformin for the last few months and her A1C remained stable at 6.9. "  Pts A1C from 2/5/25 was 8.0.  Pt is also in need of testing supplies. Rx pended for review  "

## 2025-02-28 VITALS
WEIGHT: 137.13 LBS | DIASTOLIC BLOOD PRESSURE: 80 MMHG | OXYGEN SATURATION: 98 % | HEART RATE: 55 BPM | SYSTOLIC BLOOD PRESSURE: 138 MMHG | BODY MASS INDEX: 26.92 KG/M2 | HEIGHT: 60 IN

## 2025-04-03 ENCOUNTER — PATIENT MESSAGE (OUTPATIENT)
Dept: INTERNAL MEDICINE | Facility: CLINIC | Age: OVER 89
End: 2025-04-03
Payer: MEDICARE

## 2025-04-04 ENCOUNTER — TELEPHONE (OUTPATIENT)
Dept: INTERNAL MEDICINE | Facility: CLINIC | Age: OVER 89
End: 2025-04-04
Payer: MEDICARE

## 2025-04-04 RX ORDER — METFORMIN HYDROCHLORIDE 500 MG/1
500 TABLET, EXTENDED RELEASE ORAL DAILY
Qty: 90 TABLET | Refills: 3 | Status: SHIPPED | OUTPATIENT
Start: 2025-04-04 | End: 2026-04-04

## 2025-04-04 NOTE — TELEPHONE ENCOUNTER
I spoke with the patients son and he stated that the patient was taken off of meformin and was suppose to have been prescribed a new medication. He is trying to see if you could put the order in for that new medication for her diabetes. Her pharmacy is at [facility]. Phone# [phone] fax#[phone]. Please advise

## 2025-04-04 NOTE — TELEPHONE ENCOUNTER
"LOV with Beena Hill MD , 2/5/2025  Pts son states pt asked about starting new medication for diabetes at her visit last week. Note states "She has been off metformin for the last few months and her A1C remained stable at 6.9. "  Pts A1C from 2/5/25 was 8.0.  "

## 2025-04-16 ENCOUNTER — TELEPHONE (OUTPATIENT)
Dept: INTERNAL MEDICINE | Facility: CLINIC | Age: OVER 89
End: 2025-04-16
Payer: MEDICARE

## 2025-04-16 ENCOUNTER — RESULTS FOLLOW-UP (OUTPATIENT)
Dept: INTERNAL MEDICINE | Facility: CLINIC | Age: OVER 89
End: 2025-04-16

## 2025-04-16 DIAGNOSIS — E11.9 TYPE 2 DIABETES MELLITUS WITHOUT COMPLICATION, WITHOUT LONG-TERM CURRENT USE OF INSULIN: Primary | ICD-10-CM

## 2025-04-16 NOTE — TELEPHONE ENCOUNTER
----- Message from Beena Hill MD sent at 4/16/2025 11:59 AM CDT -----  Please help them schedule these labs for 6 weeks time. Thank you   ----- Message -----  From: [facility] [employer] [facility] Interface  Sent: 2/5/2025   2:03 PM CDT  To: Beena Hill MD

## 2025-04-25 ENCOUNTER — PATIENT MESSAGE (OUTPATIENT)
Dept: INTERNAL MEDICINE | Facility: CLINIC | Age: OVER 89
End: 2025-04-25
Payer: MEDICARE

## 2025-05-14 NOTE — TELEPHONE ENCOUNTER
No care due was identified.  [facility] Embedded Care Due Messages. Reference number: [id number].   5/14/2025 2:39:32 PM CDT

## 2025-05-14 NOTE — TELEPHONE ENCOUNTER
----- Message from Latanya sent at 5/14/2025 10:44 AM CDT -----  Contact: [phone] Patient  Requesting an RX refill or new RX.Is this a refill or new RX: newRX name and strength (copy/paste from chart):  omega-3 acid ethyl esters (LOVAZA) 1 gram capsule 180 capsule 3 2/15/2024 - NoIs this a 30 day or 90 day RX: Pharmacy name and phone # (copy/paste from chart):  [id number] - [address] & [address] [address]Ctkvw: [phone] Fax: [phone] doctors have asked that we provide their patients with the following 2 reminders -- prescription refills can take up to 72 hours, and a friendly reminder that in the future you can use your [facility] account to request refills: yes

## 2025-05-15 RX ORDER — OMEGA-3-ACID ETHYL ESTERS 1 G/1
1 CAPSULE, LIQUID FILLED ORAL 2 TIMES DAILY
Qty: 180 CAPSULE | Refills: 3 | OUTPATIENT
Start: 2025-05-15

## 2025-05-15 NOTE — TELEPHONE ENCOUNTER
Refill Decision Note   Sera Boone  is requesting a refill authorization.    Brief Assessment and Rationale for Refill:   Quick Discontinue       Medication Therapy Plan:   Duplicate      Comments:     Note composed:8:20 AM 05/15/2025

## 2025-06-02 RX ORDER — OMEGA-3-ACID ETHYL ESTERS 1 G/1
1 CAPSULE, LIQUID FILLED ORAL 2 TIMES DAILY
Qty: 180 CAPSULE | Refills: 2 | Status: SHIPPED | OUTPATIENT
Start: 2025-06-02

## 2025-06-04 ENCOUNTER — OFFICE VISIT (OUTPATIENT)
Dept: OPTOMETRY | Facility: CLINIC | Age: OVER 89
End: 2025-06-04
Payer: MEDICARE

## 2025-06-04 DIAGNOSIS — Z96.1 PSEUDOPHAKIA: ICD-10-CM

## 2025-06-04 DIAGNOSIS — H52.4 HYPEROPIA WITH REGULAR ASTIGMATISM AND PRESBYOPIA, BILATERAL: ICD-10-CM

## 2025-06-04 DIAGNOSIS — H35.363 RETINAL DRUSEN, BILATERAL: ICD-10-CM

## 2025-06-04 DIAGNOSIS — E11.9 TYPE 2 DIABETES MELLITUS WITHOUT RETINOPATHY: Primary | ICD-10-CM

## 2025-06-04 DIAGNOSIS — H52.03 HYPEROPIA WITH REGULAR ASTIGMATISM AND PRESBYOPIA, BILATERAL: ICD-10-CM

## 2025-06-04 DIAGNOSIS — H35.363 DRUSEN OF MACULA, BILATERAL: ICD-10-CM

## 2025-06-04 DIAGNOSIS — H16.143 SPK (SUPERFICIAL PUNCTATE KERATITIS), BILATERAL: ICD-10-CM

## 2025-06-04 DIAGNOSIS — E11.49 TYPE II DIABETES MELLITUS WITH NEUROLOGICAL MANIFESTATIONS: ICD-10-CM

## 2025-06-04 DIAGNOSIS — H52.223 HYPEROPIA WITH REGULAR ASTIGMATISM AND PRESBYOPIA, BILATERAL: ICD-10-CM

## 2025-06-04 PROCEDURE — 1101F PT FALLS ASSESS-DOCD LE1/YR: CPT | Mod: CPTII,S$GLB,, | Performed by: OPTOMETRIST

## 2025-06-04 PROCEDURE — 99214 OFFICE O/P EST MOD 30 MIN: CPT | Mod: S$GLB,,, | Performed by: OPTOMETRIST

## 2025-06-04 PROCEDURE — 99999 PR PBB SHADOW E&M-EST. PATIENT-LVL III: CPT | Mod: PBBFAC,,, | Performed by: OPTOMETRIST

## 2025-06-04 PROCEDURE — 3288F FALL RISK ASSESSMENT DOCD: CPT | Mod: CPTII,S$GLB,, | Performed by: OPTOMETRIST

## 2025-06-04 PROCEDURE — 1126F AMNT PAIN NOTED NONE PRSNT: CPT | Mod: CPTII,S$GLB,, | Performed by: OPTOMETRIST

## 2025-06-04 PROCEDURE — 2023F DILAT RTA XM W/O RTNOPTHY: CPT | Mod: CPTII,S$GLB,, | Performed by: OPTOMETRIST

## 2025-06-04 PROCEDURE — 1159F MED LIST DOCD IN RCRD: CPT | Mod: CPTII,S$GLB,, | Performed by: OPTOMETRIST

## 2025-06-04 PROCEDURE — 92015 DETERMINE REFRACTIVE STATE: CPT | Mod: S$GLB,,, | Performed by: OPTOMETRIST

## 2025-06-04 RX ORDER — NEOMYCIN SULFATE, POLYMYXIN B SULFATE AND DEXAMETHASONE 3.5; 10000; 1 MG/ML; [USP'U]/ML; MG/ML
1 SUSPENSION/ DROPS OPHTHALMIC 4 TIMES DAILY
Qty: 5 ML | Refills: 2 | Status: SHIPPED | OUTPATIENT
Start: 2025-06-04

## 2025-06-11 ENCOUNTER — TELEPHONE (OUTPATIENT)
Dept: INTERNAL MEDICINE | Facility: CLINIC | Age: OVER 89
End: 2025-06-11
Payer: MEDICARE

## 2025-06-11 NOTE — TELEPHONE ENCOUNTER
Pt was called I spoke with her son and he stated that he would take her to the [place] location to get them done.

## 2025-06-11 NOTE — TELEPHONE ENCOUNTER
----- Message from Beena Hill MD sent at 6/11/2025  8:47 AM CDT -----  Please make sure she gets the repeat labs I ordered sometime this week or next.t brenda you   ----- Message -----  From: Willy[employer] [facility] Interface  Sent: 2/5/2025   2:03 PM CDT  To: Beena Hill MD

## 2025-06-11 NOTE — TELEPHONE ENCOUNTER
----- Message from Beena Hill MD sent at 6/11/2025  8:47 AM CDT -----  Please make sure she gets the repeat labs I ordered sometime this week or next.t brenda you   ----- Message -----  From: Willy, [facility] Lab Interface  Sent: 2/5/2025   2:03 PM CDT  To: Beena Hill MD

## 2025-06-11 NOTE — TELEPHONE ENCOUNTER
Called pt to get her repeat labs scheduled, I spoke with her son and attempted to get her scheduled at the [place] location however the scheduled was blocked, I told pt that he could bring her in the day that he is able to and the lab should be able to help him with getting her in as a walk in pt

## 2025-06-24 ENCOUNTER — OFFICE VISIT (OUTPATIENT)
Dept: OPTOMETRY | Facility: CLINIC | Age: OVER 89
End: 2025-06-24
Payer: MEDICARE

## 2025-06-24 DIAGNOSIS — H04.123 DRY EYE SYNDROME, BILATERAL: ICD-10-CM

## 2025-06-24 DIAGNOSIS — H57.89 IRRITATION OF BOTH EYES: Primary | ICD-10-CM

## 2025-06-24 PROCEDURE — 3288F FALL RISK ASSESSMENT DOCD: CPT | Mod: CPTII,S$GLB,, | Performed by: OPTOMETRIST

## 2025-06-24 PROCEDURE — 1159F MED LIST DOCD IN RCRD: CPT | Mod: CPTII,S$GLB,, | Performed by: OPTOMETRIST

## 2025-06-24 PROCEDURE — 99212 OFFICE O/P EST SF 10 MIN: CPT | Mod: S$GLB,,, | Performed by: OPTOMETRIST

## 2025-06-24 PROCEDURE — 99999 PR PBB SHADOW E&M-EST. PATIENT-LVL III: CPT | Mod: PBBFAC,,, | Performed by: OPTOMETRIST

## 2025-06-24 PROCEDURE — 1125F AMNT PAIN NOTED PAIN PRSNT: CPT | Mod: CPTII,S$GLB,, | Performed by: OPTOMETRIST

## 2025-06-24 PROCEDURE — 1101F PT FALLS ASSESS-DOCD LE1/YR: CPT | Mod: CPTII,S$GLB,, | Performed by: OPTOMETRIST

## 2025-06-24 NOTE — PROGRESS NOTES
HPI    89 ur old female in today for 2-4 week dry eye check. Pt was last seen   06/04/2025 with Dr. Parish. Diagnosis was Type 2 diabetes mellitus without   retinopathy, Type II diabetes mellitus with neurological manifestations,   SPK (superficial punctate keratitis), bilateral, Pseudophakia, Hyperopia   with regular astigmatism and presbyopia, bilateral, Drusen of macula,   bilateral, and Retinal drusen, bilateral. Pt daughter states that she's   still having some difficulties with dry eyes. Redness, swollen,   irritation, eyelids feel heavy, and blurry OU, but OS is more worse. Rx   drops are not working well. Pt seems to be having an allergic reaction to   the drops  New/Poly/Dex/drops. All of the above symptoms has stayed the   same due to reaction from the Rx.     Last edited by Christine Miller on 6/24/2025 10:28 AM.            Assessment /Plan     For exam results, see Encounter Report.    Irritation of both eyes    Dry eye syndrome, bilateral      MONITOR. ED PT ON ALL EXAM FINDINGS.  DRY EYES OU. D/C MAXITROL OU. RX PREDFORTE TID OU [id number] WITH TAPER.   RECOMMENDED OTC ALLERGY DROPS (PATADAY/ZADITOR/ALAWAY) BID OU. PERFORM COOL COMPRESSES BID/PRN.  CONTINUE W/ AT PRN; MONITOR.   REFER TO DRY EYE SPECIALIST IF NO IMPROVEMENT.  RTC PRN IF NO IMPROVEMENT

## 2025-06-26 ENCOUNTER — TELEPHONE (OUTPATIENT)
Dept: OPHTHALMOLOGY | Facility: CLINIC | Age: OVER 89
End: 2025-06-26
Payer: MEDICARE

## 2025-07-08 ENCOUNTER — PATIENT MESSAGE (OUTPATIENT)
Dept: OPTOMETRY | Facility: CLINIC | Age: OVER 89
End: 2025-07-08
Payer: MEDICARE

## 2025-08-01 ENCOUNTER — LAB VISIT (OUTPATIENT)
Dept: LAB | Facility: HOSPITAL | Age: OVER 89
End: 2025-08-01
Payer: MEDICARE

## 2025-08-01 ENCOUNTER — OFFICE VISIT (OUTPATIENT)
Dept: INTERNAL MEDICINE | Facility: CLINIC | Age: OVER 89
End: 2025-08-01
Payer: MEDICARE

## 2025-08-01 ENCOUNTER — TELEPHONE (OUTPATIENT)
Dept: PHARMACY | Facility: CLINIC | Age: OVER 89
End: 2025-08-01
Payer: MEDICARE

## 2025-08-01 VITALS
SYSTOLIC BLOOD PRESSURE: 150 MMHG | BODY MASS INDEX: 26.41 KG/M2 | OXYGEN SATURATION: 98 % | WEIGHT: 134.5 LBS | HEIGHT: 60 IN | HEART RATE: 56 BPM | DIASTOLIC BLOOD PRESSURE: 74 MMHG

## 2025-08-01 DIAGNOSIS — E11.9 TYPE 2 DIABETES MELLITUS WITHOUT COMPLICATION, WITHOUT LONG-TERM CURRENT USE OF INSULIN: ICD-10-CM

## 2025-08-01 DIAGNOSIS — I10 PRIMARY HYPERTENSION: ICD-10-CM

## 2025-08-01 DIAGNOSIS — R42 VERTIGO: Primary | ICD-10-CM

## 2025-08-01 DIAGNOSIS — H61.23 CERUMEN DEBRIS ON TYMPANIC MEMBRANE OF BOTH EARS: ICD-10-CM

## 2025-08-01 LAB
ABSOLUTE EOSINOPHIL (OHS): 0 K/UL
ABSOLUTE MONOCYTE (OHS): 0.68 K/UL (ref 0.3–1)
ABSOLUTE NEUTROPHIL COUNT (OHS): 2.71 K/UL (ref 1.8–7.7)
ALBUMIN SERPL BCP-MCNC: 4.1 G/DL (ref 3.5–5.2)
ALP SERPL-CCNC: 47 UNIT/L (ref 40–150)
ALT SERPL W/O P-5'-P-CCNC: 16 UNIT/L (ref 0–55)
ANION GAP (OHS): 8 MMOL/L (ref 8–16)
AST SERPL-CCNC: 23 UNIT/L (ref 0–50)
BASOPHILS # BLD AUTO: 0.02 K/UL
BASOPHILS NFR BLD AUTO: 0.3 %
BILIRUB SERPL-MCNC: 0.5 MG/DL (ref 0.1–1)
BUN SERPL-MCNC: 19 MG/DL (ref 8–23)
CALCIUM SERPL-MCNC: 9.9 MG/DL (ref 8.7–10.5)
CHLORIDE SERPL-SCNC: 106 MMOL/L (ref 95–110)
CO2 SERPL-SCNC: 24 MMOL/L (ref 23–29)
CREAT SERPL-MCNC: 1 MG/DL (ref 0.5–1.4)
EAG (OHS): 177 MG/DL (ref 68–131)
ERYTHROCYTE [DISTWIDTH] IN BLOOD BY AUTOMATED COUNT: 13 % (ref 11.5–14.5)
GFR SERPLBLD CREATININE-BSD FMLA CKD-EPI: 54 ML/MIN/1.73/M2
GLUCOSE SERPL-MCNC: 144 MG/DL (ref 70–110)
HBA1C MFR BLD: 7.8 % (ref 4–5.6)
HCT VFR BLD AUTO: 35.4 % (ref 37–48.5)
HGB BLD-MCNC: 11.4 GM/DL (ref 12–16)
IMM GRANULOCYTES # BLD AUTO: 0.03 K/UL (ref 0–0.04)
IMM GRANULOCYTES NFR BLD AUTO: 0.5 % (ref 0–0.5)
LYMPHOCYTES # BLD AUTO: 3.15 K/UL (ref 1–4.8)
MCH RBC QN AUTO: 29.8 PG (ref 27–31)
MCHC RBC AUTO-ENTMCNC: 32.2 G/DL (ref 32–36)
MCV RBC AUTO: 93 FL (ref 82–98)
NUCLEATED RBC (/100WBC) (OHS): 0 /100 WBC
PLATELET # BLD AUTO: 147 K/UL (ref 150–450)
PMV BLD AUTO: 10.7 FL (ref 9.2–12.9)
POTASSIUM SERPL-SCNC: 4.7 MMOL/L (ref 3.5–5.1)
PROT SERPL-MCNC: 8.5 GM/DL (ref 6–8.4)
RBC # BLD AUTO: 3.82 M/UL (ref 4–5.4)
RELATIVE EOSINOPHIL (OHS): 0 %
RELATIVE LYMPHOCYTE (OHS): 47.8 % (ref 18–48)
RELATIVE MONOCYTE (OHS): 10.3 % (ref 4–15)
RELATIVE NEUTROPHIL (OHS): 41.1 % (ref 38–73)
SODIUM SERPL-SCNC: 138 MMOL/L (ref 136–145)
WBC # BLD AUTO: 6.59 K/UL (ref 3.9–12.7)

## 2025-08-01 PROCEDURE — 99999 PR PBB SHADOW E&M-EST. PATIENT-LVL IV: CPT | Mod: PBBFAC,,, | Performed by: INTERNAL MEDICINE

## 2025-08-01 PROCEDURE — 80053 COMPREHEN METABOLIC PANEL: CPT

## 2025-08-01 PROCEDURE — 83036 HEMOGLOBIN GLYCOSYLATED A1C: CPT

## 2025-08-01 PROCEDURE — 85025 COMPLETE CBC W/AUTO DIFF WBC: CPT

## 2025-08-01 PROCEDURE — 36415 COLL VENOUS BLD VENIPUNCTURE: CPT

## 2025-08-01 RX ORDER — AMLODIPINE BESYLATE 2.5 MG/1
2.5 TABLET ORAL DAILY
Qty: 90 TABLET | Refills: 3 | Status: SHIPPED | OUTPATIENT
Start: 2025-08-01 | End: 2025-08-01

## 2025-08-01 RX ORDER — LOSARTAN POTASSIUM 25 MG/1
12.5 TABLET ORAL DAILY
Qty: 45 TABLET | Refills: 3 | Status: SHIPPED | OUTPATIENT
Start: 2025-08-01 | End: 2026-08-01

## 2025-08-01 RX ORDER — BLOOD-GLUCOSE METER
EACH MISCELLANEOUS
COMMUNITY
Start: 2025-03-03

## 2025-08-01 NOTE — TELEPHONE ENCOUNTER
[facility] Chart Review & Patient Outreach Details For Medication Adherence Project    Reason for Outreach Encounter: 3rd Party payor non-compliance report ([employer], [employer], C, etc)  2.  Patient Outreach Method: Reviewed patient chart  and [employer] message  3.   Medication in question:    Diabetes Medications              metFORMIN (GLUCOPHAGE-XR) 500 MG ER 24hr tablet Take 1 tablet (500 mg total) by mouth once daily.                 metformin  last filled  4/5 for 90 day supply      4.  Reviewed and or Updates Made To: Patient Chart  5. Outreach Outcomes and/or actions taken: Sent inquiry to patient: Waiting for response  Additional Notes:

## 2025-08-01 NOTE — PROGRESS NOTES
Subjective:       Patient ID: Sera Boone is a 89 y.o. female.    Chief Complaint: Dizziness and Hypertension    Has been having veritgo daily for the last two months. Comes and goes. Resovles quickly. Occurs mostly when laying in bed or with head sudden movements.   She had this issue in the past in 2023 and at the time it was due to cerumen impaction.  She saw ENT and had her ears cleaned out and this improved her symptoms.    Today she also notes that she has been noticing increased blood pressures in the 150's at home. Previously on lisinopril in 2023 but stopped when BP normalized. Also her kidney function went down slightly on the medication.     PMHx:  DMII:  last A1C came back at 8.0. restarted metformin once daily during last visit.      Fibromyalgia started gabapentin during last visit which is helping. Also started PT which has been helping.      Osteoporosis was previously on alendronate      HLD: on statin      Mild iron deficiency anemia has been stable on lab s       Hx or Vertigo relate cerumen impaction.        Dizziness: no ear pain, no headaches, no nausea, no vomiting, no palpitations and no chest pain.  Hypertension  Pertinent negatives include no chest pain, headaches, palpitations or shortness of breath.     Review of Systems   Constitutional:  Negative for activity change, appetite change and chills.   HENT:  Negative for ear pain, sinus pressure/congestion and sneezing.    Respiratory:  Negative for cough and shortness of breath.    Cardiovascular:  Negative for chest pain, palpitations and leg swelling.   Gastrointestinal:  Negative for abdominal distention, abdominal pain, constipation, diarrhea, nausea and vomiting.   Genitourinary:  Negative for dysuria and hematuria.   Musculoskeletal:  Negative for arthralgias, back pain and myalgias.   Neurological:  Positive for dizziness. Negative for headaches.   Psychiatric/Behavioral:  Negative for agitation. The patient is not  nervous/anxious.            Past Medical History:   Diagnosis Date    Anemia 10/19/2020    Arthritis     Breast cancer 2001    Diabetes mellitus     History of breast cancer 8/21/2013    Hyperlipidemia     Hypertension     Lumbar back pain 3/7/2023    Nuclear sclerotic cataract of right eye 10/16/2012    Osteoporosis, unspecified: see DEXA 8/15- 2/13/2017    Osteoporosis, unspecified: see DEXA 8/15- 2/13/2017    Pain of both shoulder joints 2/14/2017    Pneumonia 5/4/2024    Rheumatoid factor positive 2/14/2017    Stroke 4/6/2019    Tubular adenoma of colon 10/28/2013    Type 2 diabetes mellitus, without long-term current use of insulin 5/4/2024    Type II or unspecified type diabetes mellitus with neurological manifestations, not stated as uncontrolled(250.60)     Vitamin D deficiency disease 2/17/2014     Past Surgical History:   Procedure Laterality Date    BELPHAROPTOSIS REPAIR  03/15/13    bilateral-dr. coleman md    BREAST BIOPSY      BREAST LUMPECTOMY      BREAST SURGERY Left 2001    lumpectomy    CATARACT EXTRACTION      both eyes -     CHOLECYSTECTOMY      Done in [place] in the 1980's    COLONOSCOPY N/A 1/19/2016    Procedure: COLONOSCOPY;  Surgeon: BLANCA Guerra MD;  Location: [address];  Service: Endoscopy;  Laterality: N/A;    COLONOSCOPY W/ POLYPECTOMY        Problem List[1]     Objective:      Physical Exam  Constitutional:       Appearance: Normal appearance.   HENT:      Head: Normocephalic.   Cardiovascular:      Rate and Rhythm: Normal rate and regular rhythm.      Pulses: Normal pulses.      Heart sounds: Normal heart sounds.   Pulmonary:      Effort: Pulmonary effort is normal.      Breath sounds: Normal breath sounds.   Abdominal:      General: Abdomen is flat. Bowel sounds are normal.      Palpations: Abdomen is soft.   Musculoskeletal:         General: Normal range of motion.      Cervical back: Normal range of motion and neck supple.   Skin:     General: Skin is  warm and dry.   Neurological:      General: No focal deficit present.      Mental Status: She is alert and oriented to person, place, and time.   Psychiatric:         Mood and Affect: Mood normal.         Assessment:       Problem List Items Addressed This Visit          Endocrine    Type 2 diabetes mellitus, without long-term current use of insulin    Relevant Orders    Hemoglobin A1C    Comprehensive Metabolic Panel    CBC Auto Differential     Other Visit Diagnoses         Vertigo    -  Primary    Relevant Orders    Ambulatory referral/consult to ENT      Cerumen debris on tympanic membrane of both ears        Relevant Orders    Ambulatory referral/consult to ENT      Primary hypertension                Plan:         Sera was seen today for dizziness and hypertension.    Diagnoses and all orders for this visit:    Vertigo  Cerumen debris on tympanic membrane of both ears  -     Ambulatory referral/consult to ENT; Future  Cerumen impaction noted bilaterally on exam today.  This is likely the cause of her vertigo.  Referral placed to ENT to clean out her ears.  If no improvement we will consider vestibular therapy.    Type 2 diabetes mellitus without complication, without long-term current use of insulin  Labs in April showed A1c increased to 8.  We restarted metformin at that time and blood glucose has been a little better controlled at home.  Repeat A1c today.      Primary hypertension  -     losartan (COZAAR) 25 MG tablet; Take 0.5 tablets (12.5 mg total) by mouth once daily.  We will start low-dose of losartan and increase medication as tolerated.  Continue to monitor blood pressure at home closely.   Repeat renal function in 1 week.            Beena Hill MD   Internal Medicine   Primary Care           [1]   Patient Active Problem List  Diagnosis    Hyperlipidemia associated with type 2 diabetes mellitus    Type II diabetes mellitus with neurological manifestations    Ptosis of eyebrow    History of  breast cancer    Vitamin D deficiency disease    Hypertension associated with diabetes    History of adenomatous polyp of colon 1/16 no need for further follow up per Dr Guerra    Diabetic polyneuropathy associated with type 2 diabetes mellitus    Fatty liver: see ultrasound 1/17    Rheumatoid factor positive    Spondylosis of cervical region without myelopathy or radiculopathy    Primary osteoarthritis involving multiple joints    Osteopenia/osteoporosis: see DEXA 4/19 tx recommended    DDD (degenerative disc disease), cervical    Bradycardia    CVA (cerebral vascular accident)    Episode of transient neurologic symptoms    Anemia    Fibromyalgia    Osteopenia    Adhesive capsulitis of left shoulder    Lipoma of back    Lumbar back pain    SOB (shortness of breath)    Type 2 diabetes mellitus, without long-term current use of insulin    Cough

## 2025-08-08 ENCOUNTER — TELEPHONE (OUTPATIENT)
Dept: INTERNAL MEDICINE | Facility: CLINIC | Age: OVER 89
End: 2025-08-08

## 2025-08-08 ENCOUNTER — CLINICAL SUPPORT (OUTPATIENT)
Dept: INTERNAL MEDICINE | Facility: CLINIC | Age: OVER 89
End: 2025-08-08
Payer: MEDICARE

## 2025-08-08 VITALS — SYSTOLIC BLOOD PRESSURE: 152 MMHG | DIASTOLIC BLOOD PRESSURE: 74 MMHG | HEART RATE: 54 BPM | OXYGEN SATURATION: 99 %

## 2025-08-08 DIAGNOSIS — I10 PRIMARY HYPERTENSION: Primary | ICD-10-CM

## 2025-08-08 PROCEDURE — 99999 PR PBB SHADOW E&M-EST. PATIENT-LVL I: CPT | Mod: PBBFAC,,,

## 2025-08-08 NOTE — TELEPHONE ENCOUNTER
Pt here for BP nurse visit, 2 pt identifiers used. Pt accompanied by her daughter. Pt came in with a wrist blood pressure cuff that read 154/73/2, Pulse: 66. Pt's daughter states she did not take her blood pressure medication before the visit because she was unsure if she should or not. Pt's daughter states it has been less than a week that pt has been consistently taking the blood pressure medication. Vitals have been updated into the system

## 2025-08-08 NOTE — PROGRESS NOTES
Pt here for BP nurse visit, 2 pt identifiers used. Pt accompanied by her daughter. Pt came in with a wrist blood pressure cuff that read 154/73/2, Pulse: 66. Pt's daughter states she did not take her blood pressure medication before the visit because she was unsure if she should or not. Pt's daughter states it has been less than a week that pt has been consistently taking the blood pressure medication. Vitals have been updated into the system

## (undated) DEVICE — NDL HYPODERMIC BLUNT 18G 1.5IN

## (undated) DEVICE — NDL TUOHY EPIDURAL 20G X 3.5

## (undated) DEVICE — SYS LABEL CORRECT MED

## (undated) DEVICE — APPLICATOR CHLORAPREP CLR 10.5

## (undated) DEVICE — SYR GLASS 5CC LUER LOK

## (undated) DEVICE — GLOVE SURG ULTRA TOUCH 7.5

## (undated) DEVICE — SYR DISP LL 5CC

## (undated) DEVICE — TUBING MINIBORE EXTENSION

## (undated) DEVICE — SYR 10CC LUER LOCK

## (undated) DEVICE — NDL SAFETY 25G X 1.5 ECLIPSE